# Patient Record
Sex: FEMALE | Race: WHITE | NOT HISPANIC OR LATINO | URBAN - METROPOLITAN AREA
[De-identification: names, ages, dates, MRNs, and addresses within clinical notes are randomized per-mention and may not be internally consistent; named-entity substitution may affect disease eponyms.]

---

## 2017-06-08 ENCOUNTER — INPATIENT (INPATIENT)
Facility: HOSPITAL | Age: 57
LOS: 1 days | Discharge: HOME | End: 2017-06-10
Attending: INTERNAL MEDICINE

## 2017-06-08 DIAGNOSIS — F93.0 SEPARATION ANXIETY DISORDER OF CHILDHOOD: ICD-10-CM

## 2017-06-08 DIAGNOSIS — I48.91 UNSPECIFIED ATRIAL FIBRILLATION: ICD-10-CM

## 2017-06-08 DIAGNOSIS — I10 ESSENTIAL (PRIMARY) HYPERTENSION: ICD-10-CM

## 2017-06-08 DIAGNOSIS — K21.9 GASTRO-ESOPHAGEAL REFLUX DISEASE WITHOUT ESOPHAGITIS: ICD-10-CM

## 2017-06-08 DIAGNOSIS — J45.909 UNSPECIFIED ASTHMA, UNCOMPLICATED: ICD-10-CM

## 2017-06-28 DIAGNOSIS — I48.91 UNSPECIFIED ATRIAL FIBRILLATION: ICD-10-CM

## 2017-06-28 DIAGNOSIS — J45.909 UNSPECIFIED ASTHMA, UNCOMPLICATED: ICD-10-CM

## 2017-06-28 DIAGNOSIS — E66.01 MORBID (SEVERE) OBESITY DUE TO EXCESS CALORIES: ICD-10-CM

## 2017-06-28 DIAGNOSIS — I48.0 PAROXYSMAL ATRIAL FIBRILLATION: ICD-10-CM

## 2017-06-28 DIAGNOSIS — Z79.01 LONG TERM (CURRENT) USE OF ANTICOAGULANTS: ICD-10-CM

## 2017-06-28 DIAGNOSIS — Z71.3 DIETARY COUNSELING AND SURVEILLANCE: ICD-10-CM

## 2017-06-28 DIAGNOSIS — H91.90 UNSPECIFIED HEARING LOSS, UNSPECIFIED EAR: ICD-10-CM

## 2017-06-28 DIAGNOSIS — I10 ESSENTIAL (PRIMARY) HYPERTENSION: ICD-10-CM

## 2017-06-28 DIAGNOSIS — F32.9 MAJOR DEPRESSIVE DISORDER, SINGLE EPISODE, UNSPECIFIED: ICD-10-CM

## 2018-03-29 ENCOUNTER — EMERGENCY (EMERGENCY)
Facility: HOSPITAL | Age: 58
LOS: 0 days | Discharge: HOME | End: 2018-03-30
Attending: EMERGENCY MEDICINE

## 2018-03-29 VITALS
HEART RATE: 83 BPM | RESPIRATION RATE: 17 BRPM | SYSTOLIC BLOOD PRESSURE: 160 MMHG | DIASTOLIC BLOOD PRESSURE: 78 MMHG | HEIGHT: 67 IN | OXYGEN SATURATION: 98 % | TEMPERATURE: 99 F | WEIGHT: 250 LBS

## 2018-03-29 DIAGNOSIS — Z91.041 RADIOGRAPHIC DYE ALLERGY STATUS: ICD-10-CM

## 2018-03-29 DIAGNOSIS — Z79.899 OTHER LONG TERM (CURRENT) DRUG THERAPY: ICD-10-CM

## 2018-03-29 DIAGNOSIS — R10.9 UNSPECIFIED ABDOMINAL PAIN: ICD-10-CM

## 2018-03-29 DIAGNOSIS — N39.0 URINARY TRACT INFECTION, SITE NOT SPECIFIED: ICD-10-CM

## 2018-03-29 DIAGNOSIS — Z91.013 ALLERGY TO SEAFOOD: ICD-10-CM

## 2018-03-29 DIAGNOSIS — Z98.891 HISTORY OF UTERINE SCAR FROM PREVIOUS SURGERY: ICD-10-CM

## 2018-03-29 PROBLEM — Z00.00 ENCOUNTER FOR PREVENTIVE HEALTH EXAMINATION: Status: ACTIVE | Noted: 2018-03-29

## 2018-03-29 LAB
ALBUMIN SERPL ELPH-MCNC: 4.7 G/DL — SIGNIFICANT CHANGE UP (ref 3.5–5.2)
ALP SERPL-CCNC: 82 U/L — SIGNIFICANT CHANGE UP (ref 30–115)
ALT FLD-CCNC: 12 U/L — SIGNIFICANT CHANGE UP (ref 0–41)
ANION GAP SERPL CALC-SCNC: 12 MMOL/L — SIGNIFICANT CHANGE UP (ref 7–14)
APPEARANCE UR: CLEAR — SIGNIFICANT CHANGE UP
AST SERPL-CCNC: 13 U/L — SIGNIFICANT CHANGE UP (ref 0–41)
BACTERIA # UR AUTO: (no result)
BILIRUB SERPL-MCNC: 0.4 MG/DL — SIGNIFICANT CHANGE UP (ref 0.2–1.2)
BILIRUB UR-MCNC: NEGATIVE — SIGNIFICANT CHANGE UP
BUN SERPL-MCNC: 18 MG/DL — SIGNIFICANT CHANGE UP (ref 10–20)
CALCIUM SERPL-MCNC: 10.3 MG/DL — HIGH (ref 8.5–10.1)
CHLORIDE SERPL-SCNC: 101 MMOL/L — SIGNIFICANT CHANGE UP (ref 98–110)
CO2 SERPL-SCNC: 30 MMOL/L — SIGNIFICANT CHANGE UP (ref 17–32)
COD CRY URNS QL: NEGATIVE — SIGNIFICANT CHANGE UP
COLOR SPEC: YELLOW — SIGNIFICANT CHANGE UP
CREAT SERPL-MCNC: 0.8 MG/DL — SIGNIFICANT CHANGE UP (ref 0.7–1.5)
DIFF PNL FLD: (no result)
EPI CELLS # UR: (no result) /HPF
GLUCOSE SERPL-MCNC: 83 MG/DL — SIGNIFICANT CHANGE UP (ref 70–99)
GLUCOSE UR QL: NEGATIVE MG/DL — SIGNIFICANT CHANGE UP
GRAN CASTS # UR COMP ASSIST: NEGATIVE — SIGNIFICANT CHANGE UP
HCT VFR BLD CALC: 40.2 % — SIGNIFICANT CHANGE UP (ref 37–47)
HGB BLD-MCNC: 13.1 G/DL — SIGNIFICANT CHANGE UP (ref 12–16)
HYALINE CASTS # UR AUTO: (no result) /LPF
KETONES UR-MCNC: NEGATIVE — SIGNIFICANT CHANGE UP
LACTATE SERPL-SCNC: 0.9 MMOL/L — SIGNIFICANT CHANGE UP (ref 0.5–2.2)
LEUKOCYTE ESTERASE UR-ACNC: (no result)
LIDOCAIN IGE QN: 25 U/L — SIGNIFICANT CHANGE UP (ref 7–60)
MCHC RBC-ENTMCNC: 26 PG — LOW (ref 27–31)
MCHC RBC-ENTMCNC: 32.6 G/DL — SIGNIFICANT CHANGE UP (ref 32–37)
MCV RBC AUTO: 79.9 FL — LOW (ref 81–99)
NITRITE UR-MCNC: NEGATIVE — SIGNIFICANT CHANGE UP
NRBC # BLD: 0 /100 WBCS — SIGNIFICANT CHANGE UP (ref 0–0)
PH UR: 5.5 — SIGNIFICANT CHANGE UP (ref 5–8)
PLATELET # BLD AUTO: 351 K/UL — SIGNIFICANT CHANGE UP (ref 130–400)
POTASSIUM SERPL-MCNC: 4.2 MMOL/L — SIGNIFICANT CHANGE UP (ref 3.5–5)
POTASSIUM SERPL-SCNC: 4.2 MMOL/L — SIGNIFICANT CHANGE UP (ref 3.5–5)
PROT SERPL-MCNC: 7.5 G/DL — SIGNIFICANT CHANGE UP (ref 6–8)
PROT UR-MCNC: (no result) MG/DL
RBC # BLD: 5.03 M/UL — SIGNIFICANT CHANGE UP (ref 4.2–5.4)
RBC # FLD: 13.3 % — SIGNIFICANT CHANGE UP (ref 11.5–14.5)
RBC CASTS # UR COMP ASSIST: (no result) /HPF
SODIUM SERPL-SCNC: 143 MMOL/L — SIGNIFICANT CHANGE UP (ref 135–146)
SP GR SPEC: >=1.03 (ref 1.01–1.03)
TRI-PHOS CRY UR QL COMP ASSIST: NEGATIVE — SIGNIFICANT CHANGE UP
URATE CRY FLD QL MICRO: NEGATIVE — SIGNIFICANT CHANGE UP
UROBILINOGEN FLD QL: 0.2 MG/DL — SIGNIFICANT CHANGE UP (ref 0.2–0.2)
WBC # BLD: 9.24 K/UL — SIGNIFICANT CHANGE UP (ref 4.8–10.8)
WBC # FLD AUTO: 9.24 K/UL — SIGNIFICANT CHANGE UP (ref 4.8–10.8)
WBC UR QL: SIGNIFICANT CHANGE UP /HPF

## 2018-03-29 RX ORDER — DIATRIZOATE MEGLUMINE 180 MG/ML
20 INJECTION, SOLUTION INTRAVESICAL ONCE
Qty: 0 | Refills: 0 | Status: COMPLETED | OUTPATIENT
Start: 2018-03-29 | End: 2018-03-29

## 2018-03-29 RX ORDER — SODIUM CHLORIDE 9 MG/ML
500 INJECTION INTRAMUSCULAR; INTRAVENOUS; SUBCUTANEOUS ONCE
Qty: 0 | Refills: 0 | Status: COMPLETED | OUTPATIENT
Start: 2018-03-29 | End: 2018-03-29

## 2018-03-29 RX ORDER — SODIUM CHLORIDE 9 MG/ML
1000 INJECTION INTRAMUSCULAR; INTRAVENOUS; SUBCUTANEOUS ONCE
Qty: 0 | Refills: 0 | Status: COMPLETED | OUTPATIENT
Start: 2018-03-29 | End: 2018-03-29

## 2018-03-29 RX ADMIN — SODIUM CHLORIDE 500 MILLILITER(S): 9 INJECTION INTRAMUSCULAR; INTRAVENOUS; SUBCUTANEOUS at 23:30

## 2018-03-29 RX ADMIN — DIATRIZOATE MEGLUMINE 20 MILLILITER(S): 180 INJECTION, SOLUTION INTRAVESICAL at 20:53

## 2018-03-29 RX ADMIN — SODIUM CHLORIDE 1000 MILLILITER(S): 9 INJECTION INTRAMUSCULAR; INTRAVENOUS; SUBCUTANEOUS at 20:54

## 2018-03-29 NOTE — ED PROVIDER NOTE - MEDICAL DECISION MAKING DETAILS
pt reports feeling better, tolerated po in ed, aware of all labs and imaging, will give copy of results as reviewed and understood, aware of u.a. agrees with abx as reports similar to when she had uti in past, will send abx to pharmacy, aware of signs and symptoms to return for, will follow up with pmd as well, abd reexam soft ndnt no rebound tenderness.

## 2018-03-29 NOTE — ED ADULT NURSE NOTE - PMH
Bilateral carpal tunnel syndrome    Closed fracture of foot, unspecified laterality, sequela    History of  section

## 2018-03-29 NOTE — ED PROVIDER NOTE - CARE PLAN
Assessment and plan of treatment:	Plan: Labs, ivf, reports pain controlled at this time. u.a. + culture, imaging with oral contrast as pt cannot have iv contrast due to anaphylaxis, jakob mojicao, reassess. Principal Discharge DX:	UTI (urinary tract infection)  Assessment and plan of treatment:	Plan: Labs, ivf, reports pain controlled at this time. u.a. + culture, imaging with oral contrast as pt cannot have iv contrast due to anaphylaxis, ruq sono, reassess.  Secondary Diagnosis:	Abdominal pain

## 2018-03-29 NOTE — ED PROVIDER NOTE - PROGRESS NOTE DETAILS
pt reports can have oral contrast, states cannot have IV contrast, has had oral contrast in the past. states no IV due to anaphylaxis to IV contrast. pt aware and agrees with current plan, will continue to monitor and reassess.

## 2018-03-29 NOTE — ED PROVIDER NOTE - PLAN OF CARE
Plan: Labs, ivf, reports pain controlled at this time. u.a. + culture, imaging with oral contrast as pt cannot have iv contrast due to anaphylaxis, ruryan mojicao, reassess.

## 2018-03-29 NOTE — ED PROVIDER NOTE - PHYSICAL EXAMINATION
on exam: WDWN appearing female sitting on stretcher in nad, no rash, mmm, regular rate, radial pulses 2/4 b/l, no jvd, ctabl w/ breath sounds present b/l, no wheezing or crackles, good air exchange, good respiratory effort, no accessory muscle use, no tachypnea, no stridor, bs present throughout all 4 quadrants, abd soft, nd, tenderness to palpation to RUQ and lower mid abdomen, (-) murphys (-) rovsing (-) obturator (-) psoas  no rebound tenderness or guarding, no cvat, no pelvic pain to palpation, FROM of ext, no edema, no calf pain/swelling/erythema, AAOx3.

## 2018-03-29 NOTE — ED PROVIDER NOTE - OBJECTIVE STATEMENT
A 58 y/o f w/ pmhx of afib on xarelto, pyelonephritis, diverticulitis, asthma, b/l hearing aides presents for generalized abd pain x 4 days, worsening associated w/ R flank pain and subjective fever. went to urgent care center and was sent to ed for imaging and evaluation. denies chills, n/v, cp, sob, pleuritic chest pain, palpitations, diaphoresis, cough, diarrhea, constipation, melena/brbpr, urinary symptoms, back/ flank pain, vaginal bleeding/discharge/odor, sick contacts, recent travel or rash. had been having normal bm.

## 2018-03-30 VITALS
HEART RATE: 67 BPM | SYSTOLIC BLOOD PRESSURE: 175 MMHG | OXYGEN SATURATION: 99 % | TEMPERATURE: 97 F | DIASTOLIC BLOOD PRESSURE: 91 MMHG | RESPIRATION RATE: 18 BRPM

## 2018-03-30 RX ORDER — SACCHAROMYCES BOULARDII 250 MG
1 POWDER IN PACKET (EA) ORAL
Qty: 7 | Refills: 0 | OUTPATIENT
Start: 2018-03-30 | End: 2018-04-05

## 2018-03-30 RX ORDER — CEFPODOXIME PROXETIL 100 MG
1 TABLET ORAL
Qty: 14 | Refills: 0 | OUTPATIENT
Start: 2018-03-30 | End: 2018-04-05

## 2018-09-02 ENCOUNTER — EMERGENCY (EMERGENCY)
Facility: HOSPITAL | Age: 58
LOS: 0 days | Discharge: HOME | End: 2018-09-02
Attending: EMERGENCY MEDICINE | Admitting: EMERGENCY MEDICINE

## 2018-09-02 VITALS — HEIGHT: 67 IN | WEIGHT: 250 LBS

## 2018-09-02 VITALS — RESPIRATION RATE: 16 BRPM | TEMPERATURE: 96 F | HEART RATE: 68 BPM

## 2018-09-02 DIAGNOSIS — J45.909 UNSPECIFIED ASTHMA, UNCOMPLICATED: ICD-10-CM

## 2018-09-02 DIAGNOSIS — Z98.890 OTHER SPECIFIED POSTPROCEDURAL STATES: ICD-10-CM

## 2018-09-02 DIAGNOSIS — Z79.51 LONG TERM (CURRENT) USE OF INHALED STEROIDS: ICD-10-CM

## 2018-09-02 DIAGNOSIS — Z79.899 OTHER LONG TERM (CURRENT) DRUG THERAPY: ICD-10-CM

## 2018-09-02 DIAGNOSIS — I48.91 UNSPECIFIED ATRIAL FIBRILLATION: ICD-10-CM

## 2018-09-02 DIAGNOSIS — Z87.891 PERSONAL HISTORY OF NICOTINE DEPENDENCE: ICD-10-CM

## 2018-09-02 DIAGNOSIS — Z79.01 LONG TERM (CURRENT) USE OF ANTICOAGULANTS: ICD-10-CM

## 2018-09-02 DIAGNOSIS — R42 DIZZINESS AND GIDDINESS: ICD-10-CM

## 2018-09-02 DIAGNOSIS — R51 HEADACHE: ICD-10-CM

## 2018-09-02 DIAGNOSIS — Z79.2 LONG TERM (CURRENT) USE OF ANTIBIOTICS: ICD-10-CM

## 2018-09-02 DIAGNOSIS — Z91.048 OTHER NONMEDICINAL SUBSTANCE ALLERGY STATUS: ICD-10-CM

## 2018-09-02 DIAGNOSIS — Z91.013 ALLERGY TO SEAFOOD: ICD-10-CM

## 2018-09-02 DIAGNOSIS — I10 ESSENTIAL (PRIMARY) HYPERTENSION: ICD-10-CM

## 2018-09-02 PROBLEM — Z98.891 HISTORY OF UTERINE SCAR FROM PREVIOUS SURGERY: Chronic | Status: ACTIVE | Noted: 2018-03-29

## 2018-09-02 PROBLEM — G56.03 CARPAL TUNNEL SYNDROME, BILATERAL UPPER LIMBS: Chronic | Status: ACTIVE | Noted: 2018-03-29

## 2018-09-02 PROBLEM — S92.909S: Chronic | Status: ACTIVE | Noted: 2018-03-29

## 2018-09-02 LAB
ALBUMIN SERPL ELPH-MCNC: 4.2 G/DL — SIGNIFICANT CHANGE UP (ref 3.5–5.2)
ALP SERPL-CCNC: 87 U/L — SIGNIFICANT CHANGE UP (ref 30–115)
ALT FLD-CCNC: 14 U/L — SIGNIFICANT CHANGE UP (ref 0–41)
ANION GAP SERPL CALC-SCNC: 10 MMOL/L — SIGNIFICANT CHANGE UP (ref 7–14)
AST SERPL-CCNC: 14 U/L — SIGNIFICANT CHANGE UP (ref 0–41)
BASOPHILS # BLD AUTO: 0.06 K/UL — SIGNIFICANT CHANGE UP (ref 0–0.2)
BASOPHILS NFR BLD AUTO: 0.8 % — SIGNIFICANT CHANGE UP (ref 0–1)
BILIRUB SERPL-MCNC: 0.4 MG/DL — SIGNIFICANT CHANGE UP (ref 0.2–1.2)
BUN SERPL-MCNC: 13 MG/DL — SIGNIFICANT CHANGE UP (ref 10–20)
CALCIUM SERPL-MCNC: 9.6 MG/DL — SIGNIFICANT CHANGE UP (ref 8.5–10.1)
CHLORIDE SERPL-SCNC: 103 MMOL/L — SIGNIFICANT CHANGE UP (ref 98–110)
CO2 SERPL-SCNC: 28 MMOL/L — SIGNIFICANT CHANGE UP (ref 17–32)
CREAT SERPL-MCNC: 0.9 MG/DL — SIGNIFICANT CHANGE UP (ref 0.7–1.5)
EOSINOPHIL # BLD AUTO: 0.33 K/UL — SIGNIFICANT CHANGE UP (ref 0–0.7)
EOSINOPHIL NFR BLD AUTO: 4.6 % — SIGNIFICANT CHANGE UP (ref 0–8)
GLUCOSE SERPL-MCNC: 100 MG/DL — HIGH (ref 70–99)
HCT VFR BLD CALC: 38.5 % — SIGNIFICANT CHANGE UP (ref 37–47)
HGB BLD-MCNC: 12.4 G/DL — SIGNIFICANT CHANGE UP (ref 12–16)
IMM GRANULOCYTES NFR BLD AUTO: 0.1 % — SIGNIFICANT CHANGE UP (ref 0.1–0.3)
LACTATE SERPL-SCNC: 1.3 MMOL/L — SIGNIFICANT CHANGE UP (ref 0.5–2.2)
LYMPHOCYTES # BLD AUTO: 1.75 K/UL — SIGNIFICANT CHANGE UP (ref 1.2–3.4)
LYMPHOCYTES # BLD AUTO: 24.4 % — SIGNIFICANT CHANGE UP (ref 20.5–51.1)
MAGNESIUM SERPL-MCNC: 2.1 MG/DL — SIGNIFICANT CHANGE UP (ref 1.8–2.4)
MCHC RBC-ENTMCNC: 25.7 PG — LOW (ref 27–31)
MCHC RBC-ENTMCNC: 32.2 G/DL — SIGNIFICANT CHANGE UP (ref 32–37)
MCV RBC AUTO: 79.9 FL — LOW (ref 81–99)
MONOCYTES # BLD AUTO: 0.53 K/UL — SIGNIFICANT CHANGE UP (ref 0.1–0.6)
MONOCYTES NFR BLD AUTO: 7.4 % — SIGNIFICANT CHANGE UP (ref 1.7–9.3)
NEUTROPHILS # BLD AUTO: 4.5 K/UL — SIGNIFICANT CHANGE UP (ref 1.4–6.5)
NEUTROPHILS NFR BLD AUTO: 62.7 % — SIGNIFICANT CHANGE UP (ref 42.2–75.2)
NRBC # BLD: 0 /100 WBCS — SIGNIFICANT CHANGE UP (ref 0–0)
PLATELET # BLD AUTO: 277 K/UL — SIGNIFICANT CHANGE UP (ref 130–400)
POTASSIUM SERPL-MCNC: 4.1 MMOL/L — SIGNIFICANT CHANGE UP (ref 3.5–5)
POTASSIUM SERPL-SCNC: 4.1 MMOL/L — SIGNIFICANT CHANGE UP (ref 3.5–5)
PROT SERPL-MCNC: 7.3 G/DL — SIGNIFICANT CHANGE UP (ref 6–8)
RBC # BLD: 4.82 M/UL — SIGNIFICANT CHANGE UP (ref 4.2–5.4)
RBC # FLD: 13.3 % — SIGNIFICANT CHANGE UP (ref 11.5–14.5)
SODIUM SERPL-SCNC: 141 MMOL/L — SIGNIFICANT CHANGE UP (ref 135–146)
TROPONIN T SERPL-MCNC: <0.01 NG/ML — SIGNIFICANT CHANGE UP
WBC # BLD: 7.18 K/UL — SIGNIFICANT CHANGE UP (ref 4.8–10.8)
WBC # FLD AUTO: 7.18 K/UL — SIGNIFICANT CHANGE UP (ref 4.8–10.8)

## 2018-09-02 RX ORDER — SODIUM CHLORIDE 9 MG/ML
1000 INJECTION INTRAMUSCULAR; INTRAVENOUS; SUBCUTANEOUS ONCE
Qty: 0 | Refills: 0 | Status: COMPLETED | OUTPATIENT
Start: 2018-09-02 | End: 2018-09-02

## 2018-09-02 RX ORDER — MECLIZINE HCL 12.5 MG
50 TABLET ORAL ONCE
Qty: 0 | Refills: 0 | Status: COMPLETED | OUTPATIENT
Start: 2018-09-02 | End: 2018-09-02

## 2018-09-02 RX ADMIN — Medication 50 MILLIGRAM(S): at 16:12

## 2018-09-02 RX ADMIN — SODIUM CHLORIDE 1000 MILLILITER(S): 9 INJECTION INTRAMUSCULAR; INTRAVENOUS; SUBCUTANEOUS at 16:12

## 2018-09-02 NOTE — ED ADULT TRIAGE NOTE - CHIEF COMPLAINT QUOTE
patient states since Tuesday she has headache, dizziness, went to PMD on Weds had carotid doppler, EKG and Blood work "and everything looked ok" states today the symptoms returned and went to urgent care and was told to come the ER for further evaluation

## 2018-09-02 NOTE — ED PROVIDER NOTE - ATTENDING CONTRIBUTION TO CARE
57y f hx asthma, HTN, paroxysmal afib on xarelto. Presents w dizziness x 1 week, described as near syncopal sensation. No actual syncope. + accompanying mild, frontal HA, improving since earlier. No hx head trauma. No neck pain. No vision or hearing changes. No room spinning sensation. No CP, SOB. No abdominal pain, n/v/d. No fever, chills, or recent illness. Exam: WDWN NAD comfortable appearing and conversing appropriately. NCAT neck FROM no ttp and no meningismus. Skin warm and dry. HEENT WNL, MMM. S1S2 RRR, equal pulses b/l, lungs CTAB, no w/r/r, abdomen soft NTND, no r/g, no CVAT, no suprapubic ttp. No LE edema. A&O, normal strength and sensation, a&Ox3, CN ii-xii intact, no dysmetria, no pronator drift, 5/5 strength UE and LE b/l, normal sensation, no aphasia, no dysarthria, no droop, normal gait. A/P - dizziness - labs, imaging, reassess.

## 2018-09-02 NOTE — ED PROVIDER NOTE - OBJECTIVE STATEMENT
58 y/o F with PMH afib on xarelto, vertigo, optic neuritis, migraines, former tobacco user, asthma, HTN presents with room spinning sensation intermittently x 5 days. presented to PMD who ordered labs and carotid doppler in 1 wk from today. presented to [facility] today who sent her here for further eval . +frontal HA x 1 wk. Denies CP, palpitations, SOB, back pain, abdominal pain, n/v/d, black or bloody stools, fevers, sweats, chills, vision changes, trauma, fall, cough, recent travel, recent illness, sick contacts, leg pain/swelling, urinary symptoms, rash.

## 2018-09-02 NOTE — ED ADULT NURSE NOTE - PMH
Afib    Asthma    Bilateral carpal tunnel syndrome    Closed fracture of foot, unspecified laterality, sequela    Essential hypertension    History of  section

## 2018-09-02 NOTE — ED PROVIDER NOTE - NS ED ROS FT
Review of Systems    Constitutional: (-) fever  Cardiovascular: (-) chest pain, (-) syncope  Respiratory: (-) cough, (-) shortness of breath  Gastrointestinal: (-) vomiting, (-) diarrhea  Musculoskeletal: (-) neck pain, (-) back pain  Integumentary: (-) rash, (-) edema  Neurological: (+) headache

## 2018-09-02 NOTE — ED PROVIDER NOTE - MEDICAL DECISION MAKING DETAILS
patient feeling improved, eager to go home. has close f/u with Dr. Hernandez and is already scheduled for diagnostic testing. Neuro intact. Will dc. Patient agreeable to plan. Strict return precautions given.

## 2018-09-02 NOTE — ED ADULT NURSE NOTE - NSIMPLEMENTINTERV_GEN_ALL_ED
Implemented All Universal Safety Interventions:  [place] to call system. Call bell, personal items and telephone within reach. Instruct patient to call for assistance. Room bathroom lighting operational. Non-slip footwear when patient is off stretcher. Physically safe environment: no spills, clutter or unnecessary equipment. Stretcher in lowest position, wheels locked, appropriate side rails in place.

## 2018-12-13 NOTE — ED ADULT NURSE NOTE - TEMPLATE
Number received from peer to peer.      [phone]  Good from 12/13/18 till 02/10/2019    Pa pool notified.   Abdominal Pain, N/V/D

## 2019-01-06 ENCOUNTER — INPATIENT (INPATIENT)
Facility: HOSPITAL | Age: 59
LOS: 1 days | Discharge: HOME | End: 2019-01-08
Attending: INTERNAL MEDICINE | Admitting: INTERNAL MEDICINE
Payer: COMMERCIAL

## 2019-01-06 VITALS
OXYGEN SATURATION: 99 % | HEIGHT: 67 IN | WEIGHT: 250 LBS | HEART RATE: 84 BPM | TEMPERATURE: 98 F | SYSTOLIC BLOOD PRESSURE: 201 MMHG | RESPIRATION RATE: 20 BRPM | DIASTOLIC BLOOD PRESSURE: 87 MMHG

## 2019-01-06 DIAGNOSIS — I48.91 UNSPECIFIED ATRIAL FIBRILLATION: ICD-10-CM

## 2019-01-06 DIAGNOSIS — H91.90 UNSPECIFIED HEARING LOSS, UNSPECIFIED EAR: ICD-10-CM

## 2019-01-06 DIAGNOSIS — J45.902 UNSPECIFIED ASTHMA WITH STATUS ASTHMATICUS: ICD-10-CM

## 2019-01-06 DIAGNOSIS — Z91.013 ALLERGY TO SEAFOOD: ICD-10-CM

## 2019-01-06 DIAGNOSIS — Z23 ENCOUNTER FOR IMMUNIZATION: ICD-10-CM

## 2019-01-06 DIAGNOSIS — R06.02 SHORTNESS OF BREATH: ICD-10-CM

## 2019-01-06 DIAGNOSIS — G56.03 CARPAL TUNNEL SYNDROME, BILATERAL UPPER LIMBS: ICD-10-CM

## 2019-01-06 DIAGNOSIS — G47.33 OBSTRUCTIVE SLEEP APNEA (ADULT) (PEDIATRIC): ICD-10-CM

## 2019-01-06 DIAGNOSIS — R06.00 DYSPNEA, UNSPECIFIED: ICD-10-CM

## 2019-01-06 DIAGNOSIS — I10 ESSENTIAL (PRIMARY) HYPERTENSION: ICD-10-CM

## 2019-01-06 DIAGNOSIS — B34.9 VIRAL INFECTION, UNSPECIFIED: ICD-10-CM

## 2019-01-06 DIAGNOSIS — Z88.9 ALLERGY STATUS TO UNSPECIFIED DRUGS, MEDICAMENTS AND BIOLOGICAL SUBSTANCES: ICD-10-CM

## 2019-01-06 PROBLEM — J45.909 UNSPECIFIED ASTHMA, UNCOMPLICATED: Chronic | Status: ACTIVE | Noted: 2018-09-02

## 2019-01-06 LAB
ALBUMIN SERPL ELPH-MCNC: 4.3 G/DL — SIGNIFICANT CHANGE UP (ref 3.5–5.2)
ALP SERPL-CCNC: 92 U/L — SIGNIFICANT CHANGE UP (ref 30–115)
ALT FLD-CCNC: 14 U/L — SIGNIFICANT CHANGE UP (ref 0–41)
ANION GAP SERPL CALC-SCNC: 9 MMOL/L — SIGNIFICANT CHANGE UP (ref 7–14)
APTT BLD: 31.9 SEC — SIGNIFICANT CHANGE UP (ref 27–39.2)
AST SERPL-CCNC: 15 U/L — SIGNIFICANT CHANGE UP (ref 0–41)
BASOPHILS # BLD AUTO: 0.07 K/UL — SIGNIFICANT CHANGE UP (ref 0–0.2)
BASOPHILS NFR BLD AUTO: 0.9 % — SIGNIFICANT CHANGE UP (ref 0–1)
BILIRUB SERPL-MCNC: 0.5 MG/DL — SIGNIFICANT CHANGE UP (ref 0.2–1.2)
BUN SERPL-MCNC: 12 MG/DL — SIGNIFICANT CHANGE UP (ref 10–20)
CALCIUM SERPL-MCNC: 10.2 MG/DL — HIGH (ref 8.5–10.1)
CHLORIDE SERPL-SCNC: 103 MMOL/L — SIGNIFICANT CHANGE UP (ref 98–110)
CO2 SERPL-SCNC: 30 MMOL/L — SIGNIFICANT CHANGE UP (ref 17–32)
CREAT SERPL-MCNC: 0.8 MG/DL — SIGNIFICANT CHANGE UP (ref 0.7–1.5)
D DIMER BLD IA.RAPID-MCNC: 88 NG/ML DDU — SIGNIFICANT CHANGE UP (ref 0–230)
EOSINOPHIL # BLD AUTO: 0.42 K/UL — SIGNIFICANT CHANGE UP (ref 0–0.7)
EOSINOPHIL NFR BLD AUTO: 5.3 % — SIGNIFICANT CHANGE UP (ref 0–8)
GLUCOSE SERPL-MCNC: 94 MG/DL — SIGNIFICANT CHANGE UP (ref 70–99)
HCT VFR BLD CALC: 41 % — SIGNIFICANT CHANGE UP (ref 37–47)
HGB BLD-MCNC: 13.2 G/DL — SIGNIFICANT CHANGE UP (ref 12–16)
IMM GRANULOCYTES NFR BLD AUTO: 0.1 % — SIGNIFICANT CHANGE UP (ref 0.1–0.3)
INR BLD: 1.22 RATIO — SIGNIFICANT CHANGE UP (ref 0.65–1.3)
LACTATE SERPL-SCNC: 0.9 MMOL/L — SIGNIFICANT CHANGE UP (ref 0.5–2.2)
LYMPHOCYTES # BLD AUTO: 1.66 K/UL — SIGNIFICANT CHANGE UP (ref 1.2–3.4)
LYMPHOCYTES # BLD AUTO: 21 % — SIGNIFICANT CHANGE UP (ref 20.5–51.1)
MAGNESIUM SERPL-MCNC: 2.2 MG/DL — SIGNIFICANT CHANGE UP (ref 1.8–2.4)
MCHC RBC-ENTMCNC: 25.3 PG — LOW (ref 27–31)
MCHC RBC-ENTMCNC: 32.2 G/DL — SIGNIFICANT CHANGE UP (ref 32–37)
MCV RBC AUTO: 78.5 FL — LOW (ref 81–99)
MONOCYTES # BLD AUTO: 0.53 K/UL — SIGNIFICANT CHANGE UP (ref 0.1–0.6)
MONOCYTES NFR BLD AUTO: 6.7 % — SIGNIFICANT CHANGE UP (ref 1.7–9.3)
NEUTROPHILS # BLD AUTO: 5.22 K/UL — SIGNIFICANT CHANGE UP (ref 1.4–6.5)
NEUTROPHILS NFR BLD AUTO: 66 % — SIGNIFICANT CHANGE UP (ref 42.2–75.2)
NRBC # BLD: 0 /100 WBCS — SIGNIFICANT CHANGE UP (ref 0–0)
NT-PROBNP SERPL-SCNC: 217 PG/ML — SIGNIFICANT CHANGE UP (ref 0–300)
PLATELET # BLD AUTO: 309 K/UL — SIGNIFICANT CHANGE UP (ref 130–400)
POTASSIUM SERPL-MCNC: 4.5 MMOL/L — SIGNIFICANT CHANGE UP (ref 3.5–5)
POTASSIUM SERPL-SCNC: 4.5 MMOL/L — SIGNIFICANT CHANGE UP (ref 3.5–5)
PROT SERPL-MCNC: 7.5 G/DL — SIGNIFICANT CHANGE UP (ref 6–8)
PROTHROM AB SERPL-ACNC: 13.2 SEC — HIGH (ref 9.95–12.87)
RBC # BLD: 5.22 M/UL — SIGNIFICANT CHANGE UP (ref 4.2–5.4)
RBC # FLD: 13.1 % — SIGNIFICANT CHANGE UP (ref 11.5–14.5)
SODIUM SERPL-SCNC: 142 MMOL/L — SIGNIFICANT CHANGE UP (ref 135–146)
TROPONIN T SERPL-MCNC: <0.01 NG/ML — SIGNIFICANT CHANGE UP
WBC # BLD: 7.91 K/UL — SIGNIFICANT CHANGE UP (ref 4.8–10.8)
WBC # FLD AUTO: 7.91 K/UL — SIGNIFICANT CHANGE UP (ref 4.8–10.8)

## 2019-01-06 PROCEDURE — 93970 EXTREMITY STUDY: CPT | Mod: 26

## 2019-01-06 RX ORDER — ALBUTEROL 90 UG/1
1 AEROSOL, METERED ORAL EVERY 4 HOURS
Qty: 0 | Refills: 0 | Status: DISCONTINUED | OUTPATIENT
Start: 2019-01-06 | End: 2019-01-08

## 2019-01-06 RX ORDER — ALBUTEROL 90 UG/1
2.5 AEROSOL, METERED ORAL ONCE
Qty: 0 | Refills: 0 | Status: COMPLETED | OUTPATIENT
Start: 2019-01-06 | End: 2019-01-06

## 2019-01-06 RX ORDER — ESCITALOPRAM OXALATE 10 MG/1
10 TABLET, FILM COATED ORAL AT BEDTIME
Qty: 0 | Refills: 0 | Status: DISCONTINUED | OUTPATIENT
Start: 2019-01-06 | End: 2019-01-08

## 2019-01-06 RX ORDER — IPRATROPIUM/ALBUTEROL SULFATE 18-103MCG
3 AEROSOL WITH ADAPTER (GRAM) INHALATION ONCE
Qty: 0 | Refills: 0 | Status: COMPLETED | OUTPATIENT
Start: 2019-01-06 | End: 2019-01-06

## 2019-01-06 RX ORDER — HYDROCHLOROTHIAZIDE 25 MG
12.5 TABLET ORAL AT BEDTIME
Qty: 0 | Refills: 0 | Status: DISCONTINUED | OUTPATIENT
Start: 2019-01-06 | End: 2019-01-08

## 2019-01-06 RX ORDER — LOSARTAN POTASSIUM 100 MG/1
50 TABLET, FILM COATED ORAL AT BEDTIME
Qty: 0 | Refills: 0 | Status: DISCONTINUED | OUTPATIENT
Start: 2019-01-06 | End: 2019-01-08

## 2019-01-06 RX ORDER — LOSARTAN POTASSIUM 100 MG/1
50 TABLET, FILM COATED ORAL DAILY
Qty: 0 | Refills: 0 | Status: DISCONTINUED | OUTPATIENT
Start: 2019-01-06 | End: 2019-01-06

## 2019-01-06 RX ORDER — ACETAMINOPHEN 500 MG
650 TABLET ORAL EVERY 6 HOURS
Qty: 0 | Refills: 0 | Status: DISCONTINUED | OUTPATIENT
Start: 2019-01-06 | End: 2019-01-08

## 2019-01-06 RX ORDER — ESCITALOPRAM OXALATE 10 MG/1
10 TABLET, FILM COATED ORAL DAILY
Qty: 0 | Refills: 0 | Status: DISCONTINUED | OUTPATIENT
Start: 2019-01-06 | End: 2019-01-06

## 2019-01-06 RX ORDER — TIOTROPIUM BROMIDE 18 UG/1
1 CAPSULE ORAL; RESPIRATORY (INHALATION) DAILY
Qty: 0 | Refills: 0 | Status: DISCONTINUED | OUTPATIENT
Start: 2019-01-06 | End: 2019-01-08

## 2019-01-06 RX ORDER — RIVAROXABAN 15 MG-20MG
20 KIT ORAL EVERY 24 HOURS
Qty: 0 | Refills: 0 | Status: DISCONTINUED | OUTPATIENT
Start: 2019-01-06 | End: 2019-01-08

## 2019-01-06 RX ORDER — DILTIAZEM HCL 120 MG
180 CAPSULE, EXT RELEASE 24 HR ORAL AT BEDTIME
Qty: 0 | Refills: 0 | Status: DISCONTINUED | OUTPATIENT
Start: 2019-01-06 | End: 2019-01-08

## 2019-01-06 RX ORDER — DILTIAZEM HCL 120 MG
180 CAPSULE, EXT RELEASE 24 HR ORAL DAILY
Qty: 0 | Refills: 0 | Status: DISCONTINUED | OUTPATIENT
Start: 2019-01-06 | End: 2019-01-06

## 2019-01-06 RX ORDER — HYDROCHLOROTHIAZIDE 25 MG
12.5 TABLET ORAL DAILY
Qty: 0 | Refills: 0 | Status: DISCONTINUED | OUTPATIENT
Start: 2019-01-06 | End: 2019-01-06

## 2019-01-06 RX ORDER — IPRATROPIUM/ALBUTEROL SULFATE 18-103MCG
3 AEROSOL WITH ADAPTER (GRAM) INHALATION EVERY 6 HOURS
Qty: 0 | Refills: 0 | Status: DISCONTINUED | OUTPATIENT
Start: 2019-01-06 | End: 2019-01-08

## 2019-01-06 RX ADMIN — ALBUTEROL 2.5 MILLIGRAM(S): 90 AEROSOL, METERED ORAL at 16:59

## 2019-01-06 RX ADMIN — Medication 125 MILLIGRAM(S): at 20:40

## 2019-01-06 RX ADMIN — Medication 3 MILLILITER(S): at 16:58

## 2019-01-06 RX ADMIN — ALBUTEROL 2.5 MILLIGRAM(S): 90 AEROSOL, METERED ORAL at 20:39

## 2019-01-06 NOTE — ED PROVIDER NOTE - PROGRESS NOTE DETAILS
Dr Hernandez accepts patient to non ICU tele, Med ELAINE Huffman aware of admission with F/u DHAVALO.

## 2019-01-06 NOTE — ED ADULT NURSE NOTE - NSIMPLEMENTINTERV_GEN_ALL_ED
Implemented All Universal Safety Interventions:  [place] to call system. Call bell, personal items and telephone within reach. Instruct patient to call for assistance. Room bathroom lighting operational. Non-slip footwear when patient is off stretcher. Physically safe environment: no spills, clutter or unnecessary equipment. Stretcher in lowest position, wheels locked, appropriate side rails in place.

## 2019-01-06 NOTE — H&P ADULT - HISTORY OF PRESENT ILLNESS
59 y/o female with 2 weeks exertional dyspnea/decreased ET, no palpitations, no URI symptoms that usually accompany her asthma/bronchitis, has been using albuterol around the clock with no relief, no fever, no CP, is anticoagulated for 2 episodes of afib, no h/o DVT/PE,

## 2019-01-06 NOTE — ED PROVIDER NOTE - OBJECTIVE STATEMENT
SOB, MURCIA, worsening over past 2 weeks. No fever, slight cough, Using nebs at home with slight improvement. no fever, no chest pain. H/o asthma and A fib, no h/o DVT/PE.

## 2019-01-06 NOTE — ED PROVIDER NOTE - MEDICAL DECISION MAKING DETAILS
Pt with above PMH with 2 weeks exertional dyspnea/decreased ET, no palpitations, no URI symptoms that usually accompany her asthma/bronchitis, has been using albuterol around the clock with no relief, no fever, no CP, is anticoagulated for 2 episodes of afib, no h/o DVT/PE, on exam vital signs appreciated, comfortable at rest but dyspneic on exertion, obese, neck supple cor rrr lungs cta with good air entry abd +bs, snt/nd, no c/c/e calves nontender, labs and studies reviewed, pt felt better after nebs, lung exam unchanged, however sob/tightness returned on ambulation, will admit for pulm/cardio eval

## 2019-01-06 NOTE — ED ADULT TRIAGE NOTE - CHIEF COMPLAINT QUOTE
"I have a history of asthma. I have been short of breath for a few weeks. I have been using my nebulizer and inhaler with no relief." Pt also took Prednisone 5 mg today with no relief. "I have a history of asthma. I have been short of breath for a few weeks. I have been using my nebulizer and inhaler with no relief." Pt also took Prednisone 5 mg today with no relief. Peak Flow 150 upon arrival to ER.

## 2019-01-06 NOTE — ED ADULT NURSE NOTE - CHIEF COMPLAINT QUOTE
"I have a history of asthma. I have been short of breath for a few weeks. I have been using my nebulizer and inhaler with no relief." Pt also took Prednisone 5 mg today with no relief.

## 2019-01-06 NOTE — ED ADULT NURSE NOTE - PMH
Afib    Asthma    Bilateral carpal tunnel syndrome    Closed fracture of foot, unspecified laterality, sequela    Essential hypertension    Hearing aid worn    Hearing decreased    History of  section

## 2019-01-06 NOTE — H&P ADULT - ASSESSMENT
Pt with above PMH with 2 weeks exertional dyspnea/decreased ET, no palpitations, no URI symptoms that usually accompany her asthma/bronchitis, has been using albuterol around the clock with no relief, no fever, no CP, is anticoagulated for 2 episodes of afib, no h/o DVT/PE,

## 2019-01-07 LAB
ANION GAP SERPL CALC-SCNC: 11 MMOL/L — SIGNIFICANT CHANGE UP (ref 7–14)
BASOPHILS # BLD AUTO: 0.01 K/UL — SIGNIFICANT CHANGE UP (ref 0–0.2)
BASOPHILS NFR BLD AUTO: 0.1 % — SIGNIFICANT CHANGE UP (ref 0–1)
BUN SERPL-MCNC: 15 MG/DL — SIGNIFICANT CHANGE UP (ref 10–20)
CALCIUM SERPL-MCNC: 10.5 MG/DL — HIGH (ref 8.5–10.1)
CHLORIDE SERPL-SCNC: 105 MMOL/L — SIGNIFICANT CHANGE UP (ref 98–110)
CK MB CFR SERPL CALC: 2 NG/ML — SIGNIFICANT CHANGE UP (ref 0.6–6.3)
CK SERPL-CCNC: 66 U/L — SIGNIFICANT CHANGE UP (ref 0–225)
CO2 SERPL-SCNC: 26 MMOL/L — SIGNIFICANT CHANGE UP (ref 17–32)
CREAT SERPL-MCNC: 0.8 MG/DL — SIGNIFICANT CHANGE UP (ref 0.7–1.5)
EOSINOPHIL # BLD AUTO: 0 K/UL — SIGNIFICANT CHANGE UP (ref 0–0.7)
EOSINOPHIL NFR BLD AUTO: 0 % — SIGNIFICANT CHANGE UP (ref 0–8)
GLUCOSE SERPL-MCNC: 183 MG/DL — HIGH (ref 70–99)
HCT VFR BLD CALC: 40.9 % — SIGNIFICANT CHANGE UP (ref 37–47)
HGB BLD-MCNC: 13.2 G/DL — SIGNIFICANT CHANGE UP (ref 12–16)
IMM GRANULOCYTES NFR BLD AUTO: 0.6 % — HIGH (ref 0.1–0.3)
LYMPHOCYTES # BLD AUTO: 0.71 K/UL — LOW (ref 1.2–3.4)
LYMPHOCYTES # BLD AUTO: 5.6 % — LOW (ref 20.5–51.1)
MAGNESIUM SERPL-MCNC: 2.1 MG/DL — SIGNIFICANT CHANGE UP (ref 1.8–2.4)
MCHC RBC-ENTMCNC: 25.6 PG — LOW (ref 27–31)
MCHC RBC-ENTMCNC: 32.3 G/DL — SIGNIFICANT CHANGE UP (ref 32–37)
MCV RBC AUTO: 79.3 FL — LOW (ref 81–99)
MONOCYTES # BLD AUTO: 0.11 K/UL — SIGNIFICANT CHANGE UP (ref 0.1–0.6)
MONOCYTES NFR BLD AUTO: 0.9 % — LOW (ref 1.7–9.3)
NEUTROPHILS # BLD AUTO: 11.79 K/UL — HIGH (ref 1.4–6.5)
NEUTROPHILS NFR BLD AUTO: 92.8 % — HIGH (ref 42.2–75.2)
NRBC # BLD: 0 /100 WBCS — SIGNIFICANT CHANGE UP (ref 0–0)
PLATELET # BLD AUTO: 318 K/UL — SIGNIFICANT CHANGE UP (ref 130–400)
POTASSIUM SERPL-MCNC: 4.4 MMOL/L — SIGNIFICANT CHANGE UP (ref 3.5–5)
POTASSIUM SERPL-SCNC: 4.4 MMOL/L — SIGNIFICANT CHANGE UP (ref 3.5–5)
RBC # BLD: 5.16 M/UL — SIGNIFICANT CHANGE UP (ref 4.2–5.4)
RBC # FLD: 13.3 % — SIGNIFICANT CHANGE UP (ref 11.5–14.5)
SODIUM SERPL-SCNC: 142 MMOL/L — SIGNIFICANT CHANGE UP (ref 135–146)
TROPONIN T SERPL-MCNC: <0.01 NG/ML — SIGNIFICANT CHANGE UP
WBC # BLD: 12.7 K/UL — HIGH (ref 4.8–10.8)
WBC # FLD AUTO: 12.7 K/UL — HIGH (ref 4.8–10.8)

## 2019-01-07 RX ORDER — INFLUENZA VIRUS VACCINE 15; 15; 15; 15 UG/.5ML; UG/.5ML; UG/.5ML; UG/.5ML
0.5 SUSPENSION INTRAMUSCULAR ONCE
Qty: 0 | Refills: 0 | Status: COMPLETED | OUTPATIENT
Start: 2019-01-07 | End: 2019-01-08

## 2019-01-07 RX ADMIN — Medication 650 MILLIGRAM(S): at 11:46

## 2019-01-07 RX ADMIN — Medication 650 MILLIGRAM(S): at 23:22

## 2019-01-07 RX ADMIN — Medication 12.5 MILLIGRAM(S): at 21:36

## 2019-01-07 RX ADMIN — Medication 3 MILLILITER(S): at 21:01

## 2019-01-07 RX ADMIN — Medication 180 MILLIGRAM(S): at 21:36

## 2019-01-07 RX ADMIN — Medication 3 MILLILITER(S): at 06:13

## 2019-01-07 RX ADMIN — Medication 40 MILLIGRAM(S): at 14:43

## 2019-01-07 RX ADMIN — RIVAROXABAN 20 MILLIGRAM(S): KIT at 17:52

## 2019-01-07 RX ADMIN — ESCITALOPRAM OXALATE 10 MILLIGRAM(S): 10 TABLET, FILM COATED ORAL at 21:36

## 2019-01-07 RX ADMIN — Medication 650 MILLIGRAM(S): at 15:39

## 2019-01-07 RX ADMIN — LOSARTAN POTASSIUM 50 MILLIGRAM(S): 100 TABLET, FILM COATED ORAL at 21:36

## 2019-01-07 RX ADMIN — Medication 40 MILLIGRAM(S): at 06:05

## 2019-01-07 NOTE — PROGRESS NOTE ADULT - SUBJECTIVE AND OBJECTIVE BOX
57 y/o female with 2 weeks exertional dyspnea/decreased ET, no palpitations, no URI symptoms that usually accompany her asthma/bronchitis, has been using albuterol around the clock with no relief, no fever, no CP, is anticoagulated for 2 episodes of afib, no h/o DVT/PE,      interval : stabilization of condition    PAST MEDICAL & SURGICAL HISTORY:  Hearing aid worn  Hearing decreased  Asthma  Essential hypertension  Afib  Closed fracture of foot, unspecified laterality, sequela  History of  section  Bilateral carpal tunnel syndrome  No significant past surgical history      MEDICATIONS  (STANDING):  ALBUTerol    90 MICROgram(s) HFA Inhaler 1 Puff(s) Inhalation every 4 hours  ALBUTerol/ipratropium for Nebulization 3 milliLiter(s) Nebulizer every 6 hours  diltiazem    milliGRAM(s) Oral at bedtime  escitalopram 10 milliGRAM(s) Oral at bedtime  hydrochlorothiazide 12.5 milliGRAM(s) Oral at bedtime  influenza   Vaccine 0.5 milliLiter(s) IntraMuscular once  losartan 50 milliGRAM(s) Oral at bedtime  rivaroxaban 20 milliGRAM(s) Oral every 24 hours  tiotropium 18 MICROgram(s) Capsule 1 Capsule(s) Inhalation daily      MEDICATIONS  (PRN):  acetaminophen   Tablet .. 650 milliGRAM(s) Oral every 6 hours PRN Temp greater or equal to 38C (100.4F), Mild Pain (1 - 3)    Vital Signs Last 24 Hrs  T(C): 37.1 (2019 22:00), Max: 37.1 (2019 13:56)  T(F): 98.7 (2019 22:00), Max: 98.7 (2019 13:56)  HR: 82 (2019 22:00) (82 - 97)  BP: 122/58 (2019 22:00) (122/58 - 153/70)  BP(mean): --  RR: 18 (2019 22:00) (18 - 18)  SpO2: 98% (2019 06:00) (97% - 98%)    PHYSICAL     HEENT: PERRLA EOMI + corrective lenses  CARD: S1 S2  LUNGS: CTA - Wheezes  ABD: soft + bs  ext: - c / c / e  neurologic : alert awake o x 3 non focal                           13.2   12.70 )-----------( 318      ( 2019 08:37 )             40.9   -07    142  |  105  |  15  ----------------------------<  183<H>  4.4   |  26  |  0.8    Ca    10.5<H>      2019 08:37  Mg     2.1     -    TPro  7.5  /  Alb  4.3  /  TBili  0.5  /  DBili  x   /  AST  15  /  ALT  14  /  AlkPhos  92  -      CARDIAC MARKERS ( 2019 12:07 )  x     / <0.01 ng/mL / 66 U/L / x     / 2.0 ng/mL  CARDIAC MARKERS ( 2019 16:45 )  x     / <0.01 ng/mL / x     / x     / x                 PROCEDURE DATE:  2019            INTERPRETATION:  Clinical History / Reason for exam: The patient is a 58   year old male  with leg swelling. A venous duplex examination was   performed to evaluate the patient for deep venous thrombosis of the lower   extremities.    The bilateral common femoral, greater saphenous, superficial femoral,   popliteal and lesser saphenous veins were visualized with no evidence of   deep venous thrombosis    Allveins were fully compressible.  There was presence of spontaneous   flow, augmentation with distal compression and phasicity.    The anterior tibial veins were  patent  the posterior tibial veins were   patent  and peroneal veins were patent        Impression:    No evidence of deep venous thrombosis in the bilateral lower extremities.    ICD-10: M79.89

## 2019-01-07 NOTE — PROGRESS NOTE ADULT - ASSESSMENT
· Assessment		  Pt with above PMH with 2 weeks exertional dyspnea/decreased ET, no palpitations, no URI symptoms that usually accompany her asthma/bronchitis, has been using albuterol around the clock with no relief, no fever, no CP, is anticoagulated for 2 episodes of afib, no h/o DVT/PE,     Problem/Plan - 1:  ·  Problem: Shortness of breath.  Plan: Cont nebs.     Problem/Plan - 2:  ·  Problem: Essential hypertension.  Plan: VS  Cont meds.     Problem/Plan - 3:  ·  Problem: Afib.  Plan: Cont meds  ekg in am.

## 2019-01-08 ENCOUNTER — TRANSCRIPTION ENCOUNTER (OUTPATIENT)
Age: 59
End: 2019-01-08

## 2019-01-08 VITALS — OXYGEN SATURATION: 97 %

## 2019-01-08 RX ORDER — BUDESONIDE AND FORMOTEROL FUMARATE DIHYDRATE 160; 4.5 UG/1; UG/1
2 AEROSOL RESPIRATORY (INHALATION)
Qty: 9 | Refills: 0
Start: 2019-01-08 | End: 2019-02-06

## 2019-01-08 RX ORDER — ALBUTEROL 90 UG/1
2 AEROSOL, METERED ORAL
Qty: 9 | Refills: 5 | OUTPATIENT
Start: 2019-01-08 | End: 2019-07-06

## 2019-01-08 RX ORDER — TIOTROPIUM BROMIDE 18 UG/1
1 CAPSULE ORAL; RESPIRATORY (INHALATION)
Qty: 30 | Refills: 4
Start: 2019-01-08 | End: 2019-06-06

## 2019-01-08 RX ADMIN — Medication 3 MILLILITER(S): at 14:02

## 2019-01-08 RX ADMIN — Medication 3 MILLILITER(S): at 08:08

## 2019-01-08 RX ADMIN — Medication 3 MILLILITER(S): at 02:40

## 2019-01-08 RX ADMIN — INFLUENZA VIRUS VACCINE 0.5 MILLILITER(S): 15; 15; 15; 15 SUSPENSION INTRAMUSCULAR at 15:12

## 2019-01-08 NOTE — DISCHARGE NOTE ADULT - NS AS ACTIVITY OBS
Return to Work/School allowed/Walking-Indoors allowed/Stairs allowed/Walking-Outdoors allowed/No Heavy lifting/straining

## 2019-01-08 NOTE — DISCHARGE NOTE ADULT - MEDICATION SUMMARY - MEDICATIONS TO TAKE
I will START or STAY ON the medications listed below when I get home from the hospital:    Deltasone 20 mg oral tablet  -- 2 tab(s) by mouth once a day   -- It is very important that you take or use this exactly as directed.  Do not skip doses or discontinue unless directed by your doctor.  Obtain medical advice before taking any non-prescription drugs as some may affect the action of this medication.  Take with food or milk.    -- Indication: For Asthma    Cardizem  -- Cardizem 180 XT once a day  -- Indication: For Atrial fibrillation    Xarelto 20 mg oral tablet  -- 1 tab(s) by mouth once a day (in the evening)  -- Indication: For Afib    escitalopram 10 mg oral tablet  -- 1 tab(s) by mouth once a day  -- Indication: For mood    losartan-hydrochlorothiazide 50mg-12.5mg oral tablet  -- 1 tab(s) by mouth once a day  -- Indication: For Htn    albuterol  -- Indication: For Asthma    Ventolin  -- Indication: For Asthma    Symbicort 160 mcg-4.5 mcg/inh inhalation aerosol  -- 2 puff(s) inhaled 2 times a day   -- Check with your doctor before becoming pregnant.  For inhalation only.  Rinse mouth thoroughly after use.    -- Indication: For Asthma    Spiriva 18 mcg inhalation capsule  -- 1 cap(s) inhaled once a day   -- Check with your doctor before becoming pregnant.  For inhalation only.  It is very important that you take or use this exactly as directed.  Do not skip doses or discontinue unless directed by your doctor.  Obtain medical advice before taking any non-prescription drugs as some may affect the action of this medication.    -- Indication: For Asthma    Ventolin HFA 90 mcg/inh inhalation aerosol  -- 2 puff(s) inhaled every 6 hours   -- For inhalation only.  It is very important that you take or use this exactly as directed.  Do not skip doses or discontinue unless directed by your doctor.  Obtain medical advice before taking any non-prescription drugs as some may affect the action of this medication.  Shake well before use.    -- Indication: For Asthma

## 2019-01-08 NOTE — DISCHARGE NOTE ADULT - CARE PROVIDER_API CALL
Jerman Hernandez), Internal Medicine  [address] [facility] [address]  [address]  [place] [address]  Phone: [phone]  Fax: [phone]    Russell Ramos (), Critical Care Medicine; Pulmonary Disease; Sleep Medicine  [address] [place] [address]  Phone: [phone]  Fax: [phone]

## 2019-01-08 NOTE — DISCHARGE NOTE ADULT - CARE PROVIDERS DIRECT ADDRESSES
[email or web address]

## 2019-01-08 NOTE — CONSULT NOTE ADULT - ASSESSMENT
Patient with sob lees. No chest pain. No ankle swelling PND or orthopnea. No overt CHF by exam. She has probable SILAS, She needs a echo. She needs when stable a outpatient dobutamine SE. Patient aware

## 2019-01-08 NOTE — PROGRESS NOTE ADULT - SUBJECTIVE AND OBJECTIVE BOX
57 y/o female with 2 weeks exertional dyspnea/decreased ET, no palpitations, no URI symptoms that usually accompany her asthma/bronchitis, has been using albuterol around the clock with no relief, no fever, no CP, is anticoagulated for 2 episodes of afib, no h/o DVT/PE,      interval : stabilization of condition    PAST MEDICAL & SURGICAL HISTORY:  Hearing aid worn  Hearing decreased  Asthma  Essential hypertension  Afib  Closed fracture of foot, unspecified laterality, sequela  History of  section  Bilateral carpal tunnel syndrome  No significant past surgical history      MEDICATIONS  (STANDING):  ALBUTerol    90 MICROgram(s) HFA Inhaler 1 Puff(s) Inhalation every 4 hours  ALBUTerol/ipratropium for Nebulization 3 milliLiter(s) Nebulizer every 6 hours  diltiazem    milliGRAM(s) Oral at bedtime  escitalopram 10 milliGRAM(s) Oral at bedtime  hydrochlorothiazide 12.5 milliGRAM(s) Oral at bedtime  influenza   Vaccine 0.5 milliLiter(s) IntraMuscular once  losartan 50 milliGRAM(s) Oral at bedtime  rivaroxaban 20 milliGRAM(s) Oral every 24 hours  tiotropium 18 MICROgram(s) Capsule 1 Capsule(s) Inhalation daily      MEDICATIONS  (PRN):  acetaminophen   Tablet .. 650 milliGRAM(s) Oral every 6 hours PRN Temp greater or equal to 38C (100.4F), Mild Pain (1 - 3)    Vital Signs Last 24 Hrs  T(C): 36.2 (2019 06:13), Max: 37.1 (2019 22:00)  T(F): 97.2 (2019 06:13), Max: 98.7 (2019 22:00)  HR: 80 (2019 06:13) (80 - 97)  BP: 130/62 (2019 06:13) (122/58 - 131/76)  BP(mean): --  RR: 16 (2019 06:13) (16 - 18)  SpO2: 96% (2019 08:37) (96% - 96%)    PHYSICAL     HEENT: PERRLA EOMI + corrective lenses  CARD: S1 S2  LUNGS: CTA - Wheezes  ABD: soft + bs  ext: - c / c / e  neurologic : alert awake o x 3 non focal                           13.2   12.70 )-----------( 318      ( 2019 08:37 )             40.9   -    142  |  105  |  15  ----------------------------<  183<H>  4.4   |  26  |  0.8    Ca    10.5<H>      2019 08:37  Mg     2.1     -    TPro  7.5  /  Alb  4.3  /  TBili  0.5  /  DBili  x   /  AST  15  /  ALT  14  /  AlkPhos  92  -      CARDIAC MARKERS ( 2019 12:07 )  x     / <0.01 ng/mL / 66 U/L / x     / 2.0 ng/mL  CARDIAC MARKERS ( 2019 16:45 )  x     / <0.01 ng/mL / x     / x     / x                 PROCEDURE DATE:  2019            INTERPRETATION:  Clinical History / Reason for exam: The patient is a 58   year old male  with leg swelling. A venous duplex examination was   performed to evaluate the patient for deep venous thrombosis of the lower   extremities.    The bilateral common femoral, greater saphenous, superficial femoral,   popliteal and lesser saphenous veins were visualized with no evidence of   deep venous thrombosis    Allveins were fully compressible.  There was presence of spontaneous   flow, augmentation with distal compression and phasicity.    The anterior tibial veins were  patent  the posterior tibial veins were   patent  and peroneal veins were patent        Impression:    No evidence of deep venous thrombosis in the bilateral lower extremities.

## 2019-01-08 NOTE — CONSULT NOTE ADULT - ASSESSMENT
Status asthmaticus  likely viral in orogin    PLAN:  OK to D/C home  Check O2 sat on ambulation prior    as OP needs:  Prednisone taper  ICS/LABA such as Symbicort 160 2 puffs BID  Prn albuterol MDI    f/u office 2 weeks

## 2019-01-08 NOTE — CONSULT NOTE ADULT - SUBJECTIVE AND OBJECTIVE BOX
CARDIOLOGY CONSULT NOTE     CHIEF COMPLAINT/REASON FOR CONSULT:    HPI:  59 y/o female with 2 weeks exertional dyspnea/decreased ET, no palpitations, no URI symptoms that usually accompany her asthma/bronchitis, has been using albuterol around the clock with no relief, no fever, no CP, is anticoagulated for 2 episodes of afib, no h/o DVT/PE, (2019 21:44)      PAST MEDICAL & SURGICAL HISTORY:  Hearing aid worn  Hearing decreased  Asthma  Essential hypertension  Afib  Closed fracture of foot, unspecified laterality, sequela  History of  section  Bilateral carpal tunnel syndrome  No significant past surgical history      Cardiac Risks:   [ ]HTN, [ ] DM, [ ] Smoking, [x ] FH,  [ ] Lipids        MEDICATIONS:  MEDICATIONS  (STANDING):  ALBUTerol    90 MICROgram(s) HFA Inhaler 1 Puff(s) Inhalation every 4 hours  ALBUTerol/ipratropium for Nebulization 3 milliLiter(s) Nebulizer every 6 hours  diltiazem    milliGRAM(s) Oral at bedtime  escitalopram 10 milliGRAM(s) Oral at bedtime  hydrochlorothiazide 12.5 milliGRAM(s) Oral at bedtime  influenza   Vaccine 0.5 milliLiter(s) IntraMuscular once  losartan 50 milliGRAM(s) Oral at bedtime  rivaroxaban 20 milliGRAM(s) Oral every 24 hours  tiotropium 18 MICROgram(s) Capsule 1 Capsule(s) Inhalation daily      FAMILY HISTORY:  No pertinent family history in first degree relatives      SOCIAL HISTORY:      [ ] Marital status  Divourced  Allergies    iodine (Unknown)  shellfish (Unknown)        	    REVIEW OF SYSTEMS:  CONSTITUTIONAL: No fever, weight loss, or fatigue  EYES: No eye pain, visual disturbances, or discharge  ENMT:  No difficulty hearing, tinnitus, vertigo; No sinus or throat pain  NECK: No pain or stiffness  RESPIRATORY: No cough, wheezing, chills or hemoptysis; No Shortness of Breath  CARDIOVASCULAR: No chest pain, palpitations, passing out, dizziness, or leg swelling  GASTROINTESTINAL: No abdominal or epigastric pain. No nausea, vomiting, or hematemesis; No diarrhea or constipation. No melena or hematochezia.  GENITOURINARY: No dysuria, frequency, hematuria, or incontinence  NEUROLOGICAL: No headaches, memory loss, loss of strength, numbness, or tremors  SKIN: No itching, burning, rashes, or lesions   	    [ ] All others negative	  [ ] Unable to obtain    PHYSICAL EXAM:  T(C): 36.2 (19 @ 06:13), Max: 37.1 (19 @ 13:56)  HR: 80 (19 @ 06:13) (80 - 97)  BP: 130/62 (19 @ 06:13) (122/58 - 153/70)  RR: 16 (19 @ 06:13) (16 - 18)  SpO2: 96% (19 @ 08:37) (96% - 96%)  Wt(kg): --  I&O's Summary      Appearance: Normal	  Psychiatry: A & O x 3, Mood & affect appropriate  HEENT:   Normal oral mucosa, PERRL, EOMI	  Lymphatic: No lymphadenopathy  Cardiovascular: Normal S1 S2,RRR, No JVD, No murmurs  Respiratory: Lungs clear to auscultation	decreased bs  Gastrointestinal:  Soft, Non-tender, + BS	  Skin: No rashes, No ecchymoses, No cyanosis	  Neurologic: Non-focal  Extremities: Normal range of motion, No clubbing, cyanosis or edema  Vascular: Peripheral pulses palpable 2+ bilaterally      ECG:  	< from: 12 Lead ECG (19 @ 18:38) >  Diagnosis Line Normal sinus rhythm  Nonspecific ST abnormality  Abnormal ECG    Confirmed by KURTIS MALAGON MD ([id number]) on 2019 10:46:14 AM    < end of copied text >      	  LABS:	 	    CARDIAC MARKERS:                                    13.2   12.70 )-----------( 318      ( 2019 08:37 )             40.9         142  |  105  |  15  ----------------------------<  183<H>  4.4   |  26  |  0.8    Ca    10.5<H>      2019 08:37  Mg     2.1         TPro  7.5  /  Alb  4.3  /  TBili  0.5  /  DBili  x   /  AST  15  /  ALT  14  /  AlkPhos  92      PT/INR - ( 2019 16:45 )   PT: 13.20 sec;   INR: 1.22 ratio         PTT - ( 2019 16:45 )  PTT:31.9 sec CARDIOLOGY CONSULT NOTE     CHIEF COMPLAINT/REASON FOR CONSULT:    HPI:  57 y/o female with 2 weeks exertional dyspnea/decreased ET, no palpitations, no URI symptoms that usually accompany her asthma/bronchitis, has been using albuterol around the clock with no relief, no fever, no CP, is anticoagulated for 2 episodes of afib, no h/o DVT/PE, (2019 21:44)      PAST MEDICAL & SURGICAL HISTORY:  Hearing aid worn  Hearing decreased  Asthma  Essential hypertension  Afib  Closed fracture of foot, unspecified laterality, sequela  History of  section  Bilateral carpal tunnel syndrome  No significant past surgical history      Cardiac Risks:   [ ]HTN, [ ] DM, [ ] Smoking, [x ] FH,  [ ] Lipids        MEDICATIONS:  MEDICATIONS  (STANDING):  ALBUTerol    90 MICROgram(s) HFA Inhaler 1 Puff(s) Inhalation every 4 hours  ALBUTerol/ipratropium for Nebulization 3 milliLiter(s) Nebulizer every 6 hours  diltiazem    milliGRAM(s) Oral at bedtime  escitalopram 10 milliGRAM(s) Oral at bedtime  hydrochlorothiazide 12.5 milliGRAM(s) Oral at bedtime  influenza   Vaccine 0.5 milliLiter(s) IntraMuscular once  losartan 50 milliGRAM(s) Oral at bedtime  rivaroxaban 20 milliGRAM(s) Oral every 24 hours  tiotropium 18 MICROgram(s) Capsule 1 Capsule(s) Inhalation daily      FAMILY HISTORY:  No pertinent family history in first degree relatives      SOCIAL HISTORY:      [ ] Marital status  Divourced  Allergies    iodine (Unknown)  shellfish (Unknown)        	    REVIEW OF SYSTEMS:  CONSTITUTIONAL: No fever, weight loss, or fatigue  EYES: No eye pain, visual disturbances, or discharge  ENMT:  No difficulty hearing, tinnitus, vertigo; No sinus or throat pain  NECK: No pain or stiffness  RESPIRATORY: No cough, wheezing, chills or hemoptysis; SOB See above  CARDIOVASCULAR: No chest pain, palpitations, passing out, dizziness, or leg swelling  GASTROINTESTINAL: No abdominal or epigastric pain. No nausea, vomiting, or hematemesis; No diarrhea or constipation. No melena or hematochezia.  GENITOURINARY: No dysuria, frequency, hematuria, or incontinence  NEUROLOGICAL: No headaches, memory loss, loss of strength, numbness, or tremors  SKIN: No itching, burning, rashes, or lesions   	    [ ] All others negative	  [ ] Unable to obtain    PHYSICAL EXAM:  T(C): 36.2 (19 @ 06:13), Max: 37.1 (19 @ 13:56)  HR: 80 (19 @ 06:13) (80 - 97)  BP: 130/62 (19 @ 06:13) (122/58 - 153/70)  RR: 16 (19 @ 06:13) (16 - 18)  SpO2: 96% (19 @ 08:37) (96% - 96%)  Wt(kg): --  I&O's Summary      Appearance: Normal	  Psychiatry: A & O x 3, Mood & affect appropriate  HEENT:   Normal oral mucosa, PERRL, EOMI	  Lymphatic: No lymphadenopathy  Cardiovascular: Normal S1 S2,RRR, No JVD, No murmurs  Respiratory: Lungs clear to auscultation	decreased bs  Gastrointestinal:  Soft, Non-tender, + BS	  Skin: No rashes, No ecchymoses, No cyanosis	  Neurologic: Non-focal  Extremities: Normal range of motion, No clubbing, cyanosis or edema  Vascular: Peripheral pulses palpable 2+ bilaterally      ECG:  	< from: 12 Lead ECG (19 @ 18:38) >  Diagnosis Line Normal sinus rhythm  Nonspecific ST abnormality  Abnormal ECG    Confirmed by KURTIS MALAGON MD ([id number]) on 2019 10:46:14 AM    < end of copied text >      	  LABS:	 	    CARDIAC MARKERS:                                    13.2   12.70 )-----------( 318      ( 2019 08:37 )             40.9         142  |  105  |  15  ----------------------------<  183<H>  4.4   |  26  |  0.8    Ca    10.5<H>      2019 08:37  Mg     2.1         TPro  7.5  /  Alb  4.3  /  TBili  0.5  /  DBili  x   /  AST  15  /  ALT  14  /  AlkPhos  92      PT/INR - ( 2019 16:45 )   PT: 13.20 sec;   INR: 1.22 ratio         PTT - ( 2019 16:45 )  PTT:31.9 sec

## 2019-01-08 NOTE — DISCHARGE NOTE ADULT - PATIENT PORTAL LINK FT
You can access the [email or web address] Patient Portal, offered by [facility], by registering with the following website: [email or web address] [facility]

## 2019-01-08 NOTE — PROGRESS NOTE ADULT - ASSESSMENT
Pt with above PMH with 2 weeks exertional dyspnea/decreased ET, no palpitations, no URI symptoms that usually accompany her asthma/bronchitis, has been using albuterol around the clock with no relief, no fever, no CP, is anticoagulated for 2 episodes of afib, no h/o DVT/PE,     Problem/Plan - 1:  ·  Problem: Shortness of breath.  Plan    . Prednisone taper  ICS/LABA such as Symbicort 160 2 puffs BID  Prn albuterol MDI      Problem/Plan - 2:  ·  Problem: Essential hypertension.  Plan: VS  Cont meds.     Problem/Plan - 3:  ·  Problem: Afib.  Plan: Cont meds  ekg in am.

## 2019-01-08 NOTE — CONSULT NOTE ADULT - SUBJECTIVE AND OBJECTIVE BOX
FLAVIO HUDSON        Patient is a 58y old  Female who presents with a chief complaint of SOB/MURCIA (08 Jan 2019 11:04)      HPI:  59 y/o female with 2 weeks exertional dyspnea/decreased ET, no palpitations, no URI symptoms that usually accompany her asthma/bronchitis, has been using albuterol around the clock with no relief, no fever, no CP, is anticoagulated for 2 episodes of afib, no h/o DVT/PE, (06 Jan 2019 21:44)      Allergies    iodine (Unknown)  shellfish (Unknown)    Intolerances        Daily     Daily     I&O's Summary      FAMILY HISTORY:  No pertinent family history in first degree relatives      SOCIAL:    Marital Status:  ( x  )    (   ) Single    (   )    (  )   Occupation:   Lives with: (  ) alone  (  ) children   ( x ) spouse   (  ) parents  (  ) other  Recent Travel:     Substance Use (street drugs): ( x ) never used  (  ) other:  Tobacco Usage:  ( x  ) never smoked   (   ) former smoker   (   ) current smoker  (     ) pack year  Alcohol Usage:        HEALTH ISSUES - PROBLEM Dx:  Afib: Afib  Essential hypertension: Essential hypertension  Shortness of breath: Shortness of breath          Vital Signs Last 24 Hrs  T(C): 36.2 (08 Jan 2019 06:13), Max: 37.1 (07 Jan 2019 13:56)  T(F): 97.2 (08 Jan 2019 06:13), Max: 98.7 (07 Jan 2019 13:56)  HR: 80 (08 Jan 2019 06:13) (80 - 97)  BP: 130/62 (08 Jan 2019 06:13) (122/58 - 153/70)  BP(mean): --  RR: 16 (08 Jan 2019 06:13) (16 - 18)  SpO2: 96% (08 Jan 2019 08:37) (96% - 96%)      PT/INR - ( 06 Jan 2019 16:45 )   PT: 13.20 sec;   INR: 1.22 ratio         PTT - ( 06 Jan 2019 16:45 )  PTT:31.9 sec                          13.2   12.70 )-----------( 318      ( 07 Jan 2019 08:37 )             40.9       01-07    142  |  105  |  15  ----------------------------<  183<H>  4.4   |  26  |  0.8    Ca    10.5<H>      07 Jan 2019 08:37  Mg     2.1     01-07    TPro  7.5  /  Alb  4.3  /  TBili  0.5  /  DBili  x   /  AST  15  /  ALT  14  /  AlkPhos  92  01-06      CARDIAC MARKERS ( 07 Jan 2019 12:07 )  x     / <0.01 ng/mL / 66 U/L / x     / 2.0 ng/mL  CARDIAC MARKERS ( 06 Jan 2019 16:45 )  x     / <0.01 ng/mL / x     / x     / x            LIVER FUNCTIONS - ( 06 Jan 2019 16:45 )  Alb: 4.3 g/dL / Pro: 7.5 g/dL / ALK PHOS: 92 U/L / ALT: 14 U/L / AST: 15 U/L / GGT: x             Radiology: Radiology personally reviewed.    MEDICATIONS  (STANDING):  ALBUTerol    90 MICROgram(s) HFA Inhaler 1 Puff(s) Inhalation every 4 hours  ALBUTerol/ipratropium for Nebulization 3 milliLiter(s) Nebulizer every 6 hours  diltiazem    milliGRAM(s) Oral at bedtime  escitalopram 10 milliGRAM(s) Oral at bedtime  hydrochlorothiazide 12.5 milliGRAM(s) Oral at bedtime  influenza   Vaccine 0.5 milliLiter(s) IntraMuscular once  losartan 50 milliGRAM(s) Oral at bedtime  rivaroxaban 20 milliGRAM(s) Oral every 24 hours  tiotropium 18 MICROgram(s) Capsule 1 Capsule(s) Inhalation daily    MEDICATIONS  (PRN):  acetaminophen   Tablet .. 650 milliGRAM(s) Oral every 6 hours PRN Temp greater or equal to 38C (100.4F), Mild Pain (1 - 3)      Prescriptions:        REVIEW OF SYSTEMS:    Review of Systems:  · CONSTITUTIONAL: no fever and no chills.  · EYES: no discharge, no irritation, no pain, no redness, and no visual changes.  · ENMT: Ears: no ear pain and no hearing problems.Nose: no nasal congestion and no nasal drainage.Mouth/Throat: no dysphagia, no hoarseness and no throat pain.Neck: no lumps, no pain, no stiffness and no swollen glands.  · CARDIOVASCULAR: no chest pain  · RESPIRATORY: - - -   · Respiratory [+]: +SOB +wheezing  · GASTROINTESTINAL: no abdominal pain, no bloating, no constipation, no diarrhea, no nausea and no vomiting.  · GENITOURINARY: no dysuria, no frequency, and no hematuria.  · MUSCULOSKELETAL: no back pain, no gout, no musculoskeletal pain, no neck pain, and no weakness.  · SKIN: no abrasions, no jaundice, no lesions, no pruritis, and no rashes.  · NEURO: no loss of consciousness, no gait abnormality, no headache, no sensory deficits, and no weakness.  · PSYCHIATRIC: no known mental health issues.    PHYSICAL EXAM:   · CONSTITUTIONAL: Well appearing, well nourished, NAD  · ENMT: Airway patent, Nasal mucosa clear. Mouth with normal mucosa. Throat has no vesicles, no oropharyngeal exudates and uvula is midline.  · EYES: Clear bilaterally, pupils equal, round and reactive to light.  · CARDIAC: Normal rate, regular rhythm.  Heart sounds S1, S2.  No murmurs, rubs or gallops.  · RESPIRATORY: b/l wheezing  · GASTROINTESTINAL: Abdomen soft, non-tender, no guarding.  · MUSCULOSKELETAL: Spine appears normal, range of motion is not limited, no muscle or joint tenderness  · NEUROLOGICAL: Alert and oriented, no focal deficits, no motor or sensory deficits.  · SKIN: Skin normal color for race, warm, dry and intact. No evidence of rash.  · PSYCHIATRIC: Alert and oriented to person, place, time/situation. normal mood and affect. no apparent risk to self or others.  · HEME LYMPH: No adenopathy or splenomegaly. No cervical or inguinal lymphadenopathy.

## 2019-01-08 NOTE — DISCHARGE NOTE ADULT - HOSPITAL COURSE
· Subjective and Objective: 	  57 y/o female with 2 weeks exertional dyspnea/decreased ET, no palpitations, no URI symptoms that usually accompany her asthma/bronchitis, has been using albuterol around the clock with no relief, no fever, no CP, is anticoagulated for 2 episodes of afib, no h/o DVT/PE,      interval : stabilization of condition  Pt with above PMH with 2 weeks exertional dyspnea/decreased ET, no palpitations, no URI symptoms that usually accompany her asthma/bronchitis, has been using albuterol around the clock with no relief, no fever, no CP, is anticoagulated for 2 episodes of afib, no h/o DVT/PE,     Problem/Plan - 1:  ·  Problem: Shortness of breath.  Plan    . Prednisone taper  ICS/LABA such as Symbicort 160 2 puffs BID  Prn albuterol MDI

## 2019-02-17 ENCOUNTER — INPATIENT (INPATIENT)
Facility: HOSPITAL | Age: 59
LOS: 10 days | Discharge: HOME HEALTH | End: 2019-02-28
Attending: THORACIC SURGERY (CARDIOTHORACIC VASCULAR SURGERY) | Admitting: THORACIC SURGERY (CARDIOTHORACIC VASCULAR SURGERY)
Payer: COMMERCIAL

## 2019-02-17 VITALS
OXYGEN SATURATION: 97 % | TEMPERATURE: 98 F | HEART RATE: 105 BPM | SYSTOLIC BLOOD PRESSURE: 132 MMHG | DIASTOLIC BLOOD PRESSURE: 73 MMHG

## 2019-02-17 DIAGNOSIS — I24.9 ACUTE ISCHEMIC HEART DISEASE, UNSPECIFIED: ICD-10-CM

## 2019-02-17 DIAGNOSIS — R00.2 PALPITATIONS: ICD-10-CM

## 2019-02-17 DIAGNOSIS — I48.0 PAROXYSMAL ATRIAL FIBRILLATION: ICD-10-CM

## 2019-02-17 DIAGNOSIS — E11.9 TYPE 2 DIABETES MELLITUS WITHOUT COMPLICATIONS: ICD-10-CM

## 2019-02-17 DIAGNOSIS — I21.4 NON-ST ELEVATION (NSTEMI) MYOCARDIAL INFARCTION: ICD-10-CM

## 2019-02-17 DIAGNOSIS — E66.01 MORBID (SEVERE) OBESITY DUE TO EXCESS CALORIES: ICD-10-CM

## 2019-02-17 DIAGNOSIS — I27.20 PULMONARY HYPERTENSION, UNSPECIFIED: ICD-10-CM

## 2019-02-17 DIAGNOSIS — Z97.4 PRESENCE OF EXTERNAL HEARING-AID: ICD-10-CM

## 2019-02-17 DIAGNOSIS — J45.50 SEVERE PERSISTENT ASTHMA, UNCOMPLICATED: ICD-10-CM

## 2019-02-17 DIAGNOSIS — R74.8 ABNORMAL LEVELS OF OTHER SERUM ENZYMES: ICD-10-CM

## 2019-02-17 DIAGNOSIS — I25.119 ATHEROSCLEROTIC HEART DISEASE OF NATIVE CORONARY ARTERY WITH UNSPECIFIED ANGINA PECTORIS: ICD-10-CM

## 2019-02-17 DIAGNOSIS — I10 ESSENTIAL (PRIMARY) HYPERTENSION: ICD-10-CM

## 2019-02-17 DIAGNOSIS — Z79.01 LONG TERM (CURRENT) USE OF ANTICOAGULANTS: ICD-10-CM

## 2019-02-17 DIAGNOSIS — R42 DIZZINESS AND GIDDINESS: ICD-10-CM

## 2019-02-17 DIAGNOSIS — G47.33 OBSTRUCTIVE SLEEP APNEA (ADULT) (PEDIATRIC): ICD-10-CM

## 2019-02-17 DIAGNOSIS — E86.0 DEHYDRATION: ICD-10-CM

## 2019-02-17 DIAGNOSIS — I47.1 SUPRAVENTRICULAR TACHYCARDIA: ICD-10-CM

## 2019-02-17 DIAGNOSIS — Z91.041 RADIOGRAPHIC DYE ALLERGY STATUS: ICD-10-CM

## 2019-02-17 DIAGNOSIS — H91.90 UNSPECIFIED HEARING LOSS, UNSPECIFIED EAR: ICD-10-CM

## 2019-02-17 DIAGNOSIS — D62 ACUTE POSTHEMORRHAGIC ANEMIA: ICD-10-CM

## 2019-02-17 DIAGNOSIS — Z88.8 ALLERGY STATUS TO OTHER DRUGS, MEDICAMENTS AND BIOLOGICAL SUBSTANCES STATUS: ICD-10-CM

## 2019-02-17 DIAGNOSIS — Z87.891 PERSONAL HISTORY OF NICOTINE DEPENDENCE: ICD-10-CM

## 2019-02-17 DIAGNOSIS — J44.9 CHRONIC OBSTRUCTIVE PULMONARY DISEASE, UNSPECIFIED: ICD-10-CM

## 2019-02-17 DIAGNOSIS — I48.92 UNSPECIFIED ATRIAL FLUTTER: ICD-10-CM

## 2019-02-17 DIAGNOSIS — Z91.013 ALLERGY TO SEAFOOD: ICD-10-CM

## 2019-02-17 DIAGNOSIS — G56.03 CARPAL TUNNEL SYNDROME, BILATERAL UPPER LIMBS: ICD-10-CM

## 2019-02-17 DIAGNOSIS — Z91.14 PATIENT'S OTHER NONCOMPLIANCE WITH MEDICATION REGIMEN: ICD-10-CM

## 2019-02-17 LAB
ALBUMIN SERPL ELPH-MCNC: 4.2 G/DL — SIGNIFICANT CHANGE UP (ref 3.5–5.2)
ALP SERPL-CCNC: 95 U/L — SIGNIFICANT CHANGE UP (ref 30–115)
ALT FLD-CCNC: 15 U/L — SIGNIFICANT CHANGE UP (ref 0–41)
ANION GAP SERPL CALC-SCNC: 11 MMOL/L — SIGNIFICANT CHANGE UP (ref 7–14)
APTT BLD: 31.7 SEC — SIGNIFICANT CHANGE UP (ref 27–39.2)
AST SERPL-CCNC: 17 U/L — SIGNIFICANT CHANGE UP (ref 0–41)
BASOPHILS # BLD AUTO: 0.05 K/UL — SIGNIFICANT CHANGE UP (ref 0–0.2)
BASOPHILS NFR BLD AUTO: 0.6 % — SIGNIFICANT CHANGE UP (ref 0–1)
BILIRUB SERPL-MCNC: 0.4 MG/DL — SIGNIFICANT CHANGE UP (ref 0.2–1.2)
BUN SERPL-MCNC: 14 MG/DL — SIGNIFICANT CHANGE UP (ref 10–20)
CALCIUM SERPL-MCNC: 9.8 MG/DL — SIGNIFICANT CHANGE UP (ref 8.5–10.1)
CHLORIDE SERPL-SCNC: 105 MMOL/L — SIGNIFICANT CHANGE UP (ref 98–110)
CK MB CFR SERPL CALC: 13.4 NG/ML — HIGH (ref 0.6–6.3)
CK SERPL-CCNC: 148 U/L — SIGNIFICANT CHANGE UP (ref 0–225)
CO2 SERPL-SCNC: 27 MMOL/L — SIGNIFICANT CHANGE UP (ref 17–32)
CREAT SERPL-MCNC: 1 MG/DL — SIGNIFICANT CHANGE UP (ref 0.7–1.5)
EOSINOPHIL # BLD AUTO: 0.27 K/UL — SIGNIFICANT CHANGE UP (ref 0–0.7)
EOSINOPHIL NFR BLD AUTO: 3.2 % — SIGNIFICANT CHANGE UP (ref 0–8)
GLUCOSE SERPL-MCNC: 124 MG/DL — HIGH (ref 70–99)
HCT VFR BLD CALC: 44.5 % — SIGNIFICANT CHANGE UP (ref 37–47)
HGB BLD-MCNC: 14.2 G/DL — SIGNIFICANT CHANGE UP (ref 12–16)
IMM GRANULOCYTES NFR BLD AUTO: 0.4 % — HIGH (ref 0.1–0.3)
INR BLD: 0.98 RATIO — SIGNIFICANT CHANGE UP (ref 0.65–1.3)
LYMPHOCYTES # BLD AUTO: 1.21 K/UL — SIGNIFICANT CHANGE UP (ref 1.2–3.4)
LYMPHOCYTES # BLD AUTO: 14.1 % — LOW (ref 20.5–51.1)
MAGNESIUM SERPL-MCNC: 2.1 MG/DL — SIGNIFICANT CHANGE UP (ref 1.8–2.4)
MCHC RBC-ENTMCNC: 25.7 PG — LOW (ref 27–31)
MCHC RBC-ENTMCNC: 31.9 G/DL — LOW (ref 32–37)
MCV RBC AUTO: 80.5 FL — LOW (ref 81–99)
MONOCYTES # BLD AUTO: 0.64 K/UL — HIGH (ref 0.1–0.6)
MONOCYTES NFR BLD AUTO: 7.5 % — SIGNIFICANT CHANGE UP (ref 1.7–9.3)
NEUTROPHILS # BLD AUTO: 6.36 K/UL — SIGNIFICANT CHANGE UP (ref 1.4–6.5)
NEUTROPHILS NFR BLD AUTO: 74.2 % — SIGNIFICANT CHANGE UP (ref 42.2–75.2)
NRBC # BLD: 0 /100 WBCS — SIGNIFICANT CHANGE UP (ref 0–0)
PLATELET # BLD AUTO: 281 K/UL — SIGNIFICANT CHANGE UP (ref 130–400)
POTASSIUM SERPL-MCNC: 4.1 MMOL/L — SIGNIFICANT CHANGE UP (ref 3.5–5)
POTASSIUM SERPL-SCNC: 4.1 MMOL/L — SIGNIFICANT CHANGE UP (ref 3.5–5)
PROT SERPL-MCNC: 7.1 G/DL — SIGNIFICANT CHANGE UP (ref 6–8)
PROTHROM AB SERPL-ACNC: 11.3 SEC — SIGNIFICANT CHANGE UP (ref 9.95–12.87)
RBC # BLD: 5.53 M/UL — HIGH (ref 4.2–5.4)
RBC # FLD: 13.3 % — SIGNIFICANT CHANGE UP (ref 11.5–14.5)
SODIUM SERPL-SCNC: 143 MMOL/L — SIGNIFICANT CHANGE UP (ref 135–146)
TROPONIN T SERPL-MCNC: 0.05 NG/ML — CRITICAL HIGH
TROPONIN T SERPL-MCNC: 0.27 NG/ML — CRITICAL HIGH
WBC # BLD: 8.56 K/UL — SIGNIFICANT CHANGE UP (ref 4.8–10.8)
WBC # FLD AUTO: 8.56 K/UL — SIGNIFICANT CHANGE UP (ref 4.8–10.8)

## 2019-02-17 PROCEDURE — 93880 EXTRACRANIAL BILAT STUDY: CPT | Mod: 26

## 2019-02-17 RX ORDER — ENOXAPARIN SODIUM 100 MG/ML
100 INJECTION SUBCUTANEOUS ONCE
Qty: 0 | Refills: 0 | Status: DISCONTINUED | OUTPATIENT
Start: 2019-02-17 | End: 2019-02-17

## 2019-02-17 RX ORDER — METOPROLOL TARTRATE 50 MG
5 TABLET ORAL ONCE
Qty: 0 | Refills: 0 | Status: COMPLETED | OUTPATIENT
Start: 2019-02-17 | End: 2019-02-17

## 2019-02-17 RX ORDER — CLOPIDOGREL BISULFATE 75 MG/1
300 TABLET, FILM COATED ORAL ONCE
Qty: 0 | Refills: 0 | Status: COMPLETED | OUTPATIENT
Start: 2019-02-17 | End: 2019-02-17

## 2019-02-17 RX ORDER — AMIODARONE HYDROCHLORIDE 400 MG/1
1 TABLET ORAL
Qty: 900 | Refills: 0 | Status: DISCONTINUED | OUTPATIENT
Start: 2019-02-17 | End: 2019-02-17

## 2019-02-17 RX ORDER — DILTIAZEM HCL 120 MG
180 CAPSULE, EXT RELEASE 24 HR ORAL DAILY
Qty: 0 | Refills: 0 | Status: DISCONTINUED | OUTPATIENT
Start: 2019-02-17 | End: 2019-02-20

## 2019-02-17 RX ORDER — TIOTROPIUM BROMIDE 18 UG/1
1 CAPSULE ORAL; RESPIRATORY (INHALATION) DAILY
Qty: 0 | Refills: 0 | Status: DISCONTINUED | OUTPATIENT
Start: 2019-02-17 | End: 2019-02-18

## 2019-02-17 RX ORDER — VALSARTAN 80 MG/1
1 TABLET ORAL
Qty: 0 | Refills: 0 | COMMUNITY

## 2019-02-17 RX ORDER — ESCITALOPRAM OXALATE 10 MG/1
10 TABLET, FILM COATED ORAL DAILY
Qty: 0 | Refills: 0 | Status: DISCONTINUED | OUTPATIENT
Start: 2019-02-17 | End: 2019-02-22

## 2019-02-17 RX ORDER — DILTIAZEM HCL 120 MG
5 CAPSULE, EXT RELEASE 24 HR ORAL
Qty: 125 | Refills: 0 | Status: DISCONTINUED | OUTPATIENT
Start: 2019-02-17 | End: 2019-02-17

## 2019-02-17 RX ORDER — ESCITALOPRAM OXALATE 10 MG/1
1 TABLET, FILM COATED ORAL
Qty: 0 | Refills: 0 | COMMUNITY

## 2019-02-17 RX ORDER — DIPHENHYDRAMINE HCL 50 MG
50 CAPSULE ORAL ONCE
Qty: 0 | Refills: 0 | Status: COMPLETED | OUTPATIENT
Start: 2019-02-17 | End: 2019-02-17

## 2019-02-17 RX ORDER — DILTIAZEM HCL 120 MG
1 CAPSULE, EXT RELEASE 24 HR ORAL
Qty: 0 | Refills: 0 | COMMUNITY

## 2019-02-17 RX ORDER — ACETAMINOPHEN 500 MG
650 TABLET ORAL EVERY 6 HOURS
Qty: 0 | Refills: 0 | Status: DISCONTINUED | OUTPATIENT
Start: 2019-02-17 | End: 2019-02-22

## 2019-02-17 RX ORDER — ENOXAPARIN SODIUM 100 MG/ML
110 INJECTION SUBCUTANEOUS EVERY 12 HOURS
Qty: 0 | Refills: 0 | Status: DISCONTINUED | OUTPATIENT
Start: 2019-02-17 | End: 2019-02-18

## 2019-02-17 RX ORDER — BUDESONIDE AND FORMOTEROL FUMARATE DIHYDRATE 160; 4.5 UG/1; UG/1
2 AEROSOL RESPIRATORY (INHALATION)
Qty: 0 | Refills: 0 | COMMUNITY

## 2019-02-17 RX ORDER — RIVAROXABAN 15 MG-20MG
1 KIT ORAL
Qty: 0 | Refills: 0 | COMMUNITY

## 2019-02-17 RX ORDER — ALBUTEROL 90 UG/1
2 AEROSOL, METERED ORAL EVERY 6 HOURS
Qty: 0 | Refills: 0 | Status: DISCONTINUED | OUTPATIENT
Start: 2019-02-17 | End: 2019-02-22

## 2019-02-17 RX ORDER — AMIODARONE HYDROCHLORIDE 400 MG/1
150 TABLET ORAL ONCE
Qty: 0 | Refills: 0 | Status: COMPLETED | OUTPATIENT
Start: 2019-02-17 | End: 2019-02-17

## 2019-02-17 RX ORDER — BUDESONIDE AND FORMOTEROL FUMARATE DIHYDRATE 160; 4.5 UG/1; UG/1
2 AEROSOL RESPIRATORY (INHALATION)
Qty: 0 | Refills: 0 | Status: DISCONTINUED | OUTPATIENT
Start: 2019-02-17 | End: 2019-02-22

## 2019-02-17 RX ORDER — SODIUM CHLORIDE 9 MG/ML
1000 INJECTION INTRAMUSCULAR; INTRAVENOUS; SUBCUTANEOUS ONCE
Qty: 0 | Refills: 0 | Status: COMPLETED | OUTPATIENT
Start: 2019-02-17 | End: 2019-02-17

## 2019-02-17 RX ORDER — CLOPIDOGREL BISULFATE 75 MG/1
75 TABLET, FILM COATED ORAL DAILY
Qty: 0 | Refills: 0 | Status: DISCONTINUED | OUTPATIENT
Start: 2019-02-18 | End: 2019-02-19

## 2019-02-17 RX ORDER — DIGOXIN 250 MCG
0.25 TABLET ORAL ONCE
Qty: 0 | Refills: 0 | Status: COMPLETED | OUTPATIENT
Start: 2019-02-17 | End: 2019-02-17

## 2019-02-17 RX ORDER — CHLORHEXIDINE GLUCONATE 213 G/1000ML
1 SOLUTION TOPICAL
Qty: 0 | Refills: 0 | Status: DISCONTINUED | OUTPATIENT
Start: 2019-02-17 | End: 2019-02-22

## 2019-02-17 RX ORDER — PANTOPRAZOLE SODIUM 20 MG/1
40 TABLET, DELAYED RELEASE ORAL
Qty: 0 | Refills: 0 | Status: DISCONTINUED | OUTPATIENT
Start: 2019-02-17 | End: 2019-02-22

## 2019-02-17 RX ORDER — RIVAROXABAN 15 MG-20MG
20 KIT ORAL AT BEDTIME
Qty: 0 | Refills: 0 | Status: DISCONTINUED | OUTPATIENT
Start: 2019-02-17 | End: 2019-02-17

## 2019-02-17 RX ORDER — ALBUTEROL 90 UG/1
0 AEROSOL, METERED ORAL
Qty: 0 | Refills: 0 | COMMUNITY

## 2019-02-17 RX ADMIN — SODIUM CHLORIDE 2000 MILLILITER(S): 9 INJECTION INTRAMUSCULAR; INTRAVENOUS; SUBCUTANEOUS at 15:02

## 2019-02-17 RX ADMIN — Medication 5 MG/HR: at 15:02

## 2019-02-17 RX ADMIN — Medication 5 MG/HR: at 18:00

## 2019-02-17 RX ADMIN — Medication 125 MILLIGRAM(S): at 21:20

## 2019-02-17 RX ADMIN — Medication 650 MILLIGRAM(S): at 22:45

## 2019-02-17 RX ADMIN — ENOXAPARIN SODIUM 110 MILLIGRAM(S): 100 INJECTION SUBCUTANEOUS at 22:37

## 2019-02-17 RX ADMIN — Medication 650 MILLIGRAM(S): at 23:00

## 2019-02-17 RX ADMIN — Medication 180 MILLIGRAM(S): at 22:37

## 2019-02-17 RX ADMIN — Medication 102 MILLIGRAM(S): at 21:10

## 2019-02-17 RX ADMIN — AMIODARONE HYDROCHLORIDE 618 MILLIGRAM(S): 400 TABLET ORAL at 21:02

## 2019-02-17 RX ADMIN — CLOPIDOGREL BISULFATE 300 MILLIGRAM(S): 75 TABLET, FILM COATED ORAL at 21:55

## 2019-02-17 RX ADMIN — Medication 5 MILLIGRAM(S): at 21:30

## 2019-02-17 RX ADMIN — TIOTROPIUM BROMIDE 1 CAPSULE(S): 18 CAPSULE ORAL; RESPIRATORY (INHALATION) at 22:47

## 2019-02-17 RX ADMIN — Medication 0.25 MILLIGRAM(S): at 21:25

## 2019-02-17 RX ADMIN — BUDESONIDE AND FORMOTEROL FUMARATE DIHYDRATE 2 PUFF(S): 160; 4.5 AEROSOL RESPIRATORY (INHALATION) at 22:32

## 2019-02-17 NOTE — ED ADULT NURSE REASSESSMENT NOTE - NS ED NURSE REASSESS COMMENT FT1
pt on cardiac monitor, , Cardizem 10mg pushed with MD echavarria at bedside, additional 10 mg pushed with MD at bedside. Heart rate reduced to 115 bpm.
medication drip adjusted to 7ml/hr as per verbal order by Dr. Mckeon, will monitor.

## 2019-02-17 NOTE — CONSULT NOTE ADULT - SUBJECTIVE AND OBJECTIVE BOX
· HPI: 59yo F with a h/o Afib, asthma, BIBA for rapid HR. PT states that she woke up at 630am today and felt clammy, tightness in her jaw, SOB, tightness across her shoulders and nausea. Pt states that she called 911 after feeling her symptoms worsen. AS per EMS, Pt HR was 280, pt was given 612 12 of Adenosine, and 25 Cardizem at which her HR went down to 142. Pt states that she stopped taking her Xarelto 3 weeks ago on her own as she ran out of it. Pt denies any CP, leg pain. Patient had recent addition of inhalers given to her by pulmonary. patient denies any fever, vomiting or diarrhea.  Denies missing cardizem.   PMD: Dr. Hernandez               Cardio: Dr. Barroso   PAST MEDICAL & SURGICAL HISTORY: Hearing aid worn Hearing decreased Asthma Essential hypertension Afib Closed fracture of foot, unspecified laterality, sequela History of  section Bilateral carpal tunnel syndrome No significant past surgical history   Home Medications: albuterol:  (2019 16:05) Cardizem: Cardizem 120 XT once a day (2019 16:05) escitalopram 10 mg oral tablet: 1 tab(s) orally once a day (2019 16:05) losartan-hydrochlorothiazide 50mg-12.5mg oral tablet: 1 tab(s) orally once a day (2019 16:05) Ventolin:  (2019 16:05) Xarelto 20 mg oral tablet: 1 tab(s) orally once a day (in the evening) (2019 16:05)  Allergies  iodine (Unknown) shellfish (Unknown)  Intolerances  FAMILY HISTORY: No pertinent family history in first degree relatives   Social History Former smoker  Vital Signs Last 24 Hrs T(C): 36.8 (2019 13:31), Max: 36.8 (2019 13:31) T(F): 98.3 (2019 13:31), Max: 98.3 (2019 13:31) HR: 136 (2019 15:33) (101 - 147) BP: 110/55 (2019 14:14) (110/55 - 132/73) BP(mean): -- RR: 18 (2019 14:14) (18 - 18) SpO2: 99% (2019 14:14) (97% - 99%)  Labs:                      14.2  8.56  )-----------( 281      ( 2019 14:20 )            44.5              143  |  105  |  14 ----------------------------<  124<H> 4.1   |  27  |  1.0  Ca    9.8      2019 14:20 Mg     2.1       TPro  7.1  /  Alb  4.2  /  TBili  0.4  /  DBili  x   /  AST  17  /  ALT  15  /  AlkPhos  95    LIVER FUNCTIONS - ( 2019 14:20 ) Alb: 4.2 g/dL / Pro: 7.1 g/dL / ALK PHOS: 95 U/L / ALT: 15 U/L / AST: 17 U/L / GGT: x                PT/INR - ( 2019 14:20 )   PT: 11.30 sec;   INR: 0.98 ratio      PTT - ( 2019 14:20 )  PTT:31.7 sec CARDIAC MARKERS ( 2019 14:20 ) x     / 0.05 ng/mL / x     / x     / x        < from: Xray Chest 1 View-PORTABLE IMMEDIATE (19 @ 15:01) > Impression:    No radiographic evidence of acute cardiopulmonary disease.  < end of copied text >

## 2019-02-17 NOTE — ED PROVIDER NOTE - ATTENDING CONTRIBUTION TO CARE
57 yo F PMHx a fib on Xarelto (ran out a few weeks ago), asthma, HTN, presents via EMS with c/o rapid HR.  Pt states she woke up feeling palpitations, flushed, clammy, felt as if her "jaw was locking".  + nausea, no vomiting.  EMS found pt with .  They gave 6mg Adenosine followed by 2 doses of 12 mg x 2.  Followed by 25 mg Cardizem which slowed pts HR to 120s.  On arrival to ED pt 170s, but reports feeling much better.  On exam pt in NAD AAO x 3, + tachy, irregular, Lungs CTA B/L no wrr, abdsoft nt nd, no edema, no calf tenderness

## 2019-02-17 NOTE — ED PROVIDER NOTE - OBJECTIVE STATEMENT
57yo F with a h/o Afib BIBA for rapid HR. PT states that she woke up at 630am today and felt "fluish", stating she felt clammy, tightness in her haw, SOB, tightness across her shoulder and nausea. Pt states that she called 911 after feeling her symptoms worsen. AS per EMS, Pt HR was 280, pt was given 612 12 of Adenosine, and 25 Cardizem at which her HR went down to 142. Pt states that she stopped taking her Xarelto 3 weeks ago on her own. Pt denies any CP, leg pain. 57yo F with a h/o Afib, asthma, BIBA for rapid HR. PT states that she woke up at 630am today and felt clammy, tightness in her jaw, SOB, tightness across her shoulders and nausea. Pt states that she called 911 after feeling her symptoms worsen. AS per EMS, Pt HR was 280, pt was given 612 12 of Adenosine, and 25 Cardizem at which her HR went down to 142. Pt states that she stopped taking her Xarelto 3 weeks ago on her own. Pt denies any CP, leg pain. patient had recent addition of inhalers given to her by pulmonary. patient denies any fever, vomiting or diarrhea

## 2019-02-17 NOTE — PROVIDER CONTACT NOTE (CHANGE IN STATUS NOTIFICATION) - SITUATION
IV Amiodarone bolus 150mg in 100cc D5W initiated Immediately patient stated she didn't feel well HR noted to go into 200's irregular

## 2019-02-17 NOTE — PROVIDER CONTACT NOTE (CHANGE IN STATUS NOTIFICATION) - ACTION/TREATMENT ORDERED:
Amiodarone D/C'd Benedryl 50 mg IVPB stat Solumedrol 125 mg IVP administered Digoxin 0.25 mg IVP adminstered Lopressor 5 mg IVP administered Loaded with Plavix 300 mg PO received Lovenox 110 mg SQ giv

## 2019-02-17 NOTE — ED ADULT TRIAGE NOTE - CHIEF COMPLAINT QUOTE
Pt brought in by ambulance from home. As per EMT "She was found with a heart rate of 280. She has a history of Afib. We treated as though it were SVT and gave Adenosine 6 mg, 6 mg, then 12 mg. We then gave her Cardizem 25 mg IV. Her rate went down to 140." Pt has not had her Xarelto for one week as she ran out.

## 2019-02-17 NOTE — CONSULT NOTE ADULT - ASSESSMENT
IMPRESSION:  Afib with RVR    PLAN:    HEENT:  Oral care    PULMONARY:  HOB @ 45 degrees. Nebs PRN, spiriva, symbicort    CARDIOVASCULAR: Keep on cardizem drip for now, cardiology consult, echo, CE x 2  Restart xarelto    GI: GI prophylaxis                                          Feeding: Regular diet    RENAL:  F/u  lytes.  Correct as needed.     INFECTIOUS DISEASE:    HEMATOLOGICAL:  DVT prophylaxis.    ENDOCRINE:      MUSCULOSKELETAL:    CODE STATUS: FULL CODE    DISPOSITION: Pt requires continued monitoring ion telemetry

## 2019-02-17 NOTE — ED PROVIDER NOTE - CLINICAL SUMMARY MEDICAL DECISION MAKING FREE TEXT BOX
Pt with h/o a fib, presents today with a fib with RVR.  On cardizem drip with better rate control.  Pt has been out of her Xarelto.  I considered CT scan to look for PE however patient with anaphylaxis to contrast.   Lovenox and admit to monitored setting.

## 2019-02-17 NOTE — PATIENT PROFILE ADULT - ABILITY TO HEAR (WITH HEARING AID OR HEARING APPLIANCE IF NORMALLY USED):
Mildly to Moderately Impaired: difficulty hearing in some environments or speaker may need to increase volume or speak distinctly/[place] bilateral hearing aids

## 2019-02-17 NOTE — ED ADULT NURSE NOTE - NSIMPLEMENTINTERV_GEN_ALL_ED
Implemented All Universal Safety Interventions:  [facility] to call system. Call bell, personal items and telephone within reach. Instruct patient to call for assistance. Room bathroom lighting operational. Non-slip footwear when patient is off stretcher. Physically safe environment: no spills, clutter or unnecessary equipment. Stretcher in lowest position, wheels locked, appropriate side rails in place.

## 2019-02-17 NOTE — PROVIDER CONTACT NOTE (CHANGE IN STATUS NOTIFICATION) - BACKGROUND
Pt was an admission today was in an afib RVR received with IV Cardizem infusing @ 20 mg/h= 20 cc/h HR remained rapid ib 120's -140's Pa Sese ordered AmiodaRONE BOLUS AND DRIP Drip stopped immediately

## 2019-02-18 DIAGNOSIS — R74.8 ABNORMAL LEVELS OF OTHER SERUM ENZYMES: ICD-10-CM

## 2019-02-18 DIAGNOSIS — I10 ESSENTIAL (PRIMARY) HYPERTENSION: ICD-10-CM

## 2019-02-18 DIAGNOSIS — J45.909 UNSPECIFIED ASTHMA, UNCOMPLICATED: ICD-10-CM

## 2019-02-18 DIAGNOSIS — I48.91 UNSPECIFIED ATRIAL FIBRILLATION: ICD-10-CM

## 2019-02-18 LAB
ALBUMIN SERPL ELPH-MCNC: 4.3 G/DL — SIGNIFICANT CHANGE UP (ref 3.5–5.2)
ALP SERPL-CCNC: 93 U/L — SIGNIFICANT CHANGE UP (ref 30–115)
ALT FLD-CCNC: 14 U/L — SIGNIFICANT CHANGE UP (ref 0–41)
ANION GAP SERPL CALC-SCNC: 14 MMOL/L — SIGNIFICANT CHANGE UP (ref 7–14)
APPEARANCE UR: CLEAR — SIGNIFICANT CHANGE UP
AST SERPL-CCNC: 22 U/L — SIGNIFICANT CHANGE UP (ref 0–41)
BASOPHILS # BLD AUTO: 0.03 K/UL — SIGNIFICANT CHANGE UP (ref 0–0.2)
BASOPHILS NFR BLD AUTO: 0.4 % — SIGNIFICANT CHANGE UP (ref 0–1)
BILIRUB SERPL-MCNC: 0.6 MG/DL — SIGNIFICANT CHANGE UP (ref 0.2–1.2)
BILIRUB UR-MCNC: NEGATIVE — SIGNIFICANT CHANGE UP
BUN SERPL-MCNC: 18 MG/DL — SIGNIFICANT CHANGE UP (ref 10–20)
CALCIUM SERPL-MCNC: 9.8 MG/DL — SIGNIFICANT CHANGE UP (ref 8.5–10.1)
CHLORIDE SERPL-SCNC: 105 MMOL/L — SIGNIFICANT CHANGE UP (ref 98–110)
CK MB CFR SERPL CALC: 7.6 NG/ML — HIGH (ref 0.6–6.3)
CK SERPL-CCNC: 113 U/L — SIGNIFICANT CHANGE UP (ref 0–225)
CO2 SERPL-SCNC: 21 MMOL/L — SIGNIFICANT CHANGE UP (ref 17–32)
COLOR SPEC: YELLOW — SIGNIFICANT CHANGE UP
CREAT SERPL-MCNC: 0.9 MG/DL — SIGNIFICANT CHANGE UP (ref 0.7–1.5)
DIFF PNL FLD: NEGATIVE — SIGNIFICANT CHANGE UP
EOSINOPHIL # BLD AUTO: 0.02 K/UL — SIGNIFICANT CHANGE UP (ref 0–0.7)
EOSINOPHIL NFR BLD AUTO: 0.3 % — SIGNIFICANT CHANGE UP (ref 0–8)
EPI CELLS # UR: ABNORMAL /HPF
GLUCOSE SERPL-MCNC: 182 MG/DL — HIGH (ref 70–99)
GLUCOSE UR QL: NEGATIVE MG/DL — SIGNIFICANT CHANGE UP
HCT VFR BLD CALC: 44.2 % — SIGNIFICANT CHANGE UP (ref 37–47)
HCV AB S/CO SERPL IA: 0.17 S/CO — SIGNIFICANT CHANGE UP (ref 0–0.79)
HCV AB SERPL-IMP: SIGNIFICANT CHANGE UP
HGB BLD-MCNC: 14 G/DL — SIGNIFICANT CHANGE UP (ref 12–16)
IMM GRANULOCYTES NFR BLD AUTO: 0.7 % — HIGH (ref 0.1–0.3)
KETONES UR-MCNC: NEGATIVE — SIGNIFICANT CHANGE UP
LEUKOCYTE ESTERASE UR-ACNC: ABNORMAL
LYMPHOCYTES # BLD AUTO: 0.78 K/UL — LOW (ref 1.2–3.4)
LYMPHOCYTES # BLD AUTO: 10.8 % — LOW (ref 20.5–51.1)
MAGNESIUM SERPL-MCNC: 2.2 MG/DL — SIGNIFICANT CHANGE UP (ref 1.8–2.4)
MCHC RBC-ENTMCNC: 25.6 PG — LOW (ref 27–31)
MCHC RBC-ENTMCNC: 31.7 G/DL — LOW (ref 32–37)
MCV RBC AUTO: 80.8 FL — LOW (ref 81–99)
MONOCYTES # BLD AUTO: 0.11 K/UL — SIGNIFICANT CHANGE UP (ref 0.1–0.6)
MONOCYTES NFR BLD AUTO: 1.5 % — LOW (ref 1.7–9.3)
NEUTROPHILS # BLD AUTO: 6.23 K/UL — SIGNIFICANT CHANGE UP (ref 1.4–6.5)
NEUTROPHILS NFR BLD AUTO: 86.3 % — HIGH (ref 42.2–75.2)
NITRITE UR-MCNC: NEGATIVE — SIGNIFICANT CHANGE UP
NRBC # BLD: 0 /100 WBCS — SIGNIFICANT CHANGE UP (ref 0–0)
PH UR: 6 — SIGNIFICANT CHANGE UP (ref 5–8)
PLATELET # BLD AUTO: 293 K/UL — SIGNIFICANT CHANGE UP (ref 130–400)
POTASSIUM SERPL-MCNC: 4.4 MMOL/L — SIGNIFICANT CHANGE UP (ref 3.5–5)
POTASSIUM SERPL-SCNC: 4.4 MMOL/L — SIGNIFICANT CHANGE UP (ref 3.5–5)
PROT SERPL-MCNC: 7.2 G/DL — SIGNIFICANT CHANGE UP (ref 6–8)
PROT UR-MCNC: 100 MG/DL
RBC # BLD: 5.47 M/UL — HIGH (ref 4.2–5.4)
RBC # FLD: 13.4 % — SIGNIFICANT CHANGE UP (ref 11.5–14.5)
RBC CASTS # UR COMP ASSIST: NEGATIVE — SIGNIFICANT CHANGE UP
SODIUM SERPL-SCNC: 140 MMOL/L — SIGNIFICANT CHANGE UP (ref 135–146)
SP GR SPEC: 1.02 — SIGNIFICANT CHANGE UP (ref 1.01–1.03)
TROPONIN T SERPL-MCNC: 0.12 NG/ML — CRITICAL HIGH
TSH SERPL-MCNC: 1.7 UIU/ML — SIGNIFICANT CHANGE UP (ref 0.27–4.2)
UROBILINOGEN FLD QL: 0.2 MG/DL — SIGNIFICANT CHANGE UP (ref 0.2–0.2)
WBC # BLD: 7.22 K/UL — SIGNIFICANT CHANGE UP (ref 4.8–10.8)
WBC # FLD AUTO: 7.22 K/UL — SIGNIFICANT CHANGE UP (ref 4.8–10.8)
WBC UR QL: ABNORMAL /HPF

## 2019-02-18 RX ORDER — ALPRAZOLAM 0.25 MG
0.5 TABLET ORAL AT BEDTIME
Qty: 0 | Refills: 0 | Status: DISCONTINUED | OUTPATIENT
Start: 2019-02-18 | End: 2019-02-18

## 2019-02-18 RX ORDER — ENOXAPARIN SODIUM 100 MG/ML
100 INJECTION SUBCUTANEOUS EVERY 12 HOURS
Qty: 0 | Refills: 0 | Status: DISCONTINUED | OUTPATIENT
Start: 2019-02-18 | End: 2019-02-19

## 2019-02-18 RX ORDER — DIPHENHYDRAMINE HCL 50 MG
50 CAPSULE ORAL ONCE
Qty: 0 | Refills: 0 | Status: DISCONTINUED | OUTPATIENT
Start: 2019-02-19 | End: 2019-02-22

## 2019-02-18 RX ORDER — TIOTROPIUM BROMIDE 18 UG/1
1 CAPSULE ORAL; RESPIRATORY (INHALATION) AT BEDTIME
Qty: 0 | Refills: 0 | Status: DISCONTINUED | OUTPATIENT
Start: 2019-02-18 | End: 2019-02-22

## 2019-02-18 RX ADMIN — ENOXAPARIN SODIUM 100 MILLIGRAM(S): 100 INJECTION SUBCUTANEOUS at 11:08

## 2019-02-18 RX ADMIN — CHLORHEXIDINE GLUCONATE 1 APPLICATION(S): 213 SOLUTION TOPICAL at 07:56

## 2019-02-18 RX ADMIN — BUDESONIDE AND FORMOTEROL FUMARATE DIHYDRATE 2 PUFF(S): 160; 4.5 AEROSOL RESPIRATORY (INHALATION) at 07:24

## 2019-02-18 RX ADMIN — Medication 40 MILLIGRAM(S): at 11:08

## 2019-02-18 RX ADMIN — ENOXAPARIN SODIUM 100 MILLIGRAM(S): 100 INJECTION SUBCUTANEOUS at 21:27

## 2019-02-18 RX ADMIN — CLOPIDOGREL BISULFATE 75 MILLIGRAM(S): 75 TABLET, FILM COATED ORAL at 12:19

## 2019-02-18 RX ADMIN — ESCITALOPRAM OXALATE 10 MILLIGRAM(S): 10 TABLET, FILM COATED ORAL at 13:33

## 2019-02-18 RX ADMIN — PANTOPRAZOLE SODIUM 40 MILLIGRAM(S): 20 TABLET, DELAYED RELEASE ORAL at 07:19

## 2019-02-18 RX ADMIN — TIOTROPIUM BROMIDE 1 CAPSULE(S): 18 CAPSULE ORAL; RESPIRATORY (INHALATION) at 21:26

## 2019-02-18 RX ADMIN — Medication 40 MILLIGRAM(S): at 21:28

## 2019-02-18 RX ADMIN — BUDESONIDE AND FORMOTEROL FUMARATE DIHYDRATE 2 PUFF(S): 160; 4.5 AEROSOL RESPIRATORY (INHALATION) at 20:19

## 2019-02-18 RX ADMIN — Medication 180 MILLIGRAM(S): at 21:28

## 2019-02-18 RX ADMIN — Medication 0.5 MILLIGRAM(S): at 21:29

## 2019-02-18 NOTE — PROGRESS NOTE ADULT - SUBJECTIVE AND OBJECTIVE BOX
57 yo female with PMH of Afib on Xarelto (ran out a few weeks ago), asthma, HTN, presents via EMS with c/o rapid HR.  Pt states she woke up feeling palpitations, flushed, clammy, felt as if her "jaw was locking".  + nausea, no vomiting.  EMS found pt with .  They gave 6mg Adenosine followed by 2 doses of 12 mg x 2.  Followed by 25 mg Cardizem which slowed pts HR to 120s    PAST MEDICAL & SURGICAL HISTORY:  Hearing aid worn  Hearing decreased  Asthma  Essential hypertension  Afib  Closed fracture of foot, unspecified laterality, sequela  History of  section  Bilateral carpal tunnel syndrome  No significant past surgical history    MEDICATIONS  (STANDING):  buDESOnide  80 MICROgram(s)/formoterol 4.5 MICROgram(s) Inhaler 2 Puff(s) Inhalation two times a day  chlorhexidine 4% Liquid 1 Application(s) Topical <User Schedule>  clopidogrel Tablet 75 milliGRAM(s) Oral daily  diltiazem    milliGRAM(s) Oral daily  enoxaparin Injectable 100 milliGRAM(s) SubCutaneous every 12 hours  escitalopram 10 milliGRAM(s) Oral daily  pantoprazole    Tablet 40 milliGRAM(s) Oral before breakfast  predniSONE   Tablet 40 milliGRAM(s) Oral every 12 hours  tiotropium 18 MICROgram(s) Capsule 1 Capsule(s) Inhalation daily    MEDICATIONS  (PRN):  acetaminophen   Tablet .. 650 milliGRAM(s) Oral every 6 hours PRN Temp greater or equal to 38C (100.4F), Mild Pain (1 - 3)  ALBUTerol    90 MICROgram(s) HFA Inhaler 2 Puff(s) Inhalation every 6 hours PRN Shortness of Breath and/or Wheezing    ICU Vital Signs Last 24 Hrs  T(C): 36.4 (2019 07:14), Max: 38.1 (2019 20:30)  T(F): 97.5 (2019 07:14), Max: 100.6 (2019 20:31)  HR: 65 (2019 07:18) (60 - 207)  BP: 137/71 (2019 07:18) (105/63 - 211/92)  BP(mean): 95 (2019 07:18) (79 - 145)  ABP: --  ABP(mean): --  RR: 18 (2019 07:18) (18 - 26)  SpO2: 98% (2019 07:18) (82% - 100%)    PHYSICAL EXAM :     GENERAL : IN bed with family at bedside NAD   HEENT : difficulty hearing   LUNGS : bilateral air entry   CARD: S1 s2 irregular   ABD: soft + bs   ext: 2 +   neuro - alert awake oriented motor intact                           14.0   7.22  )-----------( 293      ( 2019 05:30 )             44.2   02-18    140  |  105  |  18  ----------------------------<  182<H>  4.4   |  21  |  0.9    Ca    9.8      2019 05:30  Mg     2.2     18    TPro  7.2  /  Alb  4.3  /  TBili  0.6  /  DBili  x   /  AST  22  /  ALT  14  /  AlkPhos  93  02-18    CARDIAC MARKERS ( 2019 05:30 )  x     / 0.12 ng/mL / 113 U/L / x     / 7.6 ng/mL  CARDIAC MARKERS ( 2019 19:22 )  x     / 0.27 ng/mL / 148 U/L / x     / 13.4 ng/mL  CARDIAC MARKERS ( 2019 14:20 )  x     / 0.05 ng/mL / x     / x     / x

## 2019-02-18 NOTE — H&P ADULT - NSHPPHYSICALEXAM_GEN_ALL_CORE
T(C): 36.4 (02-18-19 @ 07:14), Max: 38.1 (02-17-19 @ 20:30)  HR: 61 (02-18-19 @ 05:01) (60 - 207)  BP: 125/60 (02-18-19 @ 05:01) (105/63 - 211/92)  RR: 26 (02-18-19 @ 07:14) (18 - 26)  SpO2: 99% (02-18-19 @ 05:01) (82% - 100%)    PHYSICAL EXAM:  GENERAL: NAD, well-developed  NECK: Supple, No JVD  CHEST/LUNG: Clear to auscultation bilaterally; No wheeze  HEART: Regular rate and rhythm; No murmurs, rubs, or gallops  ABDOMEN: Soft, Nontender, Nondistended; Bowel sounds present  EXTREMITIES:  2+ Peripheral Pulses, No clubbing, cyanosis, or edema

## 2019-02-18 NOTE — PROGRESS NOTE ADULT - ASSESSMENT
58 year old female with Afib, HTN, Asthma present with palpations and shortness of breath was  in hospital 1 month ago ;     spoke with cardiology

## 2019-02-18 NOTE — H&P ADULT - HISTORY OF PRESENT ILLNESS
59 yo female with PMH of Afib on Xarelto (ran out a few weeks ago), asthma, HTN, presents via EMS with c/o rapid HR.  Pt states she woke up feeling palpitations, flushed, clammy, felt as if her "jaw was locking".  + nausea, no vomiting.  EMS found pt with .  They gave 6mg Adenosine followed by 2 doses of 12 mg x 2.  Followed by 25 mg Cardizem which slowed pts HR to 120s

## 2019-02-18 NOTE — CONSULT NOTE ADULT - SUBJECTIVE AND OBJECTIVE BOX
Patient is a 58y old  Female who presents with a chief complaint of Palpitations (2019 07:28)      HPI:  59 yo female with PMH of Afib on Xarelto (ran out a few weeks ago), asthma - well comtrolled, HTN, presents via EMS with c/o rapid HR. Pt states she woke up feeling palpitations, flushed, clammy, felt as if her "jaw was locking".  + nausea, no vomiting.  EMS found pt with .  They gave 6mg Adenosine followed by 2 doses of 12 mg x 2.  Followed by 25 mg Cardizem which slowed pts HR to 120s (2019 07:28)    Pt was seen by Dr. Ramos for asthma recently and was started on Symbicort and Spiriva to [place]. Since [place] she has used albuterol 4 times in 1 month (contrary to once a day prior to previous admission). Currently she feels well and feels her asthma is under control.     PAST MEDICAL & SURGICAL HISTORY:  Hearing aid worn  Hearing decreased  Asthma  Essential hypertension  Afib  Closed fracture of foot, unspecified laterality, sequela  History of  section  Bilateral carpal tunnel syndrome  No significant past surgical history      SOCIAL HX:   Smoking       -ve x 3                   ETOH                            Other    FAMILY HISTORY:  No pertinent family history in first degree relatives  :  No known cardiovacular family hisotry     ROS:  See HPI     Allergies    amiodarone (Flushing)  iodine (Unknown)  shellfish (Unknown)    Intolerances          PHYSICAL EXAM    ICU Vital Signs Last 24 Hrs  T(C): 36.4 (2019 07:14), Max: 38.1 (2019 20:30)  T(F): 97.5 (2019 07:14), Max: 100.6 (2019 20:31)  HR: 65 (2019 07:18) (60 - 207)  BP: 137/71 (2019 07:18) (105/63 - 211/92)  BP(mean): 95 (2019 07:18) (79 - 145)  ABP: --  ABP(mean): --  RR: 18 (2019 07:18) (18 - 26)  SpO2: 98% (2019 07:18) (82% - 100%)      General: In NAD   HEENT:  FREDRICK              Lymphatic system: No cervical LN   Lungs: Bilateral BS  Cardiovascular: Regular  Gastrointestinal: Soft, Positive BS  Musculoskeletal: No clubbing.  Moves all extremities.  Full range of motion   Skin: Warm.  Intact  Neurological: No motor or sensory deficit       19 @ 07:01  -  19 @ 07:00  --------------------------------------------------------  IN:    diltiazem Infusion: 30 mL  Total IN: 30 mL    OUT:    Voided: 475 mL  Total OUT: 475 mL    Total NET: -445 mL          LABS:                          14.0   7.22  )-----------( 293      ( 2019 05:30 )             44.2                                                   140  |  105  |  18  ----------------------------<  182<H>  4.4   |  21  |  0.9    Ca    9.8      2019 05:30  Mg     2.2         TPro  7.2  /  Alb  4.3  /  TBili  0.6  /  DBili  x   /  AST  22  /  ALT  14  /  AlkPhos  93        PT/INR - ( 2019 14:20 )   PT: 11.30 sec;   INR: 0.98 ratio         PTT - ( 2019 14:20 )  PTT:31.7 sec                                           CARDIAC MARKERS ( 2019 05:30 )  x     / 0.12 ng/mL / 113 U/L / x     / 7.6 ng/mL  CARDIAC MARKERS ( 2019 19:22 )  x     / 0.27 ng/mL / 148 U/L / x     / 13.4 ng/mL  CARDIAC MARKERS ( 2019 14:20 )  x     / 0.05 ng/mL / x     / x     / x                                                LIVER FUNCTIONS - ( 2019 05:30 )  Alb: 4.3 g/dL / Pro: 7.2 g/dL / ALK PHOS: 93 U/L / ALT: 14 U/L / AST: 22 U/L / GGT: x                                                                                                                                       X-Rays           No infiltrate                                                                           ECHO    MEDICATIONS  (STANDING):  buDESOnide  80 MICROgram(s)/formoterol 4.5 MICROgram(s) Inhaler 2 Puff(s) Inhalation two times a day  chlorhexidine 4% Liquid 1 Application(s) Topical <User Schedule>  clopidogrel Tablet 75 milliGRAM(s) Oral daily  diltiazem    milliGRAM(s) Oral daily  enoxaparin Injectable 110 milliGRAM(s) SubCutaneous every 12 hours  escitalopram 10 milliGRAM(s) Oral daily  pantoprazole    Tablet 40 milliGRAM(s) Oral before breakfast  tiotropium 18 MICROgram(s) Capsule 1 Capsule(s) Inhalation daily    MEDICATIONS  (PRN):  acetaminophen   Tablet .. 650 milliGRAM(s) Oral every 6 hours PRN Temp greater or equal to 38C (100.4F), Mild Pain (1 - 3)  ALBUTerol    90 MICROgram(s) HFA Inhaler 2 Puff(s) Inhalation every 6 hours PRN Shortness of Breath and/or Wheezing Patient is a 58y old  Female who presents with a chief complaint of Palpitations (2019 07:28)      HPI:  59 yo female with PMH of Afib on Xarelto (ran out a few weeks ago), asthma - well comtrolled, HTN, presents via EMS with c/o rapid HR. Pt states she woke up feeling palpitations, flushed, clammy, felt as if her "jaw was locking".  + nausea, no vomiting.  EMS found pt with .  They gave 6mg Adenosine followed by 2 doses of 12 mg x 2.  Followed by 25 mg Cardizem which slowed pts HR to 120s (2019 07:28)    Pt was seen by Dr. Ramos for asthma recently and was started on Symbicort and Spiriva to [place]. Since FL she has used albuterol 4 times in 1 month (contrary to once a day prior to previous admission). Currently she feels well and feels her asthma is under control.     PAST MEDICAL & SURGICAL HISTORY:  Hearing aid worn  Hearing decreased  Asthma  Essential hypertension  Afib  Closed fracture of foot, unspecified laterality, sequela  History of  section  Bilateral carpal tunnel syndrome  No significant past surgical history      SOCIAL HX:   Smoking       -ve x 3                   ETOH                            Other    FAMILY HISTORY:  No pertinent family history in first degree relatives  :  No known cardiovacular family hisotry     ROS:  See HPI     Allergies    amiodarone (Flushing)  iodine (Unknown)  shellfish (Unknown)    Intolerances          PHYSICAL EXAM    ICU Vital Signs Last 24 Hrs  T(C): 36.4 (2019 07:14), Max: 38.1 (2019 20:30)  T(F): 97.5 (2019 07:14), Max: 100.6 (2019 20:31)  HR: 65 (2019 07:18) (60 - 207)  BP: 137/71 (2019 07:18) (105/63 - 211/92)  BP(mean): 95 (2019 07:18) (79 - 145)    RR: 18 (2019 07:18) (18 - 26)  SpO2: 98% (2019 07:18) (82% - 100%)      General: In NAD   HEENT:  FREDRICK              Lymphatic system: No cervical LN   Lungs: Bilateral BS  Cardiovascular: Regular  Gastrointestinal: Soft, Positive BS  Musculoskeletal: No clubbing.  Moves all extremities.  Full range of motion   Skin: Warm.  Intact  Neurological: No motor or sensory deficit       19 @ 07:01  -  19 @ 07:00  --------------------------------------------------------  IN:    diltiazem Infusion: 30 mL  Total IN: 30 mL    OUT:    Voided: 475 mL  Total OUT: 475 mL    Total NET: -445 mL          LABS:                          14.0   7.22  )-----------( 293      ( 2019 05:30 )             44.2                                                   140  |  105  |  18  ----------------------------<  182<H>  4.4   |  21  |  0.9    Ca    9.8      2019 05:30  Mg     2.2         TPro  7.2  /  Alb  4.3  /  TBili  0.6  /  DBili  x   /  AST  22  /  ALT  14  /  AlkPhos  93  18      PT/INR - ( 2019 14:20 )   PT: 11.30 sec;   INR: 0.98 ratio         PTT - ( 2019 14:20 )  PTT:31.7 sec                                           CARDIAC MARKERS ( 2019 05:30 )  x     / 0.12 ng/mL / 113 U/L / x     / 7.6 ng/mL  CARDIAC MARKERS ( 2019 19:22 )  x     / 0.27 ng/mL / 148 U/L / x     / 13.4 ng/mL  CARDIAC MARKERS ( 2019 14:20 )  x     / 0.05 ng/mL / x     / x     / x                                                LIVER FUNCTIONS - ( 2019 05:30 )  Alb: 4.3 g/dL / Pro: 7.2 g/dL / ALK PHOS: 93 U/L / ALT: 14 U/L / AST: 22 U/L / GGT: x                                                                                                                                       X-Rays           No infiltrate                                                                           ECHO    MEDICATIONS  (STANDING):  buDESOnide  80 MICROgram(s)/formoterol 4.5 MICROgram(s) Inhaler 2 Puff(s) Inhalation two times a day  chlorhexidine 4% Liquid 1 Application(s) Topical <User Schedule>  clopidogrel Tablet 75 milliGRAM(s) Oral daily  diltiazem    milliGRAM(s) Oral daily  enoxaparin Injectable 110 milliGRAM(s) SubCutaneous every 12 hours  escitalopram 10 milliGRAM(s) Oral daily  pantoprazole    Tablet 40 milliGRAM(s) Oral before breakfast  tiotropium 18 MICROgram(s) Capsule 1 Capsule(s) Inhalation daily    MEDICATIONS  (PRN):  acetaminophen   Tablet .. 650 milliGRAM(s) Oral every 6 hours PRN Temp greater or equal to 38C (100.4F), Mild Pain (1 - 3)  ALBUTerol    90 MICROgram(s) HFA Inhaler 2 Puff(s) Inhalation every 6 hours PRN Shortness of Breath and/or Wheezing

## 2019-02-18 NOTE — H&P ADULT - NSHPLABSRESULTS_GEN_ALL_CORE
14.0   7.22  )-----------( 293      ( 18 Feb 2019 05:30 )             44.2       02-17    143  |  105  |  14  ----------------------------<  124<H>  4.1   |  27  |  1.0    Ca    9.8      17 Feb 2019 14:20  Mg     2.1     02-17    TPro  7.1  /  Alb  4.2  /  TBili  0.4  /  DBili  x   /  AST  17  /  ALT  15  /  AlkPhos  95  02-17                  PT/INR - ( 17 Feb 2019 14:20 )   PT: 11.30 sec;   INR: 0.98 ratio         PTT - ( 17 Feb 2019 14:20 )  PTT:31.7 sec    Lactate Trend      CARDIAC MARKERS ( 17 Feb 2019 19:22 )  x     / 0.27 ng/mL / 148 U/L / x     / 13.4 ng/mL  CARDIAC MARKERS ( 17 Feb 2019 14:20 )  x     / 0.05 ng/mL / x     / x     / x            CAPILLARY BLOOD GLUCOSE

## 2019-02-18 NOTE — CONSULT NOTE ADULT - ASSESSMENT
IMPRESSION:  Afib with RVR  Asthma - severe persistant - improving     PLAN:    Nebs PRN,   cont spiriva, symbicort.   OP pulm follow up.   Daily PEFR   On cardizem CD,   cardiology  on board, echo, AC per cards  DVT px   Downgrade to tele

## 2019-02-18 NOTE — H&P ADULT - ASSESSMENT
59 yo lady with PMH of Afib and asthma , presented for palpitations and chest tightness , found to have Afib with RVR , had also positive trop       1)Afib with RVR      S/P Cardizem drip and IV digoxin      Converted to sinus rythm    -->Keep on PO Cardizem   -->Anticoagulation with Lovenox     2)Troponinemia     ECG showing ST dep in inferior and lateral leads      -->Cardio eval   -->Consider Cardiac Cath 59 yo lady with PMH of Afib and asthma , presented for palpitations and chest tightness , found to have Afib with RVR , had also positive trop       1)Afib with RVR      S/P Cardizem drip and IV digoxin      Converted to sinus rythm    -->Keep on PO Cardizem   -->Anticoagulation with Lovenox     2)Troponinemia     ECG showing ST dep in inferolateral leads    Loaded with Plavix       -->Cardio eval   -->Trend trop   -->Consider Cardiac Cath     3)Asthma   Keep on Spiriva and Symbicort

## 2019-02-19 LAB
ALBUMIN SERPL ELPH-MCNC: 4 G/DL — SIGNIFICANT CHANGE UP (ref 3.5–5.2)
ALP SERPL-CCNC: 75 U/L — SIGNIFICANT CHANGE UP (ref 30–115)
ALT FLD-CCNC: 11 U/L — SIGNIFICANT CHANGE UP (ref 0–41)
ANION GAP SERPL CALC-SCNC: 11 MMOL/L — SIGNIFICANT CHANGE UP (ref 7–14)
ANION GAP SERPL CALC-SCNC: 13 MMOL/L — SIGNIFICANT CHANGE UP (ref 7–14)
APPEARANCE UR: CLEAR — SIGNIFICANT CHANGE UP
APTT BLD: 26.8 SEC — LOW (ref 27–39.2)
AST SERPL-CCNC: 10 U/L — SIGNIFICANT CHANGE UP (ref 0–41)
BACTERIA # UR AUTO: ABNORMAL /HPF
BASOPHILS # BLD AUTO: 0.01 K/UL — SIGNIFICANT CHANGE UP (ref 0–0.2)
BASOPHILS NFR BLD AUTO: 0.1 % — SIGNIFICANT CHANGE UP (ref 0–1)
BILIRUB SERPL-MCNC: 0.4 MG/DL — SIGNIFICANT CHANGE UP (ref 0.2–1.2)
BILIRUB UR-MCNC: NEGATIVE — SIGNIFICANT CHANGE UP
BLD GP AB SCN SERPL QL: SIGNIFICANT CHANGE UP
BUN SERPL-MCNC: 22 MG/DL — HIGH (ref 10–20)
BUN SERPL-MCNC: 23 MG/DL — HIGH (ref 10–20)
CALCIUM SERPL-MCNC: 9.9 MG/DL — SIGNIFICANT CHANGE UP (ref 8.5–10.1)
CALCIUM SERPL-MCNC: 9.9 MG/DL — SIGNIFICANT CHANGE UP (ref 8.5–10.1)
CHLORIDE SERPL-SCNC: 104 MMOL/L — SIGNIFICANT CHANGE UP (ref 98–110)
CHLORIDE SERPL-SCNC: 105 MMOL/L — SIGNIFICANT CHANGE UP (ref 98–110)
CHOLEST SERPL-MCNC: 223 MG/DL — HIGH (ref 100–200)
CO2 SERPL-SCNC: 22 MMOL/L — SIGNIFICANT CHANGE UP (ref 17–32)
CO2 SERPL-SCNC: 26 MMOL/L — SIGNIFICANT CHANGE UP (ref 17–32)
COLOR SPEC: YELLOW — SIGNIFICANT CHANGE UP
CREAT SERPL-MCNC: 0.9 MG/DL — SIGNIFICANT CHANGE UP (ref 0.7–1.5)
CREAT SERPL-MCNC: 0.9 MG/DL — SIGNIFICANT CHANGE UP (ref 0.7–1.5)
DIFF PNL FLD: ABNORMAL
EOSINOPHIL # BLD AUTO: 0 K/UL — SIGNIFICANT CHANGE UP (ref 0–0.7)
EOSINOPHIL NFR BLD AUTO: 0 % — SIGNIFICANT CHANGE UP (ref 0–8)
EPI CELLS # UR: ABNORMAL /HPF
GLUCOSE SERPL-MCNC: 167 MG/DL — HIGH (ref 70–99)
GLUCOSE SERPL-MCNC: 185 MG/DL — HIGH (ref 70–99)
GLUCOSE UR QL: NEGATIVE — SIGNIFICANT CHANGE UP
HCT VFR BLD CALC: 38.9 % — SIGNIFICANT CHANGE UP (ref 37–47)
HCT VFR BLD CALC: 41.3 % — SIGNIFICANT CHANGE UP (ref 37–47)
HDLC SERPL-MCNC: 57 MG/DL — SIGNIFICANT CHANGE UP
HGB BLD-MCNC: 12.7 G/DL — SIGNIFICANT CHANGE UP (ref 12–16)
HGB BLD-MCNC: 13 G/DL — SIGNIFICANT CHANGE UP (ref 12–16)
IMM GRANULOCYTES NFR BLD AUTO: 0.6 % — HIGH (ref 0.1–0.3)
INR BLD: 1 RATIO — SIGNIFICANT CHANGE UP (ref 0.65–1.3)
KETONES UR-MCNC: NEGATIVE — SIGNIFICANT CHANGE UP
LEUKOCYTE ESTERASE UR-ACNC: NEGATIVE — SIGNIFICANT CHANGE UP
LIPID PNL WITH DIRECT LDL SERPL: 156 MG/DL — HIGH (ref 4–129)
LYMPHOCYTES # BLD AUTO: 0.8 K/UL — LOW (ref 1.2–3.4)
LYMPHOCYTES # BLD AUTO: 5.5 % — LOW (ref 20.5–51.1)
MAGNESIUM SERPL-MCNC: 2.2 MG/DL — SIGNIFICANT CHANGE UP (ref 1.8–2.4)
MCHC RBC-ENTMCNC: 25.8 PG — LOW (ref 27–31)
MCHC RBC-ENTMCNC: 26 PG — LOW (ref 27–31)
MCHC RBC-ENTMCNC: 31.5 G/DL — LOW (ref 32–37)
MCHC RBC-ENTMCNC: 32.6 G/DL — SIGNIFICANT CHANGE UP (ref 32–37)
MCV RBC AUTO: 79.6 FL — LOW (ref 81–99)
MCV RBC AUTO: 82.1 FL — SIGNIFICANT CHANGE UP (ref 81–99)
MONOCYTES # BLD AUTO: 0.33 K/UL — SIGNIFICANT CHANGE UP (ref 0.1–0.6)
MONOCYTES NFR BLD AUTO: 2.3 % — SIGNIFICANT CHANGE UP (ref 1.7–9.3)
MRSA PCR RESULT.: NEGATIVE — SIGNIFICANT CHANGE UP
NEUTROPHILS # BLD AUTO: 13.3 K/UL — HIGH (ref 1.4–6.5)
NEUTROPHILS NFR BLD AUTO: 91.5 % — HIGH (ref 42.2–75.2)
NITRITE UR-MCNC: NEGATIVE — SIGNIFICANT CHANGE UP
NRBC # BLD: 0 /100 WBCS — SIGNIFICANT CHANGE UP (ref 0–0)
NRBC # BLD: 0 /100 WBCS — SIGNIFICANT CHANGE UP (ref 0–0)
NT-PROBNP SERPL-SCNC: 1085 PG/ML — HIGH (ref 0–300)
PH UR: 5.5 — SIGNIFICANT CHANGE UP (ref 5–8)
PLATELET # BLD AUTO: 293 K/UL — SIGNIFICANT CHANGE UP (ref 130–400)
PLATELET # BLD AUTO: 315 K/UL — SIGNIFICANT CHANGE UP (ref 130–400)
POTASSIUM SERPL-MCNC: 4.4 MMOL/L — SIGNIFICANT CHANGE UP (ref 3.5–5)
POTASSIUM SERPL-MCNC: 5.1 MMOL/L — HIGH (ref 3.5–5)
POTASSIUM SERPL-SCNC: 4.4 MMOL/L — SIGNIFICANT CHANGE UP (ref 3.5–5)
POTASSIUM SERPL-SCNC: 5.1 MMOL/L — HIGH (ref 3.5–5)
PROT SERPL-MCNC: 6.8 G/DL — SIGNIFICANT CHANGE UP (ref 6–8)
PROT UR-MCNC: NEGATIVE — SIGNIFICANT CHANGE UP
PROTHROM AB SERPL-ACNC: 11.5 SEC — SIGNIFICANT CHANGE UP (ref 9.95–12.87)
RBC # BLD: 4.89 M/UL — SIGNIFICANT CHANGE UP (ref 4.2–5.4)
RBC # BLD: 5.03 M/UL — SIGNIFICANT CHANGE UP (ref 4.2–5.4)
RBC # FLD: 13.9 % — SIGNIFICANT CHANGE UP (ref 11.5–14.5)
RBC # FLD: 14 % — SIGNIFICANT CHANGE UP (ref 11.5–14.5)
RBC CASTS # UR COMP ASSIST: SIGNIFICANT CHANGE UP /HPF
SODIUM SERPL-SCNC: 139 MMOL/L — SIGNIFICANT CHANGE UP (ref 135–146)
SODIUM SERPL-SCNC: 142 MMOL/L — SIGNIFICANT CHANGE UP (ref 135–146)
SP GR SPEC: >=1.03 — SIGNIFICANT CHANGE UP (ref 1.01–1.03)
TOTAL CHOLESTEROL/HDL RATIO MEASUREMENT: 3.9 RATIO — LOW (ref 4–5.5)
TRIGL SERPL-MCNC: 103 MG/DL — SIGNIFICANT CHANGE UP (ref 10–149)
TROPONIN T SERPL-MCNC: 0.07 NG/ML — CRITICAL HIGH
TYPE + AB SCN PNL BLD: SIGNIFICANT CHANGE UP
UROBILINOGEN FLD QL: 0.2 — SIGNIFICANT CHANGE UP (ref 0.2–0.2)
WBC # BLD: 14.52 K/UL — HIGH (ref 4.8–10.8)
WBC # BLD: 17.28 K/UL — HIGH (ref 4.8–10.8)
WBC # FLD AUTO: 14.52 K/UL — HIGH (ref 4.8–10.8)
WBC # FLD AUTO: 17.28 K/UL — HIGH (ref 4.8–10.8)
WBC UR QL: SIGNIFICANT CHANGE UP /HPF

## 2019-02-19 RX ORDER — SODIUM CHLORIDE 9 MG/ML
3 INJECTION INTRAMUSCULAR; INTRAVENOUS; SUBCUTANEOUS EVERY 8 HOURS
Qty: 0 | Refills: 0 | Status: DISCONTINUED | OUTPATIENT
Start: 2019-02-19 | End: 2019-02-22

## 2019-02-19 RX ORDER — SODIUM CHLORIDE 9 MG/ML
1000 INJECTION INTRAMUSCULAR; INTRAVENOUS; SUBCUTANEOUS
Qty: 0 | Refills: 0 | Status: DISCONTINUED | OUTPATIENT
Start: 2019-02-19 | End: 2019-02-25

## 2019-02-19 RX ORDER — METOPROLOL TARTRATE 50 MG
25 TABLET ORAL
Qty: 0 | Refills: 0 | Status: DISCONTINUED | OUTPATIENT
Start: 2019-02-19 | End: 2019-02-20

## 2019-02-19 RX ORDER — ALPRAZOLAM 0.25 MG
0.5 TABLET ORAL ONCE
Qty: 0 | Refills: 0 | Status: DISCONTINUED | OUTPATIENT
Start: 2019-02-19 | End: 2019-02-19

## 2019-02-19 RX ORDER — DOCUSATE SODIUM 100 MG
100 CAPSULE ORAL THREE TIMES A DAY
Qty: 0 | Refills: 0 | Status: DISCONTINUED | OUTPATIENT
Start: 2019-02-19 | End: 2019-02-22

## 2019-02-19 RX ORDER — ASPIRIN/CALCIUM CARB/MAGNESIUM 324 MG
81 TABLET ORAL DAILY
Qty: 0 | Refills: 0 | Status: DISCONTINUED | OUTPATIENT
Start: 2019-02-19 | End: 2019-02-22

## 2019-02-19 RX ORDER — ATORVASTATIN CALCIUM 80 MG/1
40 TABLET, FILM COATED ORAL AT BEDTIME
Qty: 0 | Refills: 0 | Status: DISCONTINUED | OUTPATIENT
Start: 2019-02-19 | End: 2019-02-22

## 2019-02-19 RX ADMIN — Medication 100 MILLIGRAM(S): at 21:25

## 2019-02-19 RX ADMIN — BUDESONIDE AND FORMOTEROL FUMARATE DIHYDRATE 2 PUFF(S): 160; 4.5 AEROSOL RESPIRATORY (INHALATION) at 21:34

## 2019-02-19 RX ADMIN — Medication 25 MILLIGRAM(S): at 18:48

## 2019-02-19 RX ADMIN — Medication 40 MILLIGRAM(S): at 10:20

## 2019-02-19 RX ADMIN — Medication 0.5 MILLIGRAM(S): at 10:29

## 2019-02-19 RX ADMIN — CLOPIDOGREL BISULFATE 75 MILLIGRAM(S): 75 TABLET, FILM COATED ORAL at 10:20

## 2019-02-19 RX ADMIN — BUDESONIDE AND FORMOTEROL FUMARATE DIHYDRATE 2 PUFF(S): 160; 4.5 AEROSOL RESPIRATORY (INHALATION) at 07:12

## 2019-02-19 RX ADMIN — ATORVASTATIN CALCIUM 40 MILLIGRAM(S): 80 TABLET, FILM COATED ORAL at 21:26

## 2019-02-19 RX ADMIN — PANTOPRAZOLE SODIUM 40 MILLIGRAM(S): 20 TABLET, DELAYED RELEASE ORAL at 07:09

## 2019-02-19 RX ADMIN — TIOTROPIUM BROMIDE 1 CAPSULE(S): 18 CAPSULE ORAL; RESPIRATORY (INHALATION) at 21:26

## 2019-02-19 RX ADMIN — CHLORHEXIDINE GLUCONATE 1 APPLICATION(S): 213 SOLUTION TOPICAL at 07:09

## 2019-02-19 RX ADMIN — ESCITALOPRAM OXALATE 10 MILLIGRAM(S): 10 TABLET, FILM COATED ORAL at 10:20

## 2019-02-19 RX ADMIN — SODIUM CHLORIDE 3 MILLILITER(S): 9 INJECTION INTRAMUSCULAR; INTRAVENOUS; SUBCUTANEOUS at 21:17

## 2019-02-19 NOTE — PROGRESS NOTE ADULT - SUBJECTIVE AND OBJECTIVE BOX
Patient aware of risk and benefits of planned procedure up to and including death and agrees with planned procedure.02-19-19 @ 20:32        POST OPERATIVE PROCEDURAL DOCUMENTATION  PRE-OP DIAGNOSIS:  cad    PROCEDURE:   LEFT HEART CATHERIZATION    Physician:KATHE Truong MD  Assistant:  MD    ANESTHESIA TYPE:  [ X] Sedation  [ X] Local/Regional  [   ]General Anesthesia    ESTIMATED BLOOD LOSS:      less than 10 mL    CONDITION  [X ] Good  [   ] Fair  [   ] Serious  [   ] Critical      SPECIMENS REMOVED (IF APPLICABLE):   None          IMPLANTS (IF APPLICABLE)      FINDINGS:  LEFT HEART CATHERIZATION                                    LVEF%:  LVEDP:    Left main:  mild  LAD:  sever ostial                  Diag:      Left Circumflex: mild  OM:      Right Cornary Artery:  mild  RPDA:    RPL:        RIGHT HEART CATHERIZATION  PA:  PCW:  CO/CI:    PERCUTANEOUS CORONARY INTERVENTIONS:      COMPLICATIONS:  None      POST-OP DIAGNOSIS  cad        PLAN OF CARE  cts consult

## 2019-02-19 NOTE — CONSULT NOTE ADULT - SUBJECTIVE AND OBJECTIVE BOX
Surgeon: /Jackson    Consult requesting by: Dr. Truong  Cardio: Dr. Barroso  Pulm: Dr. Metcalf  PMD: Dr. burch      HISTORY OF PRESENT ILLNESS:  59 yo female with PMH of Afib on Xarelto (ran out a few weeks ago), asthma, HTN, presents via EMS with c/o rapid HR.  Pt states she woke up feeling palpitations, flushed, clammy, felt as if her "jaw was locking".  + nausea, no vomiting.  EMS found pt with .  They gave 6mg Adenosine followed by 2 doses of 12 mg x 2.  Followed by 25 mg Cardizem which slowed pts HR to 120s (2019 07:28)  On 2019 the patient was transferred to [facility] for cardiac catheterization that revealed severe single vessel CAP with ostial LAD stenosis.      NYHA functional class    [ x] Class I (no limitation) [ ] Class II (slight limitation) [ ] Class III (marked limitation) [ ] Class IV (symptoms at rest)    PAST MEDICAL & SURGICAL HISTORY:  Hearing aid worn  Hearing decreased  Asthma  Essential hypertension  Afib  Closed fracture of foot, unspecified laterality, sequela  History of  section  Bilateral carpal tunnel syndrome  No significant past surgical history      MEDICATIONS  (STANDING):  aspirin enteric coated 81 milliGRAM(s) Oral daily  atorvastatin 40 milliGRAM(s) Oral at bedtime  buDESOnide  80 MICROgram(s)/formoterol 4.5 MICROgram(s) Inhaler 2 Puff(s) Inhalation two times a day  chlorhexidine 4% Liquid 1 Application(s) Topical <User Schedule>  diltiazem    milliGRAM(s) Oral daily  diphenhydrAMINE   Injectable 50 milliGRAM(s) IV Push once  docusate sodium 100 milliGRAM(s) Oral three times a day  enoxaparin Injectable 100 milliGRAM(s) SubCutaneous every 12 hours  escitalopram 10 milliGRAM(s) Oral daily  metoprolol tartrate 25 milliGRAM(s) Oral two times a day  pantoprazole    Tablet 40 milliGRAM(s) Oral before breakfast  sodium chloride 0.9% lock flush 3 milliLiter(s) IV Push every 8 hours  sodium chloride 0.9%. 1000 milliLiter(s) (75 mL/Hr) IV Continuous <Continuous>  tiotropium 18 MICROgram(s) Capsule 1 Capsule(s) Inhalation at bedtime    MEDICATIONS  (PRN):  acetaminophen   Tablet .. 650 milliGRAM(s) Oral every 6 hours PRN Temp greater or equal to 38C (100.4F), Mild Pain (1 - 3)  ALBUTerol    90 MICROgram(s) HFA Inhaler 2 Puff(s) Inhalation every 6 hours PRN Shortness of Breath and/or Wheezing    Antiplatelet therapy:        Plavix  x 3 doses                 Last dose/amt: 300mg (); 75mg (;)    Allergies    amiodarone (Flushing)  iodine (Unknown)  shellfish (Unknown)      SOCIAL HISTORY:  Smoker: [x ] Yes  [ ] No        PACK YEARS:  15 years x 1/2 pack/week                       WHEN QUIT? 10 years ago  ETOH use: [ ] Yes  [x ] No              FREQUENCY / QUANTITY:  Ilicit Drug use:  [ ] Yes  [ x] No  Occupation: Works in [employer] home   Lives with: 2 daughters  Assisted device use: None  5 meter walk test: 1____sec, 2____sec, 3___sec - Unable to complete - Femoral cath  FAMILY HISTORY: Father - CABG age 70; CAD diagnosed in his 60s    Review of Systems  CONSTITUTIONAL:  Fevers[ ] chills[ ] sweats[ ] fatigue[ ] weight loss[ ] weight gain [ ]            NEGATIVE [X ]   NEURO:  parathesias[ ] seizures [ ]  syncope [ ]  confusion [ ]                                                       NEGATIVE[ X]   EYES: glasses[ ]  blurry vision[ ]  discharge[ ] pain[ ] glaucoma [ ]                                                 NEGATIVE[X ]   ENMT:  difficulty hearing [ ]  vertigo[ ]  dysphagia[ ] epistaxis[ ] recent dental work [ ]           NEGATIVE[ X]   CV:  chest pain[ x] palpitations[x ] MURCIA [x ] diaphoresis [ ]                                                                  NEGATIVE[ ]   RESPIRATORY:  wheezing[ ] SOB[ ] cough [ ] sputum[ ] hemoptysis[ ]                                          NEGATIVE[ ]   GI:  nausea[ ]  vomiting [ ]  diarrhea[ ] constipation [ ] melena [ ]                                             NEGATIVE[ X]   : hematuria[ ]  dysuria[ ] urgency[ ] incontinence[ ]                                                                   NEGATIVE[ X]   MUSCULOSKELETAL:  arthritis[ ]  joint swelling [ ] muscle weakness [ ] Hx vein stripping [ ]   NEGATIVE[X ]   SKIN/BREAST:  rash[ ] itching [ ]  hair loss[ ] masses[ ]                                                                    NEGATIVE[ X]   PSYCH:  dementia [ ] depression [ ] anxiety[ ]                                                                                     NEGATIVE[X ]   HEME/LYMPH:  bruises easily[ ] enlarged lymph nodes[ ] tender lymph nodes[ ]                     NEGATIVE[ X]   ENDOCRINE:  cold intolerance[ ] heat intolerance[ ] polydipsia[ ]                                                NEGATIVE[ X]     PHYSICAL EXAM  Vital Signs Last 24 Hrs  T(C): 36.2 (2019 07:01), Max: 36.3 (2019 23:12)  T(F): 97.2 (2019 07:01), Max: 97.3 (2019 23:12)  HR: 73 (2019 07:06) (73 - 85)  BP: 137/65 (2019 07:06) (137/65 - 161/69)  BP(mean): 94 (2019 07:06) (94 - 99)  RR: 33 (2019 07:06) (20 - 33)  SpO2: 99% (2019 07:06) (99% - 99%)    CONSTITUTIONAL:  WNL[ x]   Neuro: WNL[ x] Normal exam oriented to person/place & time with no focal motor or sensory  deficits. Other                     Eyes: WNL[ x]   Normal exam of conjunctiva & lids, pupils equally reactive. Other     ENT: WNL[x ]    Normal exam of nasal/oral mucosa with absence of cyanosis. Other  Neck: WNL[x ]  Normal exam of jugular veins, trachea & thyroid. Other  Chest: WNL[ x] Normal lung exam with good air movement absence of wheezes, rales or Ronchi                                                                             CV:  Auscultation: normal [x ] S3[ ] S4[ ] Irregular [ ] Rub[ ] Clicks[ ]    Murmurs none:[x ]systolic [ ]  diastolic [ ] holosystolic [ ]  Carotids: No Bruits[x ] Other                 Abdominal Aorta: normal [ ] nonpalpable[ ]Other                                                                                      GI: WNL[ x] Normal exam of abdomen, liver & spleen with no noted masses or tenderness. Other                                                                                                        Extremities: WNL[x ] Normal no evidence of cyanosis or deformity Edema: none[ ]trace[ ]1+[ ]2+[ ]3+[ ]4+[ ]  Lower Extremity Pulses: Right[x ] Left[x ]Varicosities[ ]  SKIN :WNL[x ] Normal exam to inspection & palpation. Other:                                                          LABS:                        13.0   17.28 )-----------( 315      ( 2019 05:31 )             41.3     02-19    142  |  105  |  22<H>  ----------------------------<  167<H>  5.1<H>   |  26  |  0.9    Ca    9.9      2019 05:31  Mg     2.2         TPro  7.2  /  Alb  4.3  /  TBili  0.6  /  DBili  x   /  AST  22  /  ALT  14  /  AlkPhos  93        Urinalysis Basic - ( 2019 08:28 )    Color: Yellow / Appearance: Clear / S.020 / pH: x  Gluc: x / Ketone: Negative  / Bili: Negative / Urobili: 0.2 mg/dL   Blood: x / Protein: 100 mg/dL / Nitrite: Negative   Leuk Esterase: Trace / RBC: Negative / WBC 6-10 /HPF   Sq Epi: x / Non Sq Epi: Moderate /HPF / Bacteria: x      CARDIAC MARKERS ( 2019 05:31 )  x     / 0.07 ng/mL / x     / x     / x      CARDIAC MARKERS ( 2019 05:30 )  x     / 0.12 ng/mL / 113 U/L / x     / 7.6 ng/mL  CARDIAC MARKERS ( 2019 19:22 )  x     / 0.27 ng/mL / 148 U/L / x     / 13.4 ng/mL      Cardiac Cath: 1vCAD - pLAD    TTE / VASU:   Pending    Recommendation: (Procedures/Evaluations)  CT HEAD Non-Contrast:[  ]  CT Chest without contrast [ x]  Echocadiography :[x ]  Carotid Duplex :[x ]  CECI/PVR: [ ]  PFT : Simple PFT [ x]  Full [ ]  Renal Consult [ ]  Pulmonary Consult: [x ] Dr. Metcalf  Vascular Consult [ ]    Dental Consult [ ]   Hem-Onc Consult [ ]   GI Consult [ ]   Other Consultations :    STS Score:   Procedure: Isolated CAB  Risk of Mortality: 1.363%  Renal Failure: 1.071%  Permanent Stroke: 0.693%  Prolonged Ventilation: 6.707%  DSW Infection: 0.355%  Reoperation: 1.730%  Morbidity or Mortality: 10.557%  Short Length of Stay: 41.042%  Long Length of Stay: 4.025%      Impression:    CAD [ x]  Valvular  disease [ ]   Aortic Disease [ ]   CARINA: Yes[ ] No [ ]   CKD Stage I [ ] , Stage II [ ] , Stage III [ ], Stage IV [ ]   Anemia: Yes [ ], No [ ]  Diabetes :Yes [ ], No [ ]  Acute MI: Yes [ ], No [ ]   Heart Failure: Yes [ ] , No [ ] HFpEF [ ], HFrEF [ ]        Assessment/ Plan:  58F here with significant single vessel CAD.  1. Admit to CTU and pre-op for possible CABG  2. CBC, CMP, Thyroid function, BNP, HA1c, Verify Now, PT/PTT, UA, MRSA nasal Cx  3. CXR, TTE, CT chest non-contrast, Carotid duplex b/l, Simple PFT's

## 2019-02-19 NOTE — PROGRESS NOTE ADULT - ASSESSMENT
1)Afib with RVR      S/P Cardizem drip and IV digoxin      Converted to sinus rythm    -->Keep on PO Cardizem   -->Anticoagulation with Lovenox     2)Troponinemia     ECG showing ST dep in inferolateral leads    Loaded with Plavix       -->Cardio following  -->Cardiac Cath today     3)Asthma   Keep on Spiriva and Symbicort    Outpt Pulm eval

## 2019-02-19 NOTE — PROGRESS NOTE ADULT - SUBJECTIVE AND OBJECTIVE BOX
57 yo female with PMH of Afib on Xarelto (ran out a few weeks ago), asthma, HTN, presents via EMS with c/o rapid HR.  Pt states she woke up feeling palpitations, flushed, clammy, felt as if her "jaw was locking".  + nausea, no vomiting.  EMS found pt with .  They gave 6mg Adenosine followed by 2 doses of 12 mg x 2.  Followed by 25 mg Cardizem which slowed pts HR to 120s    PAST MEDICAL & SURGICAL HISTORY:  Hearing aid worn  Hearing decreased  Asthma  Essential hypertension  Afib  Closed fracture of foot, unspecified laterality, sequela  History of  section  Bilateral carpal tunnel syndrome  No significant past surgical history    MEDICATIONS  (STANDING):  buDESOnide  80 MICROgram(s)/formoterol 4.5 MICROgram(s) Inhaler 2 Puff(s) Inhalation two times a day  chlorhexidine 4% Liquid 1 Application(s) Topical <User Schedule>  clopidogrel Tablet 75 milliGRAM(s) Oral daily  diltiazem    milliGRAM(s) Oral daily  enoxaparin Injectable 100 milliGRAM(s) SubCutaneous every 12 hours  escitalopram 10 milliGRAM(s) Oral daily  pantoprazole    Tablet 40 milliGRAM(s) Oral before breakfast  predniSONE   Tablet 40 milliGRAM(s) Oral every 12 hours  tiotropium 18 MICROgram(s) Capsule 1 Capsule(s) Inhalation daily    MEDICATIONS  (PRN):  acetaminophen   Tablet .. 650 milliGRAM(s) Oral every 6 hours PRN Temp greater or equal to 38C (100.4F), Mild Pain (1 - 3)  ALBUTerol    90 MICROgram(s) HFA Inhaler 2 Puff(s) Inhalation every 6 hours PRN Shortness of Breath and/or Wheezing    ICU Vital Signs Last 24 Hrs  T(C): 36.4 (2019 07:14), Max: 38.1 (2019 20:30)  T(F): 97.5 (2019 07:14), Max: 100.6 (2019 20:31)  HR: 65 (2019 07:18) (60 - 207)  BP: 137/71 (2019 07:18) (105/63 - 211/92)  BP(mean): 95 (2019 07:18) (79 - 145)  ABP: --  ABP(mean): --  RR: 18 (2019 07:18) (18 - 26)  SpO2: 98% (2019 07:18) (82% - 100%)    PHYSICAL EXAM :     GENERAL : IN bed with family at bedside NAD   HEENT : difficulty hearing   LUNGS : bilateral air entry   CARD: S1 s2 irregular   ABD: soft + bs   ext: 2 +   neuro - alert awake oriented motor intact

## 2019-02-19 NOTE — PROGRESS NOTE ADULT - SUBJECTIVE AND OBJECTIVE BOX
FLAVIO HUDSON    Patient is a 58y old  Female who presents with a chief complaint of Palpitations (2019 07:36)      Interval History/Overnight events:    Remains in sinus rhythm     T(C): 36.2 (19 @ 07:01), Max: 37.2 (19 @ 15:01)  HR: 73 (19 @ 07:06)  BP: 137/65 (19 @ 07:06)  RR: 33 (19 @ 07:06)  SpO2: 99% (19 @ 07:06)    PHYSICAL EXAM:    GENERAL: Comfortable , not in distress   HEENT: Anicteric sclera, EOMI  CHEST/LUNG: Clear to auscultation bilaterally  HEART: Regular rate and rhythm; No murmurs, rubs, or gallops  ABDOMEN: Soft, Nontender, Nondistended; Bowel sounds present  EXTREMITIES: No clubbing, cyanosis, or edema      LABS:                          14.0   7.22  )-----------( 293      ( 2019 05:30 )             44.2         142  |  105  |  22<H>  ----------------------------<  167<H>  5.1<H>   |  26  |  0.9    Ca    9.9      2019 05:31  Mg     2.2         TPro  7.2  /  Alb  4.3  /  TBili  0.6  /  DBili  x   /  AST  22  /  ALT  14  /  AlkPhos  93  18    PT/INR - ( 2019 14:20 )   PT: 11.30 sec;   INR: 0.98 ratio         PTT - ( 2019 14:20 )  PTT:31.7 sec  CARDIAC MARKERS ( 2019 05:31 )  x     / 0.07 ng/mL / x     / x     / x      CARDIAC MARKERS ( 2019 05:30 )  x     / 0.12 ng/mL / 113 U/L / x     / 7.6 ng/mL  CARDIAC MARKERS ( 2019 19:22 )  x     / 0.27 ng/mL / 148 U/L / x     / 13.4 ng/mL  CARDIAC MARKERS ( 2019 14:20 )  x     / 0.05 ng/mL / x     / x     / x            Urinalysis Basic - ( 2019 08:28 )    Color: Yellow / Appearance: Clear / S.020 / pH: x  Gluc: x / Ketone: Negative  / Bili: Negative / Urobili: 0.2 mg/dL   Blood: x / Protein: 100 mg/dL / Nitrite: Negative   Leuk Esterase: Trace / RBC: Negative / WBC 6-10 /HPF   Sq Epi: x / Non Sq Epi: Moderate /HPF / Bacteria: x      LIVER FUNCTIONS - ( 2019 05:30 )  Alb: 4.3 g/dL / Pro: 7.2 g/dL / ALK PHOS: 93 U/L / ALT: 14 U/L / AST: 22 U/L / GGT: x             MEDICATIONS:    MEDICATIONS  (STANDING):  buDESOnide  80 MICROgram(s)/formoterol 4.5 MICROgram(s) Inhaler 2 Puff(s) Inhalation two times a day  chlorhexidine 4% Liquid 1 Application(s) Topical <User Schedule>  clopidogrel Tablet 75 milliGRAM(s) Oral daily  diltiazem    milliGRAM(s) Oral daily  diphenhydrAMINE   Injectable 50 milliGRAM(s) IV Push once  enoxaparin Injectable 100 milliGRAM(s) SubCutaneous every 12 hours  escitalopram 10 milliGRAM(s) Oral daily  pantoprazole    Tablet 40 milliGRAM(s) Oral before breakfast  predniSONE   Tablet 40 milliGRAM(s) Oral every 12 hours  tiotropium 18 MICROgram(s) Capsule 1 Capsule(s) Inhalation at bedtime      MEDICATIONS  (PRN):  acetaminophen   Tablet .. 650 milliGRAM(s) Oral every 6 hours PRN Temp greater or equal to 38C (100.4F), Mild Pain (1 - 3)  ALBUTerol    90 MICROgram(s) HFA Inhaler 2 Puff(s) Inhalation every 6 hours PRN Shortness of Breath and/or Wheezing

## 2019-02-19 NOTE — PROGRESS NOTE ADULT - SUBJECTIVE AND OBJECTIVE BOX
Patient is a 58y old  Female who presents with a chief complaint of Palpitations (18 Feb 2019 10:40)      T(F): 97.2 (02-19-19 @ 07:01), Max: 98.9 (02-18-19 @ 15:01)  HR: 73 (02-19-19 @ 07:06)  BP: 137/65 (02-19-19 @ 07:06)  RR: 33 (02-19-19 @ 07:06)  SpO2: 99% (02-19-19 @ 07:06) (99% - 99%)    PHYSICAL EXAM:  GENERAL: NAD, well-groomed, well-developed  HEAD:  Atraumatic, Normocephalic  EYES: EOMI, PERRLA, conjunctiva and sclera clear  ENMT: No tonsillar erythema, exudates, or enlargement; Moist mucous membranes, Good dentition, No lesions  NECK: Supple, No JVD, Normal thyroid  NERVOUS SYSTEM:  Alert & Oriented X3,  Motor Strength 5/5 B/L upper and lower extremities  CHEST/LUNG: Clear to percussion bilaterally; No rales, rhonchi, wheezing, or rubs  HEART: Regular rate and rhythm; No murmurs, rubs, or gallops  ABDOMEN: Soft, Nontender, Nondistended; Bowel sounds present  EXTREMITIES:   No clubbing, cyanosis, or edema  LYMPH: No lymphadenopathy noted  SKIN: No rashes or lesions    labs  02-19    142  |  105  |  22<H>  ----------------------------<  167<H>  5.1<H>   |  26  |  0.9    Ca    9.9      19 Feb 2019 05:31  Mg     2.2     02-19    TPro  7.2  /  Alb  4.3  /  TBili  0.6  /  DBili  x   /  AST  22  /  ALT  14  /  AlkPhos  93  02-18                          14.0   7.22  )-----------( 293      ( 18 Feb 2019 05:30 )             44.2       PT/INR - ( 17 Feb 2019 14:20 )   PT: 11.30 sec;   INR: 0.98 ratio         PTT - ( 17 Feb 2019 14:20 )  PTT:31.7 sec    Troponin T, Serum: 0.07 ng/mL <HH> (02-19-19 @ 05:31)      acetaminophen   Tablet .. 650 milliGRAM(s) Oral every 6 hours PRN  ALBUTerol    90 MICROgram(s) HFA Inhaler 2 Puff(s) Inhalation every 6 hours PRN  buDESOnide  80 MICROgram(s)/formoterol 4.5 MICROgram(s) Inhaler 2 Puff(s) Inhalation two times a day  chlorhexidine 4% Liquid 1 Application(s) Topical <User Schedule>  clopidogrel Tablet 75 milliGRAM(s) Oral daily  diltiazem    milliGRAM(s) Oral daily  diphenhydrAMINE   Injectable 50 milliGRAM(s) IV Push once  enoxaparin Injectable 100 milliGRAM(s) SubCutaneous every 12 hours  escitalopram 10 milliGRAM(s) Oral daily  pantoprazole    Tablet 40 milliGRAM(s) Oral before breakfast  predniSONE   Tablet 40 milliGRAM(s) Oral every 12 hours  tiotropium 18 MICROgram(s) Capsule 1 Capsule(s) Inhalation at bedtime

## 2019-02-20 LAB
ABO RH CONFIRMATION: SIGNIFICANT CHANGE UP
ALBUMIN SERPL ELPH-MCNC: 4.1 G/DL — SIGNIFICANT CHANGE UP (ref 3.5–5.2)
ALP SERPL-CCNC: 76 U/L — SIGNIFICANT CHANGE UP (ref 30–115)
ALT FLD-CCNC: 11 U/L — SIGNIFICANT CHANGE UP (ref 0–41)
ANION GAP SERPL CALC-SCNC: 15 MMOL/L — HIGH (ref 7–14)
AST SERPL-CCNC: 9 U/L — SIGNIFICANT CHANGE UP (ref 0–41)
BASOPHILS # BLD AUTO: 0.02 K/UL — SIGNIFICANT CHANGE UP (ref 0–0.2)
BASOPHILS NFR BLD AUTO: 0.1 % — SIGNIFICANT CHANGE UP (ref 0–1)
BILIRUB SERPL-MCNC: 0.3 MG/DL — SIGNIFICANT CHANGE UP (ref 0.2–1.2)
BUN SERPL-MCNC: 25 MG/DL — HIGH (ref 10–20)
CALCIUM SERPL-MCNC: 9.9 MG/DL — SIGNIFICANT CHANGE UP (ref 8.5–10.1)
CHLORIDE SERPL-SCNC: 101 MMOL/L — SIGNIFICANT CHANGE UP (ref 98–110)
CO2 SERPL-SCNC: 26 MMOL/L — SIGNIFICANT CHANGE UP (ref 17–32)
CREAT SERPL-MCNC: 0.9 MG/DL — SIGNIFICANT CHANGE UP (ref 0.7–1.5)
EOSINOPHIL # BLD AUTO: 0.01 K/UL — SIGNIFICANT CHANGE UP (ref 0–0.7)
EOSINOPHIL NFR BLD AUTO: 0.1 % — SIGNIFICANT CHANGE UP (ref 0–8)
ESTIMATED AVERAGE GLUCOSE: 120 MG/DL — HIGH (ref 68–114)
GLUCOSE SERPL-MCNC: 125 MG/DL — HIGH (ref 70–99)
HBA1C BLD-MCNC: 5.8 % — HIGH (ref 4–5.6)
HCT VFR BLD CALC: 41.4 % — SIGNIFICANT CHANGE UP (ref 37–47)
HGB BLD-MCNC: 13.1 G/DL — SIGNIFICANT CHANGE UP (ref 12–16)
IMM GRANULOCYTES NFR BLD AUTO: 0.6 % — HIGH (ref 0.1–0.3)
LYMPHOCYTES # BLD AUTO: 1.7 K/UL — SIGNIFICANT CHANGE UP (ref 1.2–3.4)
LYMPHOCYTES # BLD AUTO: 12 % — LOW (ref 20.5–51.1)
MCHC RBC-ENTMCNC: 25.6 PG — LOW (ref 27–31)
MCHC RBC-ENTMCNC: 31.6 G/DL — LOW (ref 32–37)
MCV RBC AUTO: 81 FL — SIGNIFICANT CHANGE UP (ref 81–99)
MONOCYTES # BLD AUTO: 0.92 K/UL — HIGH (ref 0.1–0.6)
MONOCYTES NFR BLD AUTO: 6.5 % — SIGNIFICANT CHANGE UP (ref 1.7–9.3)
NEUTROPHILS # BLD AUTO: 11.44 K/UL — HIGH (ref 1.4–6.5)
NEUTROPHILS NFR BLD AUTO: 80.7 % — HIGH (ref 42.2–75.2)
NRBC # BLD: 0 /100 WBCS — SIGNIFICANT CHANGE UP (ref 0–0)
PLATELET # BLD AUTO: 306 K/UL — SIGNIFICANT CHANGE UP (ref 130–400)
POTASSIUM SERPL-MCNC: 4.5 MMOL/L — SIGNIFICANT CHANGE UP (ref 3.5–5)
POTASSIUM SERPL-SCNC: 4.5 MMOL/L — SIGNIFICANT CHANGE UP (ref 3.5–5)
PROT SERPL-MCNC: 6.9 G/DL — SIGNIFICANT CHANGE UP (ref 6–8)
RBC # BLD: 5.11 M/UL — SIGNIFICANT CHANGE UP (ref 4.2–5.4)
RBC # FLD: 14.1 % — SIGNIFICANT CHANGE UP (ref 11.5–14.5)
SODIUM SERPL-SCNC: 142 MMOL/L — SIGNIFICANT CHANGE UP (ref 135–146)
TSH SERPL-MCNC: 0.42 UIU/ML — SIGNIFICANT CHANGE UP (ref 0.27–4.2)
WBC # BLD: 14.18 K/UL — HIGH (ref 4.8–10.8)
WBC # FLD AUTO: 14.18 K/UL — HIGH (ref 4.8–10.8)

## 2019-02-20 RX ORDER — METOPROLOL TARTRATE 50 MG
25 TABLET ORAL ONCE
Qty: 0 | Refills: 0 | Status: COMPLETED | OUTPATIENT
Start: 2019-02-20 | End: 2019-02-20

## 2019-02-20 RX ORDER — ENOXAPARIN SODIUM 100 MG/ML
40 INJECTION SUBCUTANEOUS ONCE
Qty: 0 | Refills: 0 | Status: COMPLETED | OUTPATIENT
Start: 2019-02-20 | End: 2019-02-20

## 2019-02-20 RX ORDER — LANOLIN ALCOHOL/MO/W.PET/CERES
5 CREAM (GRAM) TOPICAL ONCE
Qty: 0 | Refills: 0 | Status: COMPLETED | OUTPATIENT
Start: 2019-02-20 | End: 2019-02-20

## 2019-02-20 RX ORDER — DILTIAZEM HCL 120 MG
180 CAPSULE, EXT RELEASE 24 HR ORAL DAILY
Qty: 0 | Refills: 0 | Status: DISCONTINUED | OUTPATIENT
Start: 2019-02-20 | End: 2019-02-21

## 2019-02-20 RX ORDER — CHLORHEXIDINE GLUCONATE 213 G/1000ML
5 SOLUTION TOPICAL ONCE
Qty: 0 | Refills: 0 | Status: DISCONTINUED | OUTPATIENT
Start: 2019-02-20 | End: 2019-02-20

## 2019-02-20 RX ORDER — METOPROLOL TARTRATE 50 MG
12.5 TABLET ORAL
Qty: 0 | Refills: 0 | Status: COMPLETED | OUTPATIENT
Start: 2019-02-20 | End: 2019-02-21

## 2019-02-20 RX ORDER — CHLORHEXIDINE GLUCONATE 213 G/1000ML
1 SOLUTION TOPICAL ONCE
Qty: 0 | Refills: 0 | Status: DISCONTINUED | OUTPATIENT
Start: 2019-02-20 | End: 2019-02-20

## 2019-02-20 RX ADMIN — Medication 81 MILLIGRAM(S): at 11:22

## 2019-02-20 RX ADMIN — CHLORHEXIDINE GLUCONATE 1 APPLICATION(S): 213 SOLUTION TOPICAL at 06:06

## 2019-02-20 RX ADMIN — ATORVASTATIN CALCIUM 40 MILLIGRAM(S): 80 TABLET, FILM COATED ORAL at 21:25

## 2019-02-20 RX ADMIN — Medication 25 MILLIGRAM(S): at 17:31

## 2019-02-20 RX ADMIN — Medication 5 MILLIGRAM(S): at 21:24

## 2019-02-20 RX ADMIN — Medication 100 MILLIGRAM(S): at 14:07

## 2019-02-20 RX ADMIN — Medication 12.5 MILLIGRAM(S): at 19:38

## 2019-02-20 RX ADMIN — Medication 100 MILLIGRAM(S): at 06:06

## 2019-02-20 RX ADMIN — ENOXAPARIN SODIUM 40 MILLIGRAM(S): 100 INJECTION SUBCUTANEOUS at 17:31

## 2019-02-20 RX ADMIN — SODIUM CHLORIDE 3 MILLILITER(S): 9 INJECTION INTRAMUSCULAR; INTRAVENOUS; SUBCUTANEOUS at 06:10

## 2019-02-20 RX ADMIN — BUDESONIDE AND FORMOTEROL FUMARATE DIHYDRATE 2 PUFF(S): 160; 4.5 AEROSOL RESPIRATORY (INHALATION) at 19:54

## 2019-02-20 RX ADMIN — SODIUM CHLORIDE 3 MILLILITER(S): 9 INJECTION INTRAMUSCULAR; INTRAVENOUS; SUBCUTANEOUS at 22:00

## 2019-02-20 RX ADMIN — ESCITALOPRAM OXALATE 10 MILLIGRAM(S): 10 TABLET, FILM COATED ORAL at 11:22

## 2019-02-20 RX ADMIN — TIOTROPIUM BROMIDE 1 CAPSULE(S): 18 CAPSULE ORAL; RESPIRATORY (INHALATION) at 21:18

## 2019-02-20 RX ADMIN — PANTOPRAZOLE SODIUM 40 MILLIGRAM(S): 20 TABLET, DELAYED RELEASE ORAL at 06:06

## 2019-02-20 RX ADMIN — SODIUM CHLORIDE 3 MILLILITER(S): 9 INJECTION INTRAMUSCULAR; INTRAVENOUS; SUBCUTANEOUS at 11:24

## 2019-02-20 RX ADMIN — BUDESONIDE AND FORMOTEROL FUMARATE DIHYDRATE 2 PUFF(S): 160; 4.5 AEROSOL RESPIRATORY (INHALATION) at 09:00

## 2019-02-20 RX ADMIN — Medication 180 MILLIGRAM(S): at 06:55

## 2019-02-20 RX ADMIN — Medication 25 MILLIGRAM(S): at 06:06

## 2019-02-20 NOTE — PROGRESS NOTE ADULT - SUBJECTIVE AND OBJECTIVE BOX
SUBJ:No chest pain or shortness of breath      MEDICATIONS  (STANDING):  aspirin enteric coated 81 milliGRAM(s) Oral daily  atorvastatin 40 milliGRAM(s) Oral at bedtime  buDESOnide  80 MICROgram(s)/formoterol 4.5 MICROgram(s) Inhaler 2 Puff(s) Inhalation two times a day  chlorhexidine 4% Liquid 1 Application(s) Topical <User Schedule>  diltiazem    milliGRAM(s) Oral daily  diphenhydrAMINE   Injectable 50 milliGRAM(s) IV Push once  docusate sodium 100 milliGRAM(s) Oral three times a day  escitalopram 10 milliGRAM(s) Oral daily  metoprolol tartrate 25 milliGRAM(s) Oral once  pantoprazole    Tablet 40 milliGRAM(s) Oral before breakfast  sodium chloride 0.9% lock flush 3 milliLiter(s) IV Push every 8 hours  sodium chloride 0.9%. 1000 milliLiter(s) (75 mL/Hr) IV Continuous <Continuous>  tiotropium 18 MICROgram(s) Capsule 1 Capsule(s) Inhalation at bedtime    MEDICATIONS  (PRN):  acetaminophen   Tablet .. 650 milliGRAM(s) Oral every 6 hours PRN Temp greater or equal to 38C (100.4F), Mild Pain (1 - 3)  ALBUTerol    90 MICROgram(s) HFA Inhaler 2 Puff(s) Inhalation every 6 hours PRN Shortness of Breath and/or Wheezing            Vital Signs Last 24 Hrs  T(C): 36.5 (20 Feb 2019 06:00), Max: 36.6 (19 Feb 2019 17:30)  T(F): 97.7 (20 Feb 2019 06:00), Max: 97.8 (19 Feb 2019 17:30)  HR: 62 (20 Feb 2019 08:00) (58 - 74)  BP: 133/69 (20 Feb 2019 08:00) (98/58 - 147/72)  BP(mean): 95 (20 Feb 2019 08:00) (74 - 121)  RR: 33 (20 Feb 2019 08:00) (17 - 33)  SpO2: 100% (20 Feb 2019 08:00) (96% - 100%)      ECG:NML    TTE:    LABS:                        13.1   14.18 )-----------( 306      ( 20 Feb 2019 03:50 )             41.4     02-20    142  |  101  |  25<H>  ----------------------------<  125<H>  4.5   |  26  |  0.9    Ca    9.9      20 Feb 2019 03:50  Mg     2.2     02-19    TPro  6.9  /  Alb  4.1  /  TBili  0.3  /  DBili  x   /  AST  9   /  ALT  11  /  AlkPhos  76  02-20    CARDIAC MARKERS ( 19 Feb 2019 05:31 )  x     / 0.07 ng/mL / x     / x     / x          PT/INR - ( 19 Feb 2019 17:59 )   PT: 11.50 sec;   INR: 1.00 ratio         PTT - ( 19 Feb 2019 17:59 )  PTT:26.8 sec    I&O's Summary    19 Feb 2019 07:01  -  20 Feb 2019 07:00  --------------------------------------------------------  IN: 300 mL / OUT: 300 mL / NET: 0 mL      BNPSerum Pro-Brain Natriuretic Peptide: 1085 pg/mL (02-19 @ 17:59)

## 2019-02-20 NOTE — PROGRESS NOTE ADULT - SUBJECTIVE AND OBJECTIVE BOX
CTU Attending Progress Daily Note     20 Feb 2019 07:40  POD#               Procedure:      Patient seen as post-op critical care follow-up    HPI:  59 yo female with PMH of Afib on Xarelto (ran out a few weeks ago), asthma, HTN, presents via EMS with c/o rapid HR.  Pt states she woke up feeling palpitations, flushed, clammy, felt as if her "jaw was locking".  + nausea, no vomiting.  EMS found pt with .  They gave 6mg Adenosine followed by 2 doses of 12 mg x 2.  Followed by 25 mg Cardizem which slowed pts HR to 120s (18 Feb 2019 07:28)      Interval event for past 24 hr:  FLAVIO HUDSON  58y had no event.     Current Complains:  FLAVIO HUDSON has no new complaints    REVIEW OF SYSTEMS:  CONSTITUTIONAL:  [-] weakness, [-] fevers, [-] chills  EYES/ENT: [-] visual changes, [-] vertigo, [-] throat pain   NECK: [-] pain, [-] stiffness  RESPIRATORY: [-] cough, [-] wheezing, [-] hemoptysis, [-] shortness of breath  CARDIOVASCULAR: [-] chest pain, [-] palpitations, [-] orthopnea  GASTROINTESTINAL:    [-]abdominal pain, [-] nausea, [-] vomiting, [-] hematemesis, [-] diarrhea, [-] constipation, [-] melena, [-] hematochezia.  GENITOURINARY: [-] dysuria, [-] frequency, [-] hematuria  NEUROLOGICAL: [-] numbness, [-] weakness  SKIN: [-] itching, [-] burning, [-] rashes, [-] lesions   All other review of systems is negative unless indicated above.    [  ] Unable to assess ROS because :    OBJECTIVE:  ICU Vital Signs Last 24 Hrs  T(C): 36.5 (20 Feb 2019 06:00), Max: 36.6 (19 Feb 2019 17:30)  T(F): 97.7 (20 Feb 2019 06:00), Max: 97.8 (19 Feb 2019 17:30)  HR: 66 (20 Feb 2019 06:00) (58 - 74)  BP: 143/65 (20 Feb 2019 06:00) (98/58 - 147/72)  BP(mean): 87 (20 Feb 2019 06:00) (74 - 121)  ABP: --  ABP(mean): --  RR: 17 (20 Feb 2019 06:00) (17 - 32)  SpO2: 100% (20 Feb 2019 06:00) (96% - 100%)      I&O's Summary    19 Feb 2019 07:01  -  20 Feb 2019 07:00  --------------------------------------------------------  IN: 300 mL / OUT: 300 mL / NET: 0 mL      Adult Advanced Hemodynamics Last 24 Hrs  CVP(mm Hg): --  CVP(cm H2O): --  CO: --  CI: --  PA: --  PA(mean): --  PCWP: --  SVR: --  SVRI: --  PVR: --  PVRI: --      PHYSICAL EXAM:  General: WN/WD NAD    HEENT:     [+] NCAT  [+] EOMI  [-] Conjuctival edema   [-] Icterus   [-] Thrush   [-] ETT  [-] NGT/OGT    Neck:         [+] FROM   [-] JVD     [-] Nodes     [-] Masses    [+] Mid-line trachea    [-] Tracheostomy    Chest:         [-] Sternal click   [-] Sternal drainage   [+] Pacing wires   [+] Chest tubes   [-] SubQ emphysema    Lungs:          [+] CTA   [-] Rhonchi   [-] Rales    [-] Wheezing    [-] Decreased BS    [-] Dullness R L    Cardiac:       [+] S1 [+] S2    [+] RRR   [-] Irregular   [-] S3   [-] S4    [-] Murmurs    [-] Rub    Abdomen:    [+] BS    [+] Soft    [+] Non-tender     [-] Distended    [-] Organomegaly  [-] PEG    Extremities:   [-] Cyanosis U/L   [-] Clubbing  U/L  [-] LE/UE Edema   [+] Capillary refill    [+] Pulses     Neuro:        [+] Awake   [+]  Alert   [-] Confused   [-] Lethargic   [-] Sedated   [-] Generalized Weakness    Skin:        [-] Rashes    [-] Erythema   [+] Normal incisions   [+] IV sites intact   [-] Sacral decubitus    Tubes:  LINES:    CAPILLARY BLOOD GLUCOSE        CAPILLARY BLOOD GLUCOSE          HOSPITAL MEDICATIONS:  MEDICATIONS  (STANDING):  aspirin enteric coated 81 milliGRAM(s) Oral daily  atorvastatin 40 milliGRAM(s) Oral at bedtime  buDESOnide  80 MICROgram(s)/formoterol 4.5 MICROgram(s) Inhaler 2 Puff(s) Inhalation two times a day  chlorhexidine 4% Liquid 1 Application(s) Topical <User Schedule>  diltiazem    milliGRAM(s) Oral daily  diphenhydrAMINE   Injectable 50 milliGRAM(s) IV Push once  docusate sodium 100 milliGRAM(s) Oral three times a day  escitalopram 10 milliGRAM(s) Oral daily  metoprolol tartrate 25 milliGRAM(s) Oral two times a day  pantoprazole    Tablet 40 milliGRAM(s) Oral before breakfast  sodium chloride 0.9% lock flush 3 milliLiter(s) IV Push every 8 hours  sodium chloride 0.9%. 1000 milliLiter(s) (75 mL/Hr) IV Continuous <Continuous>  tiotropium 18 MICROgram(s) Capsule 1 Capsule(s) Inhalation at bedtime    MEDICATIONS  (PRN):  acetaminophen   Tablet .. 650 milliGRAM(s) Oral every 6 hours PRN Temp greater or equal to 38C (100.4F), Mild Pain (1 - 3)  ALBUTerol    90 MICROgram(s) HFA Inhaler 2 Puff(s) Inhalation every 6 hours PRN Shortness of Breath and/or Wheezing      LABS:                          13.1   14.18 )-----------( 306      ( 20 Feb 2019 03:50 )             41.4     02-20    142  |  101  |  25<H>  ----------------------------<  125<H>  4.5   |  26  |  0.9    Ca    9.9      20 Feb 2019 03:50  Mg     2.2     02-19    TPro  6.9  /  Alb  4.1  /  TBili  0.3  /  DBili  x   /  AST  9   /  ALT  11  /  AlkPhos  76  02-20    PT/INR - ( 19 Feb 2019 17:59 )   PT: 11.50 sec;   INR: 1.00 ratio         PTT - ( 19 Feb 2019 17:59 )  PTT:26.8 sec  Urinalysis Basic - ( 19 Feb 2019 18:00 )    Color: Yellow / Appearance: Clear / SG: >=1.030 / pH: x  Gluc: x / Ketone: Negative  / Bili: Negative / Urobili: 0.2   Blood: x / Protein: Negative / Nitrite: Negative   Leuk Esterase: Negative / RBC: 1-2 /HPF / WBC 1-2 /HPF   Sq Epi: x / Non Sq Epi: Occasional /HPF / Bacteria: Few /HPF          RADIOLOGY:  Reviewed and interpreted by me  CXR from 02-20-19 shows [+] mild congestion, [-] pneumothorax, [-] R/L effusion, [-] cardiomegaly,   NGT in place, S-G Catheter in place, R/L TLC in place, R/L Chest Tubes in place    ECG:  Reviewed and interpreted by me:   QTC:    Assessment:    PLAN:  Neuro: Pain control  Pulm: Encourage coughing, deep breathing and use of incentive spirometry. Wean off supplemental oxygen as able. Daily CXR.   Cardio: Monitor telemetry/alarms. Continue cardiac meds  GI: Tolerating diet. Continue stool softeners. Continue GI prophylaxis  Renal: monitor urine output, supplement electrolytes as needed  Vasc: Heparin SC/SCDs for DVT prophylaxis  Heme: Monitor H/H.   ID: Off antibiotics. Stable.  Endocrine: Monitor finger stick blood sugar and control hyperglycemia with insulin  Physical Therapy: OOB/ambulate  Tubes: Monitor Chest tube output      Discussed with Cardiothoracic Team at AM rounds. CTU Attending Progress Daily Note     2019 07:40    Patient seen as pre-op critical care follow-up    HPI:  59 yo female with PMH of Afib on Xarelto (ran out a few weeks ago), asthma, HTN, presents via EMS with c/o rapid HR.  Pt states she woke up feeling palpitations, flushed, clammy, felt as if her "jaw was locking".  + nausea, no vomiting.  EMS found pt with .  They gave 6mg Adenosine followed by 2 doses of 12 mg x 2.  Followed by 25 mg Cardizem which slowed pts HR to 120s (2019 07:28)      Interval event for past 24 hr:  FLAVIO HUDSON  58y had no event.     Current Complains:  FLAVIO HUDSON has no new complaints    REVIEW OF SYSTEMS:  CONSTITUTIONAL:  [-] weakness, [-] fevers, [-] chills  EYES/ENT: [-] visual changes, [-] vertigo, [-] throat pain   NECK: [-] pain, [-] stiffness  RESPIRATORY: [-] cough, [-] wheezing, [-] hemoptysis, [-] shortness of breath  CARDIOVASCULAR: [-] chest pain, [-] palpitations, [-] orthopnea  GASTROINTESTINAL:    [-]abdominal pain, [-] nausea, [-] vomiting, [-] hematemesis, [-] diarrhea, [-] constipation, [-] melena, [-] hematochezia.  GENITOURINARY: [-] dysuria, [-] frequency, [-] hematuria  NEUROLOGICAL: [-] numbness, [-] weakness  SKIN: [-] itching, [-] burning, [-] rashes, [-] lesions   All other review of systems is negative unless indicated above.    [  ] Unable to assess ROS because :    OBJECTIVE:  ICU Vital Signs Last 24 Hrs  T(C): 36.5 (2019 06:00), Max: 36.6 (2019 17:30)  T(F): 97.7 (2019 06:00), Max: 97.8 (2019 17:30)  HR: 66 (2019 06:00) (58 - 74)  BP: 143/65 (2019 06:00) (98/58 - 147/72)  BP(mean): 87 (2019 06:00) (74 - 121)  ABP: --  ABP(mean): --  RR: 17 (2019 06:00) (17 - 32)  SpO2: 100% (2019 06:00) (96% - 100%)      I&O's Summary    2019 07:01  -  2019 07:00  --------------------------------------------------------  IN: 300 mL / OUT: 300 mL / NET: 0 mL      PHYSICAL EXAM:  General: WN/WD NAD    HEENT:     [+] NCAT  [+] EOMI  [-] Conjuctival edema   [-] Icterus   [-] Thrush   [-] ETT  [-] NGT/OGT    Neck:         [+] FROM   [-] JVD     [-] Nodes     [-] Masses    [+] Mid-line trachea    [-] Tracheostomy    Lungs:          [+] CTA   [-] Rhonchi   [-] Rales    [-] Wheezing    [-] Decreased BS    [-] Dullness R L    Cardiac:       [+] S1 [+] S2    [+] RRR   [-] Irregular   [-] S3   [-] S4    [-] Murmurs    [-] Rub    Abdomen:    [+] BS    [+] Soft    [+] Non-tender     [-] Distended    [-] Organomegaly  [-] PEG    Extremities:   [-] Cyanosis U/L   [-] Clubbing  U/L  [-] LE/UE Edema   [+] Capillary refill    [+] Pulses     Neuro:        [+] Awake   [+]  Alert   [-] Confused   [-] Lethargic   [-] Sedated   [-] Generalized Weakness    Skin:        [-] Rashes    [-] Erythema    [+] IV sites intact   [-] Sacral decubitus      HOSPITAL MEDICATIONS:  MEDICATIONS  (STANDING):  aspirin enteric coated 81 milliGRAM(s) Oral daily  atorvastatin 40 milliGRAM(s) Oral at bedtime  buDESOnide  80 MICROgram(s)/formoterol 4.5 MICROgram(s) Inhaler 2 Puff(s) Inhalation two times a day  chlorhexidine 4% Liquid 1 Application(s) Topical <User Schedule>  diltiazem    milliGRAM(s) Oral daily  diphenhydrAMINE   Injectable 50 milliGRAM(s) IV Push once  docusate sodium 100 milliGRAM(s) Oral three times a day  escitalopram 10 milliGRAM(s) Oral daily  metoprolol tartrate 25 milliGRAM(s) Oral two times a day  pantoprazole    Tablet 40 milliGRAM(s) Oral before breakfast  sodium chloride 0.9% lock flush 3 milliLiter(s) IV Push every 8 hours  sodium chloride 0.9%. 1000 milliLiter(s) (75 mL/Hr) IV Continuous <Continuous>  tiotropium 18 MICROgram(s) Capsule 1 Capsule(s) Inhalation at bedtime    MEDICATIONS  (PRN):  acetaminophen   Tablet .. 650 milliGRAM(s) Oral every 6 hours PRN Temp greater or equal to 38C (100.4F), Mild Pain (1 - 3)  ALBUTerol    90 MICROgram(s) HFA Inhaler 2 Puff(s) Inhalation every 6 hours PRN Shortness of Breath and/or Wheezing      LABS:                          13.1   14.18 )-----------( 306      ( 2019 03:50 )             41.4     02-20    142  |  101  |  25<H>  ----------------------------<  125<H>  4.5   |  26  |  0.9    Ca    9.9      2019 03:50  Mg     2.2     02-19    TPro  6.9  /  Alb  4.1  /  TBili  0.3  /  DBili  x   /  AST  9   /  ALT  11  /  AlkPhos  76  02-20    PT/INR - ( 2019 17:59 )   PT: 11.50 sec;   INR: 1.00 ratio         PTT - ( 2019 17:59 )  PTT:26.8 sec  Urinalysis Basic - ( 2019 18:00 )    BNP 1085      Color: Yellow / Appearance: Clear / SG: >=1.030 / pH: x  Gluc: x / Ketone: Negative  / Bili: Negative / Urobili: 0.2   Blood: x / Protein: Negative / Nitrite: Negative   Leuk Esterase: Negative / RBC: 1-2 /HPF / WBC 1-2 /HPF   Sq Epi: x / Non Sq Epi: Occasional /HPF / Bacteria: Few /HPF            < from: Transthoracic Echocardiogram (19 @ 16:35) >  Summary:   1. Normal global left ventricular systolic function.   2. Trace tricuspid regurgitation.   3.PSAP at least 50.    < end of copied text >      RADIOLOGY:    Reviewed and interpreted by me  CXR from 19 shows [+] mild congestion, [-] pneumothorax, [-] R/L effusion, [-] cardiomegaly,       < from: Xray Chest 1 View- PORTABLE-Routine (19 @ 06:25) >  INTERPRETATION:  Clinical History / Reason for exam: Shortness of breath    Comparison : Chest radiograph 2019.    Technique/Positionin frontal views.    Findings:    Support devices: Telemetry leads.    Cardiac/mediastinum/hilum: Unremarkable.    Lung parenchyma/Pleura: Left basilar focal atelectasis.    Skeleton/soft tissues: Stable.    Impression:      Bibasilar focal atelectasis    < end of copied text >        ECG:    < from: 12 Lead ECG (19 @ 06:55) >    Ventricular Rate 66 BPM    Atrial Rate 66 BPM    P-R Interval 142 ms    QRS Duration 94 ms    Q-T Interval 432 ms    QTC Calculation(Bezet) 452 ms    P Axis 81 degrees    R Axis 62 degrees    T Axis 4 degrees    Diagnosis Line Normal sinus rhythm  Normal ECG    Confirmed by KURTIS MALAGON MD ([id number]) on 2019 12:44:28 PM    < end of copied text >      Assessment:  CAD preop CABG    PAST MEDICAL & SURGICAL HISTORY:  Hearing aid worn  Hearing decreased  Asthma  Essential hypertension  Afib  Closed fracture of foot, unspecified laterality, sequela  History of  section  Bilateral carpal tunnel syndrome  No significant past surgical history      PLAN:    Pulm: Encourage coughing, deep breathing and use of incentive spirometry.   Cardio: Monitor telemetry/alarms. Continue cardiac meds, for OR PER CTS  GI: Tolerating diet. Continue stool softeners. Continue GI prophylaxis  Renal: monitor urine output, supplement electrolytes as needed  Vasc: Heparin SC/SCDs for DVT prophylaxis  Heme: Monitor H/H.   ID: Off antibiotics. Stable.  Endocrine: Monitor finger stick blood sugar and control hyperglycemia with insulin  Physical Therapy: OOB/ambulate        Discussed with Cardiothoracic Team at AM rounds.

## 2019-02-20 NOTE — PROGRESS NOTE ADULT - ASSESSMENT
IMPRESSION:  HO severe persistent asthma- Better controlled on triple therapy  High risk for SILAS     PLAN:  FU PFT results   c/w LABA/ICS/LAMA   PRETTY PRN  Patient might benefit from NIV prior to and post op; Trial of CPAP 10 at bedtime  Patient appears to be optimized from asthma perspective on triple therapy   Intermediate risk for perioperative pulmonary complications   DVT px IMPRESSION:  HO severe persistent asthma- Better controlled on triple therapy  High risk for SILAS     PLAN:  FU PFT results   c/w LABA/ICS/LAMA   PRETTY PRN  Patient might benefit from NIV prior to and post op; Trial of BiPAP 14/7 at bedtime  Patient appears to be optimized from asthma perspective on triple therapy   Intermediate risk for perioperative pulmonary complications   DVT px IMPRESSION:  HO severe persistent asthma- Better controlled on triple therapy  High risk for SILAS     PLAN:  FU PFT results   c/w LABA/ICS/LAMA   PRETTY PRN  Patient might benefit from NIV prior to and post op; Trial of BiPAP 14/8 at bedtime  Patient appears to be optimized from asthma perspective on triple therapy   Intermediate risk for perioperative pulmonary complications   DVT px   monitor end tidal CO2 perioperatively and use bipap machine perioperatively

## 2019-02-20 NOTE — PROGRESS NOTE ADULT - SUBJECTIVE AND OBJECTIVE BOX
Patient is a 58y old  Female who presents with a chief complaint of Palpitations (20 Feb 2019 11:32)      SUBJECTIVE:  Patient feels well. Denies SOB.  Pulmonary FU requested for perioperative CTS pulmonary risk stratification.   Patient notes compliance with symbicort/spiriva at home for the past few months and denies the need for PRETTY > 2 times per month.     REVIEW OF SYSTEMS:  See HPI    PHYSICAL EXAM  Vital Signs Last 24 Hrs  T(C): 36.4 (20 Feb 2019 08:00), Max: 36.6 (19 Feb 2019 17:30)  T(F): 97.6 (20 Feb 2019 08:00), Max: 97.8 (19 Feb 2019 17:30)  HR: 60 (20 Feb 2019 11:08) (58 - 74)  BP: 114/62 (20 Feb 2019 11:08) (98/58 - 147/72)  BP(mean): 89 (20 Feb 2019 11:08) (74 - 121)  RR: 20 (20 Feb 2019 11:08) (17 - 33)  SpO2: 100% (20 Feb 2019 11:08) (96% - 100%)  I&O's Summary    19 Feb 2019 07:01  -  20 Feb 2019 07:00  --------------------------------------------------------  IN: 300 mL / OUT: 300 mL / NET: 0 mL    20 Feb 2019 07:01  -  20 Feb 2019 13:45  --------------------------------------------------------  IN: 120 mL / OUT: 0 mL / NET: 120 mL        General: Comfortable in bed  HEENT:  On RA  Lymph node: No palpable LN             Lungs: CTA  Cardiovascular: RRR, S1S2  Abdomen: BS+ve; soft non tender  Extremities: No LE edema  Skin:  No evident Rash  Neurological:  AAOx3; No focal deficit      LABS:                          13.1   14.18 )-----------( 306      ( 20 Feb 2019 03:50 )             41.4                                                 02-20    142  |  101  |  25<H>  ----------------------------<  125<H>  4.5   |  26  |  0.9    Ca    9.9      20 Feb 2019 03:50  Mg     2.2     02-19    TPro  6.9  /  Alb  4.1  /  TBili  0.3  /  DBili  x   /  AST  9   /  ALT  11  /  AlkPhos  76  02-20  PT/INR - ( 19 Feb 2019 17:59 )   PT: 11.50 sec;   INR: 1.00 ratio      PTT - ( 19 Feb 2019 17:59 )  PTT:26.8 sec                                           Urinalysis Basic - ( 19 Feb 2019 18:00 )  Color: Yellow / Appearance: Clear / SG: >=1.030 / pH: x  Gluc: x / Ketone: Negative  / Bili: Negative / Urobili: 0.2   Blood: x / Protein: Negative / Nitrite: Negative   Leuk Esterase: Negative / RBC: 1-2 /HPF / WBC 1-2 /HPF   Sq Epi: x / Non Sq Epi: Occasional /HPF / Bacteria: Few /HPF      CARDIAC MARKERS ( 19 Feb 2019 05:31 )  x     / 0.07 ng/mL / x     / x     / x         LIVER FUNCTIONS - ( 20 Feb 2019 03:50 )  Alb: 4.1 g/dL / Pro: 6.9 g/dL / ALK PHOS: 76 U/L / ALT: 11 U/L / AST: 9 U/L / GGT: x                                              Serum Pro-Brain Natriuretic Peptide: 1085 pg/mL (02-19-19 @ 17:59)      MEDICATIONS  (STANDING):  aspirin enteric coated 81 milliGRAM(s) Oral daily  atorvastatin 40 milliGRAM(s) Oral at bedtime  buDESOnide  80 MICROgram(s)/formoterol 4.5 MICROgram(s) Inhaler 2 Puff(s) Inhalation two times a day  chlorhexidine 0.12% Liquid 5 milliLiter(s) Swish and Spit once  chlorhexidine 4% Liquid 1 Application(s) Topical once  chlorhexidine 4% Liquid 1 Application(s) Topical <User Schedule>  diltiazem    milliGRAM(s) Oral daily  diphenhydrAMINE   Injectable 50 milliGRAM(s) IV Push once  docusate sodium 100 milliGRAM(s) Oral three times a day  escitalopram 10 milliGRAM(s) Oral daily  metoprolol tartrate 25 milliGRAM(s) Oral once  pantoprazole    Tablet 40 milliGRAM(s) Oral before breakfast  sodium chloride 0.9% lock flush 3 milliLiter(s) IV Push every 8 hours  sodium chloride 0.9%. 1000 milliLiter(s) (75 mL/Hr) IV Continuous <Continuous>  tiotropium 18 MICROgram(s) Capsule 1 Capsule(s) Inhalation at bedtime    MEDICATIONS  (PRN):  acetaminophen   Tablet .. 650 milliGRAM(s) Oral every 6 hours PRN Temp greater or equal to 38C (100.4F), Mild Pain (1 - 3)  ALBUTerol    90 MICROgram(s) HFA Inhaler 2 Puff(s) Inhalation every 6 hours PRN Shortness of Breath and/or Wheezing      Radiology:  < from: Xray Chest 1 View AP/PA (02.19.19 @ 18:42) >  Impression:    No radiographic evidence of cardiopulmonary disease    < end of copied text >

## 2019-02-20 NOTE — PROGRESS NOTE ADULT - SUBJECTIVE AND OBJECTIVE BOX
Cardiac Surgery Pre-op Note:   CC: Patient is a 58y old  Female who presents with a chief complaint of Palpitations (2019 09:33)      Referring Physician:        Alexi                                                                                     Surgeon: imjack  Procedure: cabg/ablation    Allergies    amiodarone (Flushing)  iodine (Unknown)  shellfish (Unknown)    Intolerances      HPI:  59 yo female with PMH of Afib on Xarelto (ran out a few weeks ago), asthma, HTN, presents via EMS with c/o rapid HR.  Pt states she woke up feeling palpitations, flushed, clammy, felt as if her "jaw was locking".  + nausea, no vomiting.  EMS found pt with .  They gave 6mg Adenosine followed by 2 doses of 12 mg x 2.  Followed by 25 mg Cardizem which slowed pts HR to 120s (2019 07:28)      PAST MEDICAL & SURGICAL HISTORY:  Hearing aid worn  Hearing decreased  Asthma  Essential hypertension  Afib  Closed fracture of foot, unspecified laterality, sequela  History of  section  Bilateral carpal tunnel syndrome  No significant past surgical history      MEDICATIONS  (STANDING):  aspirin enteric coated 81 milliGRAM(s) Oral daily  atorvastatin 40 milliGRAM(s) Oral at bedtime  buDESOnide  80 MICROgram(s)/formoterol 4.5 MICROgram(s) Inhaler 2 Puff(s) Inhalation two times a day  chlorhexidine 4% Liquid 1 Application(s) Topical <User Schedule>  diltiazem    milliGRAM(s) Oral daily  diphenhydrAMINE   Injectable 50 milliGRAM(s) IV Push once  docusate sodium 100 milliGRAM(s) Oral three times a day  escitalopram 10 milliGRAM(s) Oral daily  metoprolol tartrate 25 milliGRAM(s) Oral once  pantoprazole    Tablet 40 milliGRAM(s) Oral before breakfast  sodium chloride 0.9% lock flush 3 milliLiter(s) IV Push every 8 hours  sodium chloride 0.9%. 1000 milliLiter(s) (75 mL/Hr) IV Continuous <Continuous>  tiotropium 18 MICROgram(s) Capsule 1 Capsule(s) Inhalation at bedtime    MEDICATIONS  (PRN):  acetaminophen   Tablet .. 650 milliGRAM(s) Oral every 6 hours PRN Temp greater or equal to 38C (100.4F), Mild Pain (1 - 3)  ALBUTerol    90 MICROgram(s) HFA Inhaler 2 Puff(s) Inhalation every 6 hours PRN Shortness of Breath and/or Wheezing      Labs:                        13.1   14.18 )-----------( 306      ( 2019 03:50 )             41.4         142  |  101  |  25<H>  ----------------------------<  125<H>  4.5   |  26  |  0.9    Ca    9.9      2019 03:50  Mg     2.2         TPro  6.9  /  Alb  4.1  /  TBili  0.3  /  DBili  x   /  AST  9   /  ALT  11  /  AlkPhos  76      PT/INR - ( 2019 17:59 )   PT: 11.50 sec;   INR: 1.00 ratio         PTT - ( 2019 17:59 )  PTT:26.8 sec    Blood Type:   HGB A1C:   Prealbumin:   Pro-BNP: Serum Pro-Brain Natriuretic Peptide: 1085 pg/mL ( @ 17:59)    Thyroid Panel:  @ 17:59/0.42  --/--/--   @ 05:30/1.70  --/--/--    MRSA: MRSA PCR Result.: Negative ( @ 15:41)   / MSSA:   Urinalysis Basic - ( 2019 18:00 )    Color: Yellow / Appearance: Clear / SG: >=1.030 / pH: x  Gluc: x / Ketone: Negative  / Bili: Negative / Urobili: 0.2   Blood: x / Protein: Negative / Nitrite: Negative   Leuk Esterase: Negative / RBC: 1-2 /HPF / WBC 1-2 /HPF   Sq Epi: x / Non Sq Epi: Occasional /HPF / Bacteria: Few /HPF        CXR:   Impression:    No radiographic evidence of cardiopulmonary diseas    EKG:  Ventricular Rate 66 BPM    Atrial Rate 66 BPM    P-R Interval 142 ms    QRS Duration 94 ms    Q-T Interval 432 ms    QTC Calculation(Bezet) 452 ms    P Axis 81 degrees    R Axis 62 degrees    T Axis 4 degrees    Diagnosis Line Normal sinus rhythm  Normal ECG    Confirmed by KURTIS MALAGON MD ([id number]) on 2019 12:44:28 PM    Carotid Duplex:    EXAM:  CAROTID DUPLEX BILATERAL            PROCEDURE DATE:  2019      INTERPRETATION:  Clinical History / Reason for exam: The patient is   58-year-old female preop for CABG and carotid bruit.  The study was   performed to evaluate the patient for carotid artery stenosis.    Duplex evaluation of the carotid arteries was performed bilaterally.  In the right carotid system, the internal carotid artery is noted to be   patent with a peak systolic velocity of 75.5 cm/sec over end diastolic   velocity of 27.3 cm/sec.    In the left carotid system, the internal carotid artery is noted to be   patent with a peak systolic velocity of 60.9 cm/sec over end diastolic   velocity of 15.6 cm/sec.    The right vertebral arterial flow was antegrade.  The left vertebral arterial flow was antegrade.    Impression:    20-39% stenosis of the right internal carotid artery.  20-39% stenosis of the left internal carotid artery.    PFT's: pending    Echocardiogram:   EXAM:  2-D ECHO (TTE) COMPLETE        PROCEDURE DATE:  2019      INTERPRETATION:  REPORT:    TRANSTHORACIC ECHOCARDIOGRAM REPORT         Patient Name:   FLAVIO HUDSON Accession #: [id number]  Medical Rec #:  [id number]         Height:      67.0 in 170.2 cm  YOB: 1960        Weight:      258.0 lb 117.03 kg  Patient Age:    58 years         BSA:         2.25 m²  Patient Gender: F                BP:          154/75 mmHg       Date of Exam:      2019 4:35:45 PM  Sonographer:       Divya Yen  Reading Physician: Senthil Aiken M.D.    Procedure:     2D Echo/Doppler/Color Doppler Complete.  Indications:   R07.9 - Chest Pain, unspecified  Diagnosis:     Chest pain, unspecified - R07.9  Study Details: Technically fair study. Study quality was adversely   affected due                 to body habitus, post-operative state and no parasternal   windows.         Summary:   1. Normal global left ventricular systolic function.   2. Trace tricuspid regurgitation.   3.PSAP at least 50.    PHYSICIAN INTERPRETATION:  Left Ventricle: The left ventricular internal cavity size is normal. Left   ventricular wall thickness is normal. Global LV systolic function was   normal.  Right Ventricle: The right ventricular size is mildly enlarged.  Left Atrium: Normal left atrial size.  Right Atrium: Normal right atrial size.  Pericardium: There is no evidence of pericardial effusion.  Mitral Valve: No evidence of mitral valve regurgitation is seen. The   mitral valve is normal in structure.  Tricuspid Valve: Trivial tricuspid regurgitation is visualized. The   tricuspid valve is normal. PSAP at least 50.  Aortic Valve: The aortic valve was not well visualized. No evidence of   aortic valve regurgitation is seen. No evidence of aortic stenosis.  Aorta: The aortic root is normal in size and structure.       2D AND M-MODE MEASUREMENTS (normal ranges within parentheses):  Left                 Normal    Aorta/Left           Normal  Ventricle:                     Atrium:  IVSd (2D): 0.88 cm   (0.7-1.1) AoV Cusp      1.90   (1.5-2.6)  LVPWd      0.94 cm   (0.7-1.1) Separation:   cm  (2D):                          Left Atrium   3.59   (1.9-4.0)  LVIDd      4.98 cm   (3.4-5.7) (Mmode):      cm  (2D):   LA Volume     17.8  LVIDs      3.85 cm             Index         ml/m²  (2D):                          Right  LV FS      22.7 %    (>25%)    Ventricle:  (2D):                          RVd (2D):      2.05 cm  IVSd       0.99 cm   (0.7-1.1)  (Mmode):  LVPWd      0.99 cm   (0.7-1.1)  (Mmode):  LVIDd      5.01 cm   (3.4-5.7)  (Mmode):  LVIDs      3.69 cm  (Mmode):  LV FS      26.4 %    (>25%)  (Mmode):  Relative   0.38      (<0.42)  Wall  Thickness  Rel. Wall  0.40      (<0.42)  Thickness  Mm  LV Mass    80.6 g/m²  Index:  Mmode    SPECTRAL DOPPLER ANALYSIS:  LV DIASTOLIC FUNCTION:  MV Peak E: 0.98 m/s Decel Time: 232 msec  MV Peak A: 0.96 m/s  E/A Ratio: 1.03    Aortic Valve:  AoV VMax:    1.80 m/s  AoV Area, Vmax: 2.60 cm² Vmax Indx: 1.15 cm²/m²  AoV Pk Grad: 12.9 mmHg    LVOT Vmax: 1.26 m/s  LVOT VTI:  0.23 m  LVOT Diam: 2.17 cm    Mitral Valve:  MV P1/2 Time: 67.29 msec  MV Area, PHT: 3.27 cm²    Tricuspid Valve and PA/RV Systolic Pressure: TR Max Velocity: 3.41 m/s RA   Pressure:  RVSP/PASP: 33.6 mmHg    Pulmonic Valve:  PV Max Velocity: 0.99 m/s PV Max PG: 3.9 mmHg PV Mean PG:       [id number] Senthil Aiken M.D., Electronically signed on 2019 at 6:10:18   PM     Cardiac catheterization:   severe ostial LAD disease    Verify Now- 232    Urinalysis (19 @ 18:00)    pH Urine: 5.5    Glucose Qualitative, Urine: Negative    Blood, Urine: Trace    Color: Yellow    Urine Appearance: Clear    Bilirubin: Negative    Ketone - Urine: Negative    Specific Gravity: >=1.030    Protein, Urine: Negative    Urobilinogen: 0.2    Nitrite: Negative    Leukocyte Esterase Concentration: Negative    Nasal cx:  MRSA/MSSA PCR (19 @ 15:41)    MRSA PCR Result.: Negative: Notes  FLAVIO HUDSON  By: Real-Time PCR (Polymerase Reaction Method)    Gen: WN/WD NAD  Neuro: A&Ox3, nonfocal  Pulm: CTA B/L  CV: RRR, S1S2 +systolic murmur  Abd: Soft, NT, ND +BS  Ext: No edema, + peripheral pulses    Cardiac Surgery Risk Factors  CVA and/or carotid/cerebrovascular disease. Yes  No  Explain if Yes  Aortoiliac disease Yes  No  Explain if Yes  Previous MI Yes  No  Explain if Yes  Previos Cardiac Surgery Yes  No  Explain if Yes  Hemodynamics-Unstable or Shock Yes  No  Explain if Yes  Diabetis Yes  No  Explain if Yes  Hepatic Failure Yes  No  Explain if Yes  Renal failure and/or dialysis Yes  No  Explain if Yes  Heart failure-type-present or past Yes  No  Explain if Yes  COPD Yes  No  Explain if Yes  Immune System Deficiency Yes  No  Explain if Yes  Malignant Ventricular Arrhythmia Yes  No  Explain if Yes    STS Score:     Pt has AICD/PPM [ ] Yes  [x ] No             Brand Name:  Pre-op Beta Blocker ordered within 24 hrs of surgery (CABG ONLY)?  [x ] Yes  [ ] No  If not, Why?  Type & Cross  [ ] Yes  [ ] No  NPO after Midnight [x ] Yes  [ ] No  Pre-op ABX ordered, to be taped on chart:  [ x] Yes  [ ] No     Hibiclens/Peridex ordered [x ] Yes  [ ] No  Intraop on Hold: PRBCs, CXR, VASU [x ]   Consent obtained  [ ] Yes  [ ] No Cardiac Surgery Pre-op Note:   CC: Patient is a 58y old  Female who presents with a chief complaint of Palpitations (2019 09:33)      Referring Physician:        Alexi                                                                                     Surgeon: imjack  Procedure: cabg/ablation    Allergies    amiodarone (Flushing)  iodine (Unknown)  shellfish (Unknown)    Intolerances      HPI:  57 yo female with PMH of Afib on Xarelto (ran out a few weeks ago), asthma, HTN, presents via EMS with c/o rapid HR.  Pt states she woke up feeling palpitations, flushed, clammy, felt as if her "jaw was locking".  + nausea, no vomiting.  EMS found pt with .  They gave 6mg Adenosine followed by 2 doses of 12 mg x 2.  Followed by 25 mg Cardizem which slowed pts HR to 120s (2019 07:28)      PAST MEDICAL & SURGICAL HISTORY:  Hearing aid worn  Hearing decreased  Asthma  Essential hypertension  Afib  Closed fracture of foot, unspecified laterality, sequela  History of  section  Bilateral carpal tunnel syndrome  No significant past surgical history      MEDICATIONS  (STANDING):  aspirin enteric coated 81 milliGRAM(s) Oral daily  atorvastatin 40 milliGRAM(s) Oral at bedtime  buDESOnide  80 MICROgram(s)/formoterol 4.5 MICROgram(s) Inhaler 2 Puff(s) Inhalation two times a day  chlorhexidine 4% Liquid 1 Application(s) Topical <User Schedule>  diltiazem    milliGRAM(s) Oral daily  diphenhydrAMINE   Injectable 50 milliGRAM(s) IV Push once  docusate sodium 100 milliGRAM(s) Oral three times a day  escitalopram 10 milliGRAM(s) Oral daily  metoprolol tartrate 25 milliGRAM(s) Oral once  pantoprazole    Tablet 40 milliGRAM(s) Oral before breakfast  sodium chloride 0.9% lock flush 3 milliLiter(s) IV Push every 8 hours  sodium chloride 0.9%. 1000 milliLiter(s) (75 mL/Hr) IV Continuous <Continuous>  tiotropium 18 MICROgram(s) Capsule 1 Capsule(s) Inhalation at bedtime    MEDICATIONS  (PRN):  acetaminophen   Tablet .. 650 milliGRAM(s) Oral every 6 hours PRN Temp greater or equal to 38C (100.4F), Mild Pain (1 - 3)  ALBUTerol    90 MICROgram(s) HFA Inhaler 2 Puff(s) Inhalation every 6 hours PRN Shortness of Breath and/or Wheezing      Labs:                        13.1   14.18 )-----------( 306      ( 2019 03:50 )             41.4         142  |  101  |  25<H>  ----------------------------<  125<H>  4.5   |  26  |  0.9    Ca    9.9      2019 03:50  Mg     2.2         TPro  6.9  /  Alb  4.1  /  TBili  0.3  /  DBili  x   /  AST  9   /  ALT  11  /  AlkPhos  76      PT/INR - ( 2019 17:59 )   PT: 11.50 sec;   INR: 1.00 ratio         PTT - ( 2019 17:59 )  PTT:26.8 sec    Blood Type:   HGB A1C:   Prealbumin:   Pro-BNP: Serum Pro-Brain Natriuretic Peptide: 1085 pg/mL ( @ 17:59)    Thyroid Panel:  @ 17:59/0.42  --/--/--   @ 05:30/1.70  --/--/--    MRSA: MRSA PCR Result.: Negative ( @ 15:41)   / MSSA:   Urinalysis Basic - ( 2019 18:00 )    Color: Yellow / Appearance: Clear / SG: >=1.030 / pH: x  Gluc: x / Ketone: Negative  / Bili: Negative / Urobili: 0.2   Blood: x / Protein: Negative / Nitrite: Negative   Leuk Esterase: Negative / RBC: 1-2 /HPF / WBC 1-2 /HPF   Sq Epi: x / Non Sq Epi: Occasional /HPF / Bacteria: Few /HPF        CXR:   Impression:    No radiographic evidence of cardiopulmonary diseas    EKG:  Ventricular Rate 66 BPM    Atrial Rate 66 BPM    P-R Interval 142 ms    QRS Duration 94 ms    Q-T Interval 432 ms    QTC Calculation(Bezet) 452 ms    P Axis 81 degrees    R Axis 62 degrees    T Axis 4 degrees    Diagnosis Line Normal sinus rhythm  Normal ECG    Confirmed by KURTIS MALAGON MD ([id number]) on 2019 12:44:28 PM    Carotid Duplex:    EXAM:  CAROTID DUPLEX BILATERAL            PROCEDURE DATE:  2019      INTERPRETATION:  Clinical History / Reason for exam: The patient is   58-year-old female preop for CABG and carotid bruit.  The study was   performed to evaluate the patient for carotid artery stenosis.    Duplex evaluation of the carotid arteries was performed bilaterally.  In the right carotid system, the internal carotid artery is noted to be   patent with a peak systolic velocity of 75.5 cm/sec over end diastolic   velocity of 27.3 cm/sec.    In the left carotid system, the internal carotid artery is noted to be   patent with a peak systolic velocity of 60.9 cm/sec over end diastolic   velocity of 15.6 cm/sec.    The right vertebral arterial flow was antegrade.  The left vertebral arterial flow was antegrade.    Impression:    20-39% stenosis of the right internal carotid artery.  20-39% stenosis of the left internal carotid artery.    PFT's: pending    Echocardiogram:   EXAM:  2-D ECHO (TTE) COMPLETE        PROCEDURE DATE:  2019      INTERPRETATION:  REPORT:    TRANSTHORACIC ECHOCARDIOGRAM REPORT         Patient Name:   FLAVIO HUDSON Accession #: [id number]  Medical Rec #:  [id number]         Height:      67.0 in 170.2 cm  YOB: 1960        Weight:      258.0 lb 117.03 kg  Patient Age:    58 years         BSA:         2.25 m²  Patient Gender: F                BP:          154/75 mmHg       Date of Exam:      2019 4:35:45 PM  Sonographer:       Divya Yen  Reading Physician: Senthil Aiken M.D.    Procedure:     2D Echo/Doppler/Color Doppler Complete.  Indications:   R07.9 - Chest Pain, unspecified  Diagnosis:     Chest pain, unspecified - R07.9  Study Details: Technically fair study. Study quality was adversely   affected due                 to body habitus, post-operative state and no parasternal   windows.         Summary:   1. Normal global left ventricular systolic function.   2. Trace tricuspid regurgitation.   3.PSAP at least 50.    PHYSICIAN INTERPRETATION:  Left Ventricle: The left ventricular internal cavity size is normal. Left   ventricular wall thickness is normal. Global LV systolic function was   normal.  Right Ventricle: The right ventricular size is mildly enlarged.  Left Atrium: Normal left atrial size.  Right Atrium: Normal right atrial size.  Pericardium: There is no evidence of pericardial effusion.  Mitral Valve: No evidence of mitral valve regurgitation is seen. The   mitral valve is normal in structure.  Tricuspid Valve: Trivial tricuspid regurgitation is visualized. The   tricuspid valve is normal. PSAP at least 50.  Aortic Valve: The aortic valve was not well visualized. No evidence of   aortic valve regurgitation is seen. No evidence of aortic stenosis.  Aorta: The aortic root is normal in size and structure.       2D AND M-MODE MEASUREMENTS (normal ranges within parentheses):  Left                 Normal    Aorta/Left           Normal  Ventricle:                     Atrium:  IVSd (2D): 0.88 cm   (0.7-1.1) AoV Cusp      1.90   (1.5-2.6)  LVPWd      0.94 cm   (0.7-1.1) Separation:   cm  (2D):                          Left Atrium   3.59   (1.9-4.0)  LVIDd      4.98 cm   (3.4-5.7) (Mmode):      cm  (2D):   LA Volume     17.8  LVIDs      3.85 cm             Index         ml/m²  (2D):                          Right  LV FS      22.7 %    (>25%)    Ventricle:  (2D):                          RVd (2D):      2.05 cm  IVSd       0.99 cm   (0.7-1.1)  (Mmode):  LVPWd      0.99 cm   (0.7-1.1)  (Mmode):  LVIDd      5.01 cm   (3.4-5.7)  (Mmode):  LVIDs      3.69 cm  (Mmode):  LV FS      26.4 %    (>25%)  (Mmode):  Relative   0.38      (<0.42)  Wall  Thickness  Rel. Wall  0.40      (<0.42)  Thickness  Mm  LV Mass    80.6 g/m²  Index:  Mmode    SPECTRAL DOPPLER ANALYSIS:  LV DIASTOLIC FUNCTION:  MV Peak E: 0.98 m/s Decel Time: 232 msec  MV Peak A: 0.96 m/s  E/A Ratio: 1.03    Aortic Valve:  AoV VMax:    1.80 m/s  AoV Area, Vmax: 2.60 cm² Vmax Indx: 1.15 cm²/m²  AoV Pk Grad: 12.9 mmHg    LVOT Vmax: 1.26 m/s  LVOT VTI:  0.23 m  LVOT Diam: 2.17 cm    Mitral Valve:  MV P1/2 Time: 67.29 msec  MV Area, PHT: 3.27 cm²    Tricuspid Valve and PA/RV Systolic Pressure: TR Max Velocity: 3.41 m/s RA   Pressure:  RVSP/PASP: 33.6 mmHg    Pulmonic Valve:  PV Max Velocity: 0.99 m/s PV Max PG: 3.9 mmHg PV Mean PG:       [id number] Senthil Aiken M.D., Electronically signed on 2019 at 6:10:18   PM     Cardiac catheterization:   severe ostial LAD disease    Verify Now- 232    Urinalysis (19 @ 18:00)    pH Urine: 5.5    Glucose Qualitative, Urine: Negative    Blood, Urine: Trace    Color: Yellow    Urine Appearance: Clear    Bilirubin: Negative    Ketone - Urine: Negative    Specific Gravity: >=1.030    Protein, Urine: Negative    Urobilinogen: 0.2    Nitrite: Negative    Leukocyte Esterase Concentration: Negative    Nasal cx:  MRSA/MSSA PCR (19 @ 15:41)    MRSA PCR Result.: Negative: Notes  FLAVIO HUDSON  By: Real-Time PCR (Polymerase Reaction Method)    Gen: WN/WD NAD  Neuro: A&Ox3, nonfocal  Pulm: CTA B/L  CV: RRR, S1S2 +systolic murmur  Abd: Soft, NT, ND +BS  Ext: No edema, + peripheral pulses    Cardiac Surgery Risk Factors  CVA and/or carotid/cerebrovascular disease. Yes  No  Explain if Yes  Aortoiliac disease Yes  No  Explain if Yes  Previous MI Yes  No  Explain if Yes  Previos Cardiac Surgery Yes  No  Explain if Yes  Hemodynamics-Unstable or Shock Yes  No  Explain if Yes  Diabetis Yes  No  Explain if Yes  Hepatic Failure Yes  No  Explain if Yes  Renal failure and/or dialysis Yes  No  Explain if Yes  Heart failure-type-present or past Yes  No  Explain if Yes  COPD Yes  No  Explain if Yes-- pt was on resp tx's pre-op  Immune System Deficiency Yes  No  Explain if Yes  Malignant Ventricular Arrhythmia Yes  No  Explain if Yes    STS Score:   Risk of Mortality:  4.425%  Renal Failure:  3.874%  Permanent Stroke:  1.242%  Prolonged Ventilation:  18.555%  DSW Infection:  0.734%  Reoperation:  4.125%  Morbidity or Mortality:  26.268%  Short Length of Stay:  18.377%  Long Length of Stay:  13.243%    Pt has AICD/PPM [ ] Yes  [x ] No             Brand Name:  Pre-op Beta Blocker ordered within 24 hrs of surgery (CABG ONLY)?  [x ] Yes  [ ] No  If not, Why?  Type & Cross  [ x] Yes  [ ] No  NPO after Midnight [x ] Yes  [ ] No  Pre-op ABX ordered, to be taped on chart:  [ x] Yes  [ ] No     Hibiclens/Peridex ordered [x ] Yes  [ ] No  Intraop on Hold: PRBCs, CXR, VASU [x ]   Consent obtained  [ ] Yes  [ ] No Cardiac Surgery Pre-op Note:   CC: Patient is a 58y old  Female who presents with a chief complaint of Palpitations (2019 09:33)      Referring Physician:        Alexi                                                                                     Surgeon: imjack  Procedure: cabg/ablation    Allergies    amiodarone (Flushing)  iodine (Unknown)  shellfish (Unknown)    Intolerances      HPI:  57 yo female with PMH of Afib on Xarelto (ran out a few weeks ago), asthma, HTN, presents via EMS with c/o rapid HR.  Pt states she woke up feeling palpitations, flushed, clammy, felt as if her "jaw was locking".  + nausea, no vomiting.  EMS found pt with .  They gave 6mg Adenosine followed by 2 doses of 12 mg x 2.  Followed by 25 mg Cardizem which slowed pts HR to 120s (2019 07:28)      PAST MEDICAL & SURGICAL HISTORY:  Hearing aid worn  Hearing decreased  Asthma  Essential hypertension  Afib  Closed fracture of foot, unspecified laterality, sequela  History of  section  Bilateral carpal tunnel syndrome  No significant past surgical history      MEDICATIONS  (STANDING):  aspirin enteric coated 81 milliGRAM(s) Oral daily  atorvastatin 40 milliGRAM(s) Oral at bedtime  buDESOnide  80 MICROgram(s)/formoterol 4.5 MICROgram(s) Inhaler 2 Puff(s) Inhalation two times a day  chlorhexidine 4% Liquid 1 Application(s) Topical <User Schedule>  diltiazem    milliGRAM(s) Oral daily  diphenhydrAMINE   Injectable 50 milliGRAM(s) IV Push once  docusate sodium 100 milliGRAM(s) Oral three times a day  escitalopram 10 milliGRAM(s) Oral daily  metoprolol tartrate 25 milliGRAM(s) Oral once  pantoprazole    Tablet 40 milliGRAM(s) Oral before breakfast  sodium chloride 0.9% lock flush 3 milliLiter(s) IV Push every 8 hours  sodium chloride 0.9%. 1000 milliLiter(s) (75 mL/Hr) IV Continuous <Continuous>  tiotropium 18 MICROgram(s) Capsule 1 Capsule(s) Inhalation at bedtime    MEDICATIONS  (PRN):  acetaminophen   Tablet .. 650 milliGRAM(s) Oral every 6 hours PRN Temp greater or equal to 38C (100.4F), Mild Pain (1 - 3)  ALBUTerol    90 MICROgram(s) HFA Inhaler 2 Puff(s) Inhalation every 6 hours PRN Shortness of Breath and/or Wheezing      Labs:                        13.1   14.18 )-----------( 306      ( 2019 03:50 )             41.4         142  |  101  |  25<H>  ----------------------------<  125<H>  4.5   |  26  |  0.9    Ca    9.9      2019 03:50  Mg     2.2         TPro  6.9  /  Alb  4.1  /  TBili  0.3  /  DBili  x   /  AST  9   /  ALT  11  /  AlkPhos  76      PT/INR - ( 2019 17:59 )   PT: 11.50 sec;   INR: 1.00 ratio         PTT - ( 2019 17:59 )  PTT:26.8 sec    Blood Type: ABO Rh Confirmation: A POS (19 @ 08:53)  HGB A1C: pending  Pro-BNP: Serum Pro-Brain Natriuretic Peptide: 1085 pg/mL ( @ 17:59)    Thyroid Panel:  @ 17:59/0.42  --/--/--   @ 05:30/1.70  --/--/--    MRSA: MRSA PCR Result.: Negative ( @ 15:41)   / MSSA:   Urinalysis Basic - ( 2019 18:00 )    Color: Yellow / Appearance: Clear / SG: >=1.030 / pH: x  Gluc: x / Ketone: Negative  / Bili: Negative / Urobili: 0.2   Blood: x / Protein: Negative / Nitrite: Negative   Leuk Esterase: Negative / RBC: 1-2 /HPF / WBC 1-2 /HPF   Sq Epi: x / Non Sq Epi: Occasional /HPF / Bacteria: Few /HPF    Ambulation:  5 Meter walk test: 6.6, 6.7, 5.7 Avg- 6.33    CXR:   Impression:    No radiographic evidence of cardiopulmonary diseas    EKG:  Ventricular Rate 66 BPM    Atrial Rate 66 BPM    P-R Interval 142 ms    QRS Duration 94 ms    Q-T Interval 432 ms    QTC Calculation(Bezet) 452 ms    P Axis 81 degrees    R Axis 62 degrees    T Axis 4 degrees    Diagnosis Line Normal sinus rhythm  Normal ECG    Confirmed by KURTIS MALAGON MD ([id number]) on 2019 12:44:28 PM    Carotid Duplex:    EXAM:  CAROTID DUPLEX BILATERAL            PROCEDURE DATE:  2019      INTERPRETATION:  Clinical History / Reason for exam: The patient is   58-year-old female preop for CABG and carotid bruit.  The study was   performed to evaluate the patient for carotid artery stenosis.    Duplex evaluation of the carotid arteries was performed bilaterally.  In the right carotid system, the internal carotid artery is noted to be   patent with a peak systolic velocity of 75.5 cm/sec over end diastolic   velocity of 27.3 cm/sec.    In the left carotid system, the internal carotid artery is noted to be   patent with a peak systolic velocity of 60.9 cm/sec over end diastolic   velocity of 15.6 cm/sec.    The right vertebral arterial flow was antegrade.  The left vertebral arterial flow was antegrade.    Impression:    20-39% stenosis of the right internal carotid artery.  20-39% stenosis of the left internal carotid artery.    PFT's: pending    Echocardiogram:   EXAM:  2-D ECHO (TTE) COMPLETE        PROCEDURE DATE:  2019      INTERPRETATION:  REPORT:    TRANSTHORACIC ECHOCARDIOGRAM REPORT         Patient Name:   FLAVIO HUDSON Accession #: [id number]  Medical Rec #:  [id number]         Height:      67.0 in 170.2 cm  YOB: 1960        Weight:      258.0 lb 117.03 kg  Patient Age:    58 years         BSA:         2.25 m²  Patient Gender: F                BP:          154/75 mmHg       Date of Exam:      2019 4:35:45 PM  Sonographer:       Divya Yen  Reading Physician: Senthil Aiken M.D.    Procedure:     2D Echo/Doppler/Color Doppler Complete.  Indications:   R07.9 - Chest Pain, unspecified  Diagnosis:     Chest pain, unspecified - R07.9  Study Details: Technically fair study. Study quality was adversely   affected due                 to body habitus, post-operative state and no parasternal   windows.         Summary:   1. Normal global left ventricular systolic function.   2. Trace tricuspid regurgitation.   3.PSAP at least 50.    PHYSICIAN INTERPRETATION:  Left Ventricle: The left ventricular internal cavity size is normal. Left   ventricular wall thickness is normal. Global LV systolic function was   normal.  Right Ventricle: The right ventricular size is mildly enlarged.  Left Atrium: Normal left atrial size.  Right Atrium: Normal right atrial size.  Pericardium: There is no evidence of pericardial effusion.  Mitral Valve: No evidence of mitral valve regurgitation is seen. The   mitral valve is normal in structure.  Tricuspid Valve: Trivial tricuspid regurgitation is visualized. The   tricuspid valve is normal. PSAP at least 50.  Aortic Valve: The aortic valve was not well visualized. No evidence of   aortic valve regurgitation is seen. No evidence of aortic stenosis.  Aorta: The aortic root is normal in size and structure.       2D AND M-MODE MEASUREMENTS (normal ranges within parentheses):  Left                 Normal    Aorta/Left           Normal  Ventricle:                     Atrium:  IVSd (2D): 0.88 cm   (0.7-1.1) AoV Cusp      1.90   (1.5-2.6)  LVPWd      0.94 cm   (0.7-1.1) Separation:   cm  (2D):                          Left Atrium   3.59   (1.9-4.0)  LVIDd      4.98 cm   (3.4-5.7) (Mmode):      cm  (2D):   LA Volume     17.8  LVIDs      3.85 cm             Index         ml/m²  (2D):                          Right  LV FS      22.7 %    (>25%)    Ventricle:  (2D):                          RVd (2D):      2.05 cm  IVSd       0.99 cm   (0.7-1.1)  (Mmode):  LVPWd      0.99 cm   (0.7-1.1)  (Mmode):  LVIDd      5.01 cm   (3.4-5.7)  (Mmode):  LVIDs      3.69 cm  (Mmode):  LV FS      26.4 %    (>25%)  (Mmode):  Relative   0.38      (<0.42)  Wall  Thickness  Rel. Wall  0.40      (<0.42)  Thickness  Mm  LV Mass    80.6 g/m²  Index:  Mmode    SPECTRAL DOPPLER ANALYSIS:  LV DIASTOLIC FUNCTION:  MV Peak E: 0.98 m/s Decel Time: 232 msec  MV Peak A: 0.96 m/s  E/A Ratio: 1.03    Aortic Valve:  AoV VMax:    1.80 m/s  AoV Area, Vmax: 2.60 cm² Vmax Indx: 1.15 cm²/m²  AoV Pk Grad: 12.9 mmHg    LVOT Vmax: 1.26 m/s  LVOT VTI:  0.23 m  LVOT Diam: 2.17 cm    Mitral Valve:  MV P1/2 Time: 67.29 msec  MV Area, PHT: 3.27 cm²    Tricuspid Valve and PA/RV Systolic Pressure: TR Max Velocity: 3.41 m/s RA   Pressure:  RVSP/PASP: 33.6 mmHg    Pulmonic Valve:  PV Max Velocity: 0.99 m/s PV Max PG: 3.9 mmHg PV Mean PG:       [id number] Senthil Aiken M.D., Electronically signed on 2019 at 6:10:18   PM     Cardiac catheterization:   severe ostial LAD disease    Verify Now- 232    Urinalysis (19 @ 18:00)    pH Urine: 5.5    Glucose Qualitative, Urine: Negative    Blood, Urine: Trace    Color: Yellow    Urine Appearance: Clear    Bilirubin: Negative    Ketone - Urine: Negative    Specific Gravity: >=1.030    Protein, Urine: Negative    Urobilinogen: 0.2    Nitrite: Negative    Leukocyte Esterase Concentration: Negative    Nasal cx:  MRSA/MSSA PCR (19 @ 15:41)    MRSA PCR Result.: Negative: FLAVIO Cornell  By: Real-Time PCR (Polymerase Reaction Method)     Left Arm 128/63, right arm 114/70  Gen: WN/WD NAD  Neuro: A&Ox3, nonfocal  Pulm: CTA B/L  CV: RRR, S1S2 +systolic murmur  Abd: Soft, NT, ND +BS  Ext: No edema, + peripheral pulses    Cardiac Surgery Risk Factors  CVA and/or carotid/cerebrovascular disease. Yes  No  Explain if Yes  Aortoiliac disease Yes  No  Explain if Yes  Previous MI Yes  No  Explain if Yes  Previos Cardiac Surgery Yes  No  Explain if Yes  Hemodynamics-Unstable or Shock Yes  No  Explain if Yes  Diabetis Yes  No  Explain if Yes  Hepatic Failure Yes  No  Explain if Yes  Renal failure and/or dialysis Yes  No  Explain if Yes  Heart failure-type-present or past Yes  No  Explain if Yes  COPD Yes  No  Explain if Yes-- pt was on resp tx's pre-op  Immune System Deficiency Yes  No  Explain if Yes  Malignant Ventricular Arrhythmia Yes  No  Explain if Yes    STS Score:   Risk of Mortality:  4.425%  Renal Failure:  3.874%  Permanent Stroke:  1.242%  Prolonged Ventilation:  18.555%  DSW Infection:  0.734%  Reoperation:  4.125%  Morbidity or Mortality:  26.268%  Short Length of Stay:  18.377%  Long Length of Stay:  13.243%    Pt has AICD/PPM [ ] Yes  [x ] No             Brand Name:  Pre-op Beta Blocker ordered within 24 hrs of surgery (CABG ONLY)?  [x ] Yes  [ ] No  If not, Why?  Type & Cross  [ x] Yes  [ ] No  NPO after Midnight [x ] Yes  [ ] No  Pre-op ABX ordered, to be taped on chart:  [ x] Yes  [ ] No     Hibiclens/Peridex ordered [x ] Yes  [ ] No  Intraop on Hold: PRBCs, CXR, VASU [x ]   Consent obtained  [ ] Yes  [ ] No Cardiac Surgery Pre-op Note:   CC: Patient is a 58y old  Female who presents with a chief complaint of Palpitations (2019 09:33)      Referring Physician:        Alexi                                                                                     Surgeon: imjack  Procedure: cabg/ablation    Allergies    amiodarone (Flushing)  iodine (Unknown)  shellfish (Unknown)    Intolerances      HPI:  57 yo female with PMH of Afib on Xarelto (ran out a few weeks ago), asthma, HTN, presents via EMS with c/o rapid HR.  Pt states she woke up feeling palpitations, flushed, clammy, felt as if her "jaw was locking".  + nausea, no vomiting.  EMS found pt with .  They gave 6mg Adenosine followed by 2 doses of 12 mg x 2.  Followed by 25 mg Cardizem which slowed pts HR to 120s (2019 07:28)      PAST MEDICAL & SURGICAL HISTORY:  Hearing aid worn  Hearing decreased  Asthma  Essential hypertension  Afib  Closed fracture of foot, unspecified laterality, sequela  History of  section  Bilateral carpal tunnel syndrome  No significant past surgical history      MEDICATIONS  (STANDING):  aspirin enteric coated 81 milliGRAM(s) Oral daily  atorvastatin 40 milliGRAM(s) Oral at bedtime  buDESOnide  80 MICROgram(s)/formoterol 4.5 MICROgram(s) Inhaler 2 Puff(s) Inhalation two times a day  chlorhexidine 4% Liquid 1 Application(s) Topical <User Schedule>  diltiazem    milliGRAM(s) Oral daily  diphenhydrAMINE   Injectable 50 milliGRAM(s) IV Push once  docusate sodium 100 milliGRAM(s) Oral three times a day  escitalopram 10 milliGRAM(s) Oral daily  metoprolol tartrate 25 milliGRAM(s) Oral once  pantoprazole    Tablet 40 milliGRAM(s) Oral before breakfast  sodium chloride 0.9% lock flush 3 milliLiter(s) IV Push every 8 hours  sodium chloride 0.9%. 1000 milliLiter(s) (75 mL/Hr) IV Continuous <Continuous>  tiotropium 18 MICROgram(s) Capsule 1 Capsule(s) Inhalation at bedtime    MEDICATIONS  (PRN):  acetaminophen   Tablet .. 650 milliGRAM(s) Oral every 6 hours PRN Temp greater or equal to 38C (100.4F), Mild Pain (1 - 3)  ALBUTerol    90 MICROgram(s) HFA Inhaler 2 Puff(s) Inhalation every 6 hours PRN Shortness of Breath and/or Wheezing      Labs:                        13.1   14.18 )-----------( 306      ( 2019 03:50 )             41.4         142  |  101  |  25<H>  ----------------------------<  125<H>  4.5   |  26  |  0.9    Ca    9.9      2019 03:50  Mg     2.2         TPro  6.9  /  Alb  4.1  /  TBili  0.3  /  DBili  x   /  AST  9   /  ALT  11  /  AlkPhos  76      PT/INR - ( 2019 17:59 )   PT: 11.50 sec;   INR: 1.00 ratio         PTT - ( 2019 17:59 )  PTT:26.8 sec    Blood Type: ABO Rh Confirmation: A POS (19 @ 08:53)  HGB A1C: pending  Pro-BNP: Serum Pro-Brain Natriuretic Peptide: 1085 pg/mL ( @ 17:59)  Troponin T, Serum: 0.27: Critical value: ng/mL (19 @ 19:22)      Thyroid Panel:  @ 17:59/0.42  --/--/--   @ 05:30/1.70  --/--/--    MRSA: MRSA PCR Result.: Negative ( @ 15:41)   / MSSA:   Urinalysis Basic - ( 2019 18:00 )    Color: Yellow / Appearance: Clear / SG: >=1.030 / pH: x  Gluc: x / Ketone: Negative  / Bili: Negative / Urobili: 0.2   Blood: x / Protein: Negative / Nitrite: Negative   Leuk Esterase: Negative / RBC: 1-2 /HPF / WBC 1-2 /HPF   Sq Epi: x / Non Sq Epi: Occasional /HPF / Bacteria: Few /HPF    Ambulation:  5 Meter walk test: 6.6, 6.7, 5.7 Avg- 6.33    CXR:   Impression:    No radiographic evidence of cardiopulmonary diseas    EKG:  Ventricular Rate 66 BPM    Atrial Rate 66 BPM    P-R Interval 142 ms    QRS Duration 94 ms    Q-T Interval 432 ms    QTC Calculation(Bezet) 452 ms    P Axis 81 degrees    R Axis 62 degrees    T Axis 4 degrees    Diagnosis Line Normal sinus rhythm  Normal ECG    Confirmed by KURTIS MALAGON MD ([id number]) on 2019 12:44:28 PM    Carotid Duplex:    EXAM:  CAROTID DUPLEX BILATERAL            PROCEDURE DATE:  2019      INTERPRETATION:  Clinical History / Reason for exam: The patient is   58-year-old female preop for CABG and carotid bruit.  The study was   performed to evaluate the patient for carotid artery stenosis.    Duplex evaluation of the carotid arteries was performed bilaterally.  In the right carotid system, the internal carotid artery is noted to be   patent with a peak systolic velocity of 75.5 cm/sec over end diastolic   velocity of 27.3 cm/sec.    In the left carotid system, the internal carotid artery is noted to be   patent with a peak systolic velocity of 60.9 cm/sec over end diastolic   velocity of 15.6 cm/sec.    The right vertebral arterial flow was antegrade.  The left vertebral arterial flow was antegrade.    Impression:    20-39% stenosis of the right internal carotid artery.  20-39% stenosis of the left internal carotid artery.    PFT's: pending    Echocardiogram:   EXAM:  2-D ECHO (TTE) COMPLETE        PROCEDURE DATE:  2019      INTERPRETATION:  REPORT:    TRANSTHORACIC ECHOCARDIOGRAM REPORT         Patient Name:   FLAVIO HUDSON Accession #: [id number]  Medical Rec #:  [id number]         Height:      67.0 in 170.2 cm  YOB: 1960        Weight:      258.0 lb 117.03 kg  Patient Age:    58 years         BSA:         2.25 m²  Patient Gender: F                BP:          154/75 mmHg       Date of Exam:      2019 4:35:45 PM  Sonographer:       Divya Yen  Reading Physician: Senthil Aiken M.D.    Procedure:     2D Echo/Doppler/Color Doppler Complete.  Indications:   R07.9 - Chest Pain, unspecified  Diagnosis:     Chest pain, unspecified - R07.9  Study Details: Technically fair study. Study quality was adversely   affected due                 to body habitus, post-operative state and no parasternal   windows.         Summary:   1. Normal global left ventricular systolic function.   2. Trace tricuspid regurgitation.   3.PSAP at least 50.    PHYSICIAN INTERPRETATION:  Left Ventricle: The left ventricular internal cavity size is normal. Left   ventricular wall thickness is normal. Global LV systolic function was   normal.  Right Ventricle: The right ventricular size is mildly enlarged.  Left Atrium: Normal left atrial size.  Right Atrium: Normal right atrial size.  Pericardium: There is no evidence of pericardial effusion.  Mitral Valve: No evidence of mitral valve regurgitation is seen. The   mitral valve is normal in structure.  Tricuspid Valve: Trivial tricuspid regurgitation is visualized. The   tricuspid valve is normal. PSAP at least 50.  Aortic Valve: The aortic valve was not well visualized. No evidence of   aortic valve regurgitation is seen. No evidence of aortic stenosis.  Aorta: The aortic root is normal in size and structure.       2D AND M-MODE MEASUREMENTS (normal ranges within parentheses):  Left                 Normal    Aorta/Left           Normal  Ventricle:                     Atrium:  IVSd (2D): 0.88 cm   (0.7-1.1) AoV Cusp      1.90   (1.5-2.6)  LVPWd      0.94 cm   (0.7-1.1) Separation:   cm  (2D):                          Left Atrium   3.59   (1.9-4.0)  LVIDd      4.98 cm   (3.4-5.7) (Mmode):      cm  (2D):   LA Volume     17.8  LVIDs      3.85 cm             Index         ml/m²  (2D):                          Right  LV FS      22.7 %    (>25%)    Ventricle:  (2D):                          RVd (2D):      2.05 cm  IVSd       0.99 cm   (0.7-1.1)  (Mmode):  LVPWd      0.99 cm   (0.7-1.1)  (Mmode):  LVIDd      5.01 cm   (3.4-5.7)  (Mmode):  LVIDs      3.69 cm  (Mmode):  LV FS      26.4 %    (>25%)  (Mmode):  Relative   0.38      (<0.42)  Wall  Thickness  Rel. Wall  0.40      (<0.42)  Thickness  Mm  LV Mass    80.6 g/m²  Index:  Mmode    SPECTRAL DOPPLER ANALYSIS:  LV DIASTOLIC FUNCTION:  MV Peak E: 0.98 m/s Decel Time: 232 msec  MV Peak A: 0.96 m/s  E/A Ratio: 1.03    Aortic Valve:  AoV VMax:    1.80 m/s  AoV Area, Vmax: 2.60 cm² Vmax Indx: 1.15 cm²/m²  AoV Pk Grad: 12.9 mmHg    LVOT Vmax: 1.26 m/s  LVOT VTI:  0.23 m  LVOT Diam: 2.17 cm    Mitral Valve:  MV P1/2 Time: 67.29 msec  MV Area, PHT: 3.27 cm²    Tricuspid Valve and PA/RV Systolic Pressure: TR Max Velocity: 3.41 m/s RA   Pressure:  RVSP/PASP: 33.6 mmHg    Pulmonic Valve:  PV Max Velocity: 0.99 m/s PV Max PG: 3.9 mmHg PV Mean PG:       [id number] Senthil Aiken M.D., Electronically signed on 2019 at 6:10:18   PM     Cardiac catheterization:   severe ostial LAD disease    Verify Now- 232    Urinalysis (19 @ 18:00)    pH Urine: 5.5    Glucose Qualitative, Urine: Negative    Blood, Urine: Trace    Color: Yellow    Urine Appearance: Clear    Bilirubin: Negative    Ketone - Urine: Negative    Specific Gravity: >=1.030    Protein, Urine: Negative    Urobilinogen: 0.2    Nitrite: Negative    Leukocyte Esterase Concentration: Negative    Nasal cx:  MRSA/MSSA PCR (19 @ 15:41)    MRSA PCR Result.: Negative: Notes  FLAVIO HUDSON  By: Real-Time PCR (Polymerase Reaction Method)     Left Arm 128/63, right arm 114/70  Gen: WN/WD NAD  Neuro: A&Ox3, nonfocal  Pulm: CTA B/L  CV: RRR, S1S2 +systolic murmur  Abd: Soft, NT, ND +BS  Ext: No edema, + peripheral pulses    Cardiac Surgery Risk Factors  CVA and/or carotid/cerebrovascular disease. Yes  No  Explain if Yes  Aortoiliac disease Yes  No  Explain if Yes  Previous MI Yes  No  Explain if Yes trop 0.27 - pt r/i for nstemi on   Previos Cardiac Surgery Yes  No  Explain if Yes  Hemodynamics-Unstable or Shock Yes  No  Explain if Yes  Diabetis Yes  No  Explain if Yes  Hepatic Failure Yes  No  Explain if Yes  Renal failure and/or dialysis Yes  No  Explain if Yes  Heart failure-type-present or past Yes  No  Explain if Yes  COPD Yes  No  Explain if Yes-- pt was on resp tx's pre-op  Immune System Deficiency Yes  No  Explain if Yes  Malignant Ventricular Arrhythmia Yes  No  Explain if Yes    STS Score:   Risk of Mortality:  4.425%  Renal Failure:  3.874%  Permanent Stroke:  1.242%  Prolonged Ventilation:  18.555%  DSW Infection:  0.734%  Reoperation:  4.125%  Morbidity or Mortality:  26.268%  Short Length of Stay:  18.377%  Long Length of Stay:  13.243%    Pt has AICD/PPM [ ] Yes  [x ] No             Brand Name:  Pre-op Beta Blocker ordered within 24 hrs of surgery (CABG ONLY)?  [x ] Yes  [ ] No  If not, Why?  Type & Cross  [ x] Yes  [ ] No  NPO after Midnight [x ] Yes  [ ] No  Pre-op ABX ordered, to be taped on chart:  [ x] Yes  [ ] No     Hibiclens/Peridex ordered [x ] Yes  [ ] No  Intraop on Hold: PRBCs, CXR, VASU [x ]   Consent obtained  [ ] Yes  [ ] No Cardiac Surgery Pre-op Note:   CC: Patient is a 58y old  Female who presents with a chief complaint of Palpitations (2019 09:33)      Referring Physician:        Alexi                                                                                     Surgeon: imjack  Procedure: cabg/ablation    Allergies    amiodarone (Flushing)  iodine (Unknown)  shellfish (Unknown)    Intolerances      HPI:  57 yo female with PMH of Afib on Xarelto (ran out a few weeks ago), asthma, HTN, presents via EMS with c/o rapid HR.  Pt states she woke up feeling palpitations, flushed, clammy, felt as if her "jaw was locking".  + nausea, no vomiting.  EMS found pt with .  They gave 6mg Adenosine followed by 2 doses of 12 mg x 2.  Followed by 25 mg Cardizem which slowed pts HR to 120s (2019 07:28)      PAST MEDICAL & SURGICAL HISTORY:  Hearing aid worn  Hearing decreased  Asthma  Essential hypertension  Afib  Closed fracture of foot, unspecified laterality, sequela  History of  section  Bilateral carpal tunnel syndrome  No significant past surgical history      MEDICATIONS  (STANDING):  aspirin enteric coated 81 milliGRAM(s) Oral daily  atorvastatin 40 milliGRAM(s) Oral at bedtime  buDESOnide  80 MICROgram(s)/formoterol 4.5 MICROgram(s) Inhaler 2 Puff(s) Inhalation two times a day  chlorhexidine 4% Liquid 1 Application(s) Topical <User Schedule>  diltiazem    milliGRAM(s) Oral daily  diphenhydrAMINE   Injectable 50 milliGRAM(s) IV Push once  docusate sodium 100 milliGRAM(s) Oral three times a day  escitalopram 10 milliGRAM(s) Oral daily  metoprolol tartrate 25 milliGRAM(s) Oral once  pantoprazole    Tablet 40 milliGRAM(s) Oral before breakfast  sodium chloride 0.9% lock flush 3 milliLiter(s) IV Push every 8 hours  sodium chloride 0.9%. 1000 milliLiter(s) (75 mL/Hr) IV Continuous <Continuous>  tiotropium 18 MICROgram(s) Capsule 1 Capsule(s) Inhalation at bedtime    MEDICATIONS  (PRN):  acetaminophen   Tablet .. 650 milliGRAM(s) Oral every 6 hours PRN Temp greater or equal to 38C (100.4F), Mild Pain (1 - 3)  ALBUTerol    90 MICROgram(s) HFA Inhaler 2 Puff(s) Inhalation every 6 hours PRN Shortness of Breath and/or Wheezing      Labs:                        13.1   14.18 )-----------( 306      ( 2019 03:50 )             41.4         142  |  101  |  25<H>  ----------------------------<  125<H>  4.5   |  26  |  0.9    Ca    9.9      2019 03:50  Mg     2.2         TPro  6.9  /  Alb  4.1  /  TBili  0.3  /  DBili  x   /  AST  9   /  ALT  11  /  AlkPhos  76      PT/INR - ( 2019 17:59 )   PT: 11.50 sec;   INR: 1.00 ratio         PTT - ( 2019 17:59 )  PTT:26.8 sec    Blood Type: ABO Rh Confirmation: A POS (19 @ 08:53)  HGB A1C: pending  Pro-BNP: Serum Pro-Brain Natriuretic Peptide: 1085 pg/mL ( @ 17:59)  Troponin T, Serum: 0.27: Critical value: ng/mL (19 @ 19:22)      Thyroid Panel:  @ 17:59/0.42  --/--/--   @ 05:30/1.70  --/--/--    MRSA: MRSA PCR Result.: Negative ( @ 15:41)   / MSSA:   Urinalysis Basic - ( 2019 18:00 )    Color: Yellow / Appearance: Clear / SG: >=1.030 / pH: x  Gluc: x / Ketone: Negative  / Bili: Negative / Urobili: 0.2   Blood: x / Protein: Negative / Nitrite: Negative   Leuk Esterase: Negative / RBC: 1-2 /HPF / WBC 1-2 /HPF   Sq Epi: x / Non Sq Epi: Occasional /HPF / Bacteria: Few /HPF    Ambulation:  5 Meter walk test: 6.6, 6.7, 5.7 Avg- 6.33    CXR:   Impression:    No radiographic evidence of cardiopulmonary diseas    EKG:  Ventricular Rate 66 BPM    Atrial Rate 66 BPM    P-R Interval 142 ms    QRS Duration 94 ms    Q-T Interval 432 ms    QTC Calculation(Bezet) 452 ms    P Axis 81 degrees    R Axis 62 degrees    T Axis 4 degrees    Diagnosis Line Normal sinus rhythm  Normal ECG    Confirmed by KURTIS MALAGON MD ([id number]) on 2019 12:44:28 PM    Carotid Duplex:    EXAM:  CAROTID DUPLEX BILATERAL            PROCEDURE DATE:  2019      INTERPRETATION:  Clinical History / Reason for exam: The patient is   58-year-old female preop for CABG and carotid bruit.  The study was   performed to evaluate the patient for carotid artery stenosis.    Duplex evaluation of the carotid arteries was performed bilaterally.  In the right carotid system, the internal carotid artery is noted to be   patent with a peak systolic velocity of 75.5 cm/sec over end diastolic   velocity of 27.3 cm/sec.    In the left carotid system, the internal carotid artery is noted to be   patent with a peak systolic velocity of 60.9 cm/sec over end diastolic   velocity of 15.6 cm/sec.    The right vertebral arterial flow was antegrade.  The left vertebral arterial flow was antegrade.    Impression:    20-39% stenosis of the right internal carotid artery.  20-39% stenosis of the left internal carotid artery.    PFT's: FEV1 1.22, 38 % predicted    Echocardiogram:   EXAM:  2-D ECHO (TTE) COMPLETE        PROCEDURE DATE:  2019      INTERPRETATION:  REPORT:    TRANSTHORACIC ECHOCARDIOGRAM REPORT         Patient Name:   FLAVIO HUDSON Accession #: [id number]  Medical Rec #:  [id number]         Height:      67.0 in 170.2 cm  YOB: 1960        Weight:      258.0 lb 117.03 kg  Patient Age:    58 years         BSA:         2.25 m²  Patient Gender: F                BP:          154/75 mmHg       Date of Exam:      2019 4:35:45 PM  Sonographer:       Divya Yen  Reading Physician: Senthil Aiken M.D.    Procedure:     2D Echo/Doppler/Color Doppler Complete.  Indications:   R07.9 - Chest Pain, unspecified  Diagnosis:     Chest pain, unspecified - R07.9  Study Details: Technically fair study. Study quality was adversely   affected due                 to body habitus, post-operative state and no parasternal   windows.         Summary:   1. Normal global left ventricular systolic function.   2. Trace tricuspid regurgitation.   3.PSAP at least 50.    PHYSICIAN INTERPRETATION:  Left Ventricle: The left ventricular internal cavity size is normal. Left   ventricular wall thickness is normal. Global LV systolic function was   normal.  Right Ventricle: The right ventricular size is mildly enlarged.  Left Atrium: Normal left atrial size.  Right Atrium: Normal right atrial size.  Pericardium: There is no evidence of pericardial effusion.  Mitral Valve: No evidence of mitral valve regurgitation is seen. The   mitral valve is normal in structure.  Tricuspid Valve: Trivial tricuspid regurgitation is visualized. The   tricuspid valve is normal. PSAP at least 50.  Aortic Valve: The aortic valve was not well visualized. No evidence of   aortic valve regurgitation is seen. No evidence of aortic stenosis.  Aorta: The aortic root is normal in size and structure.       2D AND M-MODE MEASUREMENTS (normal ranges within parentheses):  Left                 Normal    Aorta/Left           Normal  Ventricle:                     Atrium:  IVSd (2D): 0.88 cm   (0.7-1.1) AoV Cusp      1.90   (1.5-2.6)  LVPWd      0.94 cm   (0.7-1.1) Separation:   cm  (2D):                          Left Atrium   3.59   (1.9-4.0)  LVIDd      4.98 cm   (3.4-5.7) (Mmode):      cm  (2D):   LA Volume     17.8  LVIDs      3.85 cm             Index         ml/m²  (2D):                          Right  LV FS      22.7 %    (>25%)    Ventricle:  (2D):                          RVd (2D):      2.05 cm  IVSd       0.99 cm   (0.7-1.1)  (Mmode):  LVPWd      0.99 cm   (0.7-1.1)  (Mmode):  LVIDd      5.01 cm   (3.4-5.7)  (Mmode):  LVIDs      3.69 cm  (Mmode):  LV FS      26.4 %    (>25%)  (Mmode):  Relative   0.38      (<0.42)  Wall  Thickness  Rel. Wall  0.40      (<0.42)  Thickness  Mm  LV Mass    80.6 g/m²  Index:  Mmode    SPECTRAL DOPPLER ANALYSIS:  LV DIASTOLIC FUNCTION:  MV Peak E: 0.98 m/s Decel Time: 232 msec  MV Peak A: 0.96 m/s  E/A Ratio: 1.03    Aortic Valve:  AoV VMax:    1.80 m/s  AoV Area, Vmax: 2.60 cm² Vmax Indx: 1.15 cm²/m²  AoV Pk Grad: 12.9 mmHg    LVOT Vmax: 1.26 m/s  LVOT VTI:  0.23 m  LVOT Diam: 2.17 cm    Mitral Valve:  MV P1/2 Time: 67.29 msec  MV Area, PHT: 3.27 cm²    Tricuspid Valve and PA/RV Systolic Pressure: TR Max Velocity: 3.41 m/s RA   Pressure:  RVSP/PASP: 33.6 mmHg    Pulmonic Valve:  PV Max Velocity: 0.99 m/s PV Max PG: 3.9 mmHg PV Mean PG:       [id number] Senthil Aiken M.D., Electronically signed on 2019 at 6:10:18   PM     Cardiac catheterization:   severe ostial LAD disease    Verify Now- 232    Urinalysis (19 @ 18:00)    pH Urine: 5.5    Glucose Qualitative, Urine: Negative    Blood, Urine: Trace    Color: Yellow    Urine Appearance: Clear    Bilirubin: Negative    Ketone - Urine: Negative    Specific Gravity: >=1.030    Protein, Urine: Negative    Urobilinogen: 0.2    Nitrite: Negative    Leukocyte Esterase Concentration: Negative    Nasal cx:  MRSA/MSSA PCR (19 @ 15:41)    MRSA PCR Result.: Negative: Notes  FLAVIO HUDSON  By: Real-Time PCR (Polymerase Reaction Method)     Left Arm 128/63, right arm 114/70  Gen: WN/WD NAD  Neuro: A&Ox3, nonfocal  Pulm: CTA B/L  CV: RRR, S1S2 +systolic murmur  Abd: Soft, NT, ND +BS  Ext: No edema, + peripheral pulses    Cardiac Surgery Risk Factors  CVA and/or carotid/cerebrovascular disease. Yes  No  Explain if Yes  Aortoiliac disease Yes  No  Explain if Yes  Previous MI Yes  No  Explain if Yes trop 0.27 - pt r/i for nstemi on   Previos Cardiac Surgery Yes  No  Explain if Yes  Hemodynamics-Unstable or Shock Yes  No  Explain if Yes  Diabetis Yes  No  Explain if Yes  Hepatic Failure Yes  No  Explain if Yes  Renal failure and/or dialysis Yes  No  Explain if Yes  Heart failure-type-present or past Yes  No  Explain if Yes  COPD Yes  No  Explain if Yes-- pt was on resp tx's pre-op and has FEV 1 42 % predicted  Immune System Deficiency Yes  No  Explain if Yes  Malignant Ventricular Arrhythmia Yes  No  Explain if Yes    STS Score:   Risk of Mortality:  4.842%  Renal Failure:  3.406%  Permanent Stroke:  1.121%  Prolonged Ventilation:  16.630%  DSW Infection:  0.609%  Reoperation:  2.612%  Morbidity or Mortality:  24.143%  Short Length of Stay:  24.220%  Long Length of Stay:  11.259%    Pt has AICD/PPM [ ] Yes  [x ] No             Brand Name:  Pre-op Beta Blocker ordered within 24 hrs of surgery (CABG ONLY)?  [x ] Yes  [ ] No  If not, Why?  Type & Cross  [ x] Yes  [ ] No  NPO after Midnight [x ] Yes  [ ] No  Pre-op ABX ordered, to be taped on chart:  [ x] Yes  [ ] No     Hibiclens/Peridex ordered [x ] Yes  [ ] No  Intraop on Hold: PRBCs, CXR, VASU [x ]   Consent obtained  [ ] Yes  [ ] No Cardiac Surgery Pre-op Note:   CC: Patient is a 58y old  Female who presents with a chief complaint of Palpitations (2019 09:33)      Referring Physician:        Alexi                                                                                     Surgeon: imjack  Procedure: cabg/ablation    Allergies    amiodarone (Flushing)  iodine (Unknown)  shellfish (Unknown)    Intolerances      HPI:  57 yo female with PMH of Afib on Xarelto (ran out a few weeks ago), asthma, HTN, presents via EMS with c/o rapid HR.  Pt states she woke up feeling palpitations, flushed, clammy, felt as if her "jaw was locking".  + nausea, no vomiting.  EMS found pt with .  They gave 6mg Adenosine followed by 2 doses of 12 mg x 2.  Followed by 25 mg Cardizem which slowed pts HR to 120s (2019 07:28)      PAST MEDICAL & SURGICAL HISTORY:  Hearing aid worn  Hearing decreased  Asthma  Essential hypertension  Afib  Closed fracture of foot, unspecified laterality, sequela  History of  section  Bilateral carpal tunnel syndrome  No significant past surgical history      MEDICATIONS  (STANDING):  aspirin enteric coated 81 milliGRAM(s) Oral daily  atorvastatin 40 milliGRAM(s) Oral at bedtime  buDESOnide  80 MICROgram(s)/formoterol 4.5 MICROgram(s) Inhaler 2 Puff(s) Inhalation two times a day  chlorhexidine 4% Liquid 1 Application(s) Topical <User Schedule>  diltiazem    milliGRAM(s) Oral daily  diphenhydrAMINE   Injectable 50 milliGRAM(s) IV Push once  docusate sodium 100 milliGRAM(s) Oral three times a day  escitalopram 10 milliGRAM(s) Oral daily  metoprolol tartrate 25 milliGRAM(s) Oral once  pantoprazole    Tablet 40 milliGRAM(s) Oral before breakfast  sodium chloride 0.9% lock flush 3 milliLiter(s) IV Push every 8 hours  sodium chloride 0.9%. 1000 milliLiter(s) (75 mL/Hr) IV Continuous <Continuous>  tiotropium 18 MICROgram(s) Capsule 1 Capsule(s) Inhalation at bedtime    MEDICATIONS  (PRN):  acetaminophen   Tablet .. 650 milliGRAM(s) Oral every 6 hours PRN Temp greater or equal to 38C (100.4F), Mild Pain (1 - 3)  ALBUTerol    90 MICROgram(s) HFA Inhaler 2 Puff(s) Inhalation every 6 hours PRN Shortness of Breath and/or Wheezing      Labs:                        13.1   14.18 )-----------( 306      ( 2019 03:50 )             41.4         142  |  101  |  25<H>  ----------------------------<  125<H>  4.5   |  26  |  0.9    Ca    9.9      2019 03:50  Mg     2.2         TPro  6.9  /  Alb  4.1  /  TBili  0.3  /  DBili  x   /  AST  9   /  ALT  11  /  AlkPhos  76      PT/INR - ( 2019 17:59 )   PT: 11.50 sec;   INR: 1.00 ratio         PTT - ( 2019 17:59 )  PTT:26.8 sec    Blood Type: ABO Rh Confirmation: A POS (19 @ 08:53)  HGB A1C: pending  Pro-BNP: Serum Pro-Brain Natriuretic Peptide: 1085 pg/mL ( @ 17:59)  Troponin T, Serum: 0.27: Critical value: ng/mL (19 @ 19:22)      Thyroid Panel:  @ 17:59/0.42  --/--/--   @ 05:30/1.70  --/--/--        MRSA: MRSA PCR Result.: Negative ( @ 15:41)   / MSSA:   Urinalysis Basic - ( 2019 18:00 )    Color: Yellow / Appearance: Clear / SG: >=1.030 / pH: x  Gluc: x / Ketone: Negative  / Bili: Negative / Urobili: 0.2   Blood: x / Protein: Negative / Nitrite: Negative   Leuk Esterase: Negative / RBC: 1-2 /HPF / WBC 1-2 /HPF   Sq Epi: x / Non Sq Epi: Occasional /HPF / Bacteria: Few /HPF    Ambulation:  5 Meter walk test: 6.6, 6.7, 5.7 Avg- 6.33      EXAM:  CT CHEST            PROCEDURE DATE:  2019      INTERPRETATION:  Reason for Exam:  Preop, CABG.    CT of the chest was performed from the thoracic inlet to the level of the   adrenal glands without contrast injection. Coronal and sagittal images   have been submitted.    Comparison: CCTA dated 2016    Findings:     Tubes/Lines: None.    Lungs, Pleura, and Airways:Central tracheobronchial tree is patent.   Minimal centrilobular emphysematous change emphysematous changes.   Lingula, right lower lobe linear atelectasis. No focal consolidation,   effusion or pneumothorax. Left upper lobe calcified granuloma.    No suspicious pulmonary nodules or masses.    Mediastinum/Lymph Nodes:No significant axillary, hilar or mediastinal   adenopathy.    Heart/Great Vessels: The heart is normal in size. No pericardial   effusion. Vessels are normal in caliber. Moderate, stable calcifications   LAD.    Abdomen: Visualized portion of the upper abdomen is unremarkable.    Bones and soft tissues: No acute osseous abnormalities. Degenerative   changes of the spine.    IMPRESSION:      Moderate, stable LAD calcification.     otherwise no CT evidence of acute thoracic pathology.    CXR:   Impression:    No radiographic evidence of cardiopulmonary diseas    EKG:  Ventricular Rate 66 BPM    Atrial Rate 66 BPM    P-R Interval 142 ms    QRS Duration 94 ms    Q-T Interval 432 ms    QTC Calculation(Bezet) 452 ms    P Axis 81 degrees    R Axis 62 degrees    T Axis 4 degrees    Diagnosis Line Normal sinus rhythm  Normal ECG    Confirmed by KURTIS MALAGON MD ([id number]) on 2019 12:44:28 PM    Carotid Duplex:    EXAM:  CAROTID DUPLEX BILATERAL            PROCEDURE DATE:  2019      INTERPRETATION:  Clinical History / Reason for exam: The patient is   58-year-old female preop for CABG and carotid bruit.  The study was   performed to evaluate the patient for carotid artery stenosis.    Duplex evaluation of the carotid arteries was performed bilaterally.  In the right carotid system, the internal carotid artery is noted to be   patent with a peak systolic velocity of 75.5 cm/sec over end diastolic   velocity of 27.3 cm/sec.    In the left carotid system, the internal carotid artery is noted to be   patent with a peak systolic velocity of 60.9 cm/sec over end diastolic   velocity of 15.6 cm/sec.    The right vertebral arterial flow was antegrade.  The left vertebral arterial flow was antegrade.    Impression:    20-39% stenosis of the right internal carotid artery.  20-39% stenosis of the left internal carotid artery.    PFT's: FEV1 1.22, 38 % predicted    Echocardiogram:   EXAM:  2-D ECHO (TTE) COMPLETE        PROCEDURE DATE:  2019      INTERPRETATION:  REPORT:    TRANSTHORACIC ECHOCARDIOGRAM REPORT         Patient Name:   FLAVIO HUDSON Accession #: [id number]  Medical Rec #:  [id number]         Height:      67.0 in 170.2 cm  YOB: 1960        Weight:      258.0 lb 117.03 kg  Patient Age:    58 years         BSA:         2.25 m²  Patient Gender: F                BP:          154/75 mmHg       Date of Exam:      2019 4:35:45 PM  Sonographer:       Divya Yen  Reading Physician: Senthil Aiken M.D.    Procedure:     2D Echo/Doppler/Color Doppler Complete.  Indications:   R07.9 - Chest Pain, unspecified  Diagnosis:     Chest pain, unspecified - R07.9  Study Details: Technically fair study. Study quality was adversely   affected due                 to body habitus, post-operative state and no parasternal   windows.         Summary:   1. Normal global left ventricular systolic function.   2. Trace tricuspid regurgitation.   3.PSAP at least 50.    PHYSICIAN INTERPRETATION:  Left Ventricle: The left ventricular internal cavity size is normal. Left   ventricular wall thickness is normal. Global LV systolic function was   normal.  Right Ventricle: The right ventricular size is mildly enlarged.  Left Atrium: Normal left atrial size.  Right Atrium: Normal right atrial size.  Pericardium: There is no evidence of pericardial effusion.  Mitral Valve: No evidence of mitral valve regurgitation is seen. The   mitral valve is normal in structure.  Tricuspid Valve: Trivial tricuspid regurgitation is visualized. The   tricuspid valve is normal. PSAP at least 50.  Aortic Valve: The aortic valve was not well visualized. No evidence of   aortic valve regurgitation is seen. No evidence of aortic stenosis.  Aorta: The aortic root is normal in size and structure.       2D AND M-MODE MEASUREMENTS (normal ranges within parentheses):  Left                 Normal    Aorta/Left           Normal  Ventricle:                     Atrium:  IVSd (2D): 0.88 cm   (0.7-1.1) AoV Cusp      1.90   (1.5-2.6)  LVPWd      0.94 cm   (0.7-1.1) Separation:   cm  (2D):                          Left Atrium   3.59   (1.9-4.0)  LVIDd      4.98 cm   (3.4-5.7) (Mmode):      cm  (2D):   LA Volume     17.8  LVIDs      3.85 cm             Index         ml/m²  (2D):                          Right  LV FS      22.7 %    (>25%)    Ventricle:  (2D):                          RVd (2D):      2.05 cm  IVSd       0.99 cm   (0.7-1.1)  (Mmode):  LVPWd      0.99 cm   (0.7-1.1)  (Mmode):  LVIDd      5.01 cm   (3.4-5.7)  (Mmode):  LVIDs      3.69 cm  (Mmode):  LV FS      26.4 %    (>25%)  (Mmode):  Relative   0.38      (<0.42)  Wall  Thickness  Rel. Wall  0.40      (<0.42)  Thickness  Mm  LV Mass    80.6 g/m²  Index:  Mmode    SPECTRAL DOPPLER ANALYSIS:  LV DIASTOLIC FUNCTION:  MV Peak E: 0.98 m/s Decel Time: 232 msec  MV Peak A: 0.96 m/s  E/A Ratio: 1.03    Aortic Valve:  AoV VMax:    1.80 m/s  AoV Area, Vmax: 2.60 cm² Vmax Indx: 1.15 cm²/m²  AoV Pk Grad: 12.9 mmHg    LVOT Vmax: 1.26 m/s  LVOT VTI:  0.23 m  LVOT Diam: 2.17 cm    Mitral Valve:  MV P1/2 Time: 67.29 msec  MV Area, PHT: 3.27 cm²    Tricuspid Valve and PA/RV Systolic Pressure: TR Max Velocity: 3.41 m/s RA   Pressure:  RVSP/PASP: 33.6 mmHg    Pulmonic Valve:  PV Max Velocity: 0.99 m/s PV Max PG: 3.9 mmHg PV Mean PG:       [id number] Senthil Aiken M.D., Electronically signed on 2019 at 6:10:18   PM     Cardiac catheterization:   severe ostial LAD disease    Verify Now- 232    Urinalysis (19 @ 18:00)    pH Urine: 5.5    Glucose Qualitative, Urine: Negative    Blood, Urine: Trace    Color: Yellow    Urine Appearance: Clear    Bilirubin: Negative    Ketone - Urine: Negative    Specific Gravity: >=1.030    Protein, Urine: Negative    Urobilinogen: 0.2    Nitrite: Negative    Leukocyte Esterase Concentration: Negative    Nasal cx:  MRSA/MSSA PCR (19 @ 15:41)    MRSA PCR Result.: Negative: Notes  FLAVIO HUDSON  By: Real-Time PCR (Polymerase Reaction Method)     Left Arm 128/63, right arm 114/70  Gen: WN/WD NAD  Neuro: A&Ox3, nonfocal  Pulm: CTA B/L  CV: RRR, S1S2 +systolic murmur  Abd: Soft, NT, ND +BS  Ext: No edema, + peripheral pulses    Cardiac Surgery Risk Factors  CVA and/or carotid/cerebrovascular disease. Yes  No  Explain if Yes  Aortoiliac disease Yes  No  Explain if Yes  Previous MI Yes  No  Explain if Yes trop 0.27 - pt r/i for nstemi on   Previos Cardiac Surgery Yes  No  Explain if Yes  Hemodynamics-Unstable or Shock Yes  No  Explain if Yes  Diabetis Yes  No  Explain if Yes  Hepatic Failure Yes  No  Explain if Yes  Renal failure and/or dialysis Yes  No  Explain if Yes  Heart failure-type-present or past Yes  No  Explain if Yes  COPD Yes  No  Explain if Yes-- pt was on resp tx's pre-op and has FEV 1 42 % predicted  Immune System Deficiency Yes  No  Explain if Yes  Malignant Ventricular Arrhythmia Yes  No  Explain if Yes    STS Score:   Risk of Mortality:  4.842%  Renal Failure:  3.406%  Permanent Stroke:  1.121%  Prolonged Ventilation:  16.630%  DSW Infection:  0.609%  Reoperation:  2.612%  Morbidity or Mortality:  24.143%  Short Length of Stay:  24.220%  Long Length of Stay:  11.259%    Pt has AICD/PPM [ ] Yes  [x ] No             Brand Name:  Pre-op Beta Blocker ordered within 24 hrs of surgery (CABG ONLY)?  [x ] Yes  [ ] No  If not, Why?  Type & Cross  [ x] Yes  [ ] No  NPO after Midnight [x ] Yes  [ ] No  Pre-op ABX ordered, to be taped on chart:  [ x] Yes  [ ] No     Hibiclens/Peridex ordered [x ] Yes  [ ] No  Intraop on Hold: PRBCs, CXR, VASU [x ]   Consent obtained  [ ] Yes  [ ] No

## 2019-02-21 LAB — GLUCOSE BLDC GLUCOMTR-MCNC: 95 MG/DL — SIGNIFICANT CHANGE UP (ref 70–99)

## 2019-02-21 RX ORDER — DIPHENHYDRAMINE HCL 50 MG
25 CAPSULE ORAL EVERY 6 HOURS
Qty: 0 | Refills: 0 | Status: DISCONTINUED | OUTPATIENT
Start: 2019-02-21 | End: 2019-02-22

## 2019-02-21 RX ORDER — HYDROCORTISONE 1 %
1 OINTMENT (GRAM) TOPICAL
Qty: 0 | Refills: 0 | Status: DISCONTINUED | OUTPATIENT
Start: 2019-02-21 | End: 2019-02-22

## 2019-02-21 RX ORDER — ALPRAZOLAM 0.25 MG
0.25 TABLET ORAL AT BEDTIME
Qty: 0 | Refills: 0 | Status: DISCONTINUED | OUTPATIENT
Start: 2019-02-21 | End: 2019-02-22

## 2019-02-21 RX ORDER — CHLORHEXIDINE GLUCONATE 213 G/1000ML
5 SOLUTION TOPICAL ONCE
Qty: 0 | Refills: 0 | Status: DISCONTINUED | OUTPATIENT
Start: 2019-02-21 | End: 2019-02-22

## 2019-02-21 RX ADMIN — ESCITALOPRAM OXALATE 10 MILLIGRAM(S): 10 TABLET, FILM COATED ORAL at 12:56

## 2019-02-21 RX ADMIN — Medication 1 APPLICATION(S): at 13:48

## 2019-02-21 RX ADMIN — Medication 12.5 MILLIGRAM(S): at 06:30

## 2019-02-21 RX ADMIN — Medication 0.25 MILLIGRAM(S): at 18:01

## 2019-02-21 RX ADMIN — PANTOPRAZOLE SODIUM 40 MILLIGRAM(S): 20 TABLET, DELAYED RELEASE ORAL at 06:25

## 2019-02-21 RX ADMIN — SODIUM CHLORIDE 3 MILLILITER(S): 9 INJECTION INTRAMUSCULAR; INTRAVENOUS; SUBCUTANEOUS at 06:35

## 2019-02-21 RX ADMIN — TIOTROPIUM BROMIDE 1 CAPSULE(S): 18 CAPSULE ORAL; RESPIRATORY (INHALATION) at 21:37

## 2019-02-21 RX ADMIN — BUDESONIDE AND FORMOTEROL FUMARATE DIHYDRATE 2 PUFF(S): 160; 4.5 AEROSOL RESPIRATORY (INHALATION) at 21:37

## 2019-02-21 RX ADMIN — Medication 25 MILLIGRAM(S): at 13:48

## 2019-02-21 RX ADMIN — Medication 81 MILLIGRAM(S): at 12:56

## 2019-02-21 RX ADMIN — SODIUM CHLORIDE 3 MILLILITER(S): 9 INJECTION INTRAMUSCULAR; INTRAVENOUS; SUBCUTANEOUS at 13:01

## 2019-02-21 RX ADMIN — BUDESONIDE AND FORMOTEROL FUMARATE DIHYDRATE 2 PUFF(S): 160; 4.5 AEROSOL RESPIRATORY (INHALATION) at 08:37

## 2019-02-21 RX ADMIN — CHLORHEXIDINE GLUCONATE 1 APPLICATION(S): 213 SOLUTION TOPICAL at 06:31

## 2019-02-21 RX ADMIN — Medication 12.5 MILLIGRAM(S): at 17:50

## 2019-02-21 RX ADMIN — SODIUM CHLORIDE 3 MILLILITER(S): 9 INJECTION INTRAMUSCULAR; INTRAVENOUS; SUBCUTANEOUS at 21:38

## 2019-02-21 RX ADMIN — ATORVASTATIN CALCIUM 40 MILLIGRAM(S): 80 TABLET, FILM COATED ORAL at 21:37

## 2019-02-21 NOTE — PRE-ANESTHESIA EVALUATION ADULT - NSANTHOSAYNRD_GEN_A_CORE
No. SILAS screening performed.  STOP BANG Legend: 0-2 = LOW Risk; 3-4 = INTERMEDIATE Risk; 5-8 = HIGH Risk

## 2019-02-21 NOTE — PROVIDER CONTACT NOTE (OTHER) - RECOMMENDATIONS
ELAINE Garcia notified. Both ELAINE Garcia & ELAINE Rollins to bedside. As per both PA's, hydrocortisone & benadryl to be ordered.

## 2019-02-21 NOTE — PROGRESS NOTE ADULT - SUBJECTIVE AND OBJECTIVE BOX
OPERATIVE PROCEDURE(s):     pre-op for cabg/ablation              SURGEON(s):     SUBJECTIVE ASSESSMENT:  Pt is currently without complaints other than stating she has a rash on her left anterior chest at the site of a prior R2 pad.    Vital Signs Last 24 Hrs  T(C): 37 (21 Feb 2019 12:00), Max: 37 (21 Feb 2019 12:00)  T(F): 98.6 (21 Feb 2019 12:00), Max: 98.6 (21 Feb 2019 12:00)  HR: 68 (21 Feb 2019 12:00) (54 - 70)  BP: 145/74 (21 Feb 2019 12:00) (112/58 - 156/79)  BP(mean): 111 (21 Feb 2019 12:00) (76 - 117)  RR: 21 (21 Feb 2019 12:00) (18 - 40)  SpO2: 100% (21 Feb 2019 12:00) (98% - 100%)  02-20-19 @ 07:01  -  02-21-19 @ 07:00  --------------------------------------------------------  IN: 480 mL / OUT: 400 mL / NET: 80 mL    02-21-19 @ 07:01  -  02-21-19 @ 13:33  --------------------------------------------------------  IN: 400 mL / OUT: 450 mL / NET: -50 mL        Physical Exam  General: alert and oriented x 3   Chest: cta bl  CVS: s1s2  Abd: pos bs, soft nt   GI/ :   Ext: no sig edema, 2 + pulses    LABS:                        13.1   14.18 )-----------( 306      ( 20 Feb 2019 03:50 )             41.4     COUMADIN:   [ ] YES [ x] NO    PT/INR - ( 19 Feb 2019 17:59 )   PT: 11.50 sec;   INR: 1.00 ratio         PTT - ( 19 Feb 2019 17:59 )  PTT:26.8 sec  02-20    142  |  101  |  25<H>  ----------------------------<  125<H>  4.5   |  26  |  0.9    Ca    9.9      20 Feb 2019 03:50    TPro  6.9  /  Alb  4.1  /  TBili  0.3  /  DBili  x   /  AST  9   /  ALT  11  /  AlkPhos  76  02-20    Urinalysis Basic - ( 19 Feb 2019 18:00 )    Color: Yellow / Appearance: Clear / SG: >=1.030 / pH: x  Gluc: x / Ketone: Negative  / Bili: Negative / Urobili: 0.2   Blood: x / Protein: Negative / Nitrite: Negative   Leuk Esterase: Negative / RBC: 1-2 /HPF / WBC 1-2 /HPF   Sq Epi: x / Non Sq Epi: Occasional /HPF / Bacteria: Few /HPF        MEDICATIONS  (STANDING):  aspirin enteric coated 81 milliGRAM(s) Oral daily  atorvastatin 40 milliGRAM(s) Oral at bedtime  buDESOnide  80 MICROgram(s)/formoterol 4.5 MICROgram(s) Inhaler 2 Puff(s) Inhalation two times a day  chlorhexidine 4% Liquid 1 Application(s) Topical <User Schedule>  diphenhydrAMINE   Injectable 50 milliGRAM(s) IV Push once  docusate sodium 100 milliGRAM(s) Oral three times a day  escitalopram 10 milliGRAM(s) Oral daily  hydrocortisone 0.5% Cream 1 Application(s) Topical two times a day  metoprolol tartrate 12.5 milliGRAM(s) Oral two times a day  pantoprazole    Tablet 40 milliGRAM(s) Oral before breakfast  sodium chloride 0.9% lock flush 3 milliLiter(s) IV Push every 8 hours  sodium chloride 0.9%. 1000 milliLiter(s) (75 mL/Hr) IV Continuous <Continuous>  tiotropium 18 MICROgram(s) Capsule 1 Capsule(s) Inhalation at bedtime    MEDICATIONS  (PRN):  acetaminophen   Tablet .. 650 milliGRAM(s) Oral every 6 hours PRN Temp greater or equal to 38C (100.4F), Mild Pain (1 - 3)  ALBUTerol    90 MICROgram(s) HFA Inhaler 2 Puff(s) Inhalation every 6 hours PRN Shortness of Breath and/or Wheezing  ALPRAZolam 0.25 milliGRAM(s) Oral at bedtime PRN anxiety  diphenhydrAMINE 25 milliGRAM(s) Oral every 6 hours PRN Rash and/or Itching      Allergies    amiodarone (Flushing)  iodine (Unknown)  shellfish (Unknown)    Intolerances        Ambulation/Activity Status:  ambulates well without assistance      RADIOLOGY & ADDITIONAL TESTS  no new tests    Assessment/Plan:  Patient is a 59 yo female pre-op for cabg/ablation  cont present tx as per ct surgeon  cad- cont asa, lop, and statin  rash- cont topical hydrocortisone cream and benadryl prn  npo after midnight  consent obtained  Social Service Disposition:

## 2019-02-21 NOTE — PRE-ANESTHESIA EVALUATION ADULT - NSRADCARDRESULTSFT_GEN_ALL_CORE
Summary of ECHo:   1. Normal global left ventricular systolic function.   2. Trace tricuspid regurgitation.   3. PSAP at least 50.

## 2019-02-21 NOTE — CONSULT NOTE ADULT - ASSESSMENT
59 yo female with PMH of Afib on Xarelto (ran out a few weeks ago), asthma, HTN, presented via EMS with c/o rapid HR.     Impression  Narrow complex tachycardia on presentation: likely Aflutter  1 V CAD   H/o afib/HTN/asthma      Plan:  currently in SR  asymptomatic  cont metoprolol  Scheduled for CABG tomorrow 59 yo female with PMH of Afib on Xarelto (ran out a few weeks ago), asthma, HTN, presented via EMS with c/o rapid HR.     Impression  Narrow complex tachycardia on presentation: likely Aflutter 1 ; 1 conduction  vs SVT HR at 280/min pt is on multiple B2 agonists ; including ventolin, Albuterol,  Spiriva AND  Asthma slime .uncertain if pt used excess of these drugs   1 V CAD pending  revascularization  H/o   A. Fib/  HTN  /Asthma      Plan:  currently in SR  asymptomatic  cont metoprolol  Will follow after Surgery

## 2019-02-21 NOTE — CONSULT NOTE ADULT - SUBJECTIVE AND OBJECTIVE BOX
Patient is a 58y old  Female who presents with a chief complaint of Palpitations (2019 13:32)    HPI:  Initial HPI: 57 yo female with PMH of Afib on Xarelto (ran out a few weeks ago), asthma, HTN, presents via EMS with c/o rapid HR.  Pt states she woke up feeling palpitations, flushed, clammy, felt as if her "jaw was locking".  + nausea, no vomiting.  EMS found pt with .  They gave 6mg Adenosine followed by 2 doses of 12 mg x 2.  Followed by 25 mg Cardizem which slowed pts HR to 120s (2019 07:28)    Cardiology fellow addendum:   Pt was in afib with RVR at [facility]. Pt was given IV cardizem. Pt was started on Amiodarone after which she had allergic reaction and it was stropped. Pt was transferred to [facility] and underwent cardiac cath which showed 75 % ostial LAD lesion. Pt is currently in CTU and scheduled for CABG tomorrow. pt is currently asymptomatic at rest and in SR on tele.    Pt was very tachycardiac when she initially presented to [facility] (HR~300 per EMS strip) and EP is consulted by cardiology for evaluation of initial tachycardia.     ROS:  All other systems reviewed and are negative    PAST MEDICAL & SURGICAL HISTORY  Hearing aid worn  Hearing decreased  Asthma  Essential hypertension  Afib  Closed fracture of foot, unspecified laterality, sequela  History of  section  Bilateral carpal tunnel syndrome  No significant past surgical history      FAMILY HISTORY:  FAMILY HISTORY:  No pertinent family history in first degree relatives      SOCIAL HISTORY:  []smoker  []Alcohol  []Drug    ALLERGIES:  amiodarone (Flushing)  iodine (Unknown)  shellfish (Unknown)      MEDICATIONS:  MEDICATIONS  (STANDING):  aspirin enteric coated 81 milliGRAM(s) Oral daily  atorvastatin 40 milliGRAM(s) Oral at bedtime  buDESOnide  80 MICROgram(s)/formoterol 4.5 MICROgram(s) Inhaler 2 Puff(s) Inhalation two times a day  chlorhexidine 4% Liquid 1 Application(s) Topical <User Schedule>  diphenhydrAMINE   Injectable 50 milliGRAM(s) IV Push once  docusate sodium 100 milliGRAM(s) Oral three times a day  escitalopram 10 milliGRAM(s) Oral daily  hydrocortisone 0.5% Cream 1 Application(s) Topical two times a day  metoprolol tartrate 12.5 milliGRAM(s) Oral two times a day  pantoprazole    Tablet 40 milliGRAM(s) Oral before breakfast  sodium chloride 0.9% lock flush 3 milliLiter(s) IV Push every 8 hours  sodium chloride 0.9%. 1000 milliLiter(s) (75 mL/Hr) IV Continuous <Continuous>  tiotropium 18 MICROgram(s) Capsule 1 Capsule(s) Inhalation at bedtime    MEDICATIONS  (PRN):  acetaminophen   Tablet .. 650 milliGRAM(s) Oral every 6 hours PRN Temp greater or equal to 38C (100.4F), Mild Pain (1 - 3)  ALBUTerol    90 MICROgram(s) HFA Inhaler 2 Puff(s) Inhalation every 6 hours PRN Shortness of Breath and/or Wheezing  ALPRAZolam 0.25 milliGRAM(s) Oral at bedtime PRN anxiety  diphenhydrAMINE 25 milliGRAM(s) Oral every 6 hours PRN Rash and/or Itching      HOME MEDICATIONS:  Home Medications:  albuterol:  (2019 21:37)  Cardizem: Cardizem 180 XT once a day (2019 21:37)  Lexapro 10 mg oral tablet: 1 tab(s) orally once a day (2019 21:37)  losartan-hydrochlorothiazide 50mg-12.5mg oral tablet: 1 tab(s) orally once a day (2019 21:37)  Xarelto 20 mg oral tablet: 1 tab(s) orally once a day (in the evening) (2019 21:37)      VITALS:   T(F): 98.6 ( @ 12:00), Max: 98.9 ( @ 15:01)  HR: 66 ( @ 14:00) (54 - 85)  BP: 141/80 ( @ 14:00) (98/58 - 161/69)  BP(mean): 114 ( @ 14:00) (74 - 121)  RR: 20 ( @ 14:00) (15 - 48)  SpO2: 100% ( @ 14:00) (96% - 100%)    I&O's Summary    2019 07:01  -  2019 07:00  --------------------------------------------------------  IN: 480 mL / OUT: 400 mL / NET: 80 mL    2019 07:01  -  2019 14:36  --------------------------------------------------------  IN: 600 mL / OUT: 650 mL / NET: -50 mL        PHYSICAL EXAM:  GEN: Alert and oriented X 3, No acute distress  HEENT: no pallor  NECK: Supple, no JVD  LUNGS: Clear to auscultation bilaterally  CARDIOVASCULAR: S1/S2 regular, no murmurs or rubs  ABD: Soft, BS+  EXT: No Lower extremity edema/cyanosis  NEURO: Non focal  SKIN: Intact    LABS:                        13.1   14.18 )-----------( 306      ( 2019 03:50 )             41.4     02    142  |  101  |  25<H>  ----------------------------<  125<H>  4.5   |  26  |  0.9    Ca    9.9      2019 03:50    TPro  6.9  /  Alb  4.1  /  TBili  0.3  /  DBili  x   /  AST  9   /  ALT  11  /  AlkPhos  76      PT/INR - ( 2019 17:59 )   PT: 11.50 sec;   INR: 1.00 ratio         PTT - ( 2019 17:59 )  PTT:26.8 sec          Troponin trend:    Serum Pro-Brain Natriuretic Peptide: 1085 pg/mL (19 @ 17:59)     Chol 223<H> <H> HDL 57 Trig 103  Hemoglobin A1C Hemoglobin A1C, Whole Blood: 5.8 % (19 @ 17:59)    Thyroid      RADIOLOGY:  -CXR:  < from: Xray Chest 1 View AP/PA (19 @ 18:42) >  Impression:    No radiographic evidence of cardiopulmonary disease.    < end of copied text >    -CT:  < from: CT Chest No Cont (19 @ 07:56) >  IMPRESSION:      Moderate, stable LAD calcification.     otherwise no CT evidence of acute thoracic pathology.    < end of copied text >    -TTE:  < from: Transthoracic Echocardiogram (19 @ 16:35) >  Summary:   1. Normal global left ventricular systolic function.   2. Trace tricuspid regurgitation.   3.PSAP at least 50.    < end of copied text >  T:  -CATHETERIZATION:  < from: Cardiac Cath Lab - Adult (19 @ 13:38) >    CORONARY CIRCULATION: The coronary circulation is left dominant. Left main:    Mild stenosis. Ostial LAD: There was a tubular 75 % stenosis at a site    with no prior intervention. Mid LAD: Angiography showed no evidence of    disease. Distal LAD: Angiography showed no evidence of disease. 1st    diagonal: Normal. Circumflex: The vessel was large sized. Angiography    showed minor luminal irregularities with no flow limiting lesions. RCA:    The vessel was small sized. non dominant    < end of copied text >      ECG:  < from: 12 Lead ECG (19 @ 06:55) >    Diagnosis Line Normal sinus rhythm  Normal ECG    < end of copied text >    TELEMETRY EVENTS:  none

## 2019-02-22 LAB
ALBUMIN SERPL ELPH-MCNC: 3.8 G/DL — SIGNIFICANT CHANGE UP (ref 3.5–5.2)
ALP SERPL-CCNC: 50 U/L — SIGNIFICANT CHANGE UP (ref 30–115)
ALT FLD-CCNC: 16 U/L — SIGNIFICANT CHANGE UP (ref 0–41)
ANION GAP SERPL CALC-SCNC: 16 MMOL/L — HIGH (ref 7–14)
APTT BLD: 27.4 SEC — SIGNIFICANT CHANGE UP (ref 27–39.2)
AST SERPL-CCNC: 53 U/L — HIGH (ref 0–41)
BASE EXCESS BLDA CALC-SCNC: -0.6 MMOL/L — SIGNIFICANT CHANGE UP (ref -2–2)
BASE EXCESS BLDA CALC-SCNC: 0.4 MMOL/L — SIGNIFICANT CHANGE UP (ref -2–2)
BASOPHILS # BLD AUTO: 0.04 K/UL — SIGNIFICANT CHANGE UP (ref 0–0.2)
BASOPHILS NFR BLD AUTO: 0.3 % — SIGNIFICANT CHANGE UP (ref 0–1)
BILIRUB SERPL-MCNC: 0.6 MG/DL — SIGNIFICANT CHANGE UP (ref 0.2–1.2)
BUN SERPL-MCNC: 15 MG/DL — SIGNIFICANT CHANGE UP (ref 10–20)
CALCIUM SERPL-MCNC: 8.5 MG/DL — SIGNIFICANT CHANGE UP (ref 8.5–10.1)
CHLORIDE SERPL-SCNC: 103 MMOL/L — SIGNIFICANT CHANGE UP (ref 98–110)
CO2 SERPL-SCNC: 23 MMOL/L — SIGNIFICANT CHANGE UP (ref 17–32)
CREAT SERPL-MCNC: 1 MG/DL — SIGNIFICANT CHANGE UP (ref 0.7–1.5)
EOSINOPHIL # BLD AUTO: 0.24 K/UL — SIGNIFICANT CHANGE UP (ref 0–0.7)
EOSINOPHIL NFR BLD AUTO: 1.8 % — SIGNIFICANT CHANGE UP (ref 0–8)
GAS PNL BLDA: SIGNIFICANT CHANGE UP
GLUCOSE BLDC GLUCOMTR-MCNC: 100 MG/DL — HIGH (ref 70–99)
GLUCOSE BLDC GLUCOMTR-MCNC: 110 MG/DL — HIGH (ref 70–99)
GLUCOSE BLDC GLUCOMTR-MCNC: 114 MG/DL — HIGH (ref 70–99)
GLUCOSE BLDC GLUCOMTR-MCNC: 115 MG/DL — HIGH (ref 70–99)
GLUCOSE BLDC GLUCOMTR-MCNC: 117 MG/DL — HIGH (ref 70–99)
GLUCOSE BLDC GLUCOMTR-MCNC: 146 MG/DL — HIGH (ref 70–99)
GLUCOSE BLDC GLUCOMTR-MCNC: 164 MG/DL — HIGH (ref 70–99)
GLUCOSE BLDC GLUCOMTR-MCNC: 79 MG/DL — SIGNIFICANT CHANGE UP (ref 70–99)
GLUCOSE SERPL-MCNC: 158 MG/DL — HIGH (ref 70–99)
HCO3 BLDA-SCNC: 25 MMOL/L — SIGNIFICANT CHANGE UP (ref 23–27)
HCO3 BLDA-SCNC: 26 MMOL/L — SIGNIFICANT CHANGE UP (ref 23–27)
HCT VFR BLD CALC: 27.2 % — LOW (ref 37–47)
HCT VFR BLD CALC: 34 % — LOW (ref 37–47)
HGB BLD-MCNC: 11.2 G/DL — LOW (ref 12–16)
HGB BLD-MCNC: 9.2 G/DL — LOW (ref 12–16)
IMM GRANULOCYTES NFR BLD AUTO: 0.7 % — HIGH (ref 0.1–0.3)
INR BLD: 1.23 RATIO — SIGNIFICANT CHANGE UP (ref 0.65–1.3)
LYMPHOCYTES # BLD AUTO: 1.05 K/UL — LOW (ref 1.2–3.4)
LYMPHOCYTES # BLD AUTO: 7.8 % — LOW (ref 20.5–51.1)
MCHC RBC-ENTMCNC: 25.8 PG — LOW (ref 27–31)
MCHC RBC-ENTMCNC: 26.3 PG — LOW (ref 27–31)
MCHC RBC-ENTMCNC: 32.9 G/DL — SIGNIFICANT CHANGE UP (ref 32–37)
MCHC RBC-ENTMCNC: 33.8 G/DL — SIGNIFICANT CHANGE UP (ref 32–37)
MCV RBC AUTO: 77.7 FL — LOW (ref 81–99)
MCV RBC AUTO: 78.3 FL — LOW (ref 81–99)
MONOCYTES # BLD AUTO: 0.87 K/UL — HIGH (ref 0.1–0.6)
MONOCYTES NFR BLD AUTO: 6.4 % — SIGNIFICANT CHANGE UP (ref 1.7–9.3)
NEUTROPHILS # BLD AUTO: 11.22 K/UL — HIGH (ref 1.4–6.5)
NEUTROPHILS NFR BLD AUTO: 83 % — HIGH (ref 42.2–75.2)
NRBC # BLD: 0 /100 WBCS — SIGNIFICANT CHANGE UP (ref 0–0)
NRBC # BLD: 0 /100 WBCS — SIGNIFICANT CHANGE UP (ref 0–0)
PCO2 BLDA: 45 MMHG — HIGH (ref 38–42)
PCO2 BLDA: 47 MMHG — HIGH (ref 38–42)
PH BLDA: 7.34 — LOW (ref 7.38–7.42)
PH BLDA: 7.37 — LOW (ref 7.38–7.42)
PLATELET # BLD AUTO: 175 K/UL — SIGNIFICANT CHANGE UP (ref 130–400)
PLATELET # BLD AUTO: 269 K/UL — SIGNIFICANT CHANGE UP (ref 130–400)
PO2 BLDA: 159 MMHG — HIGH (ref 78–95)
PO2 BLDA: 167 MMHG — HIGH (ref 78–95)
POTASSIUM SERPL-MCNC: 4.7 MMOL/L — SIGNIFICANT CHANGE UP (ref 3.5–5)
POTASSIUM SERPL-SCNC: 4.7 MMOL/L — SIGNIFICANT CHANGE UP (ref 3.5–5)
PROT SERPL-MCNC: 5.4 G/DL — LOW (ref 6–8)
PROTHROM AB SERPL-ACNC: 14.1 SEC — HIGH (ref 9.95–12.87)
RBC # BLD: 3.5 M/UL — LOW (ref 4.2–5.4)
RBC # BLD: 4.34 M/UL — SIGNIFICANT CHANGE UP (ref 4.2–5.4)
RBC # FLD: 13.2 % — SIGNIFICANT CHANGE UP (ref 11.5–14.5)
RBC # FLD: 13.4 % — SIGNIFICANT CHANGE UP (ref 11.5–14.5)
SAO2 % BLDA: 100 % — HIGH (ref 94–98)
SAO2 % BLDA: 99 % — HIGH (ref 94–98)
SODIUM SERPL-SCNC: 142 MMOL/L — SIGNIFICANT CHANGE UP (ref 135–146)
WBC # BLD: 13.51 K/UL — HIGH (ref 4.8–10.8)
WBC # BLD: 14.64 K/UL — HIGH (ref 4.8–10.8)
WBC # FLD AUTO: 13.51 K/UL — HIGH (ref 4.8–10.8)
WBC # FLD AUTO: 14.64 K/UL — HIGH (ref 4.8–10.8)

## 2019-02-22 RX ORDER — CEFAZOLIN SODIUM 1 G
1000 VIAL (EA) INJECTION EVERY 8 HOURS
Qty: 0 | Refills: 0 | Status: COMPLETED | OUTPATIENT
Start: 2019-02-22 | End: 2019-02-23

## 2019-02-22 RX ORDER — FENTANYL CITRATE 50 UG/ML
25 INJECTION INTRAVENOUS ONCE
Qty: 0 | Refills: 0 | Status: DISCONTINUED | OUTPATIENT
Start: 2019-02-22 | End: 2019-02-22

## 2019-02-22 RX ORDER — OXYCODONE HYDROCHLORIDE 5 MG/1
5 TABLET ORAL EVERY 4 HOURS
Qty: 0 | Refills: 0 | Status: DISCONTINUED | OUTPATIENT
Start: 2019-02-22 | End: 2019-02-28

## 2019-02-22 RX ORDER — KETOROLAC TROMETHAMINE 30 MG/ML
30 SYRINGE (ML) INJECTION ONCE
Qty: 0 | Refills: 0 | Status: DISCONTINUED | OUTPATIENT
Start: 2019-02-22 | End: 2019-02-22

## 2019-02-22 RX ORDER — SODIUM CHLORIDE 9 MG/ML
1000 INJECTION INTRAMUSCULAR; INTRAVENOUS; SUBCUTANEOUS
Qty: 0 | Refills: 0 | Status: DISCONTINUED | OUTPATIENT
Start: 2019-02-22 | End: 2019-02-25

## 2019-02-22 RX ORDER — ONDANSETRON 8 MG/1
4 TABLET, FILM COATED ORAL ONCE
Qty: 0 | Refills: 0 | Status: DISCONTINUED | OUTPATIENT
Start: 2019-02-22 | End: 2019-02-22

## 2019-02-22 RX ORDER — DEXMEDETOMIDINE HYDROCHLORIDE IN 0.9% SODIUM CHLORIDE 4 UG/ML
0.25 INJECTION INTRAVENOUS
Qty: 200 | Refills: 0 | Status: DISCONTINUED | OUTPATIENT
Start: 2019-02-22 | End: 2019-02-23

## 2019-02-22 RX ORDER — MAGNESIUM SULFATE 500 MG/ML
1 VIAL (ML) INJECTION EVERY 12 HOURS
Qty: 0 | Refills: 0 | Status: DISCONTINUED | OUTPATIENT
Start: 2019-02-22 | End: 2019-02-28

## 2019-02-22 RX ORDER — NOREPINEPHRINE BITARTRATE/D5W 8 MG/250ML
0.05 PLASTIC BAG, INJECTION (ML) INTRAVENOUS
Qty: 8 | Refills: 0 | Status: DISCONTINUED | OUTPATIENT
Start: 2019-02-22 | End: 2019-02-23

## 2019-02-22 RX ORDER — CHLORHEXIDINE GLUCONATE 213 G/1000ML
5 SOLUTION TOPICAL
Qty: 0 | Refills: 0 | Status: DISCONTINUED | OUTPATIENT
Start: 2019-02-22 | End: 2019-02-25

## 2019-02-22 RX ORDER — NITROGLYCERIN 6.5 MG
5 CAPSULE, EXTENDED RELEASE ORAL
Qty: 50 | Refills: 0 | Status: DISCONTINUED | OUTPATIENT
Start: 2019-02-22 | End: 2019-02-23

## 2019-02-22 RX ORDER — OXYCODONE HYDROCHLORIDE 5 MG/1
10 TABLET ORAL
Qty: 0 | Refills: 0 | Status: DISCONTINUED | OUTPATIENT
Start: 2019-02-22 | End: 2019-02-28

## 2019-02-22 RX ORDER — PROPOFOL 10 MG/ML
10 INJECTION, EMULSION INTRAVENOUS
Qty: 1000 | Refills: 0 | Status: DISCONTINUED | OUTPATIENT
Start: 2019-02-22 | End: 2019-02-23

## 2019-02-22 RX ORDER — FAMOTIDINE 10 MG/ML
20 INJECTION INTRAVENOUS EVERY 12 HOURS
Qty: 0 | Refills: 0 | Status: DISCONTINUED | OUTPATIENT
Start: 2019-02-22 | End: 2019-02-23

## 2019-02-22 RX ORDER — ONDANSETRON 8 MG/1
4 TABLET, FILM COATED ORAL EVERY 8 HOURS
Qty: 0 | Refills: 0 | Status: DISCONTINUED | OUTPATIENT
Start: 2019-02-22 | End: 2019-02-28

## 2019-02-22 RX ORDER — METOCLOPRAMIDE HCL 10 MG
5 TABLET ORAL EVERY 8 HOURS
Qty: 0 | Refills: 0 | Status: DISCONTINUED | OUTPATIENT
Start: 2019-02-22 | End: 2019-02-23

## 2019-02-22 RX ORDER — CHLORHEXIDINE GLUCONATE 213 G/1000ML
1 SOLUTION TOPICAL
Qty: 0 | Refills: 0 | Status: DISCONTINUED | OUTPATIENT
Start: 2019-02-22 | End: 2019-02-28

## 2019-02-22 RX ORDER — ALBUMIN HUMAN 25 %
1500 VIAL (ML) INTRAVENOUS ONCE
Qty: 0 | Refills: 0 | Status: COMPLETED | OUTPATIENT
Start: 2019-02-22 | End: 2019-02-22

## 2019-02-22 RX ORDER — MEPERIDINE HYDROCHLORIDE 50 MG/ML
25 INJECTION INTRAMUSCULAR; INTRAVENOUS; SUBCUTANEOUS ONCE
Qty: 0 | Refills: 0 | Status: DISCONTINUED | OUTPATIENT
Start: 2019-02-22 | End: 2019-02-25

## 2019-02-22 RX ORDER — ALBUTEROL 90 UG/1
2 AEROSOL, METERED ORAL EVERY 6 HOURS
Qty: 0 | Refills: 0 | Status: DISCONTINUED | OUTPATIENT
Start: 2019-02-22 | End: 2019-02-23

## 2019-02-22 RX ORDER — ASPIRIN/CALCIUM CARB/MAGNESIUM 324 MG
300 TABLET ORAL ONCE
Qty: 0 | Refills: 0 | Status: COMPLETED | OUTPATIENT
Start: 2019-02-22 | End: 2019-02-22

## 2019-02-22 RX ORDER — NICARDIPINE HYDROCHLORIDE 30 MG/1
5 CAPSULE, EXTENDED RELEASE ORAL
Qty: 40 | Refills: 0 | Status: DISCONTINUED | OUTPATIENT
Start: 2019-02-22 | End: 2019-02-23

## 2019-02-22 RX ORDER — INSULIN HUMAN 100 [IU]/ML
1 INJECTION, SOLUTION SUBCUTANEOUS
Qty: 100 | Refills: 0 | Status: DISCONTINUED | OUTPATIENT
Start: 2019-02-22 | End: 2019-02-23

## 2019-02-22 RX ORDER — DOCUSATE SODIUM 100 MG
100 CAPSULE ORAL THREE TIMES A DAY
Qty: 0 | Refills: 0 | Status: DISCONTINUED | OUTPATIENT
Start: 2019-02-22 | End: 2019-02-28

## 2019-02-22 RX ADMIN — NICARDIPINE HYDROCHLORIDE 25 MG/HR: 30 CAPSULE, EXTENDED RELEASE ORAL at 13:56

## 2019-02-22 RX ADMIN — Medication 30 MILLIGRAM(S): at 15:00

## 2019-02-22 RX ADMIN — CHLORHEXIDINE GLUCONATE 5 MILLILITER(S): 213 SOLUTION TOPICAL at 18:08

## 2019-02-22 RX ADMIN — Medication 750 MILLILITER(S): at 14:38

## 2019-02-22 RX ADMIN — INSULIN HUMAN 1 UNIT(S)/HR: 100 INJECTION, SOLUTION SUBCUTANEOUS at 13:56

## 2019-02-22 RX ADMIN — DEXMEDETOMIDINE HYDROCHLORIDE IN 0.9% SODIUM CHLORIDE 8.22 MICROGRAM(S)/KG/HR: 4 INJECTION INTRAVENOUS at 13:55

## 2019-02-22 RX ADMIN — SODIUM CHLORIDE 20 MILLILITER(S): 9 INJECTION INTRAMUSCULAR; INTRAVENOUS; SUBCUTANEOUS at 13:56

## 2019-02-22 RX ADMIN — Medication 100 MILLIGRAM(S): at 23:01

## 2019-02-22 RX ADMIN — Medication 600 MILLIGRAM(S): at 18:27

## 2019-02-22 RX ADMIN — SODIUM CHLORIDE 3 MILLILITER(S): 9 INJECTION INTRAMUSCULAR; INTRAVENOUS; SUBCUTANEOUS at 05:16

## 2019-02-22 RX ADMIN — FAMOTIDINE 20 MILLIGRAM(S): 10 INJECTION INTRAVENOUS at 18:08

## 2019-02-22 RX ADMIN — Medication 100 MILLIGRAM(S): at 15:13

## 2019-02-22 RX ADMIN — Medication 100 GRAM(S): at 18:07

## 2019-02-22 RX ADMIN — OXYCODONE HYDROCHLORIDE 10 MILLIGRAM(S): 5 TABLET ORAL at 22:15

## 2019-02-22 RX ADMIN — Medication 30 MILLIGRAM(S): at 14:50

## 2019-02-22 RX ADMIN — FENTANYL CITRATE 25 MICROGRAM(S): 50 INJECTION INTRAVENOUS at 14:00

## 2019-02-22 RX ADMIN — Medication 1.5 MICROGRAM(S)/MIN: at 13:56

## 2019-02-22 RX ADMIN — Medication 300 MILLIGRAM(S): at 18:27

## 2019-02-22 RX ADMIN — FENTANYL CITRATE 25 MICROGRAM(S): 50 INJECTION INTRAVENOUS at 14:35

## 2019-02-22 RX ADMIN — Medication 1 APPLICATION(S): at 05:14

## 2019-02-22 RX ADMIN — CHLORHEXIDINE GLUCONATE 1 APPLICATION(S): 213 SOLUTION TOPICAL at 05:15

## 2019-02-22 RX ADMIN — OXYCODONE HYDROCHLORIDE 10 MILLIGRAM(S): 5 TABLET ORAL at 20:32

## 2019-02-22 NOTE — PRE-ANESTHESIA EVALUATION ADULT - NSANTHPROCED_GEN_ALL_CORE
Central Venous Catheter/Transesophageal Echocardiogram/Arterial Catheter/Pulmonary Artery Catheter
Arterial Catheter

## 2019-02-22 NOTE — PROGRESS NOTE ADULT - SUBJECTIVE AND OBJECTIVE BOX
FLAVIO HUDSON  58y  Female    Allergy: amiodarone (Flushing)  iodine (Unknown)  shellfish (Unknown)      Patient is a 58y old  Female who presents with a chief complaint of Palpitations (2019 14:36)      INTERVAL HPI/OVERNIGHT EVENTS:  seen and examined pt at bedside. s/p CABG/ MAZE/ ELENITA closure. still intubated.      REVIEW OF SYSTEMS:  unable to assess. pt is intubated.     PAST MEDICAL & SURGICAL HISTORY:  Hearing aid worn  Hearing decreased  Asthma  Essential hypertension  Afib  Closed fracture of foot, unspecified laterality, sequela  History of  section  Bilateral carpal tunnel syndrome  No significant past surgical history      Vital Signs Last 24 Hrs  T(C): 36.6 (2019 15:00), Max: 36.9 (2019 16:00)  T(F): 97.9 (2019 15:00), Max: 98.5 (2019 16:00)  HR: 82 (2019 15:00) (60 - 84)  BP: 85/54 (2019 15:00) (85/54 - 185/80)  BP(mean): 65 (2019 15:00) (65 - 119)  RR: 13 (2019 15:00) (9 - 24)  SpO2: 99% (2019 15:00) (98% - 100%)    I&O's Summary    2019 07:  -  2019 07:00  --------------------------------------------------------  IN: 1040 mL / OUT: 1450 mL / NET: -410 mL    2019 07:  -  2019 15:23  --------------------------------------------------------  IN: 224.6 mL / OUT: 880 mL / NET: -655.4 mL        Home Medications:  albuterol:  (2019 21:37)  Cardizem: Cardizem 180 XT once a day (2019 21:37)  Lexapro 10 mg oral tablet: 1 tab(s) orally once a day (2019 21:37)  losartan-hydrochlorothiazide 50mg-12.5mg oral tablet: 1 tab(s) orally once a day (2019 21:37)  Xarelto 20 mg oral tablet: 1 tab(s) orally once a day (in the evening) (2019 21:37)      PHYSICAL EXAM:  GENERAL: intubated  HEENT: no pallor/icterus  NECK: supple  NERVOUS SYSTEM:  intubated  PULMONARY: b/l air entry   CARDIOVASCULAR: Regular rate and rhythm; No murmurs, rubs, or gallops  GI: Soft, Nondistended; Bowel sounds present  EXTREMITIES:  2+ Peripheral Pulses, No clubbing, cyanosis, or edema      LABS                        11.2   13.51 )-----------( 175      ( 2019 12:56 )             34.0         142  |  103  |  15  ----------------------------<  158<H>  4.7   |  23  |  1.0    Ca    8.5      2019 12:56    TPro  5.4<L>  /  Alb  3.8  /  TBili  0.6  /  DBili  x   /  AST  53<H>  /  ALT  16  /  AlkPhos  50  -22          PT/INR - ( 2019 12:56 )   PT: 14.10 sec;   INR: 1.23 ratio         PTT - ( 2019 12:56 )  PTT:27.4 sec    RADIOLOGY & ADDITIONAL TESTS:      MEDICATIONS  (STANDING):  ALBUTerol    90 MICROgram(s) HFA Inhaler 2 Puff(s) Inhalation every 6 hours  aspirin Suppository 300 milliGRAM(s) Rectal once  ceFAZolin   IVPB 1000 milliGRAM(s) IV Intermittent every 8 hours  chlorhexidine 0.12% Liquid 5 milliLiter(s) Oral Mucosa two times a day  chlorhexidine 4% Liquid 1 Application(s) Topical <User Schedule>  dexmedetomidine Infusion 0.25 MICROgram(s)/kG/Hr (8.225 mL/Hr) IV Continuous <Continuous>  docusate sodium 100 milliGRAM(s) Oral three times a day  famotidine Injectable 20 milliGRAM(s) IV Push every 12 hours  guaiFENesin  milliGRAM(s) Oral every 12 hours  insulin Infusion 1 Unit(s)/Hr (1 mL/Hr) IV Continuous <Continuous>  magnesium sulfate  IVPB 1 Gram(s) IV Intermittent every 12 hours  meperidine     Injectable 25 milliGRAM(s) IV Push once  metoclopramide  IVPB 5 milliGRAM(s) IV Intermittent every 8 hours  niCARdipine Infusion 5 mG/Hr (25 mL/Hr) IV Continuous <Continuous>  nitroglycerin  Infusion 5 MICROgram(s)/Min (1.5 mL/Hr) IV Continuous <Continuous>  norepinephrine Infusion 0.05 MICROgram(s)/kG/Min (12.338 mL/Hr) IV Continuous <Continuous>  propofol Infusion 10 MICROgram(s)/kG/Min (7.896 mL/Hr) IV Continuous <Continuous>  sodium chloride 0.9%. 1000 milliLiter(s) (20 mL/Hr) IV Continuous <Continuous>  sodium chloride 0.9%. 1000 milliLiter(s) (75 mL/Hr) IV Continuous <Continuous>    MEDICATIONS  (PRN):  ondansetron Injectable 4 milliGRAM(s) IV Push every 8 hours PRN Nausea  oxyCODONE    IR 10 milliGRAM(s) Oral four times a day PRN Severe Pain (7 - 10)  oxyCODONE    IR 5 milliGRAM(s) Oral every 4 hours PRN Moderate Pain (4 - 6)        < from: 12 Lead ECG (19 @ 06:55) >    Diagnosis Line Normal sinus rhythm  Normal ECG    < end of copied text >

## 2019-02-22 NOTE — BRIEF OPERATIVE NOTE - PROCEDURE
<<-----Click on this checkbox to enter Procedure Maze procedure for atrial fibrillation  02/22/2019    [facility]  02/22/2019  [place] TO LAD  Active  [email or web address]

## 2019-02-22 NOTE — PROGRESS NOTE ADULT - ASSESSMENT
Impression  Narrow complex tachycardia on presentation: likely Aflutter 1 ; 1 conduction  vs SVT HR at 280/min pt is on multiple B2 agonists ; including ventolin, Albuterol,  Spiriva AND  Asthma slime .uncertain if pt used excess of these drugs   1 V CAD s/p CABG/ MAZE/ ELENITA closure   H/o   A. Fib/  HTN  /Asthma      Plan:  currently in atrial paced rhythm. underlying sinus rhythm  still intubated   start PO cardizem 30 mg PO Q8 hrs for now.  will consider starting Tikosyn with QT monitoring on monday  discussed with CTU staff. Impression  Narrow complex tachycardia on presentation: likely Aflutter 1 ; 1 conduction  vs SVT HR at 280/min pt is on multiple B2 agonists ; including ventolin, Albuterol,  Spiriva AND  Asthma slime .uncertain if pt used excess of these drugs   1 V CAD s/p CABG/ MAZE/ ELENITA closure   H/o   A. Fib/  HTN  /Asthma      Plan:  currently in atrial paced rhythm. underlying sinus rhythm  still intubated   start PO cardizem 30 mg PO Q8 hrs for now.  will consider starting Tikosyn with QT monitoring on Monday through Wednesday  discussed with CTU staff.

## 2019-02-22 NOTE — BRIEF OPERATIVE NOTE - PRE-OP DX
Atrial fibrillation, unspecified type  02/22/2019    Active  Lois Mancini Atrial fibrillation, unspecified type  02/22/2019    Active  Lois Mancini  Coronary artery disease with other form of angina pectoris  02/22/2019    Active  Jovany Reed

## 2019-02-22 NOTE — BRIEF OPERATIVE NOTE - POST-OP DX
Chronic atrial fibrillation  02/22/2019    Active  Jovany Reed  Coronary artery disease with other form of angina pectoris  02/22/2019    Active  Jovany Reed

## 2019-02-22 NOTE — PACU DISCHARGE NOTE - PAIN:
Detail Level: Detailed
Quality 110: Preventive Care And Screening: Influenza Immunization: Influenza Immunization Administered during Influenza season
Controlled with current regime

## 2019-02-23 LAB
ALBUMIN SERPL ELPH-MCNC: 4.4 G/DL — SIGNIFICANT CHANGE UP (ref 3.5–5.2)
ALP SERPL-CCNC: 41 U/L — SIGNIFICANT CHANGE UP (ref 30–115)
ALT FLD-CCNC: 13 U/L — SIGNIFICANT CHANGE UP (ref 0–41)
ANION GAP SERPL CALC-SCNC: 14 MMOL/L — SIGNIFICANT CHANGE UP (ref 7–14)
APTT BLD: 28.2 SEC — SIGNIFICANT CHANGE UP (ref 27–39.2)
AST SERPL-CCNC: 48 U/L — HIGH (ref 0–41)
BASE EXCESS BLDV CALC-SCNC: 1.1 MMOL/L — SIGNIFICANT CHANGE UP (ref -2–2)
BASOPHILS # BLD AUTO: 0.02 K/UL — SIGNIFICANT CHANGE UP (ref 0–0.2)
BASOPHILS NFR BLD AUTO: 0.2 % — SIGNIFICANT CHANGE UP (ref 0–1)
BILIRUB DIRECT SERPL-MCNC: 0.2 MG/DL — SIGNIFICANT CHANGE UP (ref 0–0.2)
BILIRUB INDIRECT FLD-MCNC: 0.5 MG/DL — SIGNIFICANT CHANGE UP (ref 0.2–1.2)
BILIRUB SERPL-MCNC: 0.7 MG/DL — SIGNIFICANT CHANGE UP (ref 0.2–1.2)
BUN SERPL-MCNC: 17 MG/DL — SIGNIFICANT CHANGE UP (ref 10–20)
CA-I SERPL-SCNC: 1.21 MMOL/L — SIGNIFICANT CHANGE UP (ref 1.12–1.3)
CALCIUM SERPL-MCNC: 8.6 MG/DL — SIGNIFICANT CHANGE UP (ref 8.5–10.1)
CHLORIDE SERPL-SCNC: 106 MMOL/L — SIGNIFICANT CHANGE UP (ref 98–110)
CO2 SERPL-SCNC: 23 MMOL/L — SIGNIFICANT CHANGE UP (ref 17–32)
CREAT SERPL-MCNC: 0.9 MG/DL — SIGNIFICANT CHANGE UP (ref 0.7–1.5)
EOSINOPHIL # BLD AUTO: 0.03 K/UL — SIGNIFICANT CHANGE UP (ref 0–0.7)
EOSINOPHIL NFR BLD AUTO: 0.3 % — SIGNIFICANT CHANGE UP (ref 0–8)
GAS PNL BLDA: SIGNIFICANT CHANGE UP
GAS PNL BLDV: 141 MMOL/L — SIGNIFICANT CHANGE UP (ref 136–145)
GAS PNL BLDV: SIGNIFICANT CHANGE UP
GLUCOSE BLDC GLUCOMTR-MCNC: 101 MG/DL — HIGH (ref 70–99)
GLUCOSE BLDC GLUCOMTR-MCNC: 101 MG/DL — HIGH (ref 70–99)
GLUCOSE BLDC GLUCOMTR-MCNC: 102 MG/DL — HIGH (ref 70–99)
GLUCOSE BLDC GLUCOMTR-MCNC: 107 MG/DL — HIGH (ref 70–99)
GLUCOSE BLDC GLUCOMTR-MCNC: 118 MG/DL — HIGH (ref 70–99)
GLUCOSE BLDC GLUCOMTR-MCNC: 168 MG/DL — HIGH (ref 70–99)
GLUCOSE SERPL-MCNC: 91 MG/DL — SIGNIFICANT CHANGE UP (ref 70–99)
HCO3 BLDV-SCNC: 27 MMOL/L — SIGNIFICANT CHANGE UP (ref 22–29)
HCT VFR BLD CALC: 26.4 % — LOW (ref 37–47)
HCT VFR BLDA CALC: 27.3 % — LOW (ref 34–44)
HGB BLD CALC-MCNC: 8.9 G/DL — LOW (ref 14–18)
HGB BLD-MCNC: 8.5 G/DL — LOW (ref 12–16)
IMM GRANULOCYTES NFR BLD AUTO: 0.4 % — HIGH (ref 0.1–0.3)
INR BLD: 1.23 RATIO — SIGNIFICANT CHANGE UP (ref 0.65–1.3)
LACTATE BLDV-MCNC: 0.5 MMOL/L — SIGNIFICANT CHANGE UP (ref 0.5–1.6)
LYMPHOCYTES # BLD AUTO: 0.96 K/UL — LOW (ref 1.2–3.4)
LYMPHOCYTES # BLD AUTO: 8.9 % — LOW (ref 20.5–51.1)
MAGNESIUM SERPL-MCNC: 3 MG/DL — HIGH (ref 1.8–2.4)
MCHC RBC-ENTMCNC: 25.8 PG — LOW (ref 27–31)
MCHC RBC-ENTMCNC: 32.2 G/DL — SIGNIFICANT CHANGE UP (ref 32–37)
MCV RBC AUTO: 80 FL — LOW (ref 81–99)
MONOCYTES # BLD AUTO: 0.73 K/UL — HIGH (ref 0.1–0.6)
MONOCYTES NFR BLD AUTO: 6.8 % — SIGNIFICANT CHANGE UP (ref 1.7–9.3)
NEUTROPHILS # BLD AUTO: 8.96 K/UL — HIGH (ref 1.4–6.5)
NEUTROPHILS NFR BLD AUTO: 83.4 % — HIGH (ref 42.2–75.2)
NRBC # BLD: 0 /100 WBCS — SIGNIFICANT CHANGE UP (ref 0–0)
PCO2 BLDV: 50 MMHG — SIGNIFICANT CHANGE UP (ref 41–51)
PH BLDV: 7.34 — SIGNIFICANT CHANGE UP (ref 7.26–7.43)
PLATELET # BLD AUTO: 164 K/UL — SIGNIFICANT CHANGE UP (ref 130–400)
PO2 BLDV: 35 MMHG — SIGNIFICANT CHANGE UP (ref 20–40)
POTASSIUM BLDV-SCNC: 4.2 MMOL/L — SIGNIFICANT CHANGE UP (ref 3.3–5.6)
POTASSIUM SERPL-MCNC: 4.6 MMOL/L — SIGNIFICANT CHANGE UP (ref 3.5–5)
POTASSIUM SERPL-SCNC: 4.6 MMOL/L — SIGNIFICANT CHANGE UP (ref 3.5–5)
PROT SERPL-MCNC: 5.7 G/DL — LOW (ref 6–8)
PROTHROM AB SERPL-ACNC: 14.1 SEC — HIGH (ref 9.95–12.87)
RBC # BLD: 3.3 M/UL — LOW (ref 4.2–5.4)
RBC # FLD: 13.6 % — SIGNIFICANT CHANGE UP (ref 11.5–14.5)
SAO2 % BLDV: 66 % — SIGNIFICANT CHANGE UP
SODIUM SERPL-SCNC: 143 MMOL/L — SIGNIFICANT CHANGE UP (ref 135–146)
WBC # BLD: 10.74 K/UL — SIGNIFICANT CHANGE UP (ref 4.8–10.8)
WBC # FLD AUTO: 10.74 K/UL — SIGNIFICANT CHANGE UP (ref 4.8–10.8)

## 2019-02-23 RX ORDER — MOMETASONE FUROATE 220 UG/1
1 INHALANT RESPIRATORY (INHALATION) DAILY
Qty: 0 | Refills: 0 | Status: DISCONTINUED | OUTPATIENT
Start: 2019-02-23 | End: 2019-02-28

## 2019-02-23 RX ORDER — BUDESONIDE AND FORMOTEROL FUMARATE DIHYDRATE 160; 4.5 UG/1; UG/1
2 AEROSOL RESPIRATORY (INHALATION)
Qty: 0 | Refills: 0 | Status: DISCONTINUED | OUTPATIENT
Start: 2019-02-23 | End: 2019-02-23

## 2019-02-23 RX ORDER — ACETAMINOPHEN 500 MG
1000 TABLET ORAL ONCE
Qty: 0 | Refills: 0 | Status: COMPLETED | OUTPATIENT
Start: 2019-02-23 | End: 2019-02-23

## 2019-02-23 RX ORDER — ATORVASTATIN CALCIUM 80 MG/1
40 TABLET, FILM COATED ORAL AT BEDTIME
Qty: 0 | Refills: 0 | Status: DISCONTINUED | OUTPATIENT
Start: 2019-02-23 | End: 2019-02-28

## 2019-02-23 RX ORDER — INSULIN LISPRO 100/ML
VIAL (ML) SUBCUTANEOUS
Qty: 0 | Refills: 0 | Status: DISCONTINUED | OUTPATIENT
Start: 2019-02-23 | End: 2019-02-28

## 2019-02-23 RX ORDER — KETOROLAC TROMETHAMINE 30 MG/ML
30 SYRINGE (ML) INJECTION EVERY 6 HOURS
Qty: 0 | Refills: 0 | Status: DISCONTINUED | OUTPATIENT
Start: 2019-02-23 | End: 2019-02-24

## 2019-02-23 RX ORDER — DEXTROSE 50 % IN WATER 50 %
12.5 SYRINGE (ML) INTRAVENOUS ONCE
Qty: 0 | Refills: 0 | Status: DISCONTINUED | OUTPATIENT
Start: 2019-02-23 | End: 2019-02-28

## 2019-02-23 RX ORDER — FAMOTIDINE 10 MG/ML
20 INJECTION INTRAVENOUS
Qty: 0 | Refills: 0 | Status: DISCONTINUED | OUTPATIENT
Start: 2019-02-23 | End: 2019-02-28

## 2019-02-23 RX ORDER — DEXTROSE 50 % IN WATER 50 %
15 SYRINGE (ML) INTRAVENOUS ONCE
Qty: 0 | Refills: 0 | Status: DISCONTINUED | OUTPATIENT
Start: 2019-02-23 | End: 2019-02-28

## 2019-02-23 RX ORDER — TIOTROPIUM BROMIDE 18 UG/1
1 CAPSULE ORAL; RESPIRATORY (INHALATION) DAILY
Qty: 0 | Refills: 0 | Status: DISCONTINUED | OUTPATIENT
Start: 2019-02-23 | End: 2019-02-28

## 2019-02-23 RX ORDER — MOMETASONE FUROATE 220 UG/1
1 INHALANT RESPIRATORY (INHALATION) DAILY
Qty: 0 | Refills: 0 | Status: DISCONTINUED | OUTPATIENT
Start: 2019-02-23 | End: 2019-02-23

## 2019-02-23 RX ORDER — GLUCAGON INJECTION, SOLUTION 0.5 MG/.1ML
1 INJECTION, SOLUTION SUBCUTANEOUS ONCE
Qty: 0 | Refills: 0 | Status: DISCONTINUED | OUTPATIENT
Start: 2019-02-23 | End: 2019-02-28

## 2019-02-23 RX ORDER — ESCITALOPRAM OXALATE 10 MG/1
10 TABLET, FILM COATED ORAL DAILY
Qty: 0 | Refills: 0 | Status: DISCONTINUED | OUTPATIENT
Start: 2019-02-23 | End: 2019-02-28

## 2019-02-23 RX ORDER — SODIUM CHLORIDE 9 MG/ML
1000 INJECTION, SOLUTION INTRAVENOUS
Qty: 0 | Refills: 0 | Status: DISCONTINUED | OUTPATIENT
Start: 2019-02-23 | End: 2019-02-28

## 2019-02-23 RX ORDER — DEXTROSE 50 % IN WATER 50 %
25 SYRINGE (ML) INTRAVENOUS ONCE
Qty: 0 | Refills: 0 | Status: DISCONTINUED | OUTPATIENT
Start: 2019-02-23 | End: 2019-02-28

## 2019-02-23 RX ORDER — ASPIRIN/CALCIUM CARB/MAGNESIUM 324 MG
325 TABLET ORAL DAILY
Qty: 0 | Refills: 0 | Status: DISCONTINUED | OUTPATIENT
Start: 2019-02-23 | End: 2019-02-28

## 2019-02-23 RX ORDER — IPRATROPIUM/ALBUTEROL SULFATE 18-103MCG
3 AEROSOL WITH ADAPTER (GRAM) INHALATION EVERY 6 HOURS
Qty: 0 | Refills: 0 | Status: DISCONTINUED | OUTPATIENT
Start: 2019-02-23 | End: 2019-02-23

## 2019-02-23 RX ADMIN — OXYCODONE HYDROCHLORIDE 10 MILLIGRAM(S): 5 TABLET ORAL at 23:30

## 2019-02-23 RX ADMIN — ATORVASTATIN CALCIUM 40 MILLIGRAM(S): 80 TABLET, FILM COATED ORAL at 21:07

## 2019-02-23 RX ADMIN — Medication 400 MILLIGRAM(S): at 11:54

## 2019-02-23 RX ADMIN — OXYCODONE HYDROCHLORIDE 10 MILLIGRAM(S): 5 TABLET ORAL at 12:12

## 2019-02-23 RX ADMIN — FAMOTIDINE 20 MILLIGRAM(S): 10 INJECTION INTRAVENOUS at 17:11

## 2019-02-23 RX ADMIN — Medication 100 MILLIGRAM(S): at 00:38

## 2019-02-23 RX ADMIN — OXYCODONE HYDROCHLORIDE 10 MILLIGRAM(S): 5 TABLET ORAL at 07:20

## 2019-02-23 RX ADMIN — TIOTROPIUM BROMIDE 1 CAPSULE(S): 18 CAPSULE ORAL; RESPIRATORY (INHALATION) at 16:56

## 2019-02-23 RX ADMIN — ESCITALOPRAM OXALATE 10 MILLIGRAM(S): 10 TABLET, FILM COATED ORAL at 11:35

## 2019-02-23 RX ADMIN — Medication 325 MILLIGRAM(S): at 11:35

## 2019-02-23 RX ADMIN — Medication 600 MILLIGRAM(S): at 17:11

## 2019-02-23 RX ADMIN — Medication 1000 MILLIGRAM(S): at 12:09

## 2019-02-23 RX ADMIN — MOMETASONE FUROATE 1 PUFF(S): 220 INHALANT RESPIRATORY (INHALATION) at 17:05

## 2019-02-23 RX ADMIN — Medication 30 MILLIGRAM(S): at 08:35

## 2019-02-23 RX ADMIN — Medication 100 MILLIGRAM(S): at 06:25

## 2019-02-23 RX ADMIN — Medication 30 MILLIGRAM(S): at 08:21

## 2019-02-23 RX ADMIN — Medication 30 MILLIGRAM(S): at 22:54

## 2019-02-23 RX ADMIN — OXYCODONE HYDROCHLORIDE 10 MILLIGRAM(S): 5 TABLET ORAL at 00:39

## 2019-02-23 RX ADMIN — Medication 100 MILLIGRAM(S): at 14:13

## 2019-02-23 RX ADMIN — OXYCODONE HYDROCHLORIDE 10 MILLIGRAM(S): 5 TABLET ORAL at 04:22

## 2019-02-23 RX ADMIN — CHLORHEXIDINE GLUCONATE 1 APPLICATION(S): 213 SOLUTION TOPICAL at 06:26

## 2019-02-23 RX ADMIN — Medication 600 MILLIGRAM(S): at 06:24

## 2019-02-23 RX ADMIN — Medication 100 GRAM(S): at 17:11

## 2019-02-23 RX ADMIN — Medication 100 MILLIGRAM(S): at 21:07

## 2019-02-23 RX ADMIN — OXYCODONE HYDROCHLORIDE 10 MILLIGRAM(S): 5 TABLET ORAL at 02:09

## 2019-02-23 RX ADMIN — Medication 100 MILLIGRAM(S): at 07:27

## 2019-02-23 RX ADMIN — FAMOTIDINE 20 MILLIGRAM(S): 10 INJECTION INTRAVENOUS at 06:25

## 2019-02-23 RX ADMIN — OXYCODONE HYDROCHLORIDE 10 MILLIGRAM(S): 5 TABLET ORAL at 11:35

## 2019-02-23 NOTE — DIETITIAN INITIAL EVALUATION ADULT. - DIET TYPE
DASH/TLC (sodium and cholesterol restricted diet)/regular/pt reports good appetite prior to yesterday's surgery (per EMR %). Did not eat breakfast this AM

## 2019-02-23 NOTE — DIETITIAN INITIAL EVALUATION ADULT. - ORAL INTAKE PTA
Pt reports good appetite at home; on regular diet, pt reports some "gluten intolerance" however is able to tolerate reglar diet in the hospital. No PO supplements. Food allergy: fish/shellfish./good good/on regular diet, pt reports some "gluten intolerance" however is able to tolerate regular diet while in the hospital. No PO supplements. Food allergy: fish/shellfish.

## 2019-02-23 NOTE — PROGRESS NOTE ADULT - SUBJECTIVE AND OBJECTIVE BOX
CTU Attending Progress Daily Note     2019 08:16  POD#       1        Procedure:  CABG MAZE    Patient seen as post-op critical care follow-up    HPI:  59 yo female with PMH of Afib on Xarelto (ran out a few weeks ago), asthma, HTN, presents via EMS with c/o rapid HR.  Pt states she woke up feeling palpitations, flushed, clammy, felt as if her "jaw was locking".  + nausea, no vomiting.  EMS found pt with .  They gave 6mg Adenosine followed by 2 doses of 12 mg x 2.  Followed by 25 mg Cardizem which slowed pts HR to 120s (2019 07:28)      Interval event for past 24 hr:  FLAVIO HUDSON  58y remains A paced    Current Complains:  FLAVIO HUDSON has new complaints of post-op pain    REVIEW OF SYSTEMS:  CONSTITUTIONAL:  [-] weakness, [-] fevers, [-] chills  EYES/ENT: [-] visual changes, [-] vertigo, [-] throat pain   NECK: [-] pain, [-] stiffness  RESPIRATORY: [-] cough, [-] wheezing, [-] hemoptysis, [-] shortness of breath  CARDIOVASCULAR: [-] chest pain, [-] palpitations, [-] orthopnea  GASTROINTESTINAL:    [-]abdominal pain, [-] nausea, [-] vomiting, [-] hematemesis, [-] diarrhea, [-] constipation, [-] melena, [-] hematochezia.  GENITOURINARY: [-] dysuria, [-] frequency, [-] hematuria  NEUROLOGICAL: [-] numbness, [-] weakness  SKIN: [-] itching, [-] burning, [-] rashes, [-] lesions   All other review of systems is negative unless indicated above.    [  ] Unable to assess ROS because :    OBJECTIVE:  ICU Vital Signs Last 24 Hrs  T(C): 37.4 (2019 08:00), Max: 37.4 (2019 22:00)  T(F): 99.3 (2019 08:00), Max: 99.4 (2019 22:00)  HR: 84 (2019 08:00) (82 - 84)  BP: 97/58 (2019 20:30) (85/54 - 104/63)  BP(mean): 75 (2019 20:30) (65 - 81)  ABP: 134/74 (2019 08:00) (98/56 - 134/74)  ABP(mean): 96 (2019 08:00) (70 - 96)  RR: 30 (2019 08:00) (6 - 35)  SpO2: 99% (2019 08:00) (97% - 100%)      I&O's Summary    2019 07:01  -  2019 07:00  --------------------------------------------------------  IN: 2582.8 mL / OUT: 2136 mL / NET: 446.8 mL    2019 07:01  -  2019 08:16  --------------------------------------------------------  IN: 20 mL / OUT: 108 mL / NET: -88 mL      Adult Advanced Hemodynamics Last 24 Hrs  CVP(mm Hg): 17 (2019 08:00) (10 - 28)  CVP(cm H2O): --  CO: 7.3 (2019 07:30) (4.9 - 8)  CI: 3 (2019 07:30) (2.2 - 3.3)  PA: 63/29 (2019 08:00) (30/14 - 63/29)  PA(mean): 44 (2019 08:00) (23 - 44)  PCWP: --  SVR: 700 (2019 07:30) (529 - 1142)  SVRI: 1704 (2019 07:30) (1283 - 1982)  PVR: --  PVRI: --  Mode: CPAP with PS  FiO2: 40  PEEP: 5  PS: 5  ITime: 1  MAP: 8  PIP: 14      PHYSICAL EXAM:  General: WN/WD NAD    HEENT:     [+] NCAT  [+] EOMI  [-] Conjuctival edema   [-] Icterus   [-] Thrush   [-] ETT  [-] NGT/OGT    Neck:         [+] FROM   [-] JVD     [-] Nodes     [-] Masses    [+] Mid-line trachea    [-] Tracheostomy    Chest:         [-] Sternal click   [-] Sternal drainage   [+] Pacing wires   [+] Chest tubes   [-] SubQ emphysema    Lungs:          [+] CTA   [-] Rhonchi   [-] Rales    [-] Wheezing    [-] Decreased BS    [-] Dullness R L    Cardiac:       [+] S1 [+] S2    [+] RRR   [-] Irregular   [-] S3   [-] S4    [-] Murmurs    [-] Rub    Abdomen:    [+] BS    [+] Soft    [+] Non-tender     [-] Distended    [-] Organomegaly  [-] PEG    Extremities:   [-] Cyanosis U/L   [-] Clubbing  U/L  [-] LE/UE Edema   [+] Capillary refill    [+] Pulses     Neuro:        [+] Awake   [+]  Alert   [-] Confused   [-] Lethargic   [-] Sedated   [-] Generalized Weakness    Skin:        [-] Rashes    [-] Erythema   [+] Normal incisions   [+] IV sites intact   [-] Sacral decubitus    Tubes: chest  LINES: central    CAPILLARY BLOOD GLUCOSE  94 (2019 18:00)      POCT Blood Glucose.: 102 mg/dL (2019 06:36)    CAPILLARY BLOOD GLUCOSE  94 (2019 18:00)  100 (2019 17:00)      POCT Blood Glucose.: 102 mg/dL (2019 06:36)  POCT Blood Glucose.: 101 mg/dL (2019 05:23)  POCT Blood Glucose.: 101 mg/dL (2019 02:39)  POCT Blood Glucose.: 107 mg/dL (2019 01:03)  POCT Blood Glucose.: 114 mg/dL (2019 22:51)  POCT Blood Glucose.: 115 mg/dL (2019 21:00)  POCT Blood Glucose.: 117 mg/dL (2019 19:24)  POCT Blood Glucose.: 100 mg/dL (2019 17:01)  POCT Blood Glucose.: 110 mg/dL (2019 15:59)  POCT Blood Glucose.: 146 mg/dL (2019 15:04)  POCT Blood Glucose.: 164 mg/dL (2019 14:11)      HOSPITAL MEDICATIONS:  MEDICATIONS  (STANDING):  acetaminophen  IVPB .. 1000 milliGRAM(s) IV Intermittent once  chlorhexidine 0.12% Liquid 5 milliLiter(s) Oral Mucosa two times a day  chlorhexidine 4% Liquid 1 Application(s) Topical <User Schedule>  diltiazem    Tablet 30 milliGRAM(s) Oral three times a day  docusate sodium 100 milliGRAM(s) Oral three times a day  guaiFENesin  milliGRAM(s) Oral every 12 hours  magnesium sulfate  IVPB 1 Gram(s) IV Intermittent every 12 hours  meperidine     Injectable 25 milliGRAM(s) IV Push once  metoclopramide  IVPB 5 milliGRAM(s) IV Intermittent every 8 hours  sodium chloride 0.9%. 1000 milliLiter(s) (20 mL/Hr) IV Continuous <Continuous>  sodium chloride 0.9%. 1000 milliLiter(s) (75 mL/Hr) IV Continuous <Continuous>    MEDICATIONS  (PRN):  ondansetron Injectable 4 milliGRAM(s) IV Push every 8 hours PRN Nausea  oxyCODONE    IR 10 milliGRAM(s) Oral four times a day PRN Severe Pain (7 - 10)  oxyCODONE    IR 5 milliGRAM(s) Oral every 4 hours PRN Moderate Pain (4 - 6)      LABS:  ABG - ( 2019 02:47 )  pH, Arterial: 7.35  pH, Blood: x     /  pCO2: 48    /  pO2: 112   / HCO3: 26    / Base Excess: 0.3   /  SaO2: 99                                      8.5    10.74 )-----------( 164      ( 2019 04:09 )             26.4     -    143  |  106  |  17  ----------------------------<  91  4.6   |  23  |  0.9    Ca    8.6      2019 04:09  Mg     3.0     02-23    TPro  5.7<L>  /  Alb  4.4  /  TBili  0.7  /  DBili  0.2  /  AST  48<H>  /  ALT  13  /  AlkPhos  41      PT/INR - ( 2019 04:09 )   PT: 14.10 sec;   INR: 1.23 ratio         PTT - ( 2019 04:09 )  PTT:28.2 sec        RADIOLOGY:  Reviewed and interpreted by me  CXR from 19 shows [+] mild congestion, [-] pneumothorax, [-] R/L effusion, [-] cardiomegaly,   S-G Catheter in place, R/L TLC in place, R/L Chest Tubes in place    ECG:  Reviewed and interpreted by me:   QTC:    Assessment:  CAD SP CABG  Afib SP Maze procedure  Acute blood loss anemia    PAST MEDICAL & SURGICAL HISTORY:  Hearing aid worn  Hearing decreased  Asthma  Essential hypertension  Afib  Closed fracture of foot, unspecified laterality, sequela  History of  section  Bilateral carpal tunnel syndrome  No significant past surgical history        PLAN:  Neuro: Pain control  Pulm: Encourage coughing, deep breathing and use of incentive spirometry. Wean off supplemental oxygen as able. Daily CXR.   Cardio: Monitor telemetry/alarms. Continue cardiac meds  GI: Tolerating diet. Continue stool softeners. Continue GI prophylaxis  Renal: monitor urine output, supplement electrolytes as needed  Vasc: Heparin SC/SCDs for DVT prophylaxis  Heme: Monitor H/H.   ID: Off antibiotics. Stable.  Endocrine: Monitor finger stick blood sugar and control hyperglycemia with insulin  Physical Therapy: OOB/ambulate  Tubes: Monitor Chest tube output      Discussed with Cardiothoracic Team at AM rounds.

## 2019-02-23 NOTE — DIETITIAN INITIAL EVALUATION ADULT. - PHYSICAL APPEARANCE
obese/Alert, oriented, [place]. Family at bedside. BMI - 40.4 (used lowest wt this admit 117.5kg and 170.18cm/5'7"). IBW- 61.4kg.  GI -no complains, last BM  yesterday. Skin- surgical incision.  No chewing/swallowing difficulties reported

## 2019-02-23 NOTE — PROGRESS NOTE ADULT - SUBJECTIVE AND OBJECTIVE BOX
OPERATIVE PROCEDURE(s):    CABGx1/MAZE             POD #  1                     58yFemale  SURGEON(s): YANCY Reed  SUBJECTIVE ASSESSMENT: pt seen and examined.   Vital Signs Last 24 Hrs  T(F): 99.3 (23 Feb 2019 06:00), Max: 99.4 (22 Feb 2019 22:00)  HR: 82 (23 Feb 2019 07:00) (82 - 84)  BP: 97/58 (22 Feb 2019 20:30) (85/54 - 104/63)  BP(mean): 75 (22 Feb 2019 20:30) (65 - 81)  ABP: 122/60 (23 Feb 2019 07:00) (98/56 - 130/78)  ABP(mean): 80 (23 Feb 2019 07:00)  RR: 16 (23 Feb 2019 07:00) (6 - 35)  SpO2: 99% (23 Feb 2019 07:00) (97% - 100%)  CVP(mm Hg): 14 (23 Feb 2019 07:00)  CO: 5.7 (23 Feb 2019 06:30)  CI: 2.4 (23 Feb 2019 06:30)  PA: 40/22 (23 Feb 2019 07:00)  SVR: 784 (23 Feb 2019 06:30)  Mode: CPAP with PS  FiO2: 40  PEEP: 5  PS: 5  MAP: 8    I&O's Detail    22 Feb 2019 07:01  -  23 Feb 2019 07:00  --------------------------------------------------------  IN:    Albumin 5%  - 500 mL: 1500 mL    dexmedetomidine Infusion: 55.8 mL    insulin Infusion: 24 mL    IV PiggyBack: 250 mL    niCARdipine Infusion: 65 mL    nitroglycerin  Infusion: 28 mL    Oral Fluid: 260 mL    sodium chloride 0.9%.: 20 mL    sodium chloride 0.9%.: 380 mL  Total IN: 2582.8 mL    OUT:    Chest Tube: 120 mL    Chest Tube: 470 mL    Indwelling Catheter - Urethral: 1546 mL  Total OUT: 2136 mL        Net:   I&O's Detail    21 Feb 2019 07:01  -  22 Feb 2019 07:00  --------------------------------------------------------  Total NET: -410 mL      22 Feb 2019 07:01  -  23 Feb 2019 07:00  --------------------------------------------------------  Total NET: 446.8 mL        CAPILLARY BLOOD GLUCOSE  94 (22 Feb 2019 18:00)  100 (22 Feb 2019 17:00)      POCT Blood Glucose.: 102 mg/dL (23 Feb 2019 06:36)  POCT Blood Glucose.: 101 mg/dL (23 Feb 2019 05:23)  POCT Blood Glucose.: 101 mg/dL (23 Feb 2019 02:39)  POCT Blood Glucose.: 107 mg/dL (23 Feb 2019 01:03)  POCT Blood Glucose.: 114 mg/dL (22 Feb 2019 22:51)  POCT Blood Glucose.: 115 mg/dL (22 Feb 2019 21:00)  POCT Blood Glucose.: 117 mg/dL (22 Feb 2019 19:24)  POCT Blood Glucose.: 100 mg/dL (22 Feb 2019 17:01)  POCT Blood Glucose.: 110 mg/dL (22 Feb 2019 15:59)  POCT Blood Glucose.: 146 mg/dL (22 Feb 2019 15:04)  POCT Blood Glucose.: 164 mg/dL (22 Feb 2019 14:11)    Physical Exam:  General: NAD; A&Ox3/Patient is intubated and sedated  Cardiac: S1/S2, RRR, no murmur, no rubs  Lungs: unlabored respirations, CTA b/l, no wheeze, no rales, no crackles  Abdomen: Soft/NT/ND; positive bowel sounds x 4  Sternum: Intact, no click, incision healing well with no drainage  Incisions: Incisions clean/dry/intact  Extremities: No edema b/l lower extremities; good capillary refill; no cyanosis; palpable 1+ pedal pulses b/l    Central Venous Catheter: Yes[]  No[] , If Yes indication:           Day #  Cochran Catheter: Yes  [] , No  [] , If yes indication:                      Day #  NGT: Yes [] No [] ,    If Yes Placement:                                     Day #  EPICARDIAL WIRES:  [] YES [] NO                                              Day #  BOWEL MOVEMENT:  [] YES [] NO, If No, Timing since last BM:      Day #  CHEST TUBE(Left/Right):  [] YES [] NO, If yes -  AIR LEAKS:  [] YES [] NO        LABS:                        8.5<L>  10.74 )-----------( 164      ( 23 Feb 2019 04:09 )             26.4<L>                        11.2<L>  13.51<H> )-----------( 175      ( 22 Feb 2019 12:56 )             34.0<L>    02-23    143  |  106  |  17  ----------------------------<  91  4.6   |  23  |  0.9  02-22    142  |  103  |  15  ----------------------------<  158<H>  4.7   |  23  |  1.0    Ca    8.6      23 Feb 2019 04:09  Mg     3.0     02-23    TPro  5.7<L> [6.0 - 8.0]  /  Alb  4.4 [3.5 - 5.2]  /  TBili  0.7 [0.2 - 1.2]  /  DBili  0.2 [0.0 - 0.2]  /  AST  48<H> [0 - 41]  /  ALT  13 [0 - 41]  /  AlkPhos  41 [30 - 115]  02-23    PT/INR - ( 23 Feb 2019 04:09 )   PT: ;   INR: 1.23 ratio         PT/INR - ( 22 Feb 2019 12:56 )   PT: ;   INR: 1.23 ratio         PTT - ( 23 Feb 2019 04:09 )  PTT:28.2 sec, PTT - ( 22 Feb 2019 12:56 )  PTT:27.4 sec    ABG - ( 23 Feb 2019 02:47 )  pH: 7.35  /  pCO2: 48    /  pO2: 112   / HCO3: 26    / Base Excess: 0.3   /  SaO2: 99    /  LA: 0.5              RADIOLOGY & ADDITIONAL TESTS:  CXR: < from: Xray Chest 1 View-PORTABLE IMMEDIATE (02.22.19 @ 13:17) >  Impression:      Interval median sternotomy with multiple support devices as above.    Left retrocardiac atelectasis. No pneumothorax.    < end of copied text >    EKG:  MEDICATIONS  (STANDING):  acetaminophen  IVPB .. 1000 milliGRAM(s) IV Intermittent once  ALBUTerol    90 MICROgram(s) HFA Inhaler 2 Puff(s) Inhalation every 6 hours  chlorhexidine 0.12% Liquid 5 milliLiter(s) Oral Mucosa two times a day  chlorhexidine 4% Liquid 1 Application(s) Topical <User Schedule>  dexmedetomidine Infusion 0.25 MICROgram(s)/kG/Hr (8.225 mL/Hr) IV Continuous <Continuous>  diltiazem    Tablet 30 milliGRAM(s) Oral three times a day  docusate sodium 100 milliGRAM(s) Oral three times a day  famotidine Injectable 20 milliGRAM(s) IV Push every 12 hours  guaiFENesin  milliGRAM(s) Oral every 12 hours  insulin Infusion 1 Unit(s)/Hr (1 mL/Hr) IV Continuous <Continuous>  magnesium sulfate  IVPB 1 Gram(s) IV Intermittent every 12 hours  meperidine     Injectable 25 milliGRAM(s) IV Push once  metoclopramide  IVPB 5 milliGRAM(s) IV Intermittent every 8 hours  niCARdipine Infusion 5 mG/Hr (25 mL/Hr) IV Continuous <Continuous>  nitroglycerin  Infusion 5 MICROgram(s)/Min (1.5 mL/Hr) IV Continuous <Continuous>  norepinephrine Infusion 0.05 MICROgram(s)/kG/Min (12.338 mL/Hr) IV Continuous <Continuous>  propofol Infusion 10 MICROgram(s)/kG/Min (7.896 mL/Hr) IV Continuous <Continuous>  sodium chloride 0.9%. 1000 milliLiter(s) (20 mL/Hr) IV Continuous <Continuous>  sodium chloride 0.9%. 1000 milliLiter(s) (75 mL/Hr) IV Continuous <Continuous>    MEDICATIONS  (PRN):  ondansetron Injectable 4 milliGRAM(s) IV Push every 8 hours PRN Nausea  oxyCODONE    IR 10 milliGRAM(s) Oral four times a day PRN Severe Pain (7 - 10)  oxyCODONE    IR 5 milliGRAM(s) Oral every 4 hours PRN Moderate Pain (4 - 6)    HEPARIN:  [] YES [] NO  Dose: XX UNITS/HR UNITS Q8H  LOVENOX:[] YES [] NO  Dose: XX mg Q24H  COUMADIN: []  YES [] NO  Dose: XX mg  Q24H  SCD's: YES b/l  GI Prophylaxis: Protonix [], Pepcid [], None [], (Contra-indication:.....)    Post-Op Beta-Blockers: Yes [], No[], If No, then contraindication:  Post-Op Aspirin: Yes [],  No [], If No, then contraindication:  Post-Op Statin: Yes [], No[], If No, then contraindication:  Allergies    amiodarone (Flushing)  iodine (Unknown)  shellfish (Unknown)    Intolerances      Ambulation/Activity Status:    Assessment/Plan:  58y Female status-post .....  - Case and plan discussed with CTU Intensivist and CT Surgeon - Dr. Reed/Jackson/Leonidas   - Continue CTU supportive care    - Continue DVT/GI prophylaxis  - Incentive Spirometry 10 times an hour  - Continue to advance physical activity as tolerated and continue PT/OT as directed  1. CAD: Continue ASA, statin, BB  2. HTN:   3. A. Fib:   4. COPD/Hypoxia:   5. DM/Glucose Control:     Social Service Disposition: OPERATIVE PROCEDURE(s):    CABGx1/MAZE             POD #  1                     58yFemale  SURGEON(s): YANCY Reed  SUBJECTIVE ASSESSMENT: pt seen and examined. pt is having mild sternal pain, otherwise doing well  Vital Signs Last 24 Hrs  T(F): 99.3 (23 Feb 2019 06:00), Max: 99.4 (22 Feb 2019 22:00)  HR: 82 (23 Feb 2019 07:00) (82 - 84)  BP: 97/58 (22 Feb 2019 20:30) (85/54 - 104/63)  BP(mean): 75 (22 Feb 2019 20:30) (65 - 81)  ABP: 122/60 (23 Feb 2019 07:00) (98/56 - 130/78)  ABP(mean): 80 (23 Feb 2019 07:00)  RR: 16 (23 Feb 2019 07:00) (6 - 35)  SpO2: 99% (23 Feb 2019 07:00) (97% - 100%)  CVP(mm Hg): 14 (23 Feb 2019 07:00)  CO: 5.7 (23 Feb 2019 06:30)  CI: 2.4 (23 Feb 2019 06:30)  PA: 40/22 (23 Feb 2019 07:00)  SVR: 784 (23 Feb 2019 06:30)  Mode: CPAP with PS  FiO2: 40  PEEP: 5  PS: 5  MAP: 8    I&O's Detail    22 Feb 2019 07:01  -  23 Feb 2019 07:00  --------------------------------------------------------  IN:    Albumin 5%  - 500 mL: 1500 mL    dexmedetomidine Infusion: 55.8 mL    insulin Infusion: 24 mL    IV PiggyBack: 250 mL    niCARdipine Infusion: 65 mL    nitroglycerin  Infusion: 28 mL    Oral Fluid: 260 mL    sodium chloride 0.9%.: 20 mL    sodium chloride 0.9%.: 380 mL  Total IN: 2582.8 mL    OUT:    Chest Tube: 120 mL    Chest Tube: 470 mL    Indwelling Catheter - Urethral: 1546 mL  Total OUT: 2136 mL        Net:   I&O's Detail    21 Feb 2019 07:01  -  22 Feb 2019 07:00  --------------------------------------------------------  Total NET: -410 mL      22 Feb 2019 07:01  -  23 Feb 2019 07:00  --------------------------------------------------------  Total NET: 446.8 mL        CAPILLARY BLOOD GLUCOSE  94 (22 Feb 2019 18:00)  100 (22 Feb 2019 17:00)      POCT Blood Glucose.: 102 mg/dL (23 Feb 2019 06:36)  POCT Blood Glucose.: 101 mg/dL (23 Feb 2019 05:23)  POCT Blood Glucose.: 101 mg/dL (23 Feb 2019 02:39)  POCT Blood Glucose.: 107 mg/dL (23 Feb 2019 01:03)  POCT Blood Glucose.: 114 mg/dL (22 Feb 2019 22:51)  POCT Blood Glucose.: 115 mg/dL (22 Feb 2019 21:00)  POCT Blood Glucose.: 117 mg/dL (22 Feb 2019 19:24)  POCT Blood Glucose.: 100 mg/dL (22 Feb 2019 17:01)  POCT Blood Glucose.: 110 mg/dL (22 Feb 2019 15:59)  POCT Blood Glucose.: 146 mg/dL (22 Feb 2019 15:04)  POCT Blood Glucose.: 164 mg/dL (22 Feb 2019 14:11)    Physical Exam:  General: NAD; A&Ox3  Cardiac: S1/S2, RRR, no murmur, no rubs  Lungs: decreased bs at bases   Abdomen: Soft/NT/ND; positive bowel sounds x 4  Sternum: Intact, no click, incision healing well with no drainage  Incisions: Incisions clean/dry/intact  Extremities: trace edema b/l lower extremities; good capillary refill; no cyanosis; palpable 1+ pedal pulses b/l    Central Venous Catheter: Yes[x]  No[] , If Yes indication:     access      Day #1  Cochran Catheter: Yes  [x] , No  [] , If yes indication:       strict i/o               Day #1  NGT: Yes [] No [x] ,    If Yes Placement:                                     Day #  EPICARDIAL WIRES:  [x] YES [] NO                                              Day #1  BOWEL MOVEMENT:  [] YES [x] NO, If No, Timing since last BM:      Day #  CHEST TUBE(Left/Right):  [x] YES [] NO, If yes -  AIR LEAKS:  [] YES [] NO        LABS:                        8.5<L>  10.74 )-----------( 164      ( 23 Feb 2019 04:09 )             26.4<L>                        11.2<L>  13.51<H> )-----------( 175      ( 22 Feb 2019 12:56 )             34.0<L>    02-23    143  |  106  |  17  ----------------------------<  91  4.6   |  23  |  0.9  02-22    142  |  103  |  15  ----------------------------<  158<H>  4.7   |  23  |  1.0    Ca    8.6      23 Feb 2019 04:09  Mg     3.0     02-23    TPro  5.7<L> [6.0 - 8.0]  /  Alb  4.4 [3.5 - 5.2]  /  TBili  0.7 [0.2 - 1.2]  /  DBili  0.2 [0.0 - 0.2]  /  AST  48<H> [0 - 41]  /  ALT  13 [0 - 41]  /  AlkPhos  41 [30 - 115]  02-23    PT/INR - ( 23 Feb 2019 04:09 )   PT: ;   INR: 1.23 ratio         PT/INR - ( 22 Feb 2019 12:56 )   PT: ;   INR: 1.23 ratio         PTT - ( 23 Feb 2019 04:09 )  PTT:28.2 sec, PTT - ( 22 Feb 2019 12:56 )  PTT:27.4 sec    ABG - ( 23 Feb 2019 02:47 )  pH: 7.35  /  pCO2: 48    /  pO2: 112   / HCO3: 26    / Base Excess: 0.3   /  SaO2: 99    /  LA: 0.5              RADIOLOGY & ADDITIONAL TESTS:  CXR: < from: Xray Chest 1 View-PORTABLE IMMEDIATE (02.22.19 @ 13:17) >  Impression:      Interval median sternotomy with multiple support devices as above.    Left retrocardiac atelectasis. No pneumothorax.    < end of copied text >    EKG:  MEDICATIONS  (STANDING):  acetaminophen  IVPB .. 1000 milliGRAM(s) IV Intermittent once  ALBUTerol    90 MICROgram(s) HFA Inhaler 2 Puff(s) Inhalation every 6 hours  chlorhexidine 0.12% Liquid 5 milliLiter(s) Oral Mucosa two times a day  chlorhexidine 4% Liquid 1 Application(s) Topical <User Schedule>  dexmedetomidine Infusion 0.25 MICROgram(s)/kG/Hr (8.225 mL/Hr) IV Continuous <Continuous>  diltiazem    Tablet 30 milliGRAM(s) Oral three times a day  docusate sodium 100 milliGRAM(s) Oral three times a day  famotidine Injectable 20 milliGRAM(s) IV Push every 12 hours  guaiFENesin  milliGRAM(s) Oral every 12 hours  insulin Infusion 1 Unit(s)/Hr (1 mL/Hr) IV Continuous <Continuous>  magnesium sulfate  IVPB 1 Gram(s) IV Intermittent every 12 hours  meperidine     Injectable 25 milliGRAM(s) IV Push once  metoclopramide  IVPB 5 milliGRAM(s) IV Intermittent every 8 hours  niCARdipine Infusion 5 mG/Hr (25 mL/Hr) IV Continuous <Continuous>  nitroglycerin  Infusion 5 MICROgram(s)/Min (1.5 mL/Hr) IV Continuous <Continuous>  norepinephrine Infusion 0.05 MICROgram(s)/kG/Min (12.338 mL/Hr) IV Continuous <Continuous>  propofol Infusion 10 MICROgram(s)/kG/Min (7.896 mL/Hr) IV Continuous <Continuous>  sodium chloride 0.9%. 1000 milliLiter(s) (20 mL/Hr) IV Continuous <Continuous>  sodium chloride 0.9%. 1000 milliLiter(s) (75 mL/Hr) IV Continuous <Continuous>    MEDICATIONS  (PRN):  ondansetron Injectable 4 milliGRAM(s) IV Push every 8 hours PRN Nausea  oxyCODONE    IR 10 milliGRAM(s) Oral four times a day PRN Severe Pain (7 - 10)  oxyCODONE    IR 5 milliGRAM(s) Oral every 4 hours PRN Moderate Pain (4 - 6)    HEPARIN:  [] YES [x] NO  Dose: XX UNITS/HR UNITS Q8H  LOVENOX:[] YES [x] NO  Dose: XX mg Q24H  COUMADIN: []  YES [x] NO  Dose: XX mg  Q24H  SCD's: YES b/l  GI Prophylaxis: Protonix [], Pepcid [x], None [], (Contra-indication:.....)    Post-Op Beta-Blockers: Yes [], No[x], If No, then contraindication: a paced, phil  Post-Op Aspirin: Yes [x],  No [], If No, then contraindication:  Post-Op Statin: Yes [x], No[], If No, then contraindication:  Allergies    amiodarone (Flushing)  iodine (Unknown)  shellfish (Unknown)    Intolerances      Ambulation/Activity Status: ambulate as tolerated    Assessment/Plan:  58y Female status-post CABGx1/MAZE   POD#1  - Case and plan discussed with CTU Intensivist and CT Surgeon - Dr. Reed/Jackson/Leonidas   - Continue CTU supportive care    - Continue DVT/GI prophylaxis  - Incentive Spirometry 10 times an hour  - Continue to advance physical activity as tolerated and continue PT/OT as directed  1. CAD: Continue ASA, statin, no BB pt is currently being a paced. pain control  2. A. Fib: on cardizem as per EP  3. COPD/Hypoxia: cont nebs, mucinex  4. DM/Glucose Control: insulin sliding scale     Social Service Disposition:  home most likely

## 2019-02-23 NOTE — DIETITIAN INITIAL EVALUATION ADULT. - ENERGY NEEDS
Estimated energy needs: ~1700kcal/day obtained from 1790kcal/d (MSJ x 1.0) compared to 1830kcal/d (MSJ x 1.3 minus 500kcal to promote gradual wt loss @ ~1lb/week) vs. 1540kcal/d (25kcal/kg IBW)  Estimated protein needs: 74-92gm/d (1.2-1.5gm/kg IBW)  Estimated fluid need: 1ml/kcal or as per CTU team

## 2019-02-24 LAB
ALBUMIN SERPL ELPH-MCNC: 4.1 G/DL — SIGNIFICANT CHANGE UP (ref 3.5–5.2)
ALP SERPL-CCNC: 42 U/L — SIGNIFICANT CHANGE UP (ref 30–115)
ALT FLD-CCNC: 12 U/L — SIGNIFICANT CHANGE UP (ref 0–41)
ANION GAP SERPL CALC-SCNC: 9 MMOL/L — SIGNIFICANT CHANGE UP (ref 7–14)
APTT BLD: 26.7 SEC — LOW (ref 27–39.2)
AST SERPL-CCNC: 28 U/L — SIGNIFICANT CHANGE UP (ref 0–41)
BILIRUB DIRECT SERPL-MCNC: 0.2 MG/DL — SIGNIFICANT CHANGE UP (ref 0–0.2)
BILIRUB INDIRECT FLD-MCNC: 0.5 MG/DL — SIGNIFICANT CHANGE UP (ref 0.2–1.2)
BILIRUB SERPL-MCNC: 0.7 MG/DL — SIGNIFICANT CHANGE UP (ref 0.2–1.2)
BUN SERPL-MCNC: 20 MG/DL — SIGNIFICANT CHANGE UP (ref 10–20)
CALCIUM SERPL-MCNC: 9.1 MG/DL — SIGNIFICANT CHANGE UP (ref 8.5–10.1)
CHLORIDE SERPL-SCNC: 106 MMOL/L — SIGNIFICANT CHANGE UP (ref 98–110)
CO2 SERPL-SCNC: 25 MMOL/L — SIGNIFICANT CHANGE UP (ref 17–32)
CREAT SERPL-MCNC: 0.9 MG/DL — SIGNIFICANT CHANGE UP (ref 0.7–1.5)
GAS PNL BLDA: SIGNIFICANT CHANGE UP
GLUCOSE BLDC GLUCOMTR-MCNC: 121 MG/DL — HIGH (ref 70–99)
GLUCOSE BLDC GLUCOMTR-MCNC: 127 MG/DL — HIGH (ref 70–99)
GLUCOSE BLDC GLUCOMTR-MCNC: 136 MG/DL — HIGH (ref 70–99)
GLUCOSE SERPL-MCNC: 119 MG/DL — HIGH (ref 70–99)
HCT VFR BLD CALC: 27.3 % — LOW (ref 37–47)
HGB BLD-MCNC: 8.6 G/DL — LOW (ref 12–16)
INR BLD: 1.15 RATIO — SIGNIFICANT CHANGE UP (ref 0.65–1.3)
MAGNESIUM SERPL-MCNC: 2.8 MG/DL — HIGH (ref 1.8–2.4)
MCHC RBC-ENTMCNC: 25.8 PG — LOW (ref 27–31)
MCHC RBC-ENTMCNC: 31.5 G/DL — LOW (ref 32–37)
MCV RBC AUTO: 82 FL — SIGNIFICANT CHANGE UP (ref 81–99)
NRBC # BLD: 0 /100 WBCS — SIGNIFICANT CHANGE UP (ref 0–0)
PLATELET # BLD AUTO: 156 K/UL — SIGNIFICANT CHANGE UP (ref 130–400)
POTASSIUM SERPL-MCNC: 4.3 MMOL/L — SIGNIFICANT CHANGE UP (ref 3.5–5)
POTASSIUM SERPL-SCNC: 4.3 MMOL/L — SIGNIFICANT CHANGE UP (ref 3.5–5)
PROT SERPL-MCNC: 5.7 G/DL — LOW (ref 6–8)
PROTHROM AB SERPL-ACNC: 13.2 SEC — HIGH (ref 9.95–12.87)
RBC # BLD: 3.33 M/UL — LOW (ref 4.2–5.4)
RBC # FLD: 14 % — SIGNIFICANT CHANGE UP (ref 11.5–14.5)
SODIUM SERPL-SCNC: 140 MMOL/L — SIGNIFICANT CHANGE UP (ref 135–146)
WBC # BLD: 12.23 K/UL — HIGH (ref 4.8–10.8)
WBC # FLD AUTO: 12.23 K/UL — HIGH (ref 4.8–10.8)

## 2019-02-24 RX ORDER — HEPARIN SODIUM 5000 [USP'U]/ML
5000 INJECTION INTRAVENOUS; SUBCUTANEOUS EVERY 8 HOURS
Qty: 0 | Refills: 0 | Status: DISCONTINUED | OUTPATIENT
Start: 2019-02-24 | End: 2019-02-28

## 2019-02-24 RX ORDER — METOPROLOL TARTRATE 50 MG
12.5 TABLET ORAL ONCE
Qty: 0 | Refills: 0 | Status: COMPLETED | OUTPATIENT
Start: 2019-02-24 | End: 2019-02-24

## 2019-02-24 RX ORDER — METOPROLOL TARTRATE 50 MG
12.5 TABLET ORAL
Qty: 0 | Refills: 0 | Status: DISCONTINUED | OUTPATIENT
Start: 2019-02-24 | End: 2019-02-25

## 2019-02-24 RX ORDER — ALBUMIN HUMAN 25 %
500 VIAL (ML) INTRAVENOUS ONCE
Qty: 0 | Refills: 0 | Status: COMPLETED | OUTPATIENT
Start: 2019-02-24 | End: 2019-02-24

## 2019-02-24 RX ORDER — IBUPROFEN 200 MG
400 TABLET ORAL EVERY 6 HOURS
Qty: 0 | Refills: 0 | Status: DISCONTINUED | OUTPATIENT
Start: 2019-02-24 | End: 2019-02-28

## 2019-02-24 RX ADMIN — TIOTROPIUM BROMIDE 1 CAPSULE(S): 18 CAPSULE ORAL; RESPIRATORY (INHALATION) at 12:38

## 2019-02-24 RX ADMIN — HEPARIN SODIUM 5000 UNIT(S): 5000 INJECTION INTRAVENOUS; SUBCUTANEOUS at 13:33

## 2019-02-24 RX ADMIN — Medication 30 MILLIGRAM(S): at 07:17

## 2019-02-24 RX ADMIN — ATORVASTATIN CALCIUM 40 MILLIGRAM(S): 80 TABLET, FILM COATED ORAL at 21:54

## 2019-02-24 RX ADMIN — OXYCODONE HYDROCHLORIDE 10 MILLIGRAM(S): 5 TABLET ORAL at 22:54

## 2019-02-24 RX ADMIN — Medication 30 MILLIGRAM(S): at 00:33

## 2019-02-24 RX ADMIN — Medication 400 MILLIGRAM(S): at 16:08

## 2019-02-24 RX ADMIN — Medication 30 MILLIGRAM(S): at 05:55

## 2019-02-24 RX ADMIN — Medication 100 MILLIGRAM(S): at 05:04

## 2019-02-24 RX ADMIN — HEPARIN SODIUM 5000 UNIT(S): 5000 INJECTION INTRAVENOUS; SUBCUTANEOUS at 21:54

## 2019-02-24 RX ADMIN — Medication 250 MILLILITER(S): at 13:31

## 2019-02-24 RX ADMIN — OXYCODONE HYDROCHLORIDE 10 MILLIGRAM(S): 5 TABLET ORAL at 00:33

## 2019-02-24 RX ADMIN — Medication 100 GRAM(S): at 05:04

## 2019-02-24 RX ADMIN — Medication 325 MILLIGRAM(S): at 12:39

## 2019-02-24 RX ADMIN — Medication 600 MILLIGRAM(S): at 17:02

## 2019-02-24 RX ADMIN — Medication 100 GRAM(S): at 17:02

## 2019-02-24 RX ADMIN — Medication 100 MILLIGRAM(S): at 13:33

## 2019-02-24 RX ADMIN — Medication 12.5 MILLIGRAM(S): at 13:45

## 2019-02-24 RX ADMIN — FAMOTIDINE 20 MILLIGRAM(S): 10 INJECTION INTRAVENOUS at 17:02

## 2019-02-24 RX ADMIN — Medication 100 MILLIGRAM(S): at 21:54

## 2019-02-24 RX ADMIN — OXYCODONE HYDROCHLORIDE 10 MILLIGRAM(S): 5 TABLET ORAL at 16:20

## 2019-02-24 RX ADMIN — FAMOTIDINE 20 MILLIGRAM(S): 10 INJECTION INTRAVENOUS at 05:04

## 2019-02-24 RX ADMIN — ESCITALOPRAM OXALATE 10 MILLIGRAM(S): 10 TABLET, FILM COATED ORAL at 12:39

## 2019-02-24 RX ADMIN — Medication 12.5 MILLIGRAM(S): at 19:16

## 2019-02-24 RX ADMIN — Medication 600 MILLIGRAM(S): at 05:04

## 2019-02-24 RX ADMIN — MOMETASONE FUROATE 1 PUFF(S): 220 INHALANT RESPIRATORY (INHALATION) at 12:37

## 2019-02-24 NOTE — PROGRESS NOTE ADULT - SUBJECTIVE AND OBJECTIVE BOX
FLAVIO HUDSON  MRN#: [id number]  Subjective:  Patient was seen and evalauted on AM rounds     OBJECTIVE:  ICU Vital Signs Last 24 Hrs  T(C): 37.7 (2019 12:30), Max: 37.9 (2019 11:30)  T(F): 99.9 (2019 12:30), Max: 100.2 (2019 11:30)  HR: 86 (2019 13:00) (74 - 92)  BP: 113/52 (2019 09:43) (106/67 - 113/52)  BP(mean): 73 (2019 09:30) (73 - 76)  ABP: 110/52 (2019 13:00) (98/42 - 158/72)  ABP(mean): 72 (2019 13:00) (60 - 102)  RR: 24 (2019 12:00) (13 - 33)  SpO2: 98% (2019 13:00) (93% - 100%)       @ 07: @ 07:00  --------------------------------------------------------  IN: 990 mL / OUT: 873 mL / NET: 117 mL     @ 07:01   @ 13:33  --------------------------------------------------------  IN: 0 mL / OUT: 142 mL / NET: -142 mL      CAPILLARY BLOOD GLUCOSE  168 (2019 16:08)      POCT Blood Glucose.: 121 mg/dL (2019 11:11)      PHYSICAL EXAM:Daily     Daily Weight in k (2019 06:00)  General: WN/WD NAD    HEENT:     + NCAT  + EOMI  - Conjuctival edema   - Icterus   - Thrush   - ETT  - NGT/OGT    Neck:         + FROM    - JVD     - Nodes     - Masses    + Mid-line trachea   - Tracheostomy    Chest:         - Sternal click  - Sternal drainage  + Pacing wires  + Chest tubes  - SubQ emphysema    Lungs:          + CTA   - Rhonchi    - Rales    - Wheezing     - Decreased BS   - Dullness R L    Cardiac:       + S1 + S2    + RRR   - Irregular   - S3  - S4    - Murmurs   - Rub   - Hamman’s sign     Abdomen:    + BS     + Soft    + Non-tender     - Distended    - Organomegaly  - PEG    Extremities:   - Cyanosis U/L   - Clubbing  U/L  + LE Edema   + Capillary refill    + Pulses     Neuro:        + Awake   +  Alert   - Confused   - Lethargic   - Sedated   - Generalized Weakness    Skin:        - Rashes    - Erythema   + Normal incisions   + IV sites intact  - Sacral decubitus    HOSPITAL MEDICATIONS:  MEDICATIONS  (STANDING):  albumin human  5% IVPB 500 milliLiter(s) IV Intermittent once  aspirin enteric coated 325 milliGRAM(s) Oral daily  atorvastatin 40 milliGRAM(s) Oral at bedtime  chlorhexidine 0.12% Liquid 5 milliLiter(s) Oral Mucosa two times a day  chlorhexidine 4% Liquid 1 Application(s) Topical <User Schedule>  dextrose 5%. 1000 milliLiter(s) (50 mL/Hr) IV Continuous <Continuous>  dextrose 50% Injectable 12.5 Gram(s) IV Push once  dextrose 50% Injectable 25 Gram(s) IV Push once  dextrose 50% Injectable 25 Gram(s) IV Push once  diltiazem    Tablet 30 milliGRAM(s) Oral three times a day  docusate sodium 100 milliGRAM(s) Oral three times a day  escitalopram 10 milliGRAM(s) Oral daily  famotidine    Tablet 20 milliGRAM(s) Oral two times a day  guaiFENesin  milliGRAM(s) Oral every 12 hours  heparin  Injectable 5000 Unit(s) SubCutaneous every 8 hours  insulin lispro (HumaLOG) corrective regimen sliding scale   SubCutaneous three times a day before meals  magnesium sulfate  IVPB 1 Gram(s) IV Intermittent every 12 hours  meperidine     Injectable 25 milliGRAM(s) IV Push once  metoprolol tartrate 12.5 milliGRAM(s) Oral two times a day  mometasone 220 MICROgram(s) Inhaler 1 Puff(s) Inhalation daily  sodium chloride 0.9%. 1000 milliLiter(s) (20 mL/Hr) IV Continuous <Continuous>  sodium chloride 0.9%. 1000 milliLiter(s) (75 mL/Hr) IV Continuous <Continuous>  tiotropium 18 MICROgram(s) Capsule 1 Capsule(s) Inhalation daily    MEDICATIONS  (PRN):  dextrose 40% Gel 15 Gram(s) Oral once PRN Blood Glucose LESS THAN 70 milliGRAM(s)/deciliter  glucagon  Injectable 1 milliGRAM(s) IntraMuscular once PRN Glucose LESS THAN 70 milligrams/deciliter  ibuprofen  Tablet. 400 milliGRAM(s) Oral every 6 hours PRN Temp greater or equal to 38C (100.4F), Moderate Pain (4 - 6)  ondansetron Injectable 4 milliGRAM(s) IV Push every 8 hours PRN Nausea  oxyCODONE    IR 5 milliGRAM(s) Oral every 4 hours PRN Moderate Pain (4 - 6)  oxyCODONE    IR 10 milliGRAM(s) Oral four times a day PRN Severe Pain (7 - 10)      LABS:                        8.6    12.23 )-----------( 156      ( 2019 03:00 )             27.3        140  |  106  |  20  ----------------------------<  119<H>  4.3   |  25  |  0.9    Ca    9.1      2019 03:00  Mg     2.8         TPro  5.7<L>  /  Alb  4.1  /  TBili  0.7  /  DBili  0.2  /  AST  28  /  ALT  12  /  AlkPhos  42      PT/INR - ( 2019 03:50 )   PT: 13.20 sec;   INR: 1.15 ratio         PTT - ( 2019 03:50 )  PTT:26.7 sec LIVER FUNCTIONS - ( 2019 03:00 )  Alb: 4.1 g/dL / Pro: 5.7 g/dL / ALK PHOS: 42 U/L / ALT: 12 U/L / AST: 28 U/L / GGT: x               RADIOLOGY:  X Reviewed and interpreted by me: clear bilat    CARDIOPULMONARY DYSFUNCTION  - Respiratory status required supplemental oxygen & the following of continuous pulse oximetry for support & to prevent decompensation  - Continued early mobilization as tolerated  - Addressed analgesic regimen to optimize function    PREVENTION-PROPHYLAXIS  - ASA continued for graft occlusion-thromboembolism prophylaxis  - Lipitor was also started for long term graft patency  - Heparin continued for VTE prophylaxis in addition to Venodyne boots  - Pepcid maintained for GI bleeding prophylaxis  - Lopressor initiated for atrial fibrillation prophylaxis  - Metabolic stability & infection prophylaxis required review and adjustment of regular Insulin sliding scale and gylcemic regimen while following serial glucose levels to help achieve & maintain euglycemia  - Reviewed & addressed surgical site infection prophylaxis regimen

## 2019-02-24 NOTE — PROGRESS NOTE ADULT - ASSESSMENT
Assessment/Plan:  CAD/A-Fib-s/p CABG x 1, MAZE-POD #2  1-BP control-start beta-blockers  2-serum glucose control-insulin sliding scale correction regimen  3-A-fib-continue cardizem  4-BTNH-[id number] tx, pul toilet, chest PT, incentive spirometry  5-fluid overload-diuresis  6-acute blood loss anemia-stable, continue to monitor Hb/Hct daily

## 2019-02-24 NOTE — PROGRESS NOTE ADULT - SUBJECTIVE AND OBJECTIVE BOX
OPERATIVE PROCEDURE(s):                POD #                       58yFemale  SURGEON(s): YANCY Reed  SUBJECTIVE ASSESSMENT:   Vital Signs Last 24 Hrs  T(F): 100 (24 Feb 2019 04:00), Max: 100 (23 Feb 2019 16:00)  HR: 82 (24 Feb 2019 06:00) (74 - 89)  BP: 116/59 (23 Feb 2019 07:30) (116/59 - 116/59)  BP(mean): --  ABP: 130/66 (24 Feb 2019 06:00) (98/52 - 158/72)  ABP(mean): 90 (24 Feb 2019 06:00)  RR: 18 (24 Feb 2019 06:02) (11 - 30)  SpO2: 98% (24 Feb 2019 06:02) (96% - 100%)  CVP(mm Hg): 16 (23 Feb 2019 09:30)  CVP(cm H2O): --  CO: 7.3 (23 Feb 2019 07:30)  CI: 3 (23 Feb 2019 07:30)  PA: 46/25 (23 Feb 2019 09:30)  SVR: 700 (23 Feb 2019 07:30)    I&O's Detail    23 Feb 2019 07:01  -  24 Feb 2019 07:00  --------------------------------------------------------  IN:    IV PiggyBack: 200 mL    Oral Fluid: 630 mL    sodium chloride 0.9%.: 160 mL  Total IN: 990 mL    OUT:    Chest Tube: 190 mL    Chest Tube: 16 mL    Indwelling Catheter - Urethral: 639 mL  Total OUT: 845 mL        Net:   I&O's Detail    22 Feb 2019 07:01  -  23 Feb 2019 07:00  --------------------------------------------------------  Total NET: 446.8 mL      23 Feb 2019 07:01  -  24 Feb 2019 07:00  --------------------------------------------------------  Total NET: 145 mL        CAPILLARY BLOOD GLUCOSE  168 (23 Feb 2019 16:08)  118 (23 Feb 2019 11:00)      POCT Blood Glucose.: 127 mg/dL (24 Feb 2019 06:08)  POCT Blood Glucose.: 168 mg/dL (23 Feb 2019 16:08)  POCT Blood Glucose.: 118 mg/dL (23 Feb 2019 11:02)    Physical Exam:  General: NAD; A&Ox3/Patient is intubated and sedated  Cardiac: S1/S2, RRR, no murmur, no rubs  Lungs: unlabored respirations, CTA b/l, no wheeze, no rales, no crackles  Abdomen: Soft/NT/ND; positive bowel sounds x 4  Sternum: Intact, no click, incision healing well with no drainage  Incisions: Incisions clean/dry/intact  Extremities: No edema b/l lower extremities; good capillary refill; no cyanosis; palpable 1+ pedal pulses b/l    Central Venous Catheter: Yes[]  No[] , If Yes indication:           Day #  Cochran Catheter: Yes  [] , No  [] , If yes indication:                      Day #  NGT: Yes [] No [] ,    If Yes Placement:                                     Day #  EPICARDIAL WIRES:  [] YES [] NO                                              Day #  BOWEL MOVEMENT:  [] YES [] NO, If No, Timing since last BM:      Day #  CHEST TUBE(Left/Right):  [] YES [] NO, If yes -  AIR LEAKS:  [] YES [] NO        LABS:                        8.6<L>  12.23<H> )-----------( 156      ( 24 Feb 2019 03:00 )             27.3<L>                        8.5<L>  10.74 )-----------( 164      ( 23 Feb 2019 04:09 )             26.4<L>    02-24    140  |  106  |  20  ----------------------------<  119<H>  4.3   |  25  |  0.9  02-23    143  |  106  |  17  ----------------------------<  91  4.6   |  23  |  0.9    Ca    9.1      24 Feb 2019 03:00  Mg     2.8     02-24    TPro  5.7<L> [6.0 - 8.0]  /  Alb  4.1 [3.5 - 5.2]  /  TBili  0.7 [0.2 - 1.2]  /  DBili  0.2 [0.0 - 0.2]  /  AST  28 [0 - 41]  /  ALT  12 [0 - 41]  /  AlkPhos  42 [30 - 115]  02-24    PT/INR - ( 24 Feb 2019 03:50 )   PT: ;   INR: 1.15 ratio         PT/INR - ( 23 Feb 2019 04:09 )   PT: ;   INR: 1.23 ratio         PTT - ( 24 Feb 2019 03:50 )  PTT:26.7 sec, PTT - ( 23 Feb 2019 04:09 )  PTT:28.2 sec    ABG - ( 24 Feb 2019 06:00 )  pH: 7.44  /  pCO2: 38    /  pO2: 107   / HCO3: 26    / Base Excess: 1.6   /  SaO2: 99    /  LA: 1.0              RADIOLOGY & ADDITIONAL TESTS:  CXR:  EKG:  MEDICATIONS  (STANDING):  aspirin enteric coated 325 milliGRAM(s) Oral daily  atorvastatin 40 milliGRAM(s) Oral at bedtime  chlorhexidine 0.12% Liquid 5 milliLiter(s) Oral Mucosa two times a day  chlorhexidine 4% Liquid 1 Application(s) Topical <User Schedule>  dextrose 5%. 1000 milliLiter(s) (50 mL/Hr) IV Continuous <Continuous>  dextrose 50% Injectable 12.5 Gram(s) IV Push once  dextrose 50% Injectable 25 Gram(s) IV Push once  dextrose 50% Injectable 25 Gram(s) IV Push once  diltiazem    Tablet 30 milliGRAM(s) Oral three times a day  docusate sodium 100 milliGRAM(s) Oral three times a day  escitalopram 10 milliGRAM(s) Oral daily  famotidine    Tablet 20 milliGRAM(s) Oral two times a day  guaiFENesin  milliGRAM(s) Oral every 12 hours  insulin lispro (HumaLOG) corrective regimen sliding scale   SubCutaneous three times a day before meals  magnesium sulfate  IVPB 1 Gram(s) IV Intermittent every 12 hours  meperidine     Injectable 25 milliGRAM(s) IV Push once  mometasone 220 MICROgram(s) Inhaler 1 Puff(s) Inhalation daily  sodium chloride 0.9%. 1000 milliLiter(s) (20 mL/Hr) IV Continuous <Continuous>  sodium chloride 0.9%. 1000 milliLiter(s) (75 mL/Hr) IV Continuous <Continuous>  tiotropium 18 MICROgram(s) Capsule 1 Capsule(s) Inhalation daily    MEDICATIONS  (PRN):  dextrose 40% Gel 15 Gram(s) Oral once PRN Blood Glucose LESS THAN 70 milliGRAM(s)/deciliter  glucagon  Injectable 1 milliGRAM(s) IntraMuscular once PRN Glucose LESS THAN 70 milligrams/deciliter  ketorolac   Injectable 30 milliGRAM(s) IV Push every 6 hours PRN Moderate Pain (4 - 6)  ondansetron Injectable 4 milliGRAM(s) IV Push every 8 hours PRN Nausea  oxyCODONE    IR 5 milliGRAM(s) Oral every 4 hours PRN Moderate Pain (4 - 6)  oxyCODONE    IR 10 milliGRAM(s) Oral four times a day PRN Severe Pain (7 - 10)    HEPARIN:  [] YES [] NO  Dose: XX UNITS/HR UNITS Q8H  LOVENOX:[] YES [] NO  Dose: XX mg Q24H  COUMADIN: []  YES [] NO  Dose: XX mg  Q24H  SCD's: YES b/l  GI Prophylaxis: Protonix [], Pepcid [], None [], (Contra-indication:.....)    Post-Op Beta-Blockers: Yes [], No[], If No, then contraindication:  Post-Op Aspirin: Yes [],  No [], If No, then contraindication:  Post-Op Statin: Yes [], No[], If No, then contraindication:  Allergies    amiodarone (Flushing)  iodine (Unknown)  shellfish (Unknown)    Intolerances      Ambulation/Activity Status:    Assessment/Plan:  58y Female status-post .....  - Case and plan discussed with CTU Intensivist and CT Surgeon - Dr. Reed/Jackson/Leonidas   - Continue CTU supportive care    - Continue DVT/GI prophylaxis  - Incentive Spirometry 10 times an hour  - Continue to advance physical activity as tolerated and continue PT/OT as directed  1. CAD: Continue ASA, statin, BB  2. HTN:   3. A. Fib:   4. COPD/Hypoxia:   5. DM/Glucose Control:     Social Service Disposition: OPERATIVE PROCEDURE(s):     Cabgx1/MAZE           POD #   2                    58yFemale  SURGEON(s): YANCY Reed  SUBJECTIVE ASSESSMENT: pt seen and examined. doing well today, pain is better today   Vital Signs Last 24 Hrs  T(F): 100 (24 Feb 2019 04:00), Max: 100 (23 Feb 2019 16:00)  HR: 82 (24 Feb 2019 06:00) (74 - 89)  BP: 116/59 (23 Feb 2019 07:30) (116/59 - 116/59)  ABP: 130/66 (24 Feb 2019 06:00) (98/52 - 158/72)  ABP(mean): 90 (24 Feb 2019 06:00)  RR: 18 (24 Feb 2019 06:02) (11 - 30)  SpO2: 98% (24 Feb 2019 06:02) (96% - 100%)  CVP(mm Hg): 16 (23 Feb 2019 09:30)  CO: 7.3 (23 Feb 2019 07:30)  CI: 3 (23 Feb 2019 07:30)  PA: 46/25 (23 Feb 2019 09:30)  SVR: 700 (23 Feb 2019 07:30)    I&O's Detail    23 Feb 2019 07:01  -  24 Feb 2019 07:00  --------------------------------------------------------  IN:    IV PiggyBack: 200 mL    Oral Fluid: 630 mL    sodium chloride 0.9%.: 160 mL  Total IN: 990 mL    OUT:    Chest Tube: 190 mL    Chest Tube: 16 mL    Indwelling Catheter - Urethral: 639 mL  Total OUT: 845 mL        Net:   I&O's Detail    22 Feb 2019 07:01  -  23 Feb 2019 07:00  --------------------------------------------------------  Total NET: 446.8 mL      23 Feb 2019 07:01  -  24 Feb 2019 07:00  --------------------------------------------------------  Total NET: 145 mL        CAPILLARY BLOOD GLUCOSE  168 (23 Feb 2019 16:08)  118 (23 Feb 2019 11:00)      POCT Blood Glucose.: 127 mg/dL (24 Feb 2019 06:08)  POCT Blood Glucose.: 168 mg/dL (23 Feb 2019 16:08)  POCT Blood Glucose.: 118 mg/dL (23 Feb 2019 11:02)    Physical Exam:  General: NAD; A&Ox3  Cardiac: S1/S2, RRR, no murmur, no rubs  Lungs: decreased bs at bases   Abdomen: Soft/NT/ND; positive bowel sounds x 4  Sternum: Intact, no click, incision healing well with no drainage  Incisions: Incisions clean/dry/intact  Extremities: trace edema b/l lower extremities; good capillary refill; no cyanosis; palpable 1+ pedal pulses b/l    Central Venous Catheter: Yes[x]  No[] , If Yes indication:     access      Day #2  Cochran Catheter: Yes  [x] , No  [] , If yes indication:       strict i/o               Day #2  NGT: Yes [] No [x] ,    If Yes Placement:                                     Day #  EPICARDIAL WIRES:  [x] YES [] NO                                              Day #2  BOWEL MOVEMENT:  [] YES [x] NO, If No, Timing since last BM:      Day #  CHEST TUBE(Left/Right):  [] YES [x] NO, If yes -  AIR LEAKS:  [] YES [] NO            LABS:                        8.6<L>  12.23<H> )-----------( 156      ( 24 Feb 2019 03:00 )             27.3<L>                        8.5<L>  10.74 )-----------( 164      ( 23 Feb 2019 04:09 )             26.4<L>    02-24    140  |  106  |  20  ----------------------------<  119<H>  4.3   |  25  |  0.9  02-23    143  |  106  |  17  ----------------------------<  91  4.6   |  23  |  0.9    Ca    9.1      24 Feb 2019 03:00  Mg     2.8     02-24    TPro  5.7<L> [6.0 - 8.0]  /  Alb  4.1 [3.5 - 5.2]  /  TBili  0.7 [0.2 - 1.2]  /  DBili  0.2 [0.0 - 0.2]  /  AST  28 [0 - 41]  /  ALT  12 [0 - 41]  /  AlkPhos  42 [30 - 115]  02-24    PT/INR - ( 24 Feb 2019 03:50 )   PT: ;   INR: 1.15 ratio         PT/INR - ( 23 Feb 2019 04:09 )   PT: ;   INR: 1.23 ratio         PTT - ( 24 Feb 2019 03:50 )  PTT:26.7 sec, PTT - ( 23 Feb 2019 04:09 )  PTT:28.2 sec    ABG - ( 24 Feb 2019 06:00 )  pH: 7.44  /  pCO2: 38    /  pO2: 107   / HCO3: 26    / Base Excess: 1.6   /  SaO2: 99    /  LA: 1.0              RADIOLOGY & ADDITIONAL TESTS:  CXR: < from: Xray Chest 1 View- PORTABLE-Urgent (02.23.19 @ 16:00) >  Impression:      Interval removal of left chest tube; no radiographic evidence of   pneumothorax.    < end of copied text >    EKG:  MEDICATIONS  (STANDING):  aspirin enteric coated 325 milliGRAM(s) Oral daily  atorvastatin 40 milliGRAM(s) Oral at bedtime  chlorhexidine 0.12% Liquid 5 milliLiter(s) Oral Mucosa two times a day  chlorhexidine 4% Liquid 1 Application(s) Topical <User Schedule>  dextrose 5%. 1000 milliLiter(s) (50 mL/Hr) IV Continuous <Continuous>  dextrose 50% Injectable 12.5 Gram(s) IV Push once  dextrose 50% Injectable 25 Gram(s) IV Push once  dextrose 50% Injectable 25 Gram(s) IV Push once  diltiazem    Tablet 30 milliGRAM(s) Oral three times a day  docusate sodium 100 milliGRAM(s) Oral three times a day  escitalopram 10 milliGRAM(s) Oral daily  famotidine    Tablet 20 milliGRAM(s) Oral two times a day  guaiFENesin  milliGRAM(s) Oral every 12 hours  insulin lispro (HumaLOG) corrective regimen sliding scale   SubCutaneous three times a day before meals  magnesium sulfate  IVPB 1 Gram(s) IV Intermittent every 12 hours  meperidine     Injectable 25 milliGRAM(s) IV Push once  mometasone 220 MICROgram(s) Inhaler 1 Puff(s) Inhalation daily  sodium chloride 0.9%. 1000 milliLiter(s) (20 mL/Hr) IV Continuous <Continuous>  sodium chloride 0.9%. 1000 milliLiter(s) (75 mL/Hr) IV Continuous <Continuous>  tiotropium 18 MICROgram(s) Capsule 1 Capsule(s) Inhalation daily    MEDICATIONS  (PRN):  dextrose 40% Gel 15 Gram(s) Oral once PRN Blood Glucose LESS THAN 70 milliGRAM(s)/deciliter  glucagon  Injectable 1 milliGRAM(s) IntraMuscular once PRN Glucose LESS THAN 70 milligrams/deciliter  ketorolac   Injectable 30 milliGRAM(s) IV Push every 6 hours PRN Moderate Pain (4 - 6)  ondansetron Injectable 4 milliGRAM(s) IV Push every 8 hours PRN Nausea  oxyCODONE    IR 5 milliGRAM(s) Oral every 4 hours PRN Moderate Pain (4 - 6)  oxyCODONE    IR 10 milliGRAM(s) Oral four times a day PRN Severe Pain (7 - 10)    HEPARIN:  [] YES [x] NO  Dose: XX UNITS/HR UNITS Q8H  LOVENOX:[] YES [x] NO  Dose: XX mg Q24H  COUMADIN: []  YES [x] NO  Dose: XX mg  Q24H  SCD's: YES b/l  GI Prophylaxis: Protonix [], Pepcid [x], None [], (Contra-indication:.....)    Post-Op Beta-Blockers: Yes [], No[x], If No, then contraindication:  a paced, but will start today  Post-Op Aspirin: Yes [x],  No [], If No, then contraindication:  Post-Op Statin: Yes [x], No[], If No, then contraindication:  Allergies    amiodarone (Flushing)  iodine (Unknown)  shellfish (Unknown)    Intolerances      Ambulation/Activity Status: ambulate     Assessment/Plan:  58y Female status-post CABGx1/MAZE   POD#2  - Case and plan discussed with CTU Intensivist and CT Surgeon - Dr. Reed/Jackson/Leonidas   - Continue CTU supportive care    - Continue DVT/GI prophylaxis  - Incentive Spirometry 10 times an hour  - Continue to advance physical activity as tolerated and continue PT/OT as directed  1. CAD: Continue ASA, statin, start low dose bb   2. A. Fib: on cardizem as per EP  3. COPD/Hypoxia: cont nebs, mucinex  4. DM/Glucose Control: insulin sliding scale     Social Service Disposition:  home

## 2019-02-24 NOTE — PHYSICAL THERAPY INITIAL EVALUATION ADULT - GENERAL OBSERVATIONS, REHAB EVAL
[id number] am Chart reviewed. Pt. seen out of bed in recliner, in No apparent distress, + telemetry, Cordis cath, left radial A line, holley catheter, family at bedside

## 2019-02-25 LAB
ANION GAP SERPL CALC-SCNC: 10 MMOL/L — SIGNIFICANT CHANGE UP (ref 7–14)
BUN SERPL-MCNC: 17 MG/DL — SIGNIFICANT CHANGE UP (ref 10–20)
CALCIUM SERPL-MCNC: 9.3 MG/DL — SIGNIFICANT CHANGE UP (ref 8.5–10.1)
CHLORIDE SERPL-SCNC: 106 MMOL/L — SIGNIFICANT CHANGE UP (ref 98–110)
CO2 SERPL-SCNC: 24 MMOL/L — SIGNIFICANT CHANGE UP (ref 17–32)
CREAT SERPL-MCNC: 0.7 MG/DL — SIGNIFICANT CHANGE UP (ref 0.7–1.5)
GAS PNL BLDA: SIGNIFICANT CHANGE UP
GLUCOSE BLDC GLUCOMTR-MCNC: 107 MG/DL — HIGH (ref 70–99)
GLUCOSE BLDC GLUCOMTR-MCNC: 120 MG/DL — HIGH (ref 70–99)
GLUCOSE BLDC GLUCOMTR-MCNC: 148 MG/DL — HIGH (ref 70–99)
GLUCOSE SERPL-MCNC: 108 MG/DL — HIGH (ref 70–99)
HCT VFR BLD CALC: 25.6 % — LOW (ref 37–47)
HCT VFR BLD CALC: 27.2 % — LOW (ref 37–47)
HGB BLD-MCNC: 8.2 G/DL — LOW (ref 12–16)
HGB BLD-MCNC: 8.5 G/DL — LOW (ref 12–16)
MCHC RBC-ENTMCNC: 25.5 PG — LOW (ref 27–31)
MCHC RBC-ENTMCNC: 26 PG — LOW (ref 27–31)
MCHC RBC-ENTMCNC: 31.3 G/DL — LOW (ref 32–37)
MCHC RBC-ENTMCNC: 32 G/DL — SIGNIFICANT CHANGE UP (ref 32–37)
MCV RBC AUTO: 81.3 FL — SIGNIFICANT CHANGE UP (ref 81–99)
MCV RBC AUTO: 81.7 FL — SIGNIFICANT CHANGE UP (ref 81–99)
NRBC # BLD: 0 /100 WBCS — SIGNIFICANT CHANGE UP (ref 0–0)
NRBC # BLD: 0 /100 WBCS — SIGNIFICANT CHANGE UP (ref 0–0)
PLATELET # BLD AUTO: 177 K/UL — SIGNIFICANT CHANGE UP (ref 130–400)
PLATELET # BLD AUTO: 221 K/UL — SIGNIFICANT CHANGE UP (ref 130–400)
POTASSIUM SERPL-MCNC: 3.8 MMOL/L — SIGNIFICANT CHANGE UP (ref 3.5–5)
POTASSIUM SERPL-SCNC: 3.8 MMOL/L — SIGNIFICANT CHANGE UP (ref 3.5–5)
RBC # BLD: 3.15 M/UL — LOW (ref 4.2–5.4)
RBC # BLD: 3.33 M/UL — LOW (ref 4.2–5.4)
RBC # FLD: 13.8 % — SIGNIFICANT CHANGE UP (ref 11.5–14.5)
RBC # FLD: 14.1 % — SIGNIFICANT CHANGE UP (ref 11.5–14.5)
SODIUM SERPL-SCNC: 140 MMOL/L — SIGNIFICANT CHANGE UP (ref 135–146)
WBC # BLD: 10.67 K/UL — SIGNIFICANT CHANGE UP (ref 4.8–10.8)
WBC # BLD: 9.86 K/UL — SIGNIFICANT CHANGE UP (ref 4.8–10.8)
WBC # FLD AUTO: 10.67 K/UL — SIGNIFICANT CHANGE UP (ref 4.8–10.8)
WBC # FLD AUTO: 9.86 K/UL — SIGNIFICANT CHANGE UP (ref 4.8–10.8)

## 2019-02-25 RX ORDER — POTASSIUM CHLORIDE 20 MEQ
20 PACKET (EA) ORAL ONCE
Qty: 0 | Refills: 0 | Status: COMPLETED | OUTPATIENT
Start: 2019-02-25 | End: 2019-02-25

## 2019-02-25 RX ORDER — FUROSEMIDE 40 MG
20 TABLET ORAL ONCE
Qty: 0 | Refills: 0 | Status: COMPLETED | OUTPATIENT
Start: 2019-02-25 | End: 2019-02-25

## 2019-02-25 RX ORDER — COLCHICINE 0.6 MG
0.6 TABLET ORAL EVERY 12 HOURS
Qty: 0 | Refills: 0 | Status: DISCONTINUED | OUTPATIENT
Start: 2019-02-25 | End: 2019-02-25

## 2019-02-25 RX ORDER — METOPROLOL TARTRATE 50 MG
25 TABLET ORAL
Qty: 0 | Refills: 0 | Status: DISCONTINUED | OUTPATIENT
Start: 2019-02-25 | End: 2019-02-28

## 2019-02-25 RX ORDER — ACETAMINOPHEN 500 MG
650 TABLET ORAL EVERY 6 HOURS
Qty: 0 | Refills: 0 | Status: DISCONTINUED | OUTPATIENT
Start: 2019-02-25 | End: 2019-02-28

## 2019-02-25 RX ADMIN — Medication 100 GRAM(S): at 18:17

## 2019-02-25 RX ADMIN — CHLORHEXIDINE GLUCONATE 5 MILLILITER(S): 213 SOLUTION TOPICAL at 05:17

## 2019-02-25 RX ADMIN — Medication 12.5 MILLIGRAM(S): at 05:17

## 2019-02-25 RX ADMIN — Medication 600 MILLIGRAM(S): at 18:12

## 2019-02-25 RX ADMIN — OXYCODONE HYDROCHLORIDE 5 MILLIGRAM(S): 5 TABLET ORAL at 22:30

## 2019-02-25 RX ADMIN — ATORVASTATIN CALCIUM 40 MILLIGRAM(S): 80 TABLET, FILM COATED ORAL at 22:34

## 2019-02-25 RX ADMIN — MOMETASONE FUROATE 1 PUFF(S): 220 INHALANT RESPIRATORY (INHALATION) at 08:15

## 2019-02-25 RX ADMIN — ESCITALOPRAM OXALATE 10 MILLIGRAM(S): 10 TABLET, FILM COATED ORAL at 11:57

## 2019-02-25 RX ADMIN — HEPARIN SODIUM 5000 UNIT(S): 5000 INJECTION INTRAVENOUS; SUBCUTANEOUS at 22:34

## 2019-02-25 RX ADMIN — ONDANSETRON 4 MILLIGRAM(S): 8 TABLET, FILM COATED ORAL at 12:30

## 2019-02-25 RX ADMIN — Medication 20 MILLIEQUIVALENT(S): at 08:56

## 2019-02-25 RX ADMIN — Medication 100 GRAM(S): at 05:22

## 2019-02-25 RX ADMIN — Medication 600 MILLIGRAM(S): at 05:17

## 2019-02-25 RX ADMIN — OXYCODONE HYDROCHLORIDE 5 MILLIGRAM(S): 5 TABLET ORAL at 23:15

## 2019-02-25 RX ADMIN — Medication 325 MILLIGRAM(S): at 11:57

## 2019-02-25 RX ADMIN — OXYCODONE HYDROCHLORIDE 5 MILLIGRAM(S): 5 TABLET ORAL at 07:14

## 2019-02-25 RX ADMIN — Medication 25 MILLIGRAM(S): at 11:57

## 2019-02-25 RX ADMIN — Medication 100 MILLIGRAM(S): at 05:17

## 2019-02-25 RX ADMIN — TIOTROPIUM BROMIDE 1 CAPSULE(S): 18 CAPSULE ORAL; RESPIRATORY (INHALATION) at 08:02

## 2019-02-25 RX ADMIN — HEPARIN SODIUM 5000 UNIT(S): 5000 INJECTION INTRAVENOUS; SUBCUTANEOUS at 13:29

## 2019-02-25 RX ADMIN — Medication 100 MILLIGRAM(S): at 13:29

## 2019-02-25 RX ADMIN — Medication 100 MILLIGRAM(S): at 22:34

## 2019-02-25 RX ADMIN — HEPARIN SODIUM 5000 UNIT(S): 5000 INJECTION INTRAVENOUS; SUBCUTANEOUS at 05:17

## 2019-02-25 RX ADMIN — CHLORHEXIDINE GLUCONATE 1 APPLICATION(S): 213 SOLUTION TOPICAL at 05:22

## 2019-02-25 RX ADMIN — Medication 20 MILLIGRAM(S): at 08:26

## 2019-02-25 RX ADMIN — OXYCODONE HYDROCHLORIDE 5 MILLIGRAM(S): 5 TABLET ORAL at 06:30

## 2019-02-25 RX ADMIN — Medication 650 MILLIGRAM(S): at 13:30

## 2019-02-25 RX ADMIN — FAMOTIDINE 20 MILLIGRAM(S): 10 INJECTION INTRAVENOUS at 05:17

## 2019-02-25 RX ADMIN — FAMOTIDINE 20 MILLIGRAM(S): 10 INJECTION INTRAVENOUS at 18:12

## 2019-02-25 RX ADMIN — OXYCODONE HYDROCHLORIDE 5 MILLIGRAM(S): 5 TABLET ORAL at 13:14

## 2019-02-25 RX ADMIN — Medication 650 MILLIGRAM(S): at 12:30

## 2019-02-25 RX ADMIN — OXYCODONE HYDROCHLORIDE 5 MILLIGRAM(S): 5 TABLET ORAL at 12:10

## 2019-02-25 NOTE — PROGRESS NOTE ADULT - SUBJECTIVE AND OBJECTIVE BOX
OPERATIVE PROCEDURE(s):                POD #                       SURGEON(s): YANCY Reed  SUBJECTIVE ASSESSMENT: 58y Female patient seen and examined at bedside.    Vital Signs Last 24 Hrs  T(F): 100.1 (25 Feb 2019 04:00), Max: 100.4 (24 Feb 2019 17:00)  HR: 68 (25 Feb 2019 07:00) (68 - 92)  BP: 124/67 (24 Feb 2019 15:21) (106/67 - 124/67)  BP(mean): 91 (24 Feb 2019 15:21) (73 - 91)  ABP: 112/58 (25 Feb 2019 07:00) (98/42 - 148/70)  ABP(mean): 78 (25 Feb 2019 07:00)  RR: 22 (25 Feb 2019 07:00) (17 - 33)  SpO2: 96% (25 Feb 2019 07:00) (87% - 99%)    I&O's Detail    24 Feb 2019 07:01  -  25 Feb 2019 07:00  --------------------------------------------------------  IN:    Albumin 5%  - 500 mL: 500 mL    IV PiggyBack: 200 mL    Oral Fluid: 690 mL  Total IN: 1390 mL    OUT:    Indwelling Catheter - Urethral: 741 mL  Total OUT: 741 mL    Net: I&O's Detail    23 Feb 2019 07:01  -  24 Feb 2019 07:00  --------------------------------------------------------  Total NET: 177 mL    24 Feb 2019 07:01  -  25 Feb 2019 07:00  --------------------------------------------------------  Total NET: 649 mL      CAPILLARY BLOOD GLUCOSE  POCT Blood Glucose.: 120 mg/dL (25 Feb 2019 06:32)  POCT Blood Glucose.: 136 mg/dL (24 Feb 2019 16:06)  POCT Blood Glucose.: 121 mg/dL (24 Feb 2019 11:11)      Physical Exam:  General: NAD; A&Ox3  Cardiac: S1/S2, RRR, no murmur, no rubs  Lungs: unlabored respirations, CTA b/l, no wheeze, no rales, no crackles  Abdomen: Soft/NT/ND; positive bowel sounds x 4  Sternum: Intact, no click, incision healing well with no drainage  Incisions: Incisions clean/dry/intact  Extremities: No edema b/l lower extremities; good capillary refill; no cyanosis; palpable 1+ pedal pulses b/l    Central Venous Catheter: Yes[x]  No[] , If Yes indication: IV Access                    Day # 3  Cochran Catheter: Yes  [x] , No  [] , If yes indication: Monitor strict in/out              Day # 3  EPICARDIAL WIRES:  [x] YES [] NO                                                                  Day # 3  BOWEL MOVEMENT:  [] YES [x] NO, If No, Timing since last BM Day #  CHEST TUBE(Left/Right):  [] YES [x] NO, If yes -  AIR LEAKS:  [] YES [] NO        LABS:                        8.2<L>  9.86  )-----------( 177      ( 25 Feb 2019 06:40 )             25.6<L>                        8.6<L>  12.23<H> )-----------( 156      ( 24 Feb 2019 03:00 )             27.3<L>    02-25    140  |  106  |  17  ----------------------------<  108<H>  3.8   |  24  |  0.7  02-24    140  |  106  |  20  ----------------------------<  119<H>  4.3   |  25  |  0.9    Ca    9.3      25 Feb 2019 04:45  Mg     2.8     02-24    TPro  5.7<L> [6.0 - 8.0]  /  Alb  4.1 [3.5 - 5.2]  /  TBili  0.7 [0.2 - 1.2]  /  DBili  0.2 [0.0 - 0.2]  /  AST  28 [0 - 41]  /  ALT  12 [0 - 41]  /  AlkPhos  42 [30 - 115]  02-24    PT/INR - ( 24 Feb 2019 03:50 )   PT: ;   INR: 1.15 ratio         PTT - ( 24 Feb 2019 03:50 )  PTT:26.7 sec    ABG - ( 25 Feb 2019 05:14 )  pH: 7.44  /  pCO2: 40    /  pO2: 72    / HCO3: 27    / Base Excess: 2.9   /  SaO2: 96    /  LA: 0.4              RADIOLOGY & ADDITIONAL TESTS:  CXR:   EKG:  Allergies    amiodarone (Flushing)  iodine (Unknown)  shellfish (Unknown)    Intolerances      MEDICATIONS  (STANDING):  aspirin enteric coated 325 milliGRAM(s) Oral daily  atorvastatin 40 milliGRAM(s) Oral at bedtime  chlorhexidine 0.12% Liquid 5 milliLiter(s) Oral Mucosa two times a day  chlorhexidine 4% Liquid 1 Application(s) Topical <User Schedule>  dextrose 5%. 1000 milliLiter(s) (50 mL/Hr) IV Continuous <Continuous>  dextrose 50% Injectable 12.5 Gram(s) IV Push once  dextrose 50% Injectable 25 Gram(s) IV Push once  dextrose 50% Injectable 25 Gram(s) IV Push once  diltiazem    Tablet 30 milliGRAM(s) Oral three times a day  docusate sodium 100 milliGRAM(s) Oral three times a day  escitalopram 10 milliGRAM(s) Oral daily  famotidine    Tablet 20 milliGRAM(s) Oral two times a day  guaiFENesin  milliGRAM(s) Oral every 12 hours  heparin  Injectable 5000 Unit(s) SubCutaneous every 8 hours  insulin lispro (HumaLOG) corrective regimen sliding scale   SubCutaneous three times a day before meals  magnesium sulfate  IVPB 1 Gram(s) IV Intermittent every 12 hours  meperidine     Injectable 25 milliGRAM(s) IV Push once  metoprolol tartrate 12.5 milliGRAM(s) Oral two times a day  mometasone 220 MICROgram(s) Inhaler 1 Puff(s) Inhalation daily  sodium chloride 0.9%. 1000 milliLiter(s) (20 mL/Hr) IV Continuous <Continuous>  sodium chloride 0.9%. 1000 milliLiter(s) (75 mL/Hr) IV Continuous <Continuous>  tiotropium 18 MICROgram(s) Capsule 1 Capsule(s) Inhalation daily    MEDICATIONS  (PRN):  dextrose 40% Gel 15 Gram(s) Oral once PRN Blood Glucose LESS THAN 70 milliGRAM(s)/deciliter  glucagon  Injectable 1 milliGRAM(s) IntraMuscular once PRN Glucose LESS THAN 70 milligrams/deciliter  ibuprofen  Tablet. 400 milliGRAM(s) Oral every 6 hours PRN Temp greater or equal to 38C (100.4F), Moderate Pain (4 - 6)  ondansetron Injectable 4 milliGRAM(s) IV Push every 8 hours PRN Nausea  oxyCODONE    IR 5 milliGRAM(s) Oral every 4 hours PRN Moderate Pain (4 - 6)  oxyCODONE    IR 10 milliGRAM(s) Oral four times a day PRN Severe Pain (7 - 10)      Pharmacologic DVT Prophylaxis: [] YES, []NO: Contraindication:   [] HEPARIN: Dose: XX mg  Q24H    [] LOVENOX: Dose: XX mg  Q24H                 SCD's: YES b/l    GI Prophylaxis: Protonix [], Pepcid []    Post-Op Beta-Blockers: []Yes, []No: contraindication:  Post-Op Nitrate: []Yes, []No: contraindication:  Post-Op Aspirin: []Yes,  []No: contraindication:  Post-Op Statin: []Yes, []No: contraindication:      Ambulation/Activity Status:    Assessment/Plan:  58y Female status-post  - Case and plan discussed with CTU Intensivist and CT Surgeon - Dr. Reed/Jackson/Leonidas   - Continue CTU supportive care and ongoing plan of care as per continuing CTU rounds.    - Continue DVT/GI prophylaxis  - Incentive Spirometry 10 times an hour  - Continue to advance physical activity as tolerated and continue PT/OT as directed  1. CAD: Continue ASA, statin, BB  2. HTN:   3. A. Fib:   4. COPD/Hypoxia:   5. DM/Glucose Control:     Social Service Disposition: OPERATIVE PROCEDURE(s):   CABGx1/MAZE/LLA closure             POD # 3                      SURGEON(s): YANCY Reed  SUBJECTIVE ASSESSMENT: 58y Female patient seen and examined at bedside.     Vital Signs Last 24 Hrs  T(F): 100.1 (25 Feb 2019 04:00), Max: 100.4 (24 Feb 2019 17:00)  HR: 68 (25 Feb 2019 07:00) (68 - 92)  BP: 124/67 (24 Feb 2019 15:21) (106/67 - 124/67)  BP(mean): 91 (24 Feb 2019 15:21) (73 - 91)  ABP: 112/58 (25 Feb 2019 07:00) (98/42 - 148/70)  ABP(mean): 78 (25 Feb 2019 07:00)  RR: 22 (25 Feb 2019 07:00) (17 - 33)  SpO2: 96% (25 Feb 2019 07:00) (87% - 99%)    I&O's Detail    24 Feb 2019 07:01  -  25 Feb 2019 07:00  --------------------------------------------------------  IN:    Albumin 5%  - 500 mL: 500 mL    IV PiggyBack: 200 mL    Oral Fluid: 690 mL  Total IN: 1390 mL    OUT:    Indwelling Catheter - Urethral: 741 mL  Total OUT: 741 mL    Net: I&O's Detail    23 Feb 2019 07:01  -  24 Feb 2019 07:00  --------------------------------------------------------  Total NET: 177 mL    24 Feb 2019 07:01  -  25 Feb 2019 07:00  --------------------------------------------------------  Total NET: 649 mL      CAPILLARY BLOOD GLUCOSE  POCT Blood Glucose.: 120 mg/dL (25 Feb 2019 06:32)  POCT Blood Glucose.: 136 mg/dL (24 Feb 2019 16:06)  POCT Blood Glucose.: 121 mg/dL (24 Feb 2019 11:11)      Physical Exam:  General: NAD; A&Ox3  Cardiac: S1/S2, RRR, no murmur, no rubs  Lungs: decreased bs at bases   Abdomen: Soft/NT/ND; positive bowel sounds x 4  Sternum: Intact, no click, incision healing well with no drainage  Incisions: Incisions clean/dry/intact  Extremities: trace edema b/l lower extremities; good capillary refill; no cyanosis; palpable 1+ pedal pulses b/l    Central Venous Catheter: Yes[x]  No[] , If Yes indication: IV Access                    Day # 3  Cochran Catheter: Yes  [x] , No  [] , If yes indication: Monitor strict in/out              Day # 3  EPICARDIAL WIRES:  [x] YES [] NO                                                                  Day # 3  BOWEL MOVEMENT:  [] YES [x] NO, If No, Timing since last BM Day #  CHEST TUBE(Left/Right):  [] YES [x] NO, If yes -  AIR LEAKS:  [] YES [] NO        LABS:                        8.2<L>  9.86  )-----------( 177      ( 25 Feb 2019 06:40 )             25.6<L>                        8.6<L>  12.23<H> )-----------( 156      ( 24 Feb 2019 03:00 )             27.3<L>    02-25    140  |  106  |  17  ----------------------------<  108<H>  3.8   |  24  |  0.7  02-24    140  |  106  |  20  ----------------------------<  119<H>  4.3   |  25  |  0.9    Ca    9.3      25 Feb 2019 04:45  Mg     2.8     02-24    TPro  5.7<L> [6.0 - 8.0]  /  Alb  4.1 [3.5 - 5.2]  /  TBili  0.7 [0.2 - 1.2]  /  DBili  0.2 [0.0 - 0.2]  /  AST  28 [0 - 41]  /  ALT  12 [0 - 41]  /  AlkPhos  42 [30 - 115]  02-24    PT/INR - ( 24 Feb 2019 03:50 )   PT: ;   INR: 1.15 ratio       PTT - ( 24 Feb 2019 03:50 )  PTT:26.7 sec    ABG - ( 25 Feb 2019 05:14 )  pH: 7.44  /  pCO2: 40    /  pO2: 72    / HCO3: 27    / Base Excess: 2.9   /  SaO2: 96    /  LA: 0.4        RADIOLOGY & ADDITIONAL TESTS:  CXR: < from: Xray Chest 1 View- PORTABLE-Routine (02.25.19 @ 05:10) >  Impression:  No radiographic evidence of acute cardiopulmonary disease.  < end of copied text >     EKG: < from: 12 Lead ECG (02.25.19 @ 07:35) >  Ventricular Rate 73 BPM  Atrial Rate 73 BPM  P-R Interval 130 ms  QRS Duration 86 ms  Q-T Interval 370 ms  QTC Calculation(Bezet) 407 ms  P Axis 75 degrees  R Axis 45 degrees  T Axis 22 degrees  Diagnosis Line Normal sinus rhythm  Normal ECG  Confirmed by Nanda Clements MD ([id number]) on 2/25/2019 10:08:33 AM  < end of copied text >    Allergies  amiodarone (Flushing)  iodine (Unknown)  shellfish (Unknown)  Intolerances      MEDICATIONS  (STANDING):  aspirin enteric coated 325 milliGRAM(s) Oral daily  atorvastatin 40 milliGRAM(s) Oral at bedtime  chlorhexidine 0.12% Liquid 5 milliLiter(s) Oral Mucosa two times a day  chlorhexidine 4% Liquid 1 Application(s) Topical <User Schedule>  dextrose 5%. 1000 milliLiter(s) (50 mL/Hr) IV Continuous <Continuous>  dextrose 50% Injectable 12.5 Gram(s) IV Push once  dextrose 50% Injectable 25 Gram(s) IV Push once  dextrose 50% Injectable 25 Gram(s) IV Push once  diltiazem    Tablet 30 milliGRAM(s) Oral three times a day  docusate sodium 100 milliGRAM(s) Oral three times a day  escitalopram 10 milliGRAM(s) Oral daily  famotidine    Tablet 20 milliGRAM(s) Oral two times a day  guaiFENesin  milliGRAM(s) Oral every 12 hours  heparin  Injectable 5000 Unit(s) SubCutaneous every 8 hours  insulin lispro (HumaLOG) corrective regimen sliding scale   SubCutaneous three times a day before meals  magnesium sulfate  IVPB 1 Gram(s) IV Intermittent every 12 hours  meperidine     Injectable 25 milliGRAM(s) IV Push once  metoprolol tartrate 12.5 milliGRAM(s) Oral two times a day  mometasone 220 MICROgram(s) Inhaler 1 Puff(s) Inhalation daily  sodium chloride 0.9%. 1000 milliLiter(s) (20 mL/Hr) IV Continuous <Continuous>  sodium chloride 0.9%. 1000 milliLiter(s) (75 mL/Hr) IV Continuous <Continuous>  tiotropium 18 MICROgram(s) Capsule 1 Capsule(s) Inhalation daily    MEDICATIONS  (PRN):  dextrose 40% Gel 15 Gram(s) Oral once PRN Blood Glucose LESS THAN 70 milliGRAM(s)/deciliter  glucagon  Injectable 1 milliGRAM(s) IntraMuscular once PRN Glucose LESS THAN 70 milligrams/deciliter  ibuprofen  Tablet. 400 milliGRAM(s) Oral every 6 hours PRN Temp greater or equal to 38C (100.4F), Moderate Pain (4 - 6)  ondansetron Injectable 4 milliGRAM(s) IV Push every 8 hours PRN Nausea  oxyCODONE    IR 5 milliGRAM(s) Oral every 4 hours PRN Moderate Pain (4 - 6)  oxyCODONE    IR 10 milliGRAM(s) Oral four times a day PRN Severe Pain (7 - 10)      Pharmacologic DVT Prophylaxis: heparin  Injectable 5000 Unit(s) SubCutaneous every 8 hours   SCD's: YES b/l    GI Prophylaxis: famotidine    Tablet 20 milliGRAM(s) Oral two times a day    Post-Op Beta-Blockers: []Yes, []No: contraindication:  Post-Op Nitrate: []Yes, []No: contraindication:  Post-Op Aspirin: []Yes,  []No: contraindication:  Post-Op Statin: []Yes, []No: contraindication:      Ambulation/Activity Status:    Assessment/Plan:  58y Female status-post  - Case and plan discussed with CTU Intensivist and CT Surgeon - Dr. Reed/Jackson/Leonidas   - Continue CTU supportive care and ongoing plan of care as per continuing CTU rounds.    - Continue DVT/GI prophylaxis  - Incentive Spirometry 10 times an hour  - Continue to advance physical activity as tolerated and continue PT/OT as directed  1. CAD: Continue ASA, statin, BB  2. Hypervolemia: Lasix 40 iv x1  3. A. Fib: continue magnesium sulfate  IVPB 1 Gram(s) IV Intermittent every 12 hours w/diltiazem    Tablet 30 milliGRAM(s) Oral three times a day  as per EP Dr. Feliz.  4. COPD/Hypoxia: continue current inhalers and mucinex.  5. DM/Glucose Control: cont insulin lispro (HumaLOG) corrective regimen sliding scale   SubCutaneous three times a day before meals

## 2019-02-25 NOTE — PROGRESS NOTE ADULT - SUBJECTIVE AND OBJECTIVE BOX
57 yo Female with PAST MEDICAL & SURGICAL HISTORY: x-smoker, Obesity, Paroxysmal A fib on Xarelto, COPD, HTN, presented via EMS with c/o rapid HR. In ED noted with elevated Trops and EKG with inferio-lateral ST depressions.   -19 - Cardiac cath with CAD.   - 19 - s/p CABG x1 (LIMA -> LAD), MAZE with atrial crlip    -Extubated, off drips    Surgical hx:  Closed fracture of foot, unspecified laterality, sequela  History of  section    Vital Signs Last 24 Hrs  T(C): 37.9 (2019 08:00), Max: 38 (2019 17:00)  T(F): 100.2 (2019 08:00), Max: 100.4 (2019 17:00)  HR: 74 (2019 08:00) (68 - 92)  BP: 115/56 (2019 08:00) (113/52 - 124/67)  BP(mean): 73 (2019 08:00) (73 - 91)  RR: 27 (2019 08:00) (17 - 33)  SpO2: 98% (2019 08:00) (87% - 99%)    alert, awake, verbal  follows commands  right IJ Cordis  clean sternal incision  S1S2 Reg rate  B/l air entry, diminished effort  soft abdomen, non distended  b/l pedal edema    WBC 9.9, hg 8.2, plt 177  K 3.8    a/p:    57 yo F X smoker, with COPD, Paroxysmal A fib no Xarelto at home, Obesity, HTN. Found to have CAD s/p CABG x1 and MAZE    PO ASA, statin, beta blocker  d/c cordis, A line  KCl 20 meq PO x1, then lasix 20 mg IV   keep holley for today  Pain control PRN  Glycemic control   DVT proph  OOB to chair.

## 2019-02-26 ENCOUNTER — TRANSCRIPTION ENCOUNTER (OUTPATIENT)
Age: 59
End: 2019-02-26

## 2019-02-26 LAB
ANION GAP SERPL CALC-SCNC: 12 MMOL/L — SIGNIFICANT CHANGE UP (ref 7–14)
BUN SERPL-MCNC: 17 MG/DL — SIGNIFICANT CHANGE UP (ref 10–20)
CALCIUM SERPL-MCNC: 9.1 MG/DL — SIGNIFICANT CHANGE UP (ref 8.5–10.1)
CHLORIDE SERPL-SCNC: 98 MMOL/L — SIGNIFICANT CHANGE UP (ref 98–110)
CO2 SERPL-SCNC: 26 MMOL/L — SIGNIFICANT CHANGE UP (ref 17–32)
CREAT SERPL-MCNC: 0.8 MG/DL — SIGNIFICANT CHANGE UP (ref 0.7–1.5)
GLUCOSE BLDC GLUCOMTR-MCNC: 102 MG/DL — HIGH (ref 70–99)
GLUCOSE BLDC GLUCOMTR-MCNC: 117 MG/DL — HIGH (ref 70–99)
GLUCOSE BLDC GLUCOMTR-MCNC: 136 MG/DL — HIGH (ref 70–99)
GLUCOSE BLDC GLUCOMTR-MCNC: 140 MG/DL — HIGH (ref 70–99)
GLUCOSE BLDC GLUCOMTR-MCNC: 92 MG/DL — SIGNIFICANT CHANGE UP (ref 70–99)
GLUCOSE SERPL-MCNC: 132 MG/DL — HIGH (ref 70–99)
MAGNESIUM SERPL-MCNC: 2.4 MG/DL — SIGNIFICANT CHANGE UP (ref 1.8–2.4)
POTASSIUM SERPL-MCNC: 3.9 MMOL/L — SIGNIFICANT CHANGE UP (ref 3.5–5)
POTASSIUM SERPL-SCNC: 3.9 MMOL/L — SIGNIFICANT CHANGE UP (ref 3.5–5)
SODIUM SERPL-SCNC: 136 MMOL/L — SIGNIFICANT CHANGE UP (ref 135–146)

## 2019-02-26 RX ORDER — WARFARIN SODIUM 2.5 MG/1
3 TABLET ORAL ONCE
Qty: 0 | Refills: 0 | Status: COMPLETED | OUTPATIENT
Start: 2019-02-26 | End: 2019-02-26

## 2019-02-26 RX ORDER — FUROSEMIDE 40 MG
20 TABLET ORAL ONCE
Qty: 0 | Refills: 0 | Status: COMPLETED | OUTPATIENT
Start: 2019-02-26 | End: 2019-02-26

## 2019-02-26 RX ORDER — POTASSIUM CHLORIDE 20 MEQ
20 PACKET (EA) ORAL ONCE
Qty: 0 | Refills: 0 | Status: COMPLETED | OUTPATIENT
Start: 2019-02-26 | End: 2019-02-26

## 2019-02-26 RX ORDER — SENNA PLUS 8.6 MG/1
1 TABLET ORAL
Qty: 0 | Refills: 0 | Status: DISCONTINUED | OUTPATIENT
Start: 2019-02-26 | End: 2019-02-28

## 2019-02-26 RX ADMIN — CHLORHEXIDINE GLUCONATE 1 APPLICATION(S): 213 SOLUTION TOPICAL at 05:35

## 2019-02-26 RX ADMIN — WARFARIN SODIUM 3 MILLIGRAM(S): 2.5 TABLET ORAL at 22:53

## 2019-02-26 RX ADMIN — ATORVASTATIN CALCIUM 40 MILLIGRAM(S): 80 TABLET, FILM COATED ORAL at 22:53

## 2019-02-26 RX ADMIN — Medication 400 MILLIGRAM(S): at 13:20

## 2019-02-26 RX ADMIN — Medication 20 MILLIGRAM(S): at 11:00

## 2019-02-26 RX ADMIN — Medication 600 MILLIGRAM(S): at 17:52

## 2019-02-26 RX ADMIN — FAMOTIDINE 20 MILLIGRAM(S): 10 INJECTION INTRAVENOUS at 05:35

## 2019-02-26 RX ADMIN — HEPARIN SODIUM 5000 UNIT(S): 5000 INJECTION INTRAVENOUS; SUBCUTANEOUS at 22:54

## 2019-02-26 RX ADMIN — HEPARIN SODIUM 5000 UNIT(S): 5000 INJECTION INTRAVENOUS; SUBCUTANEOUS at 05:35

## 2019-02-26 RX ADMIN — Medication 20 MILLIEQUIVALENT(S): at 06:41

## 2019-02-26 RX ADMIN — MOMETASONE FUROATE 1 PUFF(S): 220 INHALANT RESPIRATORY (INHALATION) at 12:48

## 2019-02-26 RX ADMIN — Medication 400 MILLIGRAM(S): at 12:49

## 2019-02-26 RX ADMIN — Medication 600 MILLIGRAM(S): at 05:35

## 2019-02-26 RX ADMIN — SENNA PLUS 1 TABLET(S): 8.6 TABLET ORAL at 17:52

## 2019-02-26 RX ADMIN — FAMOTIDINE 20 MILLIGRAM(S): 10 INJECTION INTRAVENOUS at 17:52

## 2019-02-26 RX ADMIN — Medication 400 MILLIGRAM(S): at 20:14

## 2019-02-26 RX ADMIN — Medication 100 MILLIGRAM(S): at 13:08

## 2019-02-26 RX ADMIN — Medication 325 MILLIGRAM(S): at 12:48

## 2019-02-26 RX ADMIN — Medication 100 GRAM(S): at 05:35

## 2019-02-26 RX ADMIN — HEPARIN SODIUM 5000 UNIT(S): 5000 INJECTION INTRAVENOUS; SUBCUTANEOUS at 13:08

## 2019-02-26 RX ADMIN — Medication 100 MILLIGRAM(S): at 05:35

## 2019-02-26 RX ADMIN — Medication 100 MILLIGRAM(S): at 22:53

## 2019-02-26 RX ADMIN — Medication 100 GRAM(S): at 17:53

## 2019-02-26 RX ADMIN — ESCITALOPRAM OXALATE 10 MILLIGRAM(S): 10 TABLET, FILM COATED ORAL at 12:48

## 2019-02-26 RX ADMIN — TIOTROPIUM BROMIDE 1 CAPSULE(S): 18 CAPSULE ORAL; RESPIRATORY (INHALATION) at 07:44

## 2019-02-26 RX ADMIN — Medication 25 MILLIGRAM(S): at 05:34

## 2019-02-26 RX ADMIN — Medication 25 MILLIGRAM(S): at 17:52

## 2019-02-26 NOTE — PROGRESS NOTE ADULT - SUBJECTIVE AND OBJECTIVE BOX
OPERATIVE PROCEDURE(s):                POD #                       SURGEON(s): YANCY Reed  SUBJECTIVE ASSESSMENT:58yFemale patient seen and examined at bedside.    Vital Signs Last 24 Hrs  T(F): 98.8 (26 Feb 2019 08:00), Max: 100.9 (25 Feb 2019 13:00)  HR: 80 (26 Feb 2019 09:00) (72 - 94)  BP: 114/61 (26 Feb 2019 08:00) (87/52 - 149/87)  BP(mean): 79 (26 Feb 2019 08:00) (56 - 103)  RR: 39 (26 Feb 2019 09:00) (19 - 39)  SpO2: 98% (26 Feb 2019 09:00) (96% - 100%)    I&O's Detail    25 Feb 2019 07:01  -  26 Feb 2019 07:00  --------------------------------------------------------  IN:    IV PiggyBack: 100 mL    Oral Fluid: 1050 mL  Total IN: 1150 mL    OUT:    Indwelling Catheter - Urethral: 784 mL    Voided: 435 mL  Total OUT: 1219 mL    Net: I&O's Detail    24 Feb 2019 07:01  -  25 Feb 2019 07:00  --------------------------------------------------------  Total NET: 612 mL    25 Feb 2019 07:01  -  26 Feb 2019 07:00  --------------------------------------------------------  Total NET: -69 mL      CAPILLARY BLOOD GLUCOSE  POCT Blood Glucose.: 117 mg/dL (26 Feb 2019 06:44)  POCT Blood Glucose.: 148 mg/dL (25 Feb 2019 22:29)  POCT Blood Glucose.: 136 mg/dL (25 Feb 2019 16:06)  POCT Blood Glucose.: 107 mg/dL (25 Feb 2019 11:14)      Physical Exam:  General: NAD; A&Ox3  Cardiac: S1/S2, RRR, no murmur, no rubs  Lungs: unlabored respirations, CTA b/l, no wheeze, no rales, no crackles  Abdomen: Soft/NT/ND; positive bowel sounds x 4  Sternum: Intact, no click, incision healing well with no drainage  Incisions: Incisions clean/dry/intact  Extremities: No edema b/l lower extremities; good capillary refill; no cyanosis; palpable 1+ pedal pulses b/l    Central Venous Catheter: Yes[]  No[] , If Yes indication:                    Day #  Cochran Catheter: Yes  [] , No  [] , If yes indication:                                 Day #  NGT: Yes [] No [] ,    If Yes Placement:                                                   Day #  EPICARDIAL WIRES:  [] YES [] NO                                                            Day #  BOWEL MOVEMENT:  [] YES [] NO, If No, Timing since last BM Day #  CHEST TUBE(Left/Right):  [] YES [] NO, If yes -  AIR LEAKS:  [] YES [] NO        LABS:                        8.5<L>  10.67 )-----------( 221      ( 25 Feb 2019 23:20 )             27.2<L>                        8.2<L>  9.86  )-----------( 177      ( 25 Feb 2019 06:40 )             25.6<L>    02-25    136  |  98  |  17  ----------------------------<  132<H>  3.9   |  26  |  0.8  02-25    140  |  106  |  17  ----------------------------<  108<H>  3.8   |  24  |  0.7    Ca    9.1      25 Feb 2019 23:20  Mg     2.4     02-25    TPro  5.7<L> [6.0 - 8.0]  /  Alb  4.1 [3.5 - 5.2]  /  TBili  0.7 [0.2 - 1.2]  /  DBili  0.2 [0.0 - 0.2]  /  AST  28 [0 - 41]  /  ALT  12 [0 - 41]  /  AlkPhos  42 [30 - 115]  02-24        ABG - ( 25 Feb 2019 05:14 )  pH: 7.44  /  pCO2: 40    /  pO2: 72    / HCO3: 27    / Base Excess: 2.9   /  SaO2: 96    /  LA: 0.4              RADIOLOGY & ADDITIONAL TESTS:  CXR: < from: Xray Chest 1 View- PORTABLE-Urgent (02.26.19 @ 06:34) >  Impression:    Cardiomegaly. Left lung opacification, improving.  < end of copied text >    EKG: < from: 12 Lead ECG (02.25.19 @ 07:35) >  Ventricular Rate 73 BPM  Atrial Rate 73 BPM  P-R Interval 130 ms  QRS Duration 86 ms  Q-T Interval 370 ms  QTC Calculation(Bezet) 407 ms  P Axis 75 degrees  R Axis 45 degrees  T Axis 22 degrees  Diagnosis Line Normal sinus rhythm  Normal ECG  Confirmed by Nanda Clements MD ([id number]) on 2/25/2019 10:08:33 AM  < end of copied text >      Allergies  amiodarone (Flushing)  iodine (Unknown)  shellfish (Unknown)  Intolerances      MEDICATIONS  (STANDING):  aspirin enteric coated 325 milliGRAM(s) Oral daily  atorvastatin 40 milliGRAM(s) Oral at bedtime  chlorhexidine 4% Liquid 1 Application(s) Topical <User Schedule>  dextrose 5%. 1000 milliLiter(s) (50 mL/Hr) IV Continuous <Continuous>  dextrose 50% Injectable 12.5 Gram(s) IV Push once  dextrose 50% Injectable 25 Gram(s) IV Push once  dextrose 50% Injectable 25 Gram(s) IV Push once  diltiazem    Tablet 30 milliGRAM(s) Oral three times a day  docusate sodium 100 milliGRAM(s) Oral three times a day  escitalopram 10 milliGRAM(s) Oral daily  famotidine    Tablet 20 milliGRAM(s) Oral two times a day  furosemide   Injectable 20 milliGRAM(s) IV Push once  guaiFENesin  milliGRAM(s) Oral every 12 hours  heparin  Injectable 5000 Unit(s) SubCutaneous every 8 hours  insulin lispro (HumaLOG) corrective regimen sliding scale   SubCutaneous three times a day before meals  magnesium sulfate  IVPB 1 Gram(s) IV Intermittent every 12 hours  metoprolol tartrate 25 milliGRAM(s) Oral two times a day  mometasone 220 MICROgram(s) Inhaler 1 Puff(s) Inhalation daily  tiotropium 18 MICROgram(s) Capsule 1 Capsule(s) Inhalation daily  warfarin 3 milliGRAM(s) Oral once    MEDICATIONS  (PRN):  acetaminophen   Tablet .. 650 milliGRAM(s) Oral every 6 hours PRN Temp greater or equal to 38C (100.4F), Mild Pain (1 - 3)  dextrose 40% Gel 15 Gram(s) Oral once PRN Blood Glucose LESS THAN 70 milliGRAM(s)/deciliter  glucagon  Injectable 1 milliGRAM(s) IntraMuscular once PRN Glucose LESS THAN 70 milligrams/deciliter  ibuprofen  Tablet. 400 milliGRAM(s) Oral every 6 hours PRN Temp greater or equal to 38C (100.4F), Moderate Pain (4 - 6)  ondansetron Injectable 4 milliGRAM(s) IV Push every 8 hours PRN Nausea  oxyCODONE    IR 5 milliGRAM(s) Oral every 4 hours PRN Moderate Pain (4 - 6)  oxyCODONE    IR 10 milliGRAM(s) Oral four times a day PRN Severe Pain (7 - 10)      Pharmacologic DVT Prophylaxis: [] YES, []NO: Contraindication:   [] HEPARIN: Dose: XX mg  Q24H    [] LOVENOX: Dose: XX mg  Q24H                 SCD's: YES b/l    GI Prophylaxis: Protonix [], Pepcid []    Post-Op Beta-Blockers: []Yes, []No: contraindication:  Post-Op Nitrate: []Yes, []No: contraindication:  Post-Op Aspirin: []Yes,  []No: contraindication:  Post-Op Statin: []Yes, []No: contraindication:      Ambulation/Activity Status:    Assessment/Plan:  58y Female status-post  - Case and plan discussed with CTU Intensivist and CT Surgeon - Dr. Reed/Jackson/Leonidas   - Continue CTU supportive care and ongoing plan of care as per continuing CTU rounds.    - Continue DVT/GI prophylaxis  - Incentive Spirometry 10 times an hour  - Continue to advance physical activity as tolerated and continue PT/OT as directed  1. CAD: Continue ASA, statin, BB  2. HTN:   3. A. Fib:   4. COPD/Hypoxia:   5. DM/Glucose Control:     Social Service Disposition: OPERATIVE PROCEDURE(s):  CABGx1 / MAZE              POD # 4                      SURGEON(s): YANCY Reed    SUBJECTIVE ASSESSMENT: 58y female patient seen and examined at bedside.    Vital Signs Last 24 Hrs  T(F): 98.8 (26 Feb 2019 08:00), Max: 100.9 (25 Feb 2019 13:00)  HR: 80 (26 Feb 2019 09:00) (72 - 94)  BP: 114/61 (26 Feb 2019 08:00) (87/52 - 149/87)  BP(mean): 79 (26 Feb 2019 08:00) (56 - 103)  RR: 39 (26 Feb 2019 09:00) (19 - 39)  SpO2: 98% (26 Feb 2019 09:00) (96% - 100%)    I&O's Detail    25 Feb 2019 07:01  -  26 Feb 2019 07:00  --------------------------------------------------------  IN:    IV PiggyBack: 100 mL    Oral Fluid: 1050 mL  Total IN: 1150 mL    OUT:    Indwelling Catheter - Urethral: 784 mL    Voided: 435 mL  Total OUT: 1219 mL    Net: I&O's Detail    24 Feb 2019 07:01  -  25 Feb 2019 07:00  --------------------------------------------------------  Total NET: 612 mL    25 Feb 2019 07:01  -  26 Feb 2019 07:00  --------------------------------------------------------  Total NET: -69 mL      CAPILLARY BLOOD GLUCOSE  POCT Blood Glucose.: 117 mg/dL (26 Feb 2019 06:44)  POCT Blood Glucose.: 148 mg/dL (25 Feb 2019 22:29)  POCT Blood Glucose.: 136 mg/dL (25 Feb 2019 16:06)  POCT Blood Glucose.: 107 mg/dL (25 Feb 2019 11:14)      Physical Exam:  General: Patient is sitting up in chair, in no apparent distress. A&Ox3   Cardiac: S1/S2, RRR, no murmur, no rubs  Lungs: unlabored respirations, CTA b/l, no wheeze, no rales, no crackles  Abdomen: Soft/NT/ND; positive bowel sounds x 4  Sternum: Intact, no click, incision healing well with no drainage  Incisions: Incisions clean/dry/intact  Extremities: No edema b/l lower extremities; good capillary refill; no cyanosis; palpable 1+ pedal pulses b/l    Central Venous Catheter: Yes[]  No[X] , If Yes indication:                    Day #  Cochran Catheter: Yes  [] , No  [X] , If yes indication:                                 Day #  EPICARDIAL WIRES:  [] YES [] NO                                                            Day #  BOWEL MOVEMENT:  [] YES [] NO, If No, Timing since last BM Day #  CHEST TUBE(Left/Right):  [] YES [X] NO, If yes -  AIR LEAKS:  [] YES [] NO        LABS:                        8.5<L>  10.67 )-----------( 221      ( 25 Feb 2019 23:20 )             27.2<L>                        8.2<L>  9.86  )-----------( 177      ( 25 Feb 2019 06:40 )             25.6<L>    02-25    136  |  98  |  17  ----------------------------<  132<H>  3.9   |  26  |  0.8  02-25    140  |  106  |  17  ----------------------------<  108<H>  3.8   |  24  |  0.7    Ca    9.1      25 Feb 2019 23:20  Mg     2.4     02-25    TPro  5.7<L> [6.0 - 8.0]  /  Alb  4.1 [3.5 - 5.2]  /  TBili  0.7 [0.2 - 1.2]  /  DBili  0.2 [0.0 - 0.2]  /  AST  28 [0 - 41]  /  ALT  12 [0 - 41]  /  AlkPhos  42 [30 - 115]  02-24        ABG - ( 25 Feb 2019 05:14 )  pH: 7.44  /  pCO2: 40    /  pO2: 72    / HCO3: 27    / Base Excess: 2.9   /  SaO2: 96    /  LA: 0.4        RADIOLOGY & ADDITIONAL TESTS:  CXR: < from: Xray Chest 1 View- PORTABLE-Urgent (02.26.19 @ 06:34) >  Impression:    Cardiomegaly. Left lung opacification, improving.  < end of copied text >    EKG: < from: 12 Lead ECG (02.25.19 @ 07:35) >  Ventricular Rate 73 BPM  Atrial Rate 73 BPM  P-R Interval 130 ms  QRS Duration 86 ms  Q-T Interval 370 ms  QTC Calculation(Bezet) 407 ms  P Axis 75 degrees  R Axis 45 degrees  T Axis 22 degrees  Diagnosis Line Normal sinus rhythm  Normal ECG  Confirmed by Nanda Clements MD ([id number]) on 2/25/2019 10:08:33 AM  < end of copied text >    Allergies  amiodarone (Flushing)  iodine (Unknown)  shellfish (Unknown)  Intolerances    MEDICATIONS  (STANDING):  aspirin enteric coated 325 milliGRAM(s) Oral daily  atorvastatin 40 milliGRAM(s) Oral at bedtime  chlorhexidine 4% Liquid 1 Application(s) Topical <User Schedule>  dextrose 5%. 1000 milliLiter(s) (50 mL/Hr) IV Continuous <Continuous>  dextrose 50% Injectable 12.5 Gram(s) IV Push once  dextrose 50% Injectable 25 Gram(s) IV Push once  dextrose 50% Injectable 25 Gram(s) IV Push once  diltiazem    Tablet 30 milliGRAM(s) Oral three times a day  docusate sodium 100 milliGRAM(s) Oral three times a day  escitalopram 10 milliGRAM(s) Oral daily  famotidine    Tablet 20 milliGRAM(s) Oral two times a day  furosemide   Injectable 20 milliGRAM(s) IV Push once  guaiFENesin  milliGRAM(s) Oral every 12 hours  heparin  Injectable 5000 Unit(s) SubCutaneous every 8 hours  insulin lispro (HumaLOG) corrective regimen sliding scale   SubCutaneous three times a day before meals  magnesium sulfate  IVPB 1 Gram(s) IV Intermittent every 12 hours  metoprolol tartrate 25 milliGRAM(s) Oral two times a day  mometasone 220 MICROgram(s) Inhaler 1 Puff(s) Inhalation daily  tiotropium 18 MICROgram(s) Capsule 1 Capsule(s) Inhalation daily  warfarin 3 milliGRAM(s) Oral once    MEDICATIONS  (PRN):  acetaminophen   Tablet .. 650 milliGRAM(s) Oral every 6 hours PRN Temp greater or equal to 38C (100.4F), Mild Pain (1 - 3)  dextrose 40% Gel 15 Gram(s) Oral once PRN Blood Glucose LESS THAN 70 milliGRAM(s)/deciliter  glucagon  Injectable 1 milliGRAM(s) IntraMuscular once PRN Glucose LESS THAN 70 milligrams/deciliter  ibuprofen  Tablet. 400 milliGRAM(s) Oral every 6 hours PRN Temp greater or equal to 38C (100.4F), Moderate Pain (4 - 6)  ondansetron Injectable 4 milliGRAM(s) IV Push every 8 hours PRN Nausea  oxyCODONE    IR 5 milliGRAM(s) Oral every 4 hours PRN Moderate Pain (4 - 6)  oxyCODONE    IR 10 milliGRAM(s) Oral four times a day PRN Severe Pain (7 - 10)      Pharmacologic DVT Prophylaxis: [x] YES, []NO: Contraindication:   [x] HEPARIN: Dose: 5000 mg  Q24H    [] LOVENOX: Dose: XX mg  Q24H                 SCD's: YES b/l    GI Prophylaxis: Protonix [], Pepcid [X]    Post-Op Beta-Blockers: [X]Yes, []No: contraindication:  Post-Op Nitrate: []Yes, [X]No: contraindication:  Post-Op Aspirin: [X]Yes,  []No: contraindication:  Post-Op Statin: [X]Yes, []No: contraindication:    Ambulation/Activity Status: Ambulating out of bed into chair, walking hallways with PT     Assessment/Plan:  58y Female with hx of PMH of Afib, Asthma, HTN s/p CABG x1/MAZE POD #4  - Case and plan discussed with CTU Intensivist and CT Surgeon - Dr. Reed  - Continue CTU supportive care and ongoing plan of care as per continuing CTU rounds.    - Continue DVT/GI prophylaxis  - Incentive Spirometry 10 times an hour  - Continue to advance physical activity as tolerated and continue PT/OT as directed  1. CAD: Continue ASA, statin, BB  2. HTN: On Lopressor and Diltiazem   3. A. Fib: On Lopressor and Mg IV  4. COPD/Hypoxia: Continue with nebulizers and mucinex  5. DM/Glucose Control: On lispro sliding scale   6. Fluid overload - Lasix 20mg IV   7. Anticoag - On Coumadin 3mg, monitor INR     Social Service Disposition: OPERATIVE PROCEDURE(s):  CABGx1 / MAZE              POD # 4                      SURGEON(s): YANCY Reed    SUBJECTIVE ASSESSMENT: 58y female patient seen and examined at bedside. Pt reports incisional pain, but otherwise comfortable at this time.     Vital Signs Last 24 Hrs  T(F): 98.8 (26 Feb 2019 08:00), Max: 100.9 (25 Feb 2019 13:00)  HR: 80 (26 Feb 2019 09:00) (72 - 94)  BP: 114/61 (26 Feb 2019 08:00) (87/52 - 149/87)  BP(mean): 79 (26 Feb 2019 08:00) (56 - 103)  RR: 39 (26 Feb 2019 09:00) (19 - 39)  SpO2: 98% (26 Feb 2019 09:00) (96% - 100%)    I&O's Detail    25 Feb 2019 07:01  -  26 Feb 2019 07:00  --------------------------------------------------------  IN:    IV PiggyBack: 100 mL    Oral Fluid: 1050 mL  Total IN: 1150 mL    OUT:    Indwelling Catheter - Urethral: 784 mL    Voided: 435 mL  Total OUT: 1219 mL    Net: I&O's Detail    24 Feb 2019 07:01  -  25 Feb 2019 07:00  --------------------------------------------------------  Total NET: 612 mL    25 Feb 2019 07:01  -  26 Feb 2019 07:00  --------------------------------------------------------  Total NET: -69 mL      CAPILLARY BLOOD GLUCOSE  POCT Blood Glucose.: 117 mg/dL (26 Feb 2019 06:44)  POCT Blood Glucose.: 148 mg/dL (25 Feb 2019 22:29)  POCT Blood Glucose.: 136 mg/dL (25 Feb 2019 16:06)  POCT Blood Glucose.: 107 mg/dL (25 Feb 2019 11:14)      Physical Exam:  General: Patient is sitting up in chair, in no apparent distress. A&Ox3   Cardiac: S1/S2, RRR, no murmur, no rubs  Lungs: unlabored respirations, CTA b/l, no wheeze, no rales, no crackles  Abdomen: Soft/NT/ND; positive bowel sounds x 4  Sternum: Intact, no click, incision healing well with no drainage  Incisions: Incisions clean/dry/intact  Extremities: No edema b/l lower extremities; good capillary refill; no cyanosis; palpable 1+ pedal pulses b/l    Central Venous Catheter: Yes[]  No[X] , If Yes indication:                    Day #  Cochran Catheter: Yes  [] , No  [X] , If yes indication:                                 Day #  EPICARDIAL WIRES:  [X] YES [] NO                                                            Day # 4  BOWEL MOVEMENT:  [X] YES [] NO, If No, Timing since last BM Day #  CHEST TUBE(Left/Right):  [] YES [X] NO, If yes -  AIR LEAKS:  [] YES [] NO        LABS:                        8.5<L>  10.67 )-----------( 221      ( 25 Feb 2019 23:20 )             27.2<L>                        8.2<L>  9.86  )-----------( 177      ( 25 Feb 2019 06:40 )             25.6<L>    02-25    136  |  98  |  17  ----------------------------<  132<H>  3.9   |  26  |  0.8  02-25    140  |  106  |  17  ----------------------------<  108<H>  3.8   |  24  |  0.7    Ca    9.1      25 Feb 2019 23:20  Mg     2.4     02-25    TPro  5.7<L> [6.0 - 8.0]  /  Alb  4.1 [3.5 - 5.2]  /  TBili  0.7 [0.2 - 1.2]  /  DBili  0.2 [0.0 - 0.2]  /  AST  28 [0 - 41]  /  ALT  12 [0 - 41]  /  AlkPhos  42 [30 - 115]  02-24        ABG - ( 25 Feb 2019 05:14 )  pH: 7.44  /  pCO2: 40    /  pO2: 72    / HCO3: 27    / Base Excess: 2.9   /  SaO2: 96    /  LA: 0.4        RADIOLOGY & ADDITIONAL TESTS:  CXR: < from: Xray Chest 1 View- PORTABLE-Urgent (02.26.19 @ 06:34) >  Impression:    Cardiomegaly. Left lung opacification, improving.  < end of copied text >    EKG: < from: 12 Lead ECG (02.25.19 @ 07:35) >  Ventricular Rate 73 BPM  Atrial Rate 73 BPM  P-R Interval 130 ms  QRS Duration 86 ms  Q-T Interval 370 ms  QTC Calculation(Bezet) 407 ms  P Axis 75 degrees  R Axis 45 degrees  T Axis 22 degrees  Diagnosis Line Normal sinus rhythm  Normal ECG  Confirmed by Nanda Clements MD ([id number]) on 2/25/2019 10:08:33 AM  < end of copied text >    Allergies  amiodarone (Flushing)  iodine (Unknown)  shellfish (Unknown)  Intolerances    MEDICATIONS  (STANDING):  aspirin enteric coated 325 milliGRAM(s) Oral daily  atorvastatin 40 milliGRAM(s) Oral at bedtime  chlorhexidine 4% Liquid 1 Application(s) Topical <User Schedule>  dextrose 5%. 1000 milliLiter(s) (50 mL/Hr) IV Continuous <Continuous>  dextrose 50% Injectable 12.5 Gram(s) IV Push once  dextrose 50% Injectable 25 Gram(s) IV Push once  dextrose 50% Injectable 25 Gram(s) IV Push once  diltiazem    Tablet 30 milliGRAM(s) Oral three times a day  docusate sodium 100 milliGRAM(s) Oral three times a day  escitalopram 10 milliGRAM(s) Oral daily  famotidine    Tablet 20 milliGRAM(s) Oral two times a day  furosemide   Injectable 20 milliGRAM(s) IV Push once  guaiFENesin  milliGRAM(s) Oral every 12 hours  heparin  Injectable 5000 Unit(s) SubCutaneous every 8 hours  insulin lispro (HumaLOG) corrective regimen sliding scale   SubCutaneous three times a day before meals  magnesium sulfate  IVPB 1 Gram(s) IV Intermittent every 12 hours  metoprolol tartrate 25 milliGRAM(s) Oral two times a day  mometasone 220 MICROgram(s) Inhaler 1 Puff(s) Inhalation daily  tiotropium 18 MICROgram(s) Capsule 1 Capsule(s) Inhalation daily  warfarin 3 milliGRAM(s) Oral once    MEDICATIONS  (PRN):  acetaminophen   Tablet .. 650 milliGRAM(s) Oral every 6 hours PRN Temp greater or equal to 38C (100.4F), Mild Pain (1 - 3)  dextrose 40% Gel 15 Gram(s) Oral once PRN Blood Glucose LESS THAN 70 milliGRAM(s)/deciliter  glucagon  Injectable 1 milliGRAM(s) IntraMuscular once PRN Glucose LESS THAN 70 milligrams/deciliter  ibuprofen  Tablet. 400 milliGRAM(s) Oral every 6 hours PRN Temp greater or equal to 38C (100.4F), Moderate Pain (4 - 6)  ondansetron Injectable 4 milliGRAM(s) IV Push every 8 hours PRN Nausea  oxyCODONE    IR 5 milliGRAM(s) Oral every 4 hours PRN Moderate Pain (4 - 6)  oxyCODONE    IR 10 milliGRAM(s) Oral four times a day PRN Severe Pain (7 - 10)      Pharmacologic DVT Prophylaxis: [x] YES, []NO: Contraindication:   [x] HEPARIN: Dose: 5000 mg  Q24H    [] LOVENOX: Dose: XX mg  Q24H                 SCD's: YES b/l    GI Prophylaxis: Protonix [], Pepcid [X]    Post-Op Beta-Blockers: [X]Yes, []No: contraindication:  Post-Op Nitrate: []Yes, [X]No: contraindication:  Post-Op Aspirin: [X]Yes,  []No: contraindication:  Post-Op Statin: [X]Yes, []No: contraindication:    Ambulation/Activity Status: Ambulating out of bed into chair, walking hallways with PT     Assessment/Plan:  58y Female with hx of PMH of Afib, Asthma, HTN s/p CABG x1/MAZE POD #4  - Case and plan discussed with CTU Intensivist and CT Surgeon - Dr. Reed  - Continue CTU supportive care and ongoing plan of care as per continuing CTU rounds.    - Continue DVT/GI prophylaxis  - Incentive Spirometry 10 times an hour  - Continue to advance physical activity as tolerated and continue PT/OT as directed  1. CAD: Continue ASA, statin, BB  2. HTN: On Lopressor and Diltiazem   3. A. Fib: On Lopressor and Mg IV  4. COPD/Hypoxia: Continue with nebulizers and mucinex  5. DM/Glucose Control: On lispro sliding scale   6. Fluid overload - Lasix 20mg IV   7. Anticoag - On Coumadin 3mg, monitor INR     Social Service Disposition: OPERATIVE PROCEDURE(s):  CABGx1 / MAZE              POD # 4                      SURGEON(s): YANCY Reed    SUBJECTIVE ASSESSMENT: 58y female patient seen and examined at bedside. Pt reports incisional pain, but otherwise comfortable at this time.     Vital Signs Last 24 Hrs  T(F): 98.8 (26 Feb 2019 08:00), Max: 100.9 (25 Feb 2019 13:00)  HR: 80 (26 Feb 2019 09:00) (72 - 94)  BP: 114/61 (26 Feb 2019 08:00) (87/52 - 149/87)  BP(mean): 79 (26 Feb 2019 08:00) (56 - 103)  RR: 39 (26 Feb 2019 09:00) (19 - 39)  SpO2: 98% (26 Feb 2019 09:00) (96% - 100%)    I&O's Detail    25 Feb 2019 07:01  -  26 Feb 2019 07:00  --------------------------------------------------------  IN:    IV PiggyBack: 100 mL    Oral Fluid: 1050 mL  Total IN: 1150 mL    OUT:    Indwelling Catheter - Urethral: 784 mL    Voided: 435 mL  Total OUT: 1219 mL    Net: I&O's Detail    24 Feb 2019 07:01  -  25 Feb 2019 07:00  --------------------------------------------------------  Total NET: 612 mL    25 Feb 2019 07:01  -  26 Feb 2019 07:00  --------------------------------------------------------  Total NET: -69 mL      CAPILLARY BLOOD GLUCOSE  POCT Blood Glucose.: 117 mg/dL (26 Feb 2019 06:44)  POCT Blood Glucose.: 148 mg/dL (25 Feb 2019 22:29)  POCT Blood Glucose.: 136 mg/dL (25 Feb 2019 16:06)  POCT Blood Glucose.: 107 mg/dL (25 Feb 2019 11:14)      Physical Exam:  General: Patient is sitting up in chair, in no apparent distress. A&Ox3   Cardiac: S1/S2, RRR, no murmur, no rubs  Lungs: unlabored respirations, CTA b/l, no wheeze, no rales, no crackles  Abdomen: Soft/NT/ND; positive bowel sounds x 4  Sternum: Intact, no click, incision healing well with no drainage  Incisions: Incisions clean/dry/intact  Extremities: No edema b/l lower extremities; good capillary refill; no cyanosis; palpable 1+ pedal pulses b/l    Central Venous Catheter: Yes[]  No[X] , If Yes indication:                    Day #  Cochran Catheter: Yes  [] , No  [X] , If yes indication:                                 Day #  EPICARDIAL WIRES:  [X] YES [] NO                                                            Day # 4  BOWEL MOVEMENT:  [X] YES [] NO, If No, Timing since last BM Day #  CHEST TUBE(Left/Right):  [] YES [X] NO, If yes -  AIR LEAKS:  [] YES [] NO        LABS:                        8.5<L>  10.67 )-----------( 221      ( 25 Feb 2019 23:20 )             27.2<L>                        8.2<L>  9.86  )-----------( 177      ( 25 Feb 2019 06:40 )             25.6<L>    02-25    136  |  98  |  17  ----------------------------<  132<H>  3.9   |  26  |  0.8  02-25    140  |  106  |  17  ----------------------------<  108<H>  3.8   |  24  |  0.7    Ca    9.1      25 Feb 2019 23:20  Mg     2.4     02-25    TPro  5.7<L> [6.0 - 8.0]  /  Alb  4.1 [3.5 - 5.2]  /  TBili  0.7 [0.2 - 1.2]  /  DBili  0.2 [0.0 - 0.2]  /  AST  28 [0 - 41]  /  ALT  12 [0 - 41]  /  AlkPhos  42 [30 - 115]  02-24    ABG - ( 25 Feb 2019 05:14 )  pH: 7.44  /  pCO2: 40    /  pO2: 72    / HCO3: 27    / Base Excess: 2.9   /  SaO2: 96    /  LA: 0.4        RADIOLOGY & ADDITIONAL TESTS:  CXR: < from: Xray Chest 1 View- PORTABLE-Urgent (02.26.19 @ 06:34) >  Impression:    Cardiomegaly. Left lung opacification, improving.  < end of copied text >    EKG: < from: 12 Lead ECG (02.25.19 @ 07:35) >  Ventricular Rate 73 BPM  Atrial Rate 73 BPM  P-R Interval 130 ms  QRS Duration 86 ms  Q-T Interval 370 ms  QTC Calculation(Bezet) 407 ms  P Axis 75 degrees  R Axis 45 degrees  T Axis 22 degrees  Diagnosis Line Normal sinus rhythm  Normal ECG  Confirmed by Nanda Clements MD ([id number]) on 2/25/2019 10:08:33 AM  < end of copied text >    Allergies  amiodarone (Flushing)  iodine (Unknown)  shellfish (Unknown)  Intolerances    MEDICATIONS  (STANDING):  aspirin enteric coated 325 milliGRAM(s) Oral daily  atorvastatin 40 milliGRAM(s) Oral at bedtime  chlorhexidine 4% Liquid 1 Application(s) Topical <User Schedule>  dextrose 5%. 1000 milliLiter(s) (50 mL/Hr) IV Continuous <Continuous>  dextrose 50% Injectable 12.5 Gram(s) IV Push once  dextrose 50% Injectable 25 Gram(s) IV Push once  dextrose 50% Injectable 25 Gram(s) IV Push once  diltiazem    Tablet 30 milliGRAM(s) Oral three times a day  docusate sodium 100 milliGRAM(s) Oral three times a day  escitalopram 10 milliGRAM(s) Oral daily  famotidine    Tablet 20 milliGRAM(s) Oral two times a day  furosemide   Injectable 20 milliGRAM(s) IV Push once  guaiFENesin  milliGRAM(s) Oral every 12 hours  heparin  Injectable 5000 Unit(s) SubCutaneous every 8 hours  insulin lispro (HumaLOG) corrective regimen sliding scale   SubCutaneous three times a day before meals  magnesium sulfate  IVPB 1 Gram(s) IV Intermittent every 12 hours  metoprolol tartrate 25 milliGRAM(s) Oral two times a day  mometasone 220 MICROgram(s) Inhaler 1 Puff(s) Inhalation daily  tiotropium 18 MICROgram(s) Capsule 1 Capsule(s) Inhalation daily  warfarin 3 milliGRAM(s) Oral once    MEDICATIONS  (PRN):  acetaminophen   Tablet .. 650 milliGRAM(s) Oral every 6 hours PRN Temp greater or equal to 38C (100.4F), Mild Pain (1 - 3)  dextrose 40% Gel 15 Gram(s) Oral once PRN Blood Glucose LESS THAN 70 milliGRAM(s)/deciliter  glucagon  Injectable 1 milliGRAM(s) IntraMuscular once PRN Glucose LESS THAN 70 milligrams/deciliter  ibuprofen  Tablet. 400 milliGRAM(s) Oral every 6 hours PRN Temp greater or equal to 38C (100.4F), Moderate Pain (4 - 6)  ondansetron Injectable 4 milliGRAM(s) IV Push every 8 hours PRN Nausea  oxyCODONE    IR 5 milliGRAM(s) Oral every 4 hours PRN Moderate Pain (4 - 6)  oxyCODONE    IR 10 milliGRAM(s) Oral four times a day PRN Severe Pain (7 - 10)      Pharmacologic DVT Prophylaxis: [x] YES, []NO: Contraindication:   [x] HEPARIN: Dose: 5000 mg  Q24H    [] LOVENOX: Dose: XX mg  Q24H                 SCD's: YES b/l    GI Prophylaxis: Protonix [], Pepcid [X]    Post-Op Beta-Blockers: [X]Yes, []No: contraindication:  Post-Op Nitrate: []Yes, [X]No: contraindication:  Post-Op Aspirin: [X]Yes,  []No: contraindication:  Post-Op Statin: [X]Yes, []No: contraindication:    Ambulation/Activity Status: Ambulating out of bed into chair, walking hallways with PT     Assessment/Plan:  58y Female with hx of PMH of Afib, Asthma, HTN s/p CABG x1/MAZE POD #4  - Case and plan discussed with CTU Intensivist and CT Surgeon - Dr. Reed  - Continue CTU supportive care and ongoing plan of care as per continuing CTU rounds.    - Continue DVT/GI prophylaxis  - Incentive Spirometry 10 times an hour  - Continue to advance physical activity as tolerated and continue PT/OT as directed  1. CAD: Continue ASA, statin, BB  2. HTN: On Lopressor and Diltiazem   3. A. Fib: On Lopressor and Mg IV  4. COPD/Hypoxia: Continue with nebulizers and mucinex  5. DM/Glucose Control: On lispro sliding scale   6. Fluid overload - Lasix 20mg IV   7. Anticoag - On Coumadin 3mg, monitor INR     Social Service Disposition:

## 2019-02-26 NOTE — DISCHARGE NOTE ADULT - NS AS ACTIVITY OBS
No Heavy lifting/straining/Walking-Outdoors allowed/Do not drive or operate machinery/Showering allowed/Walking-Indoors allowed

## 2019-02-26 NOTE — DISCHARGE NOTE ADULT - MEDICATION SUMMARY - MEDICATIONS TO TAKE
I will START or STAY ON the medications listed below when I get home from the hospital:    oxycodone-acetaminophen 5 mg-325 mg oral tablet  -- 1 tab(s) by mouth every 4 hours, As Needed -for moderate pain MDD:6   -- Caution federal law prohibits the transfer of this drug to any person other  than the person for whom it was prescribed.  May cause drowsiness.  Alcohol may intensify this effect.  Use care when operating dangerous machinery.  This prescription cannot be refilled.  This product contains acetaminophen.  Do not use  with any other product containing acetaminophen to prevent possible liver damage.  Using more of this medication than prescribed may cause serious breathing problems.    -- Indication: For CAD in native artery    aspirin 81 mg oral tablet, chewable  -- 1 tab(s) by mouth once a day  -- Indication: For CAD in native artery    warfarin 4 mg oral tablet  -- 1 tab(s) by mouth once a day   -- Indication: For Atrial fibrillation with RVR    Lexapro 10 mg oral tablet  -- 1 tab(s) by mouth once a day  -- Indication: For depression    meclizine 12.5 mg oral tablet  -- 1 tab(s) by mouth once a day, As Needed -Dizziness - for dizziness   -- Indication: For dizziness    atorvastatin 40 mg oral tablet  -- 1 tab(s) by mouth once a day (at bedtime)  -- Indication: For for cholesterol    metoprolol tartrate 25 mg oral tablet  -- 1 tab(s) by mouth 2 times a day  -- Indication: For blood pressure    Symbicort 160 mcg-4.5 mcg/inh inhalation aerosol  -- 2 puff(s) inhaled 2 times a day   -- Check with your doctor before becoming pregnant.  For inhalation only.  Rinse mouth thoroughly after use.    -- Indication: For COPD    Spiriva 18 mcg inhalation capsule  -- 1 cap(s) inhaled once a day   -- Check with your doctor before becoming pregnant.  For inhalation only.  It is very important that you take or use this exactly as directed.  Do not skip doses or discontinue unless directed by your doctor.  Obtain medical advice before taking any non-prescription drugs as some may affect the action of this medication.    -- Indication: For COPD    Lasix 20 mg oral tablet  -- 1 tab(s) by mouth once a day   -- Avoid prolonged or excessive exposure to direct and/or artificial sunlight while taking this medication.  It is very important that you take or use this exactly as directed.  Do not skip doses or discontinue unless directed by your doctor.  It may be advisable to drink a full glass orange juice or eat a banana daily while taking this medication.    -- Indication: For CAD in native artery    famotidine 20 mg oral tablet  -- 1 tab(s) by mouth 2 times a day  -- Indication: For Antiacid    senna oral tablet  -- 1 tab(s) by mouth 2 times a day  -- Indication: For stool softner    K-Tab 10 mEq oral tablet, extended release  -- 1 tab(s) by mouth once a day   -- It is very important that you take or use this exactly as directed.  Do not skip doses or discontinue unless directed by your doctor.  Medication should be taken with plenty of water.  Take with food or milk.    -- Indication: For supplement

## 2019-02-26 NOTE — DISCHARGE NOTE ADULT - MEDICATION SUMMARY - MEDICATIONS TO STOP TAKING
I will STOP taking the medications listed below when I get home from the hospital:    losartan-hydrochlorothiazide 50mg-12.5mg oral tablet  -- 1 tab(s) by mouth once a day    Cardizem  -- Cardizem 180 XT once a day    Xarelto 20 mg oral tablet  -- 1 tab(s) by mouth once a day (in the evening)

## 2019-02-26 NOTE — PROGRESS NOTE ADULT - SUBJECTIVE AND OBJECTIVE BOX
57 yo Female with PAST MEDICAL & SURGICAL HISTORY: x-smoker, Obesity, Paroxysmal A fib on Xarelto, COPD, HTN, presented via EMS with c/o rapid HR. In ED noted with elevated Trops and EKG with inferio-lateral ST depressions.   -19 - Cardiac cath with CAD.   - 19 - s/p CABG x1 (LIMA -> LAD), MAZE with atrial clip    Surgical hx:  Closed fracture of foot, unspecified laterality, sequela  History of  section    -Extubated, off drips    Vital Signs Last 24 Hrs  T(C): 37.1 (2019 08:00), Max: 38.3 (2019 13:00)  T(F): 98.8 (2019 08:00), Max: 100.9 (2019 13:00)  HR: 80 (2019 09:00) (72 - 94)  BP: 114/61 (2019 08:00) (87/52 - 135/65)  BP(mean): 79 (2019 08:00) (56 - 93)  RR: 39 (2019 09:00) (19 - 39)  SpO2: 98% (2019 09:00) (96% - 100%)    alert, awake, verbal  follows commands  clean sternal incision  S1S2 Reg rate  B/l air entry, diminished effort  soft abdomen, non distended  b/l pedal edema    WBc 10.7, hg 8.5, plt 221  Cr 0.8, K 3.9    CXR - B/l congestion.     a/p:     57 yo F X smoker, with COPD, Paroxysmal A fib no Xarelto at home, Obesity, HTN. Found to have CAD s/p CABG x1 and MAZE    PO ASA, statin, beta blocker  PO Cardizem for rate control  start with a/c - coumadin as per CT surgery request.  lasix x 1 today, negative fluid balance is the goal. replace lytes PO.   Pain control PRN  Glycemic control   DVT proph  OOB to chair.

## 2019-02-26 NOTE — DISCHARGE NOTE ADULT - CARE PLAN
Principal Discharge DX:	CAD in native artery  Goal:	revasculirization  Assessment and plan of treatment:	coronary artery bypass grafting

## 2019-02-26 NOTE — DISCHARGE NOTE ADULT - MEDICATION SUMMARY - MEDICATIONS TO CHANGE
I will SWITCH the dose or number of times a day I take the medications listed below when I get home from the hospital:    albuterol    Symbicort 160 mcg-4.5 mcg/inh inhalation aerosol  -- 2 puff(s) inhaled 2 times a day   -- Check with your doctor before becoming pregnant.  For inhalation only.  Rinse mouth thoroughly after use.    Spiriva 18 mcg inhalation capsule  -- 1 cap(s) inhaled once a day   -- Check with your doctor before becoming pregnant.  For inhalation only.  It is very important that you take or use this exactly as directed.  Do not skip doses or discontinue unless directed by your doctor.  Obtain medical advice before taking any non-prescription drugs as some may affect the action of this medication.    Ventolin HFA 90 mcg/inh inhalation aerosol  -- 2 puff(s) inhaled every 6 hours   -- For inhalation only.  It is very important that you take or use this exactly as directed.  Do not skip doses or discontinue unless directed by your doctor.  Obtain medical advice before taking any non-prescription drugs as some may affect the action of this medication.  Shake well before use.    Lexapro 10 mg oral tablet  -- 1 tab(s) by mouth once a day

## 2019-02-26 NOTE — DISCHARGE NOTE ADULT - PATIENT PORTAL LINK FT
You can access the [facility] Patient Portal, offered by [facility], by registering with the following website: [email or web address]

## 2019-02-26 NOTE — DISCHARGE NOTE ADULT - NS AS DC FOLLOWUP STROKE INST
Smoking Cessation/Influenza vaccination (VIS Pub Date: August 7, 2015) Smoking Cessation/Coumadin/Warfarin/Influenza vaccination (VIS Pub Date: August 7, 2015)

## 2019-02-26 NOTE — DISCHARGE NOTE ADULT - HOSPITAL COURSE
59 yo female with PMH of Afib on Xarelto (ran out a few weeks ago), asthma, HTN, presents via EMS with c/o rapid HR.  Pt states she woke up feeling palpitations, flushed, clammy, felt as if her "jaw was locking".  + nausea, no vomiting.  EMS found pt with .  They gave 6mg Adenosine followed by 2 doses of 12 mg x 2.  Followed by 25 mg Cardizem which slowed pts HR to 120s.On 2/19/2019 the patient was transferred to [facility] for cardiac catheterization that revealed severe single vessel CAP with ostial LAD stenosis. Pt underwent Coronary Artery Bypass Grafting without complications. Post op pt developed vertigo and was treated with meclizine. Pt was discharged home in stable condition on POD #6.

## 2019-02-27 LAB
ANION GAP SERPL CALC-SCNC: 15 MMOL/L — HIGH (ref 7–14)
BUN SERPL-MCNC: 15 MG/DL — SIGNIFICANT CHANGE UP (ref 10–20)
CALCIUM SERPL-MCNC: 9.5 MG/DL — SIGNIFICANT CHANGE UP (ref 8.5–10.1)
CHLORIDE SERPL-SCNC: 100 MMOL/L — SIGNIFICANT CHANGE UP (ref 98–110)
CO2 SERPL-SCNC: 26 MMOL/L — SIGNIFICANT CHANGE UP (ref 17–32)
CREAT SERPL-MCNC: 0.7 MG/DL — SIGNIFICANT CHANGE UP (ref 0.7–1.5)
GLUCOSE BLDC GLUCOMTR-MCNC: 100 MG/DL — HIGH (ref 70–99)
GLUCOSE BLDC GLUCOMTR-MCNC: 85 MG/DL — SIGNIFICANT CHANGE UP (ref 70–99)
GLUCOSE BLDC GLUCOMTR-MCNC: 88 MG/DL — SIGNIFICANT CHANGE UP (ref 70–99)
GLUCOSE BLDC GLUCOMTR-MCNC: 93 MG/DL — SIGNIFICANT CHANGE UP (ref 70–99)
GLUCOSE SERPL-MCNC: 90 MG/DL — SIGNIFICANT CHANGE UP (ref 70–99)
HCT VFR BLD CALC: 29.6 % — LOW (ref 37–47)
HGB BLD-MCNC: 9.4 G/DL — LOW (ref 12–16)
INR BLD: 1.18 RATIO — SIGNIFICANT CHANGE UP (ref 0.65–1.3)
MAGNESIUM SERPL-MCNC: 2.3 MG/DL — SIGNIFICANT CHANGE UP (ref 1.8–2.4)
MCHC RBC-ENTMCNC: 26 PG — LOW (ref 27–31)
MCHC RBC-ENTMCNC: 31.8 G/DL — LOW (ref 32–37)
MCV RBC AUTO: 81.8 FL — SIGNIFICANT CHANGE UP (ref 81–99)
NRBC # BLD: 0 /100 WBCS — SIGNIFICANT CHANGE UP (ref 0–0)
PLATELET # BLD AUTO: 284 K/UL — SIGNIFICANT CHANGE UP (ref 130–400)
POTASSIUM SERPL-MCNC: 4 MMOL/L — SIGNIFICANT CHANGE UP (ref 3.5–5)
POTASSIUM SERPL-SCNC: 4 MMOL/L — SIGNIFICANT CHANGE UP (ref 3.5–5)
PROTHROM AB SERPL-ACNC: 13.6 SEC — HIGH (ref 9.95–12.87)
RBC # BLD: 3.62 M/UL — LOW (ref 4.2–5.4)
RBC # FLD: 13.8 % — SIGNIFICANT CHANGE UP (ref 11.5–14.5)
SODIUM SERPL-SCNC: 141 MMOL/L — SIGNIFICANT CHANGE UP (ref 135–146)
WBC # BLD: 10.1 K/UL — SIGNIFICANT CHANGE UP (ref 4.8–10.8)
WBC # FLD AUTO: 10.1 K/UL — SIGNIFICANT CHANGE UP (ref 4.8–10.8)

## 2019-02-27 RX ORDER — WARFARIN SODIUM 2.5 MG/1
4 TABLET ORAL ONCE
Qty: 0 | Refills: 0 | Status: COMPLETED | OUTPATIENT
Start: 2019-02-27 | End: 2019-02-27

## 2019-02-27 RX ORDER — WARFARIN SODIUM 2.5 MG/1
3 TABLET ORAL ONCE
Qty: 0 | Refills: 0 | Status: DISCONTINUED | OUTPATIENT
Start: 2019-02-27 | End: 2019-02-27

## 2019-02-27 RX ORDER — FUROSEMIDE 40 MG
20 TABLET ORAL ONCE
Qty: 0 | Refills: 0 | Status: COMPLETED | OUTPATIENT
Start: 2019-02-27 | End: 2019-02-27

## 2019-02-27 RX ADMIN — WARFARIN SODIUM 4 MILLIGRAM(S): 2.5 TABLET ORAL at 22:30

## 2019-02-27 RX ADMIN — Medication 100 MILLIGRAM(S): at 22:30

## 2019-02-27 RX ADMIN — CHLORHEXIDINE GLUCONATE 1 APPLICATION(S): 213 SOLUTION TOPICAL at 07:18

## 2019-02-27 RX ADMIN — SENNA PLUS 1 TABLET(S): 8.6 TABLET ORAL at 05:18

## 2019-02-27 RX ADMIN — Medication 100 GRAM(S): at 17:37

## 2019-02-27 RX ADMIN — OXYCODONE HYDROCHLORIDE 5 MILLIGRAM(S): 5 TABLET ORAL at 12:30

## 2019-02-27 RX ADMIN — Medication 100 GRAM(S): at 07:19

## 2019-02-27 RX ADMIN — Medication 30 MILLILITER(S): at 15:40

## 2019-02-27 RX ADMIN — Medication 600 MILLIGRAM(S): at 17:37

## 2019-02-27 RX ADMIN — MOMETASONE FUROATE 1 PUFF(S): 220 INHALANT RESPIRATORY (INHALATION) at 10:26

## 2019-02-27 RX ADMIN — Medication 325 MILLIGRAM(S): at 11:38

## 2019-02-27 RX ADMIN — HEPARIN SODIUM 5000 UNIT(S): 5000 INJECTION INTRAVENOUS; SUBCUTANEOUS at 14:40

## 2019-02-27 RX ADMIN — Medication 25 MILLIGRAM(S): at 17:37

## 2019-02-27 RX ADMIN — OXYCODONE HYDROCHLORIDE 5 MILLIGRAM(S): 5 TABLET ORAL at 22:30

## 2019-02-27 RX ADMIN — TIOTROPIUM BROMIDE 1 CAPSULE(S): 18 CAPSULE ORAL; RESPIRATORY (INHALATION) at 07:55

## 2019-02-27 RX ADMIN — Medication 20 MILLIGRAM(S): at 08:48

## 2019-02-27 RX ADMIN — ESCITALOPRAM OXALATE 10 MILLIGRAM(S): 10 TABLET, FILM COATED ORAL at 11:38

## 2019-02-27 RX ADMIN — FAMOTIDINE 20 MILLIGRAM(S): 10 INJECTION INTRAVENOUS at 05:17

## 2019-02-27 RX ADMIN — Medication 25 MILLIGRAM(S): at 05:17

## 2019-02-27 RX ADMIN — ATORVASTATIN CALCIUM 40 MILLIGRAM(S): 80 TABLET, FILM COATED ORAL at 22:30

## 2019-02-27 RX ADMIN — OXYCODONE HYDROCHLORIDE 5 MILLIGRAM(S): 5 TABLET ORAL at 12:06

## 2019-02-27 RX ADMIN — Medication 400 MILLIGRAM(S): at 01:53

## 2019-02-27 RX ADMIN — FAMOTIDINE 20 MILLIGRAM(S): 10 INJECTION INTRAVENOUS at 17:37

## 2019-02-27 RX ADMIN — HEPARIN SODIUM 5000 UNIT(S): 5000 INJECTION INTRAVENOUS; SUBCUTANEOUS at 22:30

## 2019-02-27 RX ADMIN — Medication 100 MILLIGRAM(S): at 05:17

## 2019-02-27 RX ADMIN — Medication 600 MILLIGRAM(S): at 05:17

## 2019-02-27 NOTE — PROGRESS NOTE ADULT - SUBJECTIVE AND OBJECTIVE BOX
57 yo Female with PAST MEDICAL & SURGICAL HISTORY: x-smoker, Obesity, Paroxysmal A fib on Xarelto, COPD, HTN, presented via EMS with c/o rapid HR. In ED noted with elevated Trops and EKG with inferio-lateral ST depressions.   -19 - Cardiac cath with CAD.   - 19 - s/p CABG x1 (LIMA -> LAD), MAZE with atrial clip    Surgical hx:  Closed fracture of foot, unspecified laterality, sequela  History of  section    -Extubated, off drips      Vital Signs Last 24 Hrs  T(C): 36.8 (2019 08:00), Max: 37.9 (2019 20:00)  T(F): 98.3 (2019 08:00), Max: 100.3 (2019 20:00)  HR: 74 (2019 08:00) (74 - 98)  BP: 106/56 (2019 08:00) (95/66 - 126/78)  BP(mean): 74 (2019 08:00) (60 - 99)  RR: 24 (2019 08:00) (19 - 39)  SpO2: 99% (2019 08:00) (97% - 100%)    alert, awake, verbal  follows commands  clean sternal incision  S1S2 Reg rate  B/l air entry, diminished effort  soft abdomen, non distended  b/l pedal edema    INR 1.2  Cr 0.7  WBC 10.1, Hg 9.4, plt 284    CXR left lower lobe congestion    a/p:    57 yo F X smoker, with COPD, Paroxysmal A fib no Xarelto at home, Obesity, HTN. Found to have CAD s/p CABG x1 and MAZE    lasix 20 mg IV x1  PO ASA, statin, beta blocker  PO Cardizem for rate control  cont with a/c - coumadin as per CT surgery request.  D/C sternal wires  Pain control PRN  Glycemic control   DVT proph  OOB to chair.

## 2019-02-27 NOTE — CONSULT NOTE ADULT - CONSULT REASON
dizziness r/o labyrinthitis
Afib with RVR
CABG eval for single vessel CAD
afib
tachycardia
AFib with RVR

## 2019-02-27 NOTE — PROGRESS NOTE ADULT - SUBJECTIVE AND OBJECTIVE BOX
OPERATIVE PROCEDURE(s):     cabg x 1 / maze           POD # 5    SURGEON(s):     SUBJECTIVE ASSESSMENT: pt is without complaints and wants to go home    Vital Signs Last 24 Hrs  T(C): 36.8 (27 Feb 2019 08:00), Max: 37.9 (26 Feb 2019 20:00)  T(F): 98.3 (27 Feb 2019 08:00), Max: 100.3 (26 Feb 2019 20:00)  HR: 74 (27 Feb 2019 08:00) (74 - 98)  BP: 106/56 (27 Feb 2019 08:00) (95/66 - 126/78)  BP(mean): 74 (27 Feb 2019 08:00) (60 - 99)  RR: 24 (27 Feb 2019 08:00) (19 - 39)  SpO2: 99% (27 Feb 2019 08:00) (97% - 100%)  02-26-19 @ 07:01  -  02-27-19 @ 07:00  --------------------------------------------------------  IN: 1420 mL / OUT: 1400 mL / NET: 20 mL    02-27-19 @ 07:01  -  02-27-19 @ 08:24  --------------------------------------------------------  IN: 240 mL / OUT: 0 mL / NET: 240 mL        Physical Exam  General: alert and oriented x 3  Chest: lungs cta bl  CVS: s1s2  Abd: pos bs, soft nt  GI/ :  Ext: trace edema  incisions: c/d/i  sternum- intact    Central Venous Catheter: Yes[ ]  No[ x] , If Yes indication:           Day #    Cochran Catheter: Yes  [ ] , No [x ] : If yes indication:                         Day #    NGT: Yes [ ] No [x  ]     If Yes Placement:                                          Day #    Post-Op Beta-Blockers: Yes [x ], No[ ], If No, then contraindication:    CHEST TUBE:  [ ] YES [x ] NO  OUTPUT:     per 24 hours    AIR LEAKS:  [ ] YES [ ] NO      EPICARDIAL WIRES:  [ ] YES [x ] NO      BOWEL MOVEMENT:  [x] YES [ ] NO          LABS:                        9.4    10.10 )-----------( 284      ( 27 Feb 2019 06:10 )             29.6     COUMADIN:   [ x] YES [ ] NO    PT/INR - ( 27 Feb 2019 06:10 )   PT: 13.60 sec;   INR: 1.18 ratio           02-27    141  |  100  |  15  ----------------------------<  90  4.0   |  26  |  0.7    Ca    9.5      27 Feb 2019 06:10  Mg     2.3     02-27          MEDICATIONS  (STANDING):  aspirin enteric coated 325 milliGRAM(s) Oral daily  atorvastatin 40 milliGRAM(s) Oral at bedtime  chlorhexidine 4% Liquid 1 Application(s) Topical <User Schedule>  dextrose 5%. 1000 milliLiter(s) (50 mL/Hr) IV Continuous <Continuous>  dextrose 50% Injectable 12.5 Gram(s) IV Push once  dextrose 50% Injectable 25 Gram(s) IV Push once  dextrose 50% Injectable 25 Gram(s) IV Push once  diltiazem    Tablet 30 milliGRAM(s) Oral three times a day  docusate sodium 100 milliGRAM(s) Oral three times a day  escitalopram 10 milliGRAM(s) Oral daily  famotidine    Tablet 20 milliGRAM(s) Oral two times a day  furosemide   Injectable 20 milliGRAM(s) IV Push once  guaiFENesin  milliGRAM(s) Oral every 12 hours  heparin  Injectable 5000 Unit(s) SubCutaneous every 8 hours  insulin lispro (HumaLOG) corrective regimen sliding scale   SubCutaneous three times a day before meals  magnesium sulfate  IVPB 1 Gram(s) IV Intermittent every 12 hours  metoprolol tartrate 25 milliGRAM(s) Oral two times a day  mometasone 220 MICROgram(s) Inhaler 1 Puff(s) Inhalation daily  senna 1 Tablet(s) Oral two times a day  tiotropium 18 MICROgram(s) Capsule 1 Capsule(s) Inhalation daily  warfarin 4 milliGRAM(s) Oral once    MEDICATIONS  (PRN):  acetaminophen   Tablet .. 650 milliGRAM(s) Oral every 6 hours PRN Temp greater or equal to 38C (100.4F), Mild Pain (1 - 3)  dextrose 40% Gel 15 Gram(s) Oral once PRN Blood Glucose LESS THAN 70 milliGRAM(s)/deciliter  glucagon  Injectable 1 milliGRAM(s) IntraMuscular once PRN Glucose LESS THAN 70 milligrams/deciliter  ibuprofen  Tablet. 400 milliGRAM(s) Oral every 6 hours PRN Temp greater or equal to 38C (100.4F), Moderate Pain (4 - 6)  ondansetron Injectable 4 milliGRAM(s) IV Push every 8 hours PRN Nausea  oxyCODONE    IR 5 milliGRAM(s) Oral every 4 hours PRN Moderate Pain (4 - 6)  oxyCODONE    IR 10 milliGRAM(s) Oral four times a day PRN Severe Pain (7 - 10)      Allergies    amiodarone (Flushing)  iodine (Unknown)  shellfish (Unknown)    Intolerances        Ambulation/Activity Status: pt is ambulating well and needs to do stairs with pt prior to d/c        RADIOLOGY & ADDITIONAL TESTS:    EXAM:  XR CHEST PORTABLE URGENT 1V            PROCEDURE DATE:  02/26/2019      INTERPRETATION:  Clinical History / Reason for exam: Coronary artery   disease.    Comparison : Chest radiograph prior day.    Technique/Positioning: Frontal portable.    Findings:    Support devices: Telemetry leads overlie the thorax.    Cardiac/mediastinum/hilum: Patient is status post median sternotomy. The   heart is enlarged. Cardiac device is seen.    Lung parenchyma/Pleura: Interstitial opacities are seen within the left   lung.    Skeleton/soft tissues: Unchanged.    Impression:      Cardiomegaly. Left lung opacification, improving.      Assessment/Plan: Patient is a 59 yo female s/p cabg/maze pod # 5  cont tx as per ct surgeon  s/p cabg- cont asas, lop, and statin  s/p maze for paroxysmal a. fib- cont coumadin and pt will f/u with coumadin clinic for further coumadin dosing; pt has remained in sr for entire post op period and will remain on cardizem for rate control being she has amio allergy  d/c pacing wires  coumadin clinic follow up   gentle diuresis  pt to ambulate with pt for stair therapy      Social Service Disposition:  d/c home today OPERATIVE PROCEDURE(s):     cabg x 1 / maze           POD # 5    SURGEON(s):     SUBJECTIVE ASSESSMENT: pt states that when she went for a walk with PT she began to feel dizzy and also complains of mild heartburn type symptoms    Vital Signs Last 24 Hrs  T(C): 36.8 (27 Feb 2019 08:00), Max: 37.9 (26 Feb 2019 20:00)  T(F): 98.3 (27 Feb 2019 08:00), Max: 100.3 (26 Feb 2019 20:00)  HR: 74 (27 Feb 2019 08:00) (74 - 98)  BP: 106/56 (27 Feb 2019 08:00) (95/66 - 126/78)  BP(mean): 74 (27 Feb 2019 08:00) (60 - 99)  RR: 24 (27 Feb 2019 08:00) (19 - 39)  SpO2: 99% (27 Feb 2019 08:00) (97% - 100%)  02-26-19 @ 07:01 - 02-27-19 @ 07:00  --------------------------------------------------------  IN: 1420 mL / OUT: 1400 mL / NET: 20 mL    02-27-19 @ 07:01  -  02-27-19 @ 08:24  --------------------------------------------------------  IN: 240 mL / OUT: 0 mL / NET: 240 mL        Physical Exam  General: alert and oriented x 3  Chest: lungs cta bl  CVS: s1s2  Abd: pos bs, soft nt  GI/ :  Ext: trace edema  incisions: c/d/i  sternum- intact    Central Venous Catheter: Yes[ ]  No[ x] , If Yes indication:           Day #    Cochran Catheter: Yes  [ ] , No [x ] : If yes indication:                         Day #    NGT: Yes [ ] No [x  ]     If Yes Placement:                                          Day #    Post-Op Beta-Blockers: Yes [x ], No[ ], If No, then contraindication:    CHEST TUBE:  [ ] YES [x ] NO  OUTPUT:     per 24 hours    AIR LEAKS:  [ ] YES [ ] NO      EPICARDIAL WIRES:  [ ] YES [x ] NO      BOWEL MOVEMENT:  [x] YES [ ] NO          LABS:                        9.4    10.10 )-----------( 284      ( 27 Feb 2019 06:10 )             29.6     COUMADIN:   [ x] YES [ ] NO    PT/INR - ( 27 Feb 2019 06:10 )   PT: 13.60 sec;   INR: 1.18 ratio           02-27    141  |  100  |  15  ----------------------------<  90  4.0   |  26  |  0.7    Ca    9.5      27 Feb 2019 06:10  Mg     2.3     02-27          MEDICATIONS  (STANDING):  aspirin enteric coated 325 milliGRAM(s) Oral daily  atorvastatin 40 milliGRAM(s) Oral at bedtime  chlorhexidine 4% Liquid 1 Application(s) Topical <User Schedule>  dextrose 5%. 1000 milliLiter(s) (50 mL/Hr) IV Continuous <Continuous>  dextrose 50% Injectable 12.5 Gram(s) IV Push once  dextrose 50% Injectable 25 Gram(s) IV Push once  dextrose 50% Injectable 25 Gram(s) IV Push once  diltiazem    Tablet 30 milliGRAM(s) Oral three times a day  docusate sodium 100 milliGRAM(s) Oral three times a day  escitalopram 10 milliGRAM(s) Oral daily  famotidine    Tablet 20 milliGRAM(s) Oral two times a day  furosemide   Injectable 20 milliGRAM(s) IV Push once  guaiFENesin  milliGRAM(s) Oral every 12 hours  heparin  Injectable 5000 Unit(s) SubCutaneous every 8 hours  insulin lispro (HumaLOG) corrective regimen sliding scale   SubCutaneous three times a day before meals  magnesium sulfate  IVPB 1 Gram(s) IV Intermittent every 12 hours  metoprolol tartrate 25 milliGRAM(s) Oral two times a day  mometasone 220 MICROgram(s) Inhaler 1 Puff(s) Inhalation daily  senna 1 Tablet(s) Oral two times a day  tiotropium 18 MICROgram(s) Capsule 1 Capsule(s) Inhalation daily  warfarin 4 milliGRAM(s) Oral once    MEDICATIONS  (PRN):  acetaminophen   Tablet .. 650 milliGRAM(s) Oral every 6 hours PRN Temp greater or equal to 38C (100.4F), Mild Pain (1 - 3)  dextrose 40% Gel 15 Gram(s) Oral once PRN Blood Glucose LESS THAN 70 milliGRAM(s)/deciliter  glucagon  Injectable 1 milliGRAM(s) IntraMuscular once PRN Glucose LESS THAN 70 milligrams/deciliter  ibuprofen  Tablet. 400 milliGRAM(s) Oral every 6 hours PRN Temp greater or equal to 38C (100.4F), Moderate Pain (4 - 6)  ondansetron Injectable 4 milliGRAM(s) IV Push every 8 hours PRN Nausea  oxyCODONE    IR 5 milliGRAM(s) Oral every 4 hours PRN Moderate Pain (4 - 6)  oxyCODONE    IR 10 milliGRAM(s) Oral four times a day PRN Severe Pain (7 - 10)      Allergies    amiodarone (Flushing)  iodine (Unknown)  shellfish (Unknown)    Intolerances        Ambulation/Activity Status: pt is ambulating well and needs to do stairs with pt prior to d/c        RADIOLOGY & ADDITIONAL TESTS:    EXAM:  XR CHEST PORTABLE URGENT 1V            PROCEDURE DATE:  02/26/2019      INTERPRETATION:  Clinical History / Reason for exam: Coronary artery   disease.    Comparison : Chest radiograph prior day.    Technique/Positioning: Frontal portable.    Findings:    Support devices: Telemetry leads overlie the thorax.    Cardiac/mediastinum/hilum: Patient is status post median sternotomy. The   heart is enlarged. Cardiac device is seen.    Lung parenchyma/Pleura: Interstitial opacities are seen within the left   lung.    Skeleton/soft tissues: Unchanged.    Impression:      Cardiomegaly. Left lung opacification, improving.      Assessment/Plan: Patient is a 59 yo female s/p cabg/maze pod # 5  cont tx as per ct surgeon  s/p cabg- cont asa, lop, and statin  s/p maze for paroxysmal a. fib- cont coumadin and pt will f/u with coumadin clinic for further coumadin dosing; pt has remained in sr for entire post op period and will remain on cardizem for rate control being she has amio allergy  d/c pacing wires  coumadin clinic follow up   gentle diuresis  pt to ambulate with pt for stair therapy once she feels better  maalox prn for heartburn as well as pepcid    Social Service Disposition:  d/c home today if she is able to do stairs with pt

## 2019-02-27 NOTE — CONSULT NOTE ADULT - ASSESSMENT
58y old Female s/p CABG POD#5 c/o episode of dizziness while ambulating -- may be secondary to dehydration or orthostatics, less likely labyrinthitis.    PLAN:  1.	Encourage fluid hydration and diet  2.	Check orthostatics  3.	Can start Meclizine for symptomatic relief if dizziness continues as this has helped pt in past

## 2019-02-27 NOTE — CONSULT NOTE ADULT - CONSULT REQUESTED DATE/TIME
27-Feb-2019
17-Feb-2019 16:11
18-Feb-2019 07:10
19-Feb-2019 15:46
21-Feb-2019 14:36
18-Feb-2019 08:58

## 2019-02-27 NOTE — CONSULT NOTE ADULT - SUBJECTIVE AND OBJECTIVE BOX
58y old Female s/p CABG POD#5 c/o episode of dizziness, self spinning when she got up to ambulate and during ambulation today. Pt has also had intermittent episodes of nausea, no vomiting. Pt w hearing aids, no acute change in hearing, no tinnitus, no headache, no gait imbalance. Episodes are not brought about by head movements. Pt states she has had episodes of vertigo and labyrinthitis in the past in which she has used Meclizine for symptomatic relief. Currently, while sitting, pt asymptomatic.     PAST MEDICAL & SURGICAL HISTORY:  Hearing aid worn  Hearing decreased  Asthma  Essential hypertension  Afib  Closed fracture of foot, unspecified laterality, sequela  History of  section  Bilateral carpal tunnel syndrome  No significant past surgical history    MEDS: acetaminophen   Tablet .. 650 milliGRAM(s) PRN  aluminum hydroxide/magnesium hydroxide/simethicone Suspension 30 milliLiter(s) PRN  aspirin enteric coated 325 milliGRAM(s)  atorvastatin 40 milliGRAM(s)  chlorhexidine 4% Liquid 1 Application(s)  dextrose 40% Gel 15 Gram(s) PRN  dextrose 5%. 1000 milliLiter(s)  dextrose 50% Injectable 12.5 Gram(s)  dextrose 50% Injectable 25 Gram(s)  dextrose 50% Injectable 25 Gram(s)  diltiazem    Tablet 30 milliGRAM(s)  docusate sodium 100 milliGRAM(s)  escitalopram 10 milliGRAM(s)  famotidine    Tablet 20 milliGRAM(s)  glucagon  Injectable 1 milliGRAM(s) PRN  guaiFENesin  milliGRAM(s)  heparin  Injectable 5000 Unit(s)  ibuprofen  Tablet. 400 milliGRAM(s) PRN  insulin lispro (HumaLOG) corrective regimen sliding scale    magnesium sulfate  IVPB 1 Gram(s)  metoprolol tartrate 25 milliGRAM(s)  mometasone 220 MICROgram(s) Inhaler 1 Puff(s)  ondansetron Injectable 4 milliGRAM(s) PRN  oxyCODONE    IR 5 milliGRAM(s) PRN  oxyCODONE    IR 10 milliGRAM(s) PRN  senna 1 Tablet(s)  tiotropium 18 MICROgram(s) Capsule 1 Capsule(s)  warfarin 4 milliGRAM(s)    MEDICATIONS  (PRN):  acetaminophen   Tablet .. 650 milliGRAM(s) Oral every 6 hours PRN Temp greater or equal to 38C (100.4F), Mild Pain (1 - 3)  aluminum hydroxide/magnesium hydroxide/simethicone Suspension 30 milliLiter(s) Oral every 6 hours PRN Dyspepsia  dextrose 40% Gel 15 Gram(s) Oral once PRN Blood Glucose LESS THAN 70 milliGRAM(s)/deciliter  glucagon  Injectable 1 milliGRAM(s) IntraMuscular once PRN Glucose LESS THAN 70 milligrams/deciliter  ibuprofen  Tablet. 400 milliGRAM(s) Oral every 6 hours PRN Temp greater or equal to 38C (100.4F), Moderate Pain (4 - 6)  ondansetron Injectable 4 milliGRAM(s) IV Push every 8 hours PRN Nausea  oxyCODONE    IR 5 milliGRAM(s) Oral every 4 hours PRN Moderate Pain (4 - 6)  oxyCODONE    IR 10 milliGRAM(s) Oral four times a day PRN Severe Pain (7 - 10)    ALLERGIES: amiodarone (Flushing)  iodine (Unknown)  shellfish (Unknown)    VS: T(F): 98, Max: 100.3 ( @ 20:00)  HR: 84 (74 - 100)  BP: 126/67 (99/50 - 140/78)  RR: 18  SpO2: 99% (96% - 100%)  GEN: Alert, awake, NAD  HEENT: posterior OP pink, no erythema, PERRL, EOMI, no nystagmus, hearing aids removed, R EAR: no pre/post auricular or mastoid TTP, external ear normal, EAC clear, TM grey, L ear: no pre/post auricular or mastoid TTP, external ear normal, EAC with some cerumen, TM grey, neck supple, no LAD, [place] not performed as pt sitting in chair    LABS/IMAGIN.4    10.10 )-----------( 284      ( 2019 06:10 )             29.6         141  |  100  |  15  ----------------------------<  90  4.0   |  26  |  0.7    Ca    9.5      2019 06:10  Mg     2.3         PT/INR - ( 2019 06:10 )   PT: 13.60 sec;   INR: 1.18 ratio

## 2019-02-27 NOTE — PROGRESS NOTE ADULT - SUBJECTIVE AND OBJECTIVE BOX
PT SEEN AND WARFARIN EDUCATION INITIATED.  ANSWERED PT QUESTIONS.  EXPLAINED POLICIES AND PROCEDURES OF ANTICOAGULATION CENTER TO PT.  PT GIVEN APPT FOR FRIDAY 3/1 AT 2PM [address] [place] [phone]    BARBIE MAHAN ANP-CACP

## 2019-02-28 VITALS
HEART RATE: 92 BPM | SYSTOLIC BLOOD PRESSURE: 119 MMHG | DIASTOLIC BLOOD PRESSURE: 89 MMHG | RESPIRATION RATE: 22 BRPM | OXYGEN SATURATION: 100 %

## 2019-02-28 LAB
ANION GAP SERPL CALC-SCNC: 15 MMOL/L — HIGH (ref 7–14)
BUN SERPL-MCNC: 13 MG/DL — SIGNIFICANT CHANGE UP (ref 10–20)
CALCIUM SERPL-MCNC: 9.1 MG/DL — SIGNIFICANT CHANGE UP (ref 8.5–10.1)
CHLORIDE SERPL-SCNC: 99 MMOL/L — SIGNIFICANT CHANGE UP (ref 98–110)
CO2 SERPL-SCNC: 25 MMOL/L — SIGNIFICANT CHANGE UP (ref 17–32)
CREAT SERPL-MCNC: 0.7 MG/DL — SIGNIFICANT CHANGE UP (ref 0.7–1.5)
GLUCOSE BLDC GLUCOMTR-MCNC: 91 MG/DL — SIGNIFICANT CHANGE UP (ref 70–99)
GLUCOSE SERPL-MCNC: 108 MG/DL — HIGH (ref 70–99)
HCT VFR BLD CALC: 26.9 % — LOW (ref 37–47)
HGB BLD-MCNC: 8.7 G/DL — LOW (ref 12–16)
INR BLD: 1.33 RATIO — HIGH (ref 0.65–1.3)
MCHC RBC-ENTMCNC: 25.8 PG — LOW (ref 27–31)
MCHC RBC-ENTMCNC: 32.3 G/DL — SIGNIFICANT CHANGE UP (ref 32–37)
MCV RBC AUTO: 79.8 FL — LOW (ref 81–99)
NRBC # BLD: 0 /100 WBCS — SIGNIFICANT CHANGE UP (ref 0–0)
PLATELET # BLD AUTO: 320 K/UL — SIGNIFICANT CHANGE UP (ref 130–400)
POTASSIUM SERPL-MCNC: 3.8 MMOL/L — SIGNIFICANT CHANGE UP (ref 3.5–5)
POTASSIUM SERPL-SCNC: 3.8 MMOL/L — SIGNIFICANT CHANGE UP (ref 3.5–5)
PROTHROM AB SERPL-ACNC: 15.3 SEC — HIGH (ref 9.95–12.87)
RBC # BLD: 3.37 M/UL — LOW (ref 4.2–5.4)
RBC # FLD: 13.7 % — SIGNIFICANT CHANGE UP (ref 11.5–14.5)
SODIUM SERPL-SCNC: 139 MMOL/L — SIGNIFICANT CHANGE UP (ref 135–146)
WBC # BLD: 11.18 K/UL — HIGH (ref 4.8–10.8)
WBC # FLD AUTO: 11.18 K/UL — HIGH (ref 4.8–10.8)

## 2019-02-28 PROCEDURE — 99222 1ST HOSP IP/OBS MODERATE 55: CPT

## 2019-02-28 RX ORDER — WARFARIN SODIUM 2.5 MG/1
1 TABLET ORAL
Qty: 30 | Refills: 0
Start: 2019-02-28 | End: 2019-03-29

## 2019-02-28 RX ORDER — LOSARTAN/HYDROCHLOROTHIAZIDE 100MG-25MG
1 TABLET ORAL
Qty: 0 | Refills: 0 | COMMUNITY

## 2019-02-28 RX ORDER — RIVAROXABAN 15 MG-20MG
1 KIT ORAL
Qty: 0 | Refills: 0 | COMMUNITY

## 2019-02-28 RX ORDER — SENNA PLUS 8.6 MG/1
1 TABLET ORAL
Qty: 60 | Refills: 0
Start: 2019-02-28

## 2019-02-28 RX ORDER — POTASSIUM CHLORIDE 20 MEQ
20 PACKET (EA) ORAL ONCE
Qty: 0 | Refills: 0 | Status: COMPLETED | OUTPATIENT
Start: 2019-02-28 | End: 2019-02-28

## 2019-02-28 RX ORDER — METOPROLOL TARTRATE 50 MG
1 TABLET ORAL
Qty: 60 | Refills: 0
Start: 2019-02-28

## 2019-02-28 RX ORDER — POTASSIUM CHLORIDE 20 MEQ
1 PACKET (EA) ORAL
Qty: 5 | Refills: 0
Start: 2019-02-28 | End: 2019-03-04

## 2019-02-28 RX ORDER — ESCITALOPRAM OXALATE 10 MG/1
1 TABLET, FILM COATED ORAL
Qty: 30 | Refills: 0
Start: 2019-02-28

## 2019-02-28 RX ORDER — ATORVASTATIN CALCIUM 80 MG/1
1 TABLET, FILM COATED ORAL
Qty: 30 | Refills: 0
Start: 2019-02-28

## 2019-02-28 RX ORDER — WARFARIN SODIUM 2.5 MG/1
4 TABLET ORAL ONCE
Qty: 0 | Refills: 0 | Status: DISCONTINUED | OUTPATIENT
Start: 2019-02-28 | End: 2019-02-28

## 2019-02-28 RX ORDER — ASPIRIN/CALCIUM CARB/MAGNESIUM 324 MG
81 TABLET ORAL DAILY
Qty: 0 | Refills: 0 | Status: DISCONTINUED | OUTPATIENT
Start: 2019-02-28 | End: 2019-02-28

## 2019-02-28 RX ORDER — FUROSEMIDE 40 MG
1 TABLET ORAL
Qty: 5 | Refills: 0
Start: 2019-02-28 | End: 2019-03-04

## 2019-02-28 RX ORDER — MECLIZINE HCL 12.5 MG
12.5 TABLET ORAL DAILY
Qty: 0 | Refills: 0 | Status: DISCONTINUED | OUTPATIENT
Start: 2019-02-28 | End: 2019-02-28

## 2019-02-28 RX ORDER — ESCITALOPRAM OXALATE 10 MG/1
1 TABLET, FILM COATED ORAL
Qty: 0 | Refills: 0 | COMMUNITY

## 2019-02-28 RX ORDER — ASPIRIN/CALCIUM CARB/MAGNESIUM 324 MG
1 TABLET ORAL
Qty: 30 | Refills: 0
Start: 2019-02-28

## 2019-02-28 RX ORDER — ALBUTEROL 90 UG/1
0 AEROSOL, METERED ORAL
Qty: 0 | Refills: 0 | COMMUNITY

## 2019-02-28 RX ORDER — MECLIZINE HCL 12.5 MG
1 TABLET ORAL
Qty: 30 | Refills: 0
Start: 2019-02-28

## 2019-02-28 RX ORDER — FAMOTIDINE 10 MG/ML
1 INJECTION INTRAVENOUS
Qty: 60 | Refills: 0
Start: 2019-02-28

## 2019-02-28 RX ADMIN — OXYCODONE HYDROCHLORIDE 5 MILLIGRAM(S): 5 TABLET ORAL at 03:49

## 2019-02-28 RX ADMIN — FAMOTIDINE 20 MILLIGRAM(S): 10 INJECTION INTRAVENOUS at 06:12

## 2019-02-28 RX ADMIN — Medication 600 MILLIGRAM(S): at 06:11

## 2019-02-28 RX ADMIN — ESCITALOPRAM OXALATE 10 MILLIGRAM(S): 10 TABLET, FILM COATED ORAL at 11:33

## 2019-02-28 RX ADMIN — Medication 100 MILLIGRAM(S): at 06:11

## 2019-02-28 RX ADMIN — Medication 25 MILLIGRAM(S): at 06:13

## 2019-02-28 RX ADMIN — TIOTROPIUM BROMIDE 1 CAPSULE(S): 18 CAPSULE ORAL; RESPIRATORY (INHALATION) at 08:30

## 2019-02-28 RX ADMIN — HEPARIN SODIUM 5000 UNIT(S): 5000 INJECTION INTRAVENOUS; SUBCUTANEOUS at 06:11

## 2019-02-28 RX ADMIN — Medication 20 MILLIEQUIVALENT(S): at 07:09

## 2019-02-28 RX ADMIN — Medication 81 MILLIGRAM(S): at 11:32

## 2019-02-28 RX ADMIN — Medication 100 MILLIGRAM(S): at 14:20

## 2019-02-28 RX ADMIN — Medication 600 MILLIGRAM(S): at 18:22

## 2019-02-28 RX ADMIN — Medication 100 GRAM(S): at 06:12

## 2019-02-28 RX ADMIN — SENNA PLUS 1 TABLET(S): 8.6 TABLET ORAL at 06:38

## 2019-02-28 RX ADMIN — FAMOTIDINE 20 MILLIGRAM(S): 10 INJECTION INTRAVENOUS at 18:22

## 2019-02-28 RX ADMIN — Medication 12.5 MILLIGRAM(S): at 08:30

## 2019-02-28 NOTE — PROGRESS NOTE ADULT - SUBJECTIVE AND OBJECTIVE BOX
59 yo Female with PAST MEDICAL & SURGICAL HISTORY: x-smoker, Obesity, Paroxysmal A fib on Xarelto, COPD, HTN, presented via EMS with c/o rapid HR. In ED noted with elevated Trops and EKG with inferio-lateral ST depressions.   -19 - Cardiac cath with CAD.   - 19 - s/p CABG x1 (LIMA -> LAD), MAZE with atrial clip    Surgical hx:  Closed fracture of foot, unspecified laterality, sequela  History of  section    -Extubated, off drips    Vital Signs Last 24 Hrs  T(C): 36.7 (2019 21:00), Max: 36.8 (2019 12:00)  T(F): 98.1 (2019 21:00), Max: 98.2 (2019 12:00)  HR: 84 (2019 06:30) (78 - 100)  BP: 115/61 (2019 06:30) (101/56 - 140/78)  BP(mean): 72 (2019 06:30) (69 - 104)  RR: 21 (2019 06:30) (18 - 28)  SpO2: 99% (2019 06:30) (96% - 100%)    alert, awake, verbal  follows commands  clean sternal incision  S1S2 Reg rate  B/l air entry, diminished effort  soft abdomen, non distended  b/l pedal edema, slightly decreased ROM    WBC 11.2, Hg 8.7, plt 320  Cr 0.7, K 3.8  INR 1.3    CXR - left lower lobe infiltrate    a/p:    59 yo F X smoker, with COPD, Paroxysmal A fib no Xarelto at home, Obesity, HTN. Found to have CAD    POD # 6 CABG x1 and MAZE    hold diuresis, start meclizine PO  PO ASA, statin, beta blocker  PO Cardizem for rate control  coumadin at night  Pain control PRN  Glycemic control   DVT proph  improved ambulation today. plan is to d/c her home

## 2019-02-28 NOTE — PROGRESS NOTE ADULT - PROVIDER SPECIALTY LIST ADULT
Anticoag Management
CT Surgery
Cardiology
Critical Care
Electrophysiology
Internal Medicine
Pulmonology
CT Surgery
Critical Care

## 2019-02-28 NOTE — PROGRESS NOTE ADULT - REASON FOR ADMISSION
Palpitations

## 2019-02-28 NOTE — PROGRESS NOTE ADULT - SUBJECTIVE AND OBJECTIVE BOX
OPERATIVE PROCEDURE(s):                POD #                       SURGEON(s): YANCY Reed  SUBJECTIVE ASSESSMENT:58yFemale patient seen and examined at bedside.    Vital Signs Last 24 Hrs  T(F): 98.1 (27 Feb 2019 21:00), Max: 98.2 (27 Feb 2019 12:00)  HR: 84 (28 Feb 2019 06:30) (76 - 100)  BP: 115/61 (28 Feb 2019 06:30) (99/50 - 140/78)  BP(mean): 72 (28 Feb 2019 06:30) (64 - 104)  ABP: --  ABP(mean): --  RR: 21 (28 Feb 2019 06:30) (18 - 28)  SpO2: 99% (28 Feb 2019 06:30) (96% - 100%)  CVP(mm Hg): --  CVP(cm H2O): --  CO: --  CI: --  PA: --  SVR: --    I&O's Detail    27 Feb 2019 07:01  -  28 Feb 2019 07:00  --------------------------------------------------------  IN:    IV PiggyBack: 200 mL    Oral Fluid: 1080 mL  Total IN: 1280 mL    OUT:    Voided: 1575 mL  Total OUT: 1575 mL        Net: I&O's Detail    26 Feb 2019 07:01  -  27 Feb 2019 07:00  --------------------------------------------------------  Total NET: 20 mL      27 Feb 2019 07:01  -  28 Feb 2019 07:00  --------------------------------------------------------  Total NET: -295 mL        CAPILLARY BLOOD GLUCOSE      POCT Blood Glucose.: 91 mg/dL (28 Feb 2019 06:23)  POCT Blood Glucose.: 93 mg/dL (27 Feb 2019 22:40)  POCT Blood Glucose.: 88 mg/dL (27 Feb 2019 15:49)  POCT Blood Glucose.: 100 mg/dL (27 Feb 2019 10:54)      Physical Exam:  General: NAD; A&Ox3  Cardiac: S1/S2, RRR, no murmur, no rubs  Lungs: unlabored respirations, CTA b/l, no wheeze, no rales, no crackles  Abdomen: Soft/NT/ND; positive bowel sounds x 4  Sternum: Intact, no click, incision healing well with no drainage  Incisions: Incisions clean/dry/intact  Extremities: No edema b/l lower extremities; good capillary refill; no cyanosis; palpable 1+ pedal pulses b/l    Central Venous Catheter: Yes[]  No[] , If Yes indication:                    Day #  Cochran Catheter: Yes  [] , No  [] , If yes indication:                                 Day #  NGT: Yes [] No [] ,    If Yes Placement:                                                   Day #  EPICARDIAL WIRES:  [] YES [] NO                                                            Day #  BOWEL MOVEMENT:  [] YES [] NO, If No, Timing since last BM Day #  CHEST TUBE(Left/Right):  [] YES [] NO, If yes -  AIR LEAKS:  [] YES [] NO        LABS:                        8.7<L>  11.18<H> )-----------( 320      ( 28 Feb 2019 01:05 )             26.9<L>                        9.4<L>  10.10 )-----------( 284      ( 27 Feb 2019 06:10 )             29.6<L>    02-28    139  |  99  |  13  ----------------------------<  108<H>  3.8   |  25  |  0.7  02-27    141  |  100  |  15  ----------------------------<  90  4.0   |  26  |  0.7    Ca    9.1      28 Feb 2019 01:05  Mg     2.3     02-27      PT/INR - ( 28 Feb 2019 01:05 )   PT: ;   INR: 1.33 ratio         PT/INR - ( 27 Feb 2019 06:10 )   PT: ;   INR: 1.18 ratio               RADIOLOGY & ADDITIONAL TESTS:  CXR:   EKG:  Allergies    amiodarone (Flushing)  iodine (Unknown)  shellfish (Unknown)    Intolerances      MEDICATIONS  (STANDING):  aspirin enteric coated 325 milliGRAM(s) Oral daily  atorvastatin 40 milliGRAM(s) Oral at bedtime  chlorhexidine 4% Liquid 1 Application(s) Topical <User Schedule>  dextrose 5%. 1000 milliLiter(s) (50 mL/Hr) IV Continuous <Continuous>  dextrose 50% Injectable 12.5 Gram(s) IV Push once  dextrose 50% Injectable 25 Gram(s) IV Push once  dextrose 50% Injectable 25 Gram(s) IV Push once  diltiazem    Tablet 30 milliGRAM(s) Oral three times a day  docusate sodium 100 milliGRAM(s) Oral three times a day  escitalopram 10 milliGRAM(s) Oral daily  famotidine    Tablet 20 milliGRAM(s) Oral two times a day  guaiFENesin  milliGRAM(s) Oral every 12 hours  heparin  Injectable 5000 Unit(s) SubCutaneous every 8 hours  magnesium sulfate  IVPB 1 Gram(s) IV Intermittent every 12 hours  metoprolol tartrate 25 milliGRAM(s) Oral two times a day  mometasone 220 MICROgram(s) Inhaler 1 Puff(s) Inhalation daily  senna 1 Tablet(s) Oral two times a day  tiotropium 18 MICROgram(s) Capsule 1 Capsule(s) Inhalation daily    MEDICATIONS  (PRN):  acetaminophen   Tablet .. 650 milliGRAM(s) Oral every 6 hours PRN Temp greater or equal to 38C (100.4F), Mild Pain (1 - 3)  aluminum hydroxide/magnesium hydroxide/simethicone Suspension 30 milliLiter(s) Oral every 6 hours PRN Dyspepsia  dextrose 40% Gel 15 Gram(s) Oral once PRN Blood Glucose LESS THAN 70 milliGRAM(s)/deciliter  glucagon  Injectable 1 milliGRAM(s) IntraMuscular once PRN Glucose LESS THAN 70 milligrams/deciliter  ibuprofen  Tablet. 400 milliGRAM(s) Oral every 6 hours PRN Temp greater or equal to 38C (100.4F), Moderate Pain (4 - 6)  ondansetron Injectable 4 milliGRAM(s) IV Push every 8 hours PRN Nausea  oxyCODONE    IR 5 milliGRAM(s) Oral every 4 hours PRN Moderate Pain (4 - 6)  oxyCODONE    IR 10 milliGRAM(s) Oral four times a day PRN Severe Pain (7 - 10)      Pharmacologic DVT Prophylaxis: [] YES, []NO: Contraindication:   [] HEPARIN: Dose: XX mg  Q24H    [] LOVENOX: Dose: XX mg  Q24H                 SCD's: YES b/l    GI Prophylaxis: Protonix [], Pepcid []    Post-Op Beta-Blockers: [x]Yes, []No: contraindication:  Post-Op Nitrate: []Yes, []No: contraindication:  Post-Op Aspirin: [x]Yes,  []No: contraindication:  Post-Op Statin: []Yes, []No: contraindication:      Ambulation/Activity Status: Ambulate     Assessment/Plan:  58y Female status-post cabg/ maze with ELENITA clipping on coumadin. Pt ger vertigo.   - Maze/ ELENITA clipping- decrease asa to 81mg, coumadin 4mg and f/u in coumadin clinic tomorrow.  -CAD continue Beta blockers  - will d/c home today without standing diuresis as per CTU team.  - ambulate today and do stairs.      Social Service Disposition: OPERATIVE PROCEDURE(s):      cabg x 1/ MAZE with ELENITA clipping          POD #   6                    SURGEON(s): YANCY Reed  SUBJECTIVE ASSESSMENT:58yFemale patient seen and examined at bedside. Pt states she is still feeling a bit dizzy but improved since yesterday. Pt had no acute events O/N.    Vital Signs Last 24 Hrs  T(F): 98.1 (27 Feb 2019 21:00), Max: 98.2 (27 Feb 2019 12:00)  HR: 84 (28 Feb 2019 06:30) (76 - 100)  BP: 115/61 (28 Feb 2019 06:30) (99/50 - 140/78)  BP(mean): 72 (28 Feb 2019 06:30) (64 - 104)  RR: 21 (28 Feb 2019 06:30) (18 - 28)  SpO2: 99% (28 Feb 2019 06:30) (96% - 100%)    I&O's Detail    27 Feb 2019 07:01  -  28 Feb 2019 07:00  --------------------------------------------------------  IN:    IV PiggyBack: 200 mL    Oral Fluid: 1080 mL  Total IN: 1280 mL    OUT:    Voided: 1575 mL  Total OUT: 1575 mL        Net: I&O's Detail    26 Feb 2019 07:01  -  27 Feb 2019 07:00  --------------------------------------------------------  Total NET: 20 mL      27 Feb 2019 07:01  -  28 Feb 2019 07:00  --------------------------------------------------------  Total NET: -295 mL        CAPILLARY BLOOD GLUCOSE      POCT Blood Glucose.: 91 mg/dL (28 Feb 2019 06:23)  POCT Blood Glucose.: 93 mg/dL (27 Feb 2019 22:40)  POCT Blood Glucose.: 88 mg/dL (27 Feb 2019 15:49)  POCT Blood Glucose.: 100 mg/dL (27 Feb 2019 10:54)      Physical Exam:  General: NAD; A&Ox3  Cardiac: S1/S2, RRR, no murmur, no rubs  Lungs: unlabored respirations, slightly decrease BS on left base, no wheeze, no rales, no crackles b/l  Abdomen: Soft/NT/ND; positive bowel sounds x 4  Sternum: Intact, no click, incision healing well with no drainage  Incisions: Incisions clean/dry/intact  Extremities: trace edema b/l lower extremities; good capillary refill; no cyanosis; palpable 1+ pedal pulses b/l    Central Venous Catheter: Yes[]  No[] , If Yes indication:                    Day #  Cochran Catheter: Yes  [] , No  [x] , If yes indication:                                 Day #  NGT: Yes [] No [X} ,    If Yes Placement:                                                   Day #  EPICARDIAL WIRES:  [] YES [] NO                                                            Day #  BOWEL MOVEMENT:  [] YES [] NO, If No, Timing since last BM Day #  CHEST TUBE(Left/Right):  [] YES [] NO, If yes -  AIR LEAKS:  [] YES [] NO        LABS:                        8.7<L>  11.18<H> )-----------( 320      ( 28 Feb 2019 01:05 )             26.9<L>                        9.4<L>  10.10 )-----------( 284      ( 27 Feb 2019 06:10 )             29.6<L>    02-28    139  |  99  |  13  ----------------------------<  108<H>  3.8   |  25  |  0.7  02-27    141  |  100  |  15  ----------------------------<  90  4.0   |  26  |  0.7    Ca    9.1      28 Feb 2019 01:05  Mg     2.3     02-27      PT/INR - ( 28 Feb 2019 01:05 )   PT: ;   INR: 1.33 ratio         PT/INR - ( 27 Feb 2019 06:10 )   PT: ;   INR: 1.18 ratio               RADIOLOGY & ADDITIONAL TESTS:  CXR: < from: Xray Chest 1 View- PORTABLE-Routine (02.27.19 @ 05:06) >  Stable left basilar opacity/effusion.    < end of copied text >      Allergies    amiodarone (Flushing)  iodine (Unknown)  shellfish (Unknown)    Intolerances      MEDICATIONS  (STANDING):  aspirin enteric coated 325 milliGRAM(s) Oral daily  atorvastatin 40 milliGRAM(s) Oral at bedtime  chlorhexidine 4% Liquid 1 Application(s) Topical <User Schedule>  dextrose 5%. 1000 milliLiter(s) (50 mL/Hr) IV Continuous <Continuous>  dextrose 50% Injectable 12.5 Gram(s) IV Push once  dextrose 50% Injectable 25 Gram(s) IV Push once  dextrose 50% Injectable 25 Gram(s) IV Push once  diltiazem    Tablet 30 milliGRAM(s) Oral three times a day  docusate sodium 100 milliGRAM(s) Oral three times a day  escitalopram 10 milliGRAM(s) Oral daily  famotidine    Tablet 20 milliGRAM(s) Oral two times a day  guaiFENesin  milliGRAM(s) Oral every 12 hours  heparin  Injectable 5000 Unit(s) SubCutaneous every 8 hours  magnesium sulfate  IVPB 1 Gram(s) IV Intermittent every 12 hours  metoprolol tartrate 25 milliGRAM(s) Oral two times a day  mometasone 220 MICROgram(s) Inhaler 1 Puff(s) Inhalation daily  senna 1 Tablet(s) Oral two times a day  tiotropium 18 MICROgram(s) Capsule 1 Capsule(s) Inhalation daily    MEDICATIONS  (PRN):  acetaminophen   Tablet .. 650 milliGRAM(s) Oral every 6 hours PRN Temp greater or equal to 38C (100.4F), Mild Pain (1 - 3)  aluminum hydroxide/magnesium hydroxide/simethicone Suspension 30 milliLiter(s) Oral every 6 hours PRN Dyspepsia  dextrose 40% Gel 15 Gram(s) Oral once PRN Blood Glucose LESS THAN 70 milliGRAM(s)/deciliter  glucagon  Injectable 1 milliGRAM(s) IntraMuscular once PRN Glucose LESS THAN 70 milligrams/deciliter  ibuprofen  Tablet. 400 milliGRAM(s) Oral every 6 hours PRN Temp greater or equal to 38C (100.4F), Moderate Pain (4 - 6)  ondansetron Injectable 4 milliGRAM(s) IV Push every 8 hours PRN Nausea  oxyCODONE    IR 5 milliGRAM(s) Oral every 4 hours PRN Moderate Pain (4 - 6)  oxyCODONE    IR 10 milliGRAM(s) Oral four times a day PRN Severe Pain (7 - 10)      Pharmacologic DVT Prophylaxis: [] YES, []NO: Contraindication:   [x] HEPARIN: Dose: 5000 mg  Q8H               SCD's: YES b/l    GI Prophylaxis: Protonix [], Pepcid [x]    Post-Op Beta-Blockers: [x]Yes, []No: contraindication:  Post-Op Aspirin: [x]Yes,  []No: contraindication:  Post-Op Statin: [x]Yes, []No: contraindication:      Ambulation/Activity Status: Ambulate     Assessment/Plan:  58y Female status-post cabg/ maze with ELENITA clipping on coumadin. Pt ger vertigo.   - Maze/ ELENITA clipping- decrease asa to 81mg, coumadin 4mg and f/u in coumadin clinic tomorrow.  -CAD continue Beta blockers  - will d/c home today without standing diuresis as per CTU team.  - ambulate today and do stairs.  - start meclizine for dizziness      Social Service Disposition:  d/c home today

## 2019-03-01 ENCOUNTER — OUTPATIENT (OUTPATIENT)
Dept: OUTPATIENT SERVICES | Facility: HOSPITAL | Age: 59
LOS: 1 days | Discharge: HOME | End: 2019-03-01

## 2019-03-01 DIAGNOSIS — Z79.01 LONG TERM (CURRENT) USE OF ANTICOAGULANTS: ICD-10-CM

## 2019-03-01 DIAGNOSIS — I48.91 UNSPECIFIED ATRIAL FIBRILLATION: ICD-10-CM

## 2019-03-01 LAB
POCT INR: 2.4 RATIO — HIGH (ref 0.9–1.2)
POCT PT: 28.3 SEC — HIGH (ref 10–13.4)

## 2019-03-04 ENCOUNTER — OUTPATIENT (OUTPATIENT)
Dept: OUTPATIENT SERVICES | Facility: HOSPITAL | Age: 59
LOS: 1 days | Discharge: HOME | End: 2019-03-04

## 2019-03-04 DIAGNOSIS — I82.91 CHRONIC EMBOLISM AND THROMBOSIS OF UNSPECIFIED VEIN: ICD-10-CM

## 2019-03-04 DIAGNOSIS — Z79.01 LONG TERM (CURRENT) USE OF ANTICOAGULANTS: ICD-10-CM

## 2019-03-04 LAB
POCT INR: 3.1 RATIO — HIGH (ref 0.9–1.2)
POCT PT: 37 SEC — HIGH (ref 10–13.4)

## 2019-03-07 ENCOUNTER — OUTPATIENT (OUTPATIENT)
Dept: OUTPATIENT SERVICES | Facility: HOSPITAL | Age: 59
LOS: 1 days | Discharge: HOME | End: 2019-03-07

## 2019-03-07 DIAGNOSIS — I82.91 CHRONIC EMBOLISM AND THROMBOSIS OF UNSPECIFIED VEIN: ICD-10-CM

## 2019-03-07 DIAGNOSIS — Z79.01 LONG TERM (CURRENT) USE OF ANTICOAGULANTS: ICD-10-CM

## 2019-03-07 LAB
POCT INR: 3.1 RATIO — HIGH (ref 0.9–1.2)
POCT PT: 36.9 SEC — HIGH (ref 10–13.4)

## 2019-03-08 ENCOUNTER — APPOINTMENT (OUTPATIENT)
Dept: CARDIOTHORACIC SURGERY | Facility: CLINIC | Age: 59
End: 2019-03-08

## 2019-03-08 VITALS
DIASTOLIC BLOOD PRESSURE: 75 MMHG | RESPIRATION RATE: 13 BRPM | HEART RATE: 80 BPM | SYSTOLIC BLOOD PRESSURE: 115 MMHG | HEIGHT: 67 IN | BODY MASS INDEX: 40.18 KG/M2 | TEMPERATURE: 98.6 F | OXYGEN SATURATION: 98 % | WEIGHT: 256 LBS

## 2019-03-08 RX ORDER — METOPROLOL TARTRATE 25 MG/1
25 TABLET, FILM COATED ORAL
Refills: 0 | Status: ACTIVE | COMMUNITY

## 2019-03-08 RX ORDER — SENNOSIDES 8.6 MG/1
CAPSULE, GELATIN COATED ORAL
Refills: 0 | Status: ACTIVE | COMMUNITY

## 2019-03-08 RX ORDER — ASPIRIN 81 MG
81 TABLET, DELAYED RELEASE (ENTERIC COATED) ORAL
Refills: 0 | Status: ACTIVE | COMMUNITY

## 2019-03-08 RX ORDER — TIOTROPIUM BROMIDE 18 UG/1
18 CAPSULE ORAL; RESPIRATORY (INHALATION)
Refills: 0 | Status: ACTIVE | COMMUNITY

## 2019-03-08 RX ORDER — BUDESONIDE AND FORMOTEROL FUMARATE DIHYDRATE 160; 4.5 UG/1; UG/1
160-4.5 AEROSOL RESPIRATORY (INHALATION)
Refills: 0 | Status: ACTIVE | COMMUNITY

## 2019-03-08 RX ORDER — ATORVASTATIN CALCIUM 40 MG/1
40 TABLET, FILM COATED ORAL
Refills: 0 | Status: ACTIVE | COMMUNITY

## 2019-03-11 ENCOUNTER — OUTPATIENT (OUTPATIENT)
Dept: OUTPATIENT SERVICES | Facility: HOSPITAL | Age: 59
LOS: 1 days | Discharge: HOME | End: 2019-03-11

## 2019-03-11 DIAGNOSIS — I82.91 CHRONIC EMBOLISM AND THROMBOSIS OF UNSPECIFIED VEIN: ICD-10-CM

## 2019-03-11 DIAGNOSIS — Z79.01 LONG TERM (CURRENT) USE OF ANTICOAGULANTS: ICD-10-CM

## 2019-03-11 LAB
POCT INR: 1.6 RATIO — HIGH (ref 0.9–1.2)
POCT PT: 19.5 SEC — HIGH (ref 10–13.4)

## 2019-03-15 ENCOUNTER — OUTPATIENT (OUTPATIENT)
Dept: OUTPATIENT SERVICES | Facility: HOSPITAL | Age: 59
LOS: 1 days | Discharge: HOME | End: 2019-03-15

## 2019-03-15 DIAGNOSIS — I82.91 CHRONIC EMBOLISM AND THROMBOSIS OF UNSPECIFIED VEIN: ICD-10-CM

## 2019-03-15 DIAGNOSIS — Z79.01 LONG TERM (CURRENT) USE OF ANTICOAGULANTS: ICD-10-CM

## 2019-03-15 LAB
POCT INR: 1.8 RATIO — HIGH (ref 0.9–1.2)
POCT PT: 21.6 SEC — HIGH (ref 10–13.4)

## 2019-03-15 NOTE — CHART NOTE - NSCHARTNOTEFT_GEN_A_CORE
Chart reviewed for clarification if patient had an NSTEMI. Cardiac enzyme's confirmed patient had a NSTEMI.

## 2019-03-18 ENCOUNTER — OUTPATIENT (OUTPATIENT)
Dept: OUTPATIENT SERVICES | Facility: HOSPITAL | Age: 59
LOS: 1 days | Discharge: HOME | End: 2019-03-18

## 2019-03-18 DIAGNOSIS — Z79.01 LONG TERM (CURRENT) USE OF ANTICOAGULANTS: ICD-10-CM

## 2019-03-18 DIAGNOSIS — I82.91 CHRONIC EMBOLISM AND THROMBOSIS OF UNSPECIFIED VEIN: ICD-10-CM

## 2019-03-18 LAB
POCT INR: 1.7 RATIO — HIGH (ref 0.9–1.2)
POCT PT: 20.2 SEC — HIGH (ref 10–13.4)

## 2019-03-20 PROBLEM — Z98.890 S/P MAZE OPERATION FOR ATRIAL FIBRILLATION: Status: ACTIVE | Noted: 2019-03-08

## 2019-03-20 PROBLEM — Z95.1 S/P CABG (CORONARY ARTERY BYPASS GRAFT): Status: ACTIVE | Noted: 2019-03-08

## 2019-03-21 ENCOUNTER — OUTPATIENT (OUTPATIENT)
Dept: OUTPATIENT SERVICES | Facility: HOSPITAL | Age: 59
LOS: 1 days | Discharge: HOME | End: 2019-03-21

## 2019-03-21 DIAGNOSIS — I82.91 CHRONIC EMBOLISM AND THROMBOSIS OF UNSPECIFIED VEIN: ICD-10-CM

## 2019-03-21 DIAGNOSIS — Z79.01 LONG TERM (CURRENT) USE OF ANTICOAGULANTS: ICD-10-CM

## 2019-03-21 LAB
POCT INR: 1.9 RATIO — HIGH (ref 0.9–1.2)
POCT PT: 22.5 SEC — HIGH (ref 10–13.4)

## 2019-03-22 ENCOUNTER — APPOINTMENT (OUTPATIENT)
Dept: CARDIOTHORACIC SURGERY | Facility: CLINIC | Age: 59
End: 2019-03-22

## 2019-03-22 VITALS
RESPIRATION RATE: 13 BRPM | BODY MASS INDEX: 40.26 KG/M2 | HEART RATE: 78 BPM | DIASTOLIC BLOOD PRESSURE: 81 MMHG | SYSTOLIC BLOOD PRESSURE: 128 MMHG | WEIGHT: 256.5 LBS | OXYGEN SATURATION: 98 % | TEMPERATURE: 98.5 F | HEIGHT: 67 IN

## 2019-03-22 DIAGNOSIS — Z86.79 OTHER SPECIFIED POSTPROCEDURAL STATES: ICD-10-CM

## 2019-03-22 DIAGNOSIS — Z95.1 PRESENCE OF AORTOCORONARY BYPASS GRAFT: ICD-10-CM

## 2019-03-22 DIAGNOSIS — Z98.890 OTHER SPECIFIED POSTPROCEDURAL STATES: ICD-10-CM

## 2019-03-22 RX ORDER — MECLIZINE HYDROCHLORIDE 12.5 MG/1
12.5 TABLET ORAL
Refills: 0 | Status: DISCONTINUED | COMMUNITY
End: 2019-03-22

## 2019-03-22 RX ORDER — FAMOTIDINE 20 MG/1
20 TABLET, FILM COATED ORAL
Refills: 0 | Status: DISCONTINUED | COMMUNITY
End: 2019-03-22

## 2019-03-26 RX ORDER — WARFARIN 4 MG/1
TABLET ORAL
Refills: 0 | Status: DISCONTINUED | COMMUNITY
End: 2019-03-26

## 2019-11-08 ENCOUNTER — OUTPATIENT (OUTPATIENT)
Dept: OUTPATIENT SERVICES | Facility: HOSPITAL | Age: 59
LOS: 1 days | Discharge: HOME | End: 2019-11-08
Payer: MEDICAID

## 2019-11-08 DIAGNOSIS — Z98.890 OTHER SPECIFIED POSTPROCEDURAL STATES: Chronic | ICD-10-CM

## 2019-11-08 DIAGNOSIS — Z95.1 PRESENCE OF AORTOCORONARY BYPASS GRAFT: Chronic | ICD-10-CM

## 2019-11-08 LAB
ANION GAP SERPL CALC-SCNC: 13 MMOL/L — SIGNIFICANT CHANGE UP (ref 7–14)
BUN SERPL-MCNC: 16 MG/DL — SIGNIFICANT CHANGE UP (ref 10–20)
CALCIUM SERPL-MCNC: 9.8 MG/DL — SIGNIFICANT CHANGE UP (ref 8.5–10.1)
CHLORIDE SERPL-SCNC: 102 MMOL/L — SIGNIFICANT CHANGE UP (ref 98–110)
CO2 SERPL-SCNC: 24 MMOL/L — SIGNIFICANT CHANGE UP (ref 17–32)
CREAT SERPL-MCNC: 1 MG/DL — SIGNIFICANT CHANGE UP (ref 0.7–1.5)
GLUCOSE SERPL-MCNC: 98 MG/DL — SIGNIFICANT CHANGE UP (ref 70–99)
HCT VFR BLD CALC: 40.8 % — SIGNIFICANT CHANGE UP (ref 37–47)
HGB BLD-MCNC: 12.8 G/DL — SIGNIFICANT CHANGE UP (ref 12–16)
MCHC RBC-ENTMCNC: 25.5 PG — LOW (ref 27–31)
MCHC RBC-ENTMCNC: 31.4 G/DL — LOW (ref 32–37)
MCV RBC AUTO: 81.4 FL — SIGNIFICANT CHANGE UP (ref 81–99)
NRBC # BLD: 0 /100 WBCS — SIGNIFICANT CHANGE UP (ref 0–0)
PLATELET # BLD AUTO: 283 K/UL — SIGNIFICANT CHANGE UP (ref 130–400)
POTASSIUM SERPL-MCNC: 4.4 MMOL/L — SIGNIFICANT CHANGE UP (ref 3.5–5)
POTASSIUM SERPL-SCNC: 4.4 MMOL/L — SIGNIFICANT CHANGE UP (ref 3.5–5)
RBC # BLD: 5.01 M/UL — SIGNIFICANT CHANGE UP (ref 4.2–5.4)
RBC # FLD: 13.2 % — SIGNIFICANT CHANGE UP (ref 11.5–14.5)
SODIUM SERPL-SCNC: 139 MMOL/L — SIGNIFICANT CHANGE UP (ref 135–146)
WBC # BLD: 6.38 K/UL — SIGNIFICANT CHANGE UP (ref 4.8–10.8)
WBC # FLD AUTO: 6.38 K/UL — SIGNIFICANT CHANGE UP (ref 4.8–10.8)

## 2019-11-08 PROCEDURE — 93306 TTE W/DOPPLER COMPLETE: CPT | Mod: 26

## 2019-11-08 NOTE — H&P CARDIOLOGY - HISTORY OF PRESENT ILLNESS
59 years old female patient with PMHx of CAD s/p CABG LIMA to LAD on 2/2019, morbid obesity, SILAS, asthma, COPD on inhaler steroids, HTN, DL presented for [facility], have been complaining of MURCIA worsening recently    Pre cath note:    indication: MURCIA post CABG  [ ] STEMI                [ ] NSTEMI                 [ ] Acute coronary syndrome                     [ ]Unstable Angina   [ ] high risk  [ ] intermediate risk  [ ] low risk                     [ ] Stable Angina     non-invasive testing:                          Date:                     result: [ ] high risk  [ ] intermediate risk  [ ] low risk    Anti- Anginal medications:                    [ ] not used                       [ x] used                   [ ] not used but strong indication not to use    Ejection Fraction                   [ ] <29            [ ] 30-39%   [ ] 40-49%     [ ]>50%    CHF                   [ ] active (within last 14 days on meds   [ ] Chronic (on meds but no exacerbation)    COPD                   [ ] mild (on chronic bronchodilators)  [x ] moderate (on chronic steroid therapy)      [ ] severe (indication for home O2 or PACO2 >50)    Other risk factors:                       [ ] Previous MI                     [ ] CVA/ stroke                    [ ] carotid stent/ CEA                    [ ] PVD/PAD- (arterial aneurysm, non-palpable pulses, tortuous vessel with inability to insert catheter, infra-renal dissection, renal or subclavian artery stenosis)                    [ ] diabetic                    [x ] previous CABG                    [ ] Renal Failure 59 years old female patient with PMHx of CAD s/p CABG LIMA to LAD on 2/2019, morbid obesity, SILAS, asthma, COPD on inhaler steroids, HTN, DL presented for [facility], have been complaining of MURCIA worsening recently    Pre cath note:    indication: MURCIA post CABG  [ ] STEMI                [ ] NSTEMI                 [ ] Acute coronary syndrome                     [ ]Unstable Angina   [ ] high risk  [ ] intermediate risk  [ ] low risk                     [ ] Stable Angina     non-invasive testing:                          Date:                     result: [ ] high risk  [ ] intermediate risk  [ ] low risk    Anti- Anginal medications:                    [ ] not used                       [ x] used                   [ ] not used but strong indication not to use    Ejection Fraction                   [ ] <29            [ ] 30-39%   [ ] 40-49%     [x ]>50%    CHF                   [ ] active (within last 14 days on meds   [ ] Chronic (on meds but no exacerbation)    COPD                   [ ] mild (on chronic bronchodilators)  [x ] moderate (on chronic steroid therapy)      [ ] severe (indication for home O2 or PACO2 >50)    Other risk factors:                       [ ] Previous MI                     [ ] CVA/ stroke                    [ ] carotid stent/ CEA                    [ ] PVD/PAD- (arterial aneurysm, non-palpable pulses, tortuous vessel with inability to insert catheter, infra-renal dissection, renal or subclavian artery stenosis)                    [ ] diabetic                    [x ] previous CABG                    [ ] Renal Failure

## 2019-11-08 NOTE — CHART NOTE - NSCHARTNOTEFT_GEN_A_CORE
PRE-OP DIAGNOSIS: hx of recent CABG in 2/2019, complains of new dyspnea on minimal exertion    PROCEDURE: right and LHC with coronary angiography    Physician: Dr Pitts  Assistant: Blanca Palacios     ANESTHESIA TYPE:  [  ]General Anesthesia  [  ] Sedation  [ x ] Local/Regional    ESTIMATED BLOOD LOSS:    10   mL    CONDITION  [  ] Critical  [  ] Serious  [  ]Fair  [ x ]Good      SPECIMENS REMOVED (IF APPLICABLE): N/A      IV CONTRAST:   80          mL      IMPLANTS (IF APPLICABLE)      FINDINGS    Left Heart Catheterization:  LVEF%: 60  LVEDP: mild elevation   [ ] Normal Coronary Arteries        LEFT HEART CATHETERIZATION                                    Left main  very short bifurcating system     LAD: ostial 90-95% tubular lesion                         Diag: patent    Left Circumflex: normal   OM: mild disease     Right Coronary Artery: small non dominant     LIMA to LAD patent       DOMINANCE: Right    ACCESS: right femoral artery, right femoral vein   CLOSURE: per close for femoral artery, manual for femoral    INTERVENTION  none     RIGHT HEART CATHETERIZATION  PA: severe pulm HTN  PCW: elevated 30 marked V wave on wedge curve  CO/CI: normal  hemodynamic suggestive of constrictive pattern       POST-OP DIAGNOSIS  patent LIMA to LAD, significant ostial LAD lesion         PLAN OF CARE  [x] D/C Home today  continue home medication   will do echo to check for valve and constrictive pattern PRE-OP DIAGNOSIS: hx of recent CABG in 2/2019, complains of new dyspnea on minimal exertion    PROCEDURE: right and LHC with coronary angiography    Physician: Dr Pitts  Assistant: Blanca Palacios     ANESTHESIA TYPE:  [  ]General Anesthesia  [  ] Sedation  [ x ] Local/Regional    ESTIMATED BLOOD LOSS:    10   mL    CONDITION  [  ] Critical  [  ] Serious  [  ]Fair  [ x ]Good      SPECIMENS REMOVED (IF APPLICABLE): N/A      IV CONTRAST:   80          mL      IMPLANTS (IF APPLICABLE)      FINDINGS    Left Heart Catheterization:  LVEF%: 60  LVEDP: mild elevation   [ ] Normal Coronary Arteries        LEFT HEART CATHETERIZATION                                    Left main  very short bifurcating system     LAD: ostial 90-95% tubular lesion                         Diag: patent    Left Circumflex: normal   OM: mild disease     Right Coronary Artery: small non dominant     LIMA to LAD patent       DOMINANCE: Right    ACCESS: right femoral artery, right femoral vein   CLOSURE: per close for femoral artery, manual for femoral    INTERVENTION  none     RIGHT HEART CATHETERIZATION  PA: severe pulm HTN  PCW: elevated 30 marked V wave on wedge curve  CO/CI: normal  ? constrictive pattern in hemodynamic RV and LV curves       POST-OP DIAGNOSIS  patent LIMA to LAD, significant ostial LAD lesion         PLAN OF CARE  [x] D/C Home today  continue home medication   will do echo to check for valve and constrictive pattern

## 2019-11-15 ENCOUNTER — OUTPATIENT (OUTPATIENT)
Dept: OUTPATIENT SERVICES | Facility: HOSPITAL | Age: 59
LOS: 1 days | Discharge: HOME | End: 2019-11-15

## 2019-11-15 DIAGNOSIS — Z95.1 PRESENCE OF AORTOCORONARY BYPASS GRAFT: Chronic | ICD-10-CM

## 2019-11-15 DIAGNOSIS — Z98.890 OTHER SPECIFIED POSTPROCEDURAL STATES: Chronic | ICD-10-CM

## 2019-11-25 DIAGNOSIS — E66.01 MORBID (SEVERE) OBESITY DUE TO EXCESS CALORIES: ICD-10-CM

## 2019-11-25 DIAGNOSIS — J45.909 UNSPECIFIED ASTHMA, UNCOMPLICATED: ICD-10-CM

## 2019-11-25 DIAGNOSIS — I50.9 HEART FAILURE, UNSPECIFIED: ICD-10-CM

## 2019-11-25 DIAGNOSIS — Z95.1 PRESENCE OF AORTOCORONARY BYPASS GRAFT: ICD-10-CM

## 2019-11-25 DIAGNOSIS — Z91.041 RADIOGRAPHIC DYE ALLERGY STATUS: ICD-10-CM

## 2019-11-25 DIAGNOSIS — I25.10 ATHEROSCLEROTIC HEART DISEASE OF NATIVE CORONARY ARTERY WITHOUT ANGINA PECTORIS: ICD-10-CM

## 2019-11-25 DIAGNOSIS — E78.00 PURE HYPERCHOLESTEROLEMIA, UNSPECIFIED: ICD-10-CM

## 2019-11-25 DIAGNOSIS — Z88.8 ALLERGY STATUS TO OTHER DRUGS, MEDICAMENTS AND BIOLOGICAL SUBSTANCES STATUS: ICD-10-CM

## 2019-11-25 DIAGNOSIS — I48.91 UNSPECIFIED ATRIAL FIBRILLATION: ICD-10-CM

## 2019-11-25 DIAGNOSIS — Z87.891 PERSONAL HISTORY OF NICOTINE DEPENDENCE: ICD-10-CM

## 2019-11-25 DIAGNOSIS — I27.20 PULMONARY HYPERTENSION, UNSPECIFIED: ICD-10-CM

## 2019-11-25 DIAGNOSIS — Z91.013 ALLERGY TO SEAFOOD: ICD-10-CM

## 2019-11-29 ENCOUNTER — OUTPATIENT (OUTPATIENT)
Dept: OUTPATIENT SERVICES | Facility: HOSPITAL | Age: 59
LOS: 1 days | Discharge: HOME | End: 2019-11-29

## 2019-11-29 DIAGNOSIS — Z98.890 OTHER SPECIFIED POSTPROCEDURAL STATES: Chronic | ICD-10-CM

## 2019-11-29 DIAGNOSIS — F33.1 MAJOR DEPRESSIVE DISORDER, RECURRENT, MODERATE: ICD-10-CM

## 2019-11-29 DIAGNOSIS — Z95.1 PRESENCE OF AORTOCORONARY BYPASS GRAFT: Chronic | ICD-10-CM

## 2019-12-05 ENCOUNTER — NON-APPOINTMENT (OUTPATIENT)
Age: 59
End: 2019-12-05

## 2019-12-05 ENCOUNTER — OUTPATIENT (OUTPATIENT)
Dept: OUTPATIENT SERVICES | Facility: HOSPITAL | Age: 59
LOS: 1 days | Discharge: HOME | End: 2019-12-05
Payer: MEDICAID

## 2019-12-05 DIAGNOSIS — Z98.890 OTHER SPECIFIED POSTPROCEDURAL STATES: Chronic | ICD-10-CM

## 2019-12-05 DIAGNOSIS — Z95.1 PRESENCE OF AORTOCORONARY BYPASS GRAFT: Chronic | ICD-10-CM

## 2019-12-05 DIAGNOSIS — F33.1 MAJOR DEPRESSIVE DISORDER, RECURRENT, MODERATE: ICD-10-CM

## 2019-12-05 PROCEDURE — 90792 PSYCH DIAG EVAL W/MED SRVCS: CPT

## 2019-12-13 ENCOUNTER — OUTPATIENT (OUTPATIENT)
Dept: OUTPATIENT SERVICES | Facility: HOSPITAL | Age: 59
LOS: 1 days | Discharge: HOME | End: 2019-12-13

## 2019-12-13 DIAGNOSIS — Z98.890 OTHER SPECIFIED POSTPROCEDURAL STATES: Chronic | ICD-10-CM

## 2019-12-13 DIAGNOSIS — F33.1 MAJOR DEPRESSIVE DISORDER, RECURRENT, MODERATE: ICD-10-CM

## 2019-12-13 DIAGNOSIS — Z95.1 PRESENCE OF AORTOCORONARY BYPASS GRAFT: Chronic | ICD-10-CM

## 2020-01-08 ENCOUNTER — OUTPATIENT (OUTPATIENT)
Dept: OUTPATIENT SERVICES | Facility: HOSPITAL | Age: 60
LOS: 1 days | Discharge: HOME | End: 2020-01-08
Payer: MEDICAID

## 2020-01-08 DIAGNOSIS — Z95.1 PRESENCE OF AORTOCORONARY BYPASS GRAFT: Chronic | ICD-10-CM

## 2020-01-08 DIAGNOSIS — F33.1 MAJOR DEPRESSIVE DISORDER, RECURRENT, MODERATE: ICD-10-CM

## 2020-01-08 DIAGNOSIS — Z98.890 OTHER SPECIFIED POSTPROCEDURAL STATES: Chronic | ICD-10-CM

## 2020-01-08 PROCEDURE — 99213 OFFICE O/P EST LOW 20 MIN: CPT

## 2020-01-30 ENCOUNTER — OUTPATIENT (OUTPATIENT)
Dept: OUTPATIENT SERVICES | Facility: HOSPITAL | Age: 60
LOS: 1 days | Discharge: HOME | End: 2020-01-30

## 2020-01-30 DIAGNOSIS — F33.1 MAJOR DEPRESSIVE DISORDER, RECURRENT, MODERATE: ICD-10-CM

## 2020-01-30 DIAGNOSIS — Z95.1 PRESENCE OF AORTOCORONARY BYPASS GRAFT: Chronic | ICD-10-CM

## 2020-01-30 DIAGNOSIS — Z98.890 OTHER SPECIFIED POSTPROCEDURAL STATES: Chronic | ICD-10-CM

## 2020-02-06 ENCOUNTER — OUTPATIENT (OUTPATIENT)
Dept: OUTPATIENT SERVICES | Facility: HOSPITAL | Age: 60
LOS: 1 days | Discharge: HOME | End: 2020-02-06
Payer: MEDICAID

## 2020-02-06 DIAGNOSIS — Z98.890 OTHER SPECIFIED POSTPROCEDURAL STATES: Chronic | ICD-10-CM

## 2020-02-06 DIAGNOSIS — Z95.1 PRESENCE OF AORTOCORONARY BYPASS GRAFT: Chronic | ICD-10-CM

## 2020-02-06 DIAGNOSIS — F33.1 MAJOR DEPRESSIVE DISORDER, RECURRENT, MODERATE: ICD-10-CM

## 2020-02-06 PROCEDURE — 99213 OFFICE O/P EST LOW 20 MIN: CPT

## 2020-02-07 ENCOUNTER — OUTPATIENT (OUTPATIENT)
Dept: OUTPATIENT SERVICES | Facility: HOSPITAL | Age: 60
LOS: 1 days | Discharge: HOME | End: 2020-02-07

## 2020-02-07 DIAGNOSIS — Z98.890 OTHER SPECIFIED POSTPROCEDURAL STATES: Chronic | ICD-10-CM

## 2020-02-07 DIAGNOSIS — F33.1 MAJOR DEPRESSIVE DISORDER, RECURRENT, MODERATE: ICD-10-CM

## 2020-02-07 DIAGNOSIS — Z95.1 PRESENCE OF AORTOCORONARY BYPASS GRAFT: Chronic | ICD-10-CM

## 2020-02-21 ENCOUNTER — OUTPATIENT (OUTPATIENT)
Dept: OUTPATIENT SERVICES | Facility: HOSPITAL | Age: 60
LOS: 1 days | Discharge: HOME | End: 2020-02-21

## 2020-02-21 DIAGNOSIS — Z98.890 OTHER SPECIFIED POSTPROCEDURAL STATES: Chronic | ICD-10-CM

## 2020-02-21 DIAGNOSIS — Z95.1 PRESENCE OF AORTOCORONARY BYPASS GRAFT: Chronic | ICD-10-CM

## 2020-02-21 DIAGNOSIS — F33.1 MAJOR DEPRESSIVE DISORDER, RECURRENT, MODERATE: ICD-10-CM

## 2020-03-05 ENCOUNTER — OUTPATIENT (OUTPATIENT)
Dept: OUTPATIENT SERVICES | Facility: HOSPITAL | Age: 60
LOS: 1 days | Discharge: HOME | End: 2020-03-05
Payer: MEDICAID

## 2020-03-05 DIAGNOSIS — Z98.890 OTHER SPECIFIED POSTPROCEDURAL STATES: Chronic | ICD-10-CM

## 2020-03-05 DIAGNOSIS — Z95.1 PRESENCE OF AORTOCORONARY BYPASS GRAFT: Chronic | ICD-10-CM

## 2020-03-05 DIAGNOSIS — F33.1 MAJOR DEPRESSIVE DISORDER, RECURRENT, MODERATE: ICD-10-CM

## 2020-03-05 PROCEDURE — 99213 OFFICE O/P EST LOW 20 MIN: CPT

## 2020-05-01 ENCOUNTER — OUTPATIENT (OUTPATIENT)
Dept: OUTPATIENT SERVICES | Facility: HOSPITAL | Age: 60
LOS: 1 days | Discharge: HOME | End: 2020-05-01
Payer: MEDICAID

## 2020-05-01 DIAGNOSIS — Z98.890 OTHER SPECIFIED POSTPROCEDURAL STATES: Chronic | ICD-10-CM

## 2020-05-01 DIAGNOSIS — F33.1 MAJOR DEPRESSIVE DISORDER, RECURRENT, MODERATE: ICD-10-CM

## 2020-05-01 DIAGNOSIS — Z95.1 PRESENCE OF AORTOCORONARY BYPASS GRAFT: Chronic | ICD-10-CM

## 2020-05-01 PROCEDURE — 99213 OFFICE O/P EST LOW 20 MIN: CPT

## 2020-05-14 ENCOUNTER — OUTPATIENT (OUTPATIENT)
Dept: OUTPATIENT SERVICES | Facility: HOSPITAL | Age: 60
LOS: 1 days | Discharge: HOME | End: 2020-05-14

## 2020-05-14 DIAGNOSIS — Z98.890 OTHER SPECIFIED POSTPROCEDURAL STATES: Chronic | ICD-10-CM

## 2020-05-14 DIAGNOSIS — Z95.1 PRESENCE OF AORTOCORONARY BYPASS GRAFT: Chronic | ICD-10-CM

## 2020-05-14 DIAGNOSIS — F33.1 MAJOR DEPRESSIVE DISORDER, RECURRENT, MODERATE: ICD-10-CM

## 2020-06-11 ENCOUNTER — OUTPATIENT (OUTPATIENT)
Dept: OUTPATIENT SERVICES | Facility: HOSPITAL | Age: 60
LOS: 1 days | Discharge: HOME | End: 2020-06-11

## 2020-06-11 DIAGNOSIS — F33.1 MAJOR DEPRESSIVE DISORDER, RECURRENT, MODERATE: ICD-10-CM

## 2020-06-11 DIAGNOSIS — Z98.890 OTHER SPECIFIED POSTPROCEDURAL STATES: Chronic | ICD-10-CM

## 2020-06-11 DIAGNOSIS — Z95.1 PRESENCE OF AORTOCORONARY BYPASS GRAFT: Chronic | ICD-10-CM

## 2020-06-30 ENCOUNTER — OUTPATIENT (OUTPATIENT)
Dept: OUTPATIENT SERVICES | Facility: HOSPITAL | Age: 60
LOS: 1 days | Discharge: HOME | End: 2020-06-30

## 2020-06-30 DIAGNOSIS — Z98.890 OTHER SPECIFIED POSTPROCEDURAL STATES: Chronic | ICD-10-CM

## 2020-06-30 DIAGNOSIS — F33.1 MAJOR DEPRESSIVE DISORDER, RECURRENT, MODERATE: ICD-10-CM

## 2020-06-30 DIAGNOSIS — Z95.1 PRESENCE OF AORTOCORONARY BYPASS GRAFT: Chronic | ICD-10-CM

## 2020-07-09 ENCOUNTER — OUTPATIENT (OUTPATIENT)
Dept: OUTPATIENT SERVICES | Facility: HOSPITAL | Age: 60
LOS: 1 days | Discharge: HOME | End: 2020-07-09

## 2020-07-09 DIAGNOSIS — Z98.890 OTHER SPECIFIED POSTPROCEDURAL STATES: Chronic | ICD-10-CM

## 2020-07-09 DIAGNOSIS — Z95.1 PRESENCE OF AORTOCORONARY BYPASS GRAFT: Chronic | ICD-10-CM

## 2020-07-09 DIAGNOSIS — F33.1 MAJOR DEPRESSIVE DISORDER, RECURRENT, MODERATE: ICD-10-CM

## 2020-07-27 ENCOUNTER — OUTPATIENT (OUTPATIENT)
Dept: OUTPATIENT SERVICES | Facility: HOSPITAL | Age: 60
LOS: 1 days | Discharge: HOME | End: 2020-07-27

## 2020-07-27 DIAGNOSIS — Z95.1 PRESENCE OF AORTOCORONARY BYPASS GRAFT: Chronic | ICD-10-CM

## 2020-07-27 DIAGNOSIS — Z98.890 OTHER SPECIFIED POSTPROCEDURAL STATES: Chronic | ICD-10-CM

## 2020-07-28 DIAGNOSIS — F33.1 MAJOR DEPRESSIVE DISORDER, RECURRENT, MODERATE: ICD-10-CM

## 2020-08-12 ENCOUNTER — OUTPATIENT (OUTPATIENT)
Dept: OUTPATIENT SERVICES | Facility: HOSPITAL | Age: 60
LOS: 1 days | Discharge: HOME | End: 2020-08-12

## 2020-08-12 DIAGNOSIS — F33.1 MAJOR DEPRESSIVE DISORDER, RECURRENT, MODERATE: ICD-10-CM

## 2020-08-12 DIAGNOSIS — Z98.890 OTHER SPECIFIED POSTPROCEDURAL STATES: Chronic | ICD-10-CM

## 2020-08-12 DIAGNOSIS — Z95.1 PRESENCE OF AORTOCORONARY BYPASS GRAFT: Chronic | ICD-10-CM

## 2020-08-27 ENCOUNTER — OUTPATIENT (OUTPATIENT)
Dept: OUTPATIENT SERVICES | Facility: HOSPITAL | Age: 60
LOS: 1 days | Discharge: HOME | End: 2020-08-27
Payer: MEDICAID

## 2020-08-27 DIAGNOSIS — F33.1 MAJOR DEPRESSIVE DISORDER, RECURRENT, MODERATE: ICD-10-CM

## 2020-08-27 DIAGNOSIS — Z95.1 PRESENCE OF AORTOCORONARY BYPASS GRAFT: Chronic | ICD-10-CM

## 2020-08-27 DIAGNOSIS — Z98.890 OTHER SPECIFIED POSTPROCEDURAL STATES: Chronic | ICD-10-CM

## 2020-08-27 PROCEDURE — ZZZZZ: CPT

## 2020-09-16 ENCOUNTER — OUTPATIENT (OUTPATIENT)
Dept: OUTPATIENT SERVICES | Facility: HOSPITAL | Age: 60
LOS: 1 days | Discharge: HOME | End: 2020-09-16

## 2020-09-16 DIAGNOSIS — Z95.1 PRESENCE OF AORTOCORONARY BYPASS GRAFT: Chronic | ICD-10-CM

## 2020-09-16 DIAGNOSIS — F33.1 MAJOR DEPRESSIVE DISORDER, RECURRENT, MODERATE: ICD-10-CM

## 2020-09-16 DIAGNOSIS — Z98.890 OTHER SPECIFIED POSTPROCEDURAL STATES: Chronic | ICD-10-CM

## 2020-10-14 ENCOUNTER — OUTPATIENT (OUTPATIENT)
Dept: OUTPATIENT SERVICES | Facility: HOSPITAL | Age: 60
LOS: 1 days | Discharge: HOME | End: 2020-10-14

## 2020-10-14 DIAGNOSIS — Z95.1 PRESENCE OF AORTOCORONARY BYPASS GRAFT: Chronic | ICD-10-CM

## 2020-10-14 DIAGNOSIS — F33.1 MAJOR DEPRESSIVE DISORDER, RECURRENT, MODERATE: ICD-10-CM

## 2020-10-14 DIAGNOSIS — Z98.890 OTHER SPECIFIED POSTPROCEDURAL STATES: Chronic | ICD-10-CM

## 2020-10-22 ENCOUNTER — OUTPATIENT (OUTPATIENT)
Dept: OUTPATIENT SERVICES | Facility: HOSPITAL | Age: 60
LOS: 1 days | Discharge: HOME | End: 2020-10-22
Payer: MEDICAID

## 2020-10-22 DIAGNOSIS — F33.1 MAJOR DEPRESSIVE DISORDER, RECURRENT, MODERATE: ICD-10-CM

## 2020-10-22 DIAGNOSIS — Z98.890 OTHER SPECIFIED POSTPROCEDURAL STATES: Chronic | ICD-10-CM

## 2020-10-22 DIAGNOSIS — Z95.1 PRESENCE OF AORTOCORONARY BYPASS GRAFT: Chronic | ICD-10-CM

## 2020-10-22 PROCEDURE — 99213 OFFICE O/P EST LOW 20 MIN: CPT | Mod: 95

## 2020-11-11 ENCOUNTER — OUTPATIENT (OUTPATIENT)
Dept: OUTPATIENT SERVICES | Facility: HOSPITAL | Age: 60
LOS: 1 days | Discharge: HOME | End: 2020-11-11

## 2020-11-11 DIAGNOSIS — F33.1 MAJOR DEPRESSIVE DISORDER, RECURRENT, MODERATE: ICD-10-CM

## 2020-11-11 DIAGNOSIS — Z98.890 OTHER SPECIFIED POSTPROCEDURAL STATES: Chronic | ICD-10-CM

## 2020-11-11 DIAGNOSIS — Z95.1 PRESENCE OF AORTOCORONARY BYPASS GRAFT: Chronic | ICD-10-CM

## 2020-12-09 ENCOUNTER — NON-APPOINTMENT (OUTPATIENT)
Age: 60
End: 2020-12-09

## 2020-12-09 ENCOUNTER — APPOINTMENT (OUTPATIENT)
Dept: PSYCHIATRY | Facility: CLINIC | Age: 60
End: 2020-12-09

## 2020-12-09 ENCOUNTER — TRANSCRIPTION ENCOUNTER (OUTPATIENT)
Age: 60
End: 2020-12-09

## 2020-12-09 ENCOUNTER — OUTPATIENT (OUTPATIENT)
Dept: OUTPATIENT SERVICES | Facility: HOSPITAL | Age: 60
LOS: 1 days | Discharge: HOME | End: 2020-12-09

## 2020-12-09 DIAGNOSIS — Z95.1 PRESENCE OF AORTOCORONARY BYPASS GRAFT: Chronic | ICD-10-CM

## 2020-12-09 DIAGNOSIS — Z98.890 OTHER SPECIFIED POSTPROCEDURAL STATES: Chronic | ICD-10-CM

## 2020-12-09 DIAGNOSIS — I10 ESSENTIAL (PRIMARY) HYPERTENSION: ICD-10-CM

## 2020-12-09 DIAGNOSIS — F33.1 MAJOR DEPRESSIVE DISORDER, RECURRENT, MODERATE: ICD-10-CM

## 2020-12-09 RX ORDER — UMECLIDINIUM 62.5 UG/1
AEROSOL, POWDER ORAL
Refills: 0 | Status: ACTIVE | COMMUNITY

## 2020-12-16 ENCOUNTER — OUTPATIENT (OUTPATIENT)
Dept: OUTPATIENT SERVICES | Facility: HOSPITAL | Age: 60
LOS: 1 days | Discharge: HOME | End: 2020-12-16

## 2020-12-16 ENCOUNTER — APPOINTMENT (OUTPATIENT)
Dept: PSYCHIATRY | Facility: CLINIC | Age: 60
End: 2020-12-16
Payer: MEDICAID

## 2020-12-16 DIAGNOSIS — Z98.890 OTHER SPECIFIED POSTPROCEDURAL STATES: Chronic | ICD-10-CM

## 2020-12-16 DIAGNOSIS — Z95.1 PRESENCE OF AORTOCORONARY BYPASS GRAFT: Chronic | ICD-10-CM

## 2020-12-16 DIAGNOSIS — F33.1 MAJOR DEPRESSIVE DISORDER, RECURRENT, MODERATE: ICD-10-CM

## 2020-12-16 PROCEDURE — 99213 OFFICE O/P EST LOW 20 MIN: CPT | Mod: 95

## 2021-01-05 ENCOUNTER — OUTPATIENT (OUTPATIENT)
Dept: OUTPATIENT SERVICES | Facility: HOSPITAL | Age: 61
LOS: 1 days | Discharge: HOME | End: 2021-01-05

## 2021-01-05 ENCOUNTER — APPOINTMENT (OUTPATIENT)
Dept: PSYCHIATRY | Facility: CLINIC | Age: 61
End: 2021-01-05
Payer: MEDICAID

## 2021-01-05 DIAGNOSIS — Z95.1 PRESENCE OF AORTOCORONARY BYPASS GRAFT: Chronic | ICD-10-CM

## 2021-01-05 DIAGNOSIS — Z98.890 OTHER SPECIFIED POSTPROCEDURAL STATES: Chronic | ICD-10-CM

## 2021-01-05 DIAGNOSIS — F33.1 MAJOR DEPRESSIVE DISORDER, RECURRENT, MODERATE: ICD-10-CM

## 2021-01-05 PROCEDURE — ZZZZZ: CPT

## 2021-01-06 ENCOUNTER — OUTPATIENT (OUTPATIENT)
Dept: OUTPATIENT SERVICES | Facility: HOSPITAL | Age: 61
LOS: 1 days | Discharge: HOME | End: 2021-01-06

## 2021-01-06 ENCOUNTER — APPOINTMENT (OUTPATIENT)
Dept: PSYCHIATRY | Facility: CLINIC | Age: 61
End: 2021-01-06

## 2021-01-06 DIAGNOSIS — F33.1 MAJOR DEPRESSIVE DISORDER, RECURRENT, MODERATE: ICD-10-CM

## 2021-01-06 DIAGNOSIS — Z98.890 OTHER SPECIFIED POSTPROCEDURAL STATES: Chronic | ICD-10-CM

## 2021-01-06 DIAGNOSIS — Z95.1 PRESENCE OF AORTOCORONARY BYPASS GRAFT: Chronic | ICD-10-CM

## 2021-02-03 ENCOUNTER — APPOINTMENT (OUTPATIENT)
Dept: PSYCHIATRY | Facility: CLINIC | Age: 61
End: 2021-02-03

## 2021-03-01 ENCOUNTER — OUTPATIENT (OUTPATIENT)
Dept: OUTPATIENT SERVICES | Facility: HOSPITAL | Age: 61
LOS: 1 days | Discharge: HOME | End: 2021-03-01

## 2021-03-01 ENCOUNTER — APPOINTMENT (OUTPATIENT)
Dept: PSYCHIATRY | Facility: CLINIC | Age: 61
End: 2021-03-01

## 2021-03-01 DIAGNOSIS — Z95.1 PRESENCE OF AORTOCORONARY BYPASS GRAFT: Chronic | ICD-10-CM

## 2021-03-01 DIAGNOSIS — Z98.890 OTHER SPECIFIED POSTPROCEDURAL STATES: Chronic | ICD-10-CM

## 2021-03-01 DIAGNOSIS — F33.1 MAJOR DEPRESSIVE DISORDER, RECURRENT, MODERATE: ICD-10-CM

## 2021-03-18 NOTE — ED PROVIDER NOTE - NSTIMEPROVIDERCAREINITIATE_GEN_ER
Assessment & Plan       ICD-10-CM    1. Lymphedema  I89.0 PHYSICAL THERAPY REFERRAL   2. Cough  R05 benzonatate (TESSALON) 200 MG capsule   3. Bariatric surgery status  Z98.84    4. Dyspnea, unspecified type  R06.00    5. Mild intermittent asthma without complication  J45.20 albuterol (PROAIR HFA/PROVENTIL HFA/VENTOLIN HFA) 108 (90 Base) MCG/ACT inhaler     Patient with complicated clinical picture after extensive skin removal and panniculectomy performed about 3 weeks ago.  She subsequently returned to the operating room for evacuation of a hematoma.  More recently, she has struggled with cough, fever, and dyspnea.  Evaluations for COVID-19 have been negative to date.  She does have history of asthma, and her exam today does show evidence of wheeze.  We will plan treatment with albuterol and have her update me with how she is doing over the next 24 hours.  Given her fluid retention and her close proximity to surgery, discussed prednisone, but elected to avoid it at this time.    Also discussed that swelling is likely related to third spacing of fluid after extensive surgical procedure.  We discussed optimizing nutrition status and trying to improve albumin over time to help promote appropriate diuresis.  Patient will continue to keep me posted in this regard.  Also placed a lymphedema referral to help with some very gentle lymph manipulation that can help with this process.      40 minutes spent on the date of the encounter doing chart review, review of outside records, patient visit and documentation            Return in about 1 week (around 3/25/2021), or if symptoms worsen or fail to improve.    Kelly Bedoya MD  [facility] MORRIS Perez is a 52 year old who presents for the following health issues     HPI     Acute Illness  Acute illness concerns: fever, 3/16/21 Negative covid test  Onset/Duration: 4 days  Symptoms:  Fever: YES  Chills/Sweats: YES  Headache (location?):  YES  Sinus Pressure: no  Conjunctivitis:  no  Ear Pain: no  Rhinorrhea: no  Congestion: YES  Sore Throat: no  Cough: YES  Wheeze: no  Decreased Appetite: no  Nausea: YES  Vomiting: no  Diarrhea: no  Dysuria/Freq.: no  Dysuria or Hematuria: no  Fatigue/Achiness: YES  Sick/Strep Exposure: no  Therapies tried and outcome: None    Patient had a complicated recent clinical course.  Had extensive skin removal after panniculectomy and residual skin removal after massive weight loss from gastric bypass surgery.  Postoperative course was complicated by hematoma formation that required evacuation in the OR.    Patient seen in follow-up with plastic surgery yesterday.  3 drains remain in place, however drain output has been slowly decreasing.  Drain output has been serous fluid with a small amount of blood, but no purulent drainage.    Patient had messaged me a couple of days ago regarding dyspnea.  As she had not been able to have any anticoagulation postoperatively due to her hematoma formation, and her postoperative status put her at increased risk of clot, I advised her to go to the emergency department due to my concern for pulmonary embolus.  She did go and had a negative CT angio at that time.  Extensive lab work performed at her emergency department visit showed normal electrolytes, kidney function, liver function, white blood cell count, BNP, along with negative bilateral lower extremity venous ultrasounds for DVT.    Her testing for SARS-CoV-2 virus was also negative.    She is had dyspnea and increased cough that she remarks is felt similar to past attacks of an asthma-like process.  She does not currently have an inhaler she has not had to use one for many years.  She has not had a fever over the last couple of days, and her energy level is actually improved today compared to yesterday.    She is also had significant weight gain, gaining 67 pounds overnight.  She feels like the skin is tight and she has fluid in  "the tissues of her abdomen and legs.    She is been working on her nutrition status postoperatively, however this is been difficult.  We reviewed her labs from the emergency department with a low albumin and protein levels.    Last ibuprofen and tylenol dosing was 2 days ago.  Afebrile today.    Patient has been off work since her surgery, and is hoping to go back in the near future.        Review of Systems   Constitutional, HEENT, cardiovascular, pulmonary, gi and gu, Derm systems are negative, except as otherwise noted.      Objective    /60 (Cuff Size: Adult Large)   Pulse 76   Temp 97.9  F (36.6  C) (Tympanic)   Resp 16   Ht 1.626 m (5' 4\")   Wt 99.8 kg (220 lb)   LMP 10/01/2016 (Approximate)   SpO2 100%   BMI 37.76 kg/m    Body mass index is 37.76 kg/m .   Wt Readings from Last 4 Encounters:   03/18/21 99.8 kg (220 lb)   02/25/21 103.1 kg (227 lb 6.4 oz)   02/10/21 103.4 kg (228 lb)   01/25/21 100.2 kg (221 lb)       Physical Exam   GENERAL: alert, no distress and fatigued  EYES: Eyes grossly normal to inspection, PERRL and conjunctivae and sclerae normal  RESP: lungs clear to auscultation - no rales, rhonchi or wheezes  CV: regular rate and rhythm, normal S1 S2, no S3 or S4, no murmur, click or rub, no peripheral edema and peripheral pulses strong  ABDOMEN: Positive bowel sounds, obese, surgical incision sites are healing well.  There is minimal erythema around the 3 drain sites.  Fluid in bulbs of drain is serous which is a small amount of blood in one of the 3 drains.  No rebound or guarding.  Tissues of the abdomen and thighs are swollen bilaterally.  There is no pitting edema.  PSYCH: mentation appears normal, affect normal/bright    Lab results and imaging reviewed in epic    Kelly Bedoya MD          " 17-Feb-2019 13:28

## 2021-03-19 ENCOUNTER — APPOINTMENT (OUTPATIENT)
Dept: PULMONOLOGY | Facility: CLINIC | Age: 61
End: 2021-03-19
Payer: MEDICAID

## 2021-03-19 ENCOUNTER — OUTPATIENT (OUTPATIENT)
Dept: OUTPATIENT SERVICES | Facility: HOSPITAL | Age: 61
LOS: 1 days | Discharge: HOME | End: 2021-03-19

## 2021-03-19 VITALS
SYSTOLIC BLOOD PRESSURE: 100 MMHG | TEMPERATURE: 98.5 F | OXYGEN SATURATION: 99 % | DIASTOLIC BLOOD PRESSURE: 72 MMHG | HEIGHT: 67 IN | HEART RATE: 108 BPM | BODY MASS INDEX: 40.49 KG/M2 | WEIGHT: 258 LBS

## 2021-03-19 DIAGNOSIS — E66.9 OBESITY, UNSPECIFIED: ICD-10-CM

## 2021-03-19 DIAGNOSIS — Z95.1 PRESENCE OF AORTOCORONARY BYPASS GRAFT: Chronic | ICD-10-CM

## 2021-03-19 DIAGNOSIS — Z87.891 PERSONAL HISTORY OF NICOTINE DEPENDENCE: ICD-10-CM

## 2021-03-19 DIAGNOSIS — J44.9 CHRONIC OBSTRUCTIVE PULMONARY DISEASE, UNSPECIFIED: ICD-10-CM

## 2021-03-19 DIAGNOSIS — Z78.9 OTHER SPECIFIED HEALTH STATUS: ICD-10-CM

## 2021-03-19 DIAGNOSIS — R06.00 DYSPNEA, UNSPECIFIED: ICD-10-CM

## 2021-03-19 DIAGNOSIS — Z98.890 OTHER SPECIFIED POSTPROCEDURAL STATES: Chronic | ICD-10-CM

## 2021-03-19 DIAGNOSIS — G47.19 OTHER HYPERSOMNIA: ICD-10-CM

## 2021-03-19 PROCEDURE — 99203 OFFICE O/P NEW LOW 30 MIN: CPT | Mod: GC

## 2021-03-19 RX ORDER — FUROSEMIDE 80 MG/1
TABLET ORAL
Refills: 0 | Status: DISCONTINUED | COMMUNITY
End: 2021-03-19

## 2021-03-19 RX ORDER — RIVAROXABAN 20 MG/1
20 TABLET, FILM COATED ORAL DAILY
Qty: 30 | Refills: 0 | Status: ACTIVE | COMMUNITY
Start: 2019-03-26

## 2021-03-19 RX ORDER — ISOSORBIDE MONONITRATE 30 MG/1
30 TABLET, EXTENDED RELEASE ORAL DAILY
Refills: 0 | Status: ACTIVE | COMMUNITY
Start: 2021-03-19

## 2021-03-19 RX ORDER — OXYCODONE AND ACETAMINOPHEN 5; 325 MG/1; MG/1
5-325 TABLET ORAL
Refills: 0 | Status: DISCONTINUED | COMMUNITY
End: 2021-03-19

## 2021-03-19 NOTE — PHYSICAL EXAM
[No Acute Distress] : no acute distress [Well Nourished] : well nourished [Normal Oropharynx] : normal oropharynx [II] : Mallampati Class: II [2+] : Right Tonsil: 2+ [Normal Appearance] : normal appearance [Neck Circumference: ___] : neck circumference: [unfilled] [No Neck Mass] : no neck mass [No JVD] : no jvd [Normal Rate/Rhythm] : normal rate/rhythm [Normal S1, S2] : normal s1, s2 [No Murmurs] : no murmurs [No Resp Distress] : no resp distress [No Acc Muscle Use] : no acc muscle use [Clear to Auscultation Bilaterally] : clear to auscultation bilaterally [Surgical scars] : surgical scars [Benign] : benign [Normal Gait] : normal gait [No Clubbing] : no clubbing [No Cyanosis] : no cyanosis [No Edema] : no edema [Normal Color/ Pigmentation] : normal color/ pigmentation [No Focal Deficits] : no focal deficits [Oriented x3] : oriented x3 [Normal Affect] : normal affect [TextBox_80] : Midline Sternotomy scar visible

## 2021-03-19 NOTE — HISTORY OF PRESENT ILLNESS
[Former] : former [Never] : never [Awakes Unrefreshed] : awakes unrefreshed [Daytime Somnolence] : daytime somnolence [Hypersomnolence] : hypersomnolence [Nonrestorative Sleep] : nonrestorative sleep [Snoring] : snoring [Witnessed Apneas] : witnessed apneas [TextBox_4] : 61 YO F w/PMHx of triple vessel Dz s/p CABG x 1 and MAZE procedure, HTN, Paroxysmal Atrial Fibrillation s/p ablation, on Xarelto and ASA Asthma/COPD, 20PY Smoking Hx quit 12 years ago, formerly a patient of Dr. Ramos. Presenting today because since her CABG in 2019 she has been experiencing dyspnea worse on exertion. Prior echo reveals PASP of 38.6mmHg and LVEF 60-65%. Pt has never had polysomnography, reports having had 2 episodes of atrial fibrillation when taking sudafed several years before her CABG. Pt reports chronic daytime somnolence, dyspnea worse on exertion, denies leg swelling, no chest pain, no palpitations, recently lost 24lbs intentionally, she is practicing flaca restricted diet. STOP_BANG is 6. Per daughter who is accompanying pt, pt has frequent hypopneic/apneic episodes during sleep, is known to snore. Last CXR was performed in 2019 revealed LLL Opacity. Has not had repeat since then, has not had Low Dose CT.  [TextBox_13] : 20 [YearQuit] : 2009 [Cataplexy] : denies cataplexy [Difficulty Maintaining Sleep] : does not have difficulty maintaining sleep [Frequent Nocturnal Awakening] : denies frequent nocturnal awakening [Hypnopompic Hallucinations] : denies hypnopompic hallucinations [Recent  Weight Gain] : no recent weight gain [TextBox_11] : 6 [TextBox_9] : Was told by her Cardiologist several years ago she has Pulm HTN after echo revealed a borderline PASP.

## 2021-03-19 NOTE — REASON FOR VISIT
[Initial] : an initial visit [Pulmonary Hypertension] : pulmonary hypertension [Shortness of Breath] : shortness of breath [Family Member] : family member [Hypersomnolence] : hypersomnolence [TextBox_13] : Dr. Pettit

## 2021-03-23 ENCOUNTER — OUTPATIENT (OUTPATIENT)
Dept: OUTPATIENT SERVICES | Facility: HOSPITAL | Age: 61
LOS: 1 days | Discharge: HOME | End: 2021-03-23

## 2021-03-23 ENCOUNTER — APPOINTMENT (OUTPATIENT)
Dept: PSYCHIATRY | Facility: CLINIC | Age: 61
End: 2021-03-23
Payer: MEDICAID

## 2021-03-23 DIAGNOSIS — Z98.890 OTHER SPECIFIED POSTPROCEDURAL STATES: Chronic | ICD-10-CM

## 2021-03-23 DIAGNOSIS — Z95.1 PRESENCE OF AORTOCORONARY BYPASS GRAFT: Chronic | ICD-10-CM

## 2021-03-23 DIAGNOSIS — F33.1 MAJOR DEPRESSIVE DISORDER, RECURRENT, MODERATE: ICD-10-CM

## 2021-03-23 PROCEDURE — 99213 OFFICE O/P EST LOW 20 MIN: CPT | Mod: 95

## 2021-03-24 ENCOUNTER — NON-APPOINTMENT (OUTPATIENT)
Age: 61
End: 2021-03-24

## 2021-03-29 ENCOUNTER — APPOINTMENT (OUTPATIENT)
Dept: PSYCHIATRY | Facility: CLINIC | Age: 61
End: 2021-03-29

## 2021-03-31 ENCOUNTER — APPOINTMENT (OUTPATIENT)
Dept: PSYCHIATRY | Facility: CLINIC | Age: 61
End: 2021-03-31

## 2021-03-31 ENCOUNTER — OUTPATIENT (OUTPATIENT)
Dept: OUTPATIENT SERVICES | Facility: HOSPITAL | Age: 61
LOS: 1 days | Discharge: HOME | End: 2021-03-31

## 2021-03-31 DIAGNOSIS — Z95.1 PRESENCE OF AORTOCORONARY BYPASS GRAFT: Chronic | ICD-10-CM

## 2021-03-31 DIAGNOSIS — Z98.890 OTHER SPECIFIED POSTPROCEDURAL STATES: Chronic | ICD-10-CM

## 2021-03-31 DIAGNOSIS — F33.1 MAJOR DEPRESSIVE DISORDER, RECURRENT, MODERATE: ICD-10-CM

## 2021-04-05 ENCOUNTER — LABORATORY RESULT (OUTPATIENT)
Age: 61
End: 2021-04-05

## 2021-04-05 ENCOUNTER — OUTPATIENT (OUTPATIENT)
Dept: OUTPATIENT SERVICES | Facility: HOSPITAL | Age: 61
LOS: 1 days | Discharge: HOME | End: 2021-04-05

## 2021-04-05 DIAGNOSIS — Z98.890 OTHER SPECIFIED POSTPROCEDURAL STATES: Chronic | ICD-10-CM

## 2021-04-05 DIAGNOSIS — Z11.59 ENCOUNTER FOR SCREENING FOR OTHER VIRAL DISEASES: ICD-10-CM

## 2021-04-05 DIAGNOSIS — Z95.1 PRESENCE OF AORTOCORONARY BYPASS GRAFT: Chronic | ICD-10-CM

## 2021-04-08 ENCOUNTER — OUTPATIENT (OUTPATIENT)
Dept: OUTPATIENT SERVICES | Facility: HOSPITAL | Age: 61
LOS: 1 days | Discharge: HOME | End: 2021-04-08
Payer: MEDICAID

## 2021-04-08 DIAGNOSIS — Z98.890 OTHER SPECIFIED POSTPROCEDURAL STATES: Chronic | ICD-10-CM

## 2021-04-08 DIAGNOSIS — Z95.1 PRESENCE OF AORTOCORONARY BYPASS GRAFT: Chronic | ICD-10-CM

## 2021-04-08 PROCEDURE — 95811 POLYSOM 6/>YRS CPAP 4/> PARM: CPT | Mod: 26

## 2021-04-09 DIAGNOSIS — G47.33 OBSTRUCTIVE SLEEP APNEA (ADULT) (PEDIATRIC): ICD-10-CM

## 2021-04-21 ENCOUNTER — NON-APPOINTMENT (OUTPATIENT)
Age: 61
End: 2021-04-21

## 2021-04-22 ENCOUNTER — OUTPATIENT (OUTPATIENT)
Dept: OUTPATIENT SERVICES | Facility: HOSPITAL | Age: 61
LOS: 1 days | Discharge: HOME | End: 2021-04-22

## 2021-04-22 ENCOUNTER — APPOINTMENT (OUTPATIENT)
Dept: PSYCHIATRY | Facility: CLINIC | Age: 61
End: 2021-04-22
Payer: MEDICAID

## 2021-04-22 DIAGNOSIS — F33.1 MAJOR DEPRESSIVE DISORDER, RECURRENT, MODERATE: ICD-10-CM

## 2021-04-22 DIAGNOSIS — Z98.890 OTHER SPECIFIED POSTPROCEDURAL STATES: Chronic | ICD-10-CM

## 2021-04-22 DIAGNOSIS — Z95.1 PRESENCE OF AORTOCORONARY BYPASS GRAFT: Chronic | ICD-10-CM

## 2021-04-22 PROCEDURE — 99213 OFFICE O/P EST LOW 20 MIN: CPT | Mod: 95

## 2021-04-27 ENCOUNTER — OUTPATIENT (OUTPATIENT)
Dept: OUTPATIENT SERVICES | Facility: HOSPITAL | Age: 61
LOS: 1 days | Discharge: HOME | End: 2021-04-27
Payer: MEDICAID

## 2021-04-27 ENCOUNTER — RESULT REVIEW (OUTPATIENT)
Age: 61
End: 2021-04-27

## 2021-04-27 DIAGNOSIS — R06.02 SHORTNESS OF BREATH: ICD-10-CM

## 2021-04-27 DIAGNOSIS — Z98.890 OTHER SPECIFIED POSTPROCEDURAL STATES: Chronic | ICD-10-CM

## 2021-04-27 DIAGNOSIS — E66.9 OBESITY, UNSPECIFIED: ICD-10-CM

## 2021-04-27 DIAGNOSIS — Z95.1 PRESENCE OF AORTOCORONARY BYPASS GRAFT: Chronic | ICD-10-CM

## 2021-04-27 PROCEDURE — 71271 CT THORAX LUNG CANCER SCR C-: CPT | Mod: 26

## 2021-04-28 ENCOUNTER — APPOINTMENT (OUTPATIENT)
Dept: PSYCHIATRY | Facility: CLINIC | Age: 61
End: 2021-04-28

## 2021-04-28 ENCOUNTER — OUTPATIENT (OUTPATIENT)
Dept: OUTPATIENT SERVICES | Facility: HOSPITAL | Age: 61
LOS: 1 days | Discharge: HOME | End: 2021-04-28

## 2021-04-28 DIAGNOSIS — F33.1 MAJOR DEPRESSIVE DISORDER, RECURRENT, MODERATE: ICD-10-CM

## 2021-04-28 DIAGNOSIS — Z98.890 OTHER SPECIFIED POSTPROCEDURAL STATES: Chronic | ICD-10-CM

## 2021-04-28 DIAGNOSIS — Z95.1 PRESENCE OF AORTOCORONARY BYPASS GRAFT: Chronic | ICD-10-CM

## 2021-05-03 ENCOUNTER — LABORATORY RESULT (OUTPATIENT)
Age: 61
End: 2021-05-03

## 2021-05-03 ENCOUNTER — OUTPATIENT (OUTPATIENT)
Dept: OUTPATIENT SERVICES | Facility: HOSPITAL | Age: 61
LOS: 1 days | Discharge: HOME | End: 2021-05-03

## 2021-05-03 DIAGNOSIS — Z11.59 ENCOUNTER FOR SCREENING FOR OTHER VIRAL DISEASES: ICD-10-CM

## 2021-05-03 DIAGNOSIS — Z95.1 PRESENCE OF AORTOCORONARY BYPASS GRAFT: Chronic | ICD-10-CM

## 2021-05-03 DIAGNOSIS — Z98.890 OTHER SPECIFIED POSTPROCEDURAL STATES: Chronic | ICD-10-CM

## 2021-05-06 ENCOUNTER — OUTPATIENT (OUTPATIENT)
Dept: OUTPATIENT SERVICES | Facility: HOSPITAL | Age: 61
LOS: 1 days | Discharge: HOME | End: 2021-05-06
Payer: MEDICAID

## 2021-05-06 DIAGNOSIS — E66.9 OBESITY, UNSPECIFIED: ICD-10-CM

## 2021-05-06 DIAGNOSIS — Z98.890 OTHER SPECIFIED POSTPROCEDURAL STATES: Chronic | ICD-10-CM

## 2021-05-06 DIAGNOSIS — Z95.1 PRESENCE OF AORTOCORONARY BYPASS GRAFT: Chronic | ICD-10-CM

## 2021-05-06 PROCEDURE — 94060 EVALUATION OF WHEEZING: CPT | Mod: 26

## 2021-05-06 PROCEDURE — 94727 GAS DIL/WSHOT DETER LNG VOL: CPT | Mod: 26

## 2021-05-06 PROCEDURE — 94729 DIFFUSING CAPACITY: CPT | Mod: 26

## 2021-05-21 ENCOUNTER — OUTPATIENT (OUTPATIENT)
Dept: OUTPATIENT SERVICES | Facility: HOSPITAL | Age: 61
LOS: 1 days | Discharge: HOME | End: 2021-05-21

## 2021-05-21 ENCOUNTER — APPOINTMENT (OUTPATIENT)
Dept: PSYCHIATRY | Facility: CLINIC | Age: 61
End: 2021-05-21
Payer: MEDICAID

## 2021-05-21 DIAGNOSIS — F32.9 MAJOR DEPRESSIVE DISORDER, SINGLE EPISODE, UNSPECIFIED: ICD-10-CM

## 2021-05-21 DIAGNOSIS — Z95.1 PRESENCE OF AORTOCORONARY BYPASS GRAFT: Chronic | ICD-10-CM

## 2021-05-21 DIAGNOSIS — Z98.890 OTHER SPECIFIED POSTPROCEDURAL STATES: Chronic | ICD-10-CM

## 2021-05-21 DIAGNOSIS — F41.1 GENERALIZED ANXIETY DISORDER: ICD-10-CM

## 2021-05-21 PROCEDURE — 99213 OFFICE O/P EST LOW 20 MIN: CPT | Mod: 95

## 2021-05-26 ENCOUNTER — OUTPATIENT (OUTPATIENT)
Dept: OUTPATIENT SERVICES | Facility: HOSPITAL | Age: 61
LOS: 1 days | Discharge: HOME | End: 2021-05-26

## 2021-05-26 ENCOUNTER — APPOINTMENT (OUTPATIENT)
Dept: PSYCHIATRY | Facility: CLINIC | Age: 61
End: 2021-05-26

## 2021-05-26 DIAGNOSIS — F32.9 MAJOR DEPRESSIVE DISORDER, SINGLE EPISODE, UNSPECIFIED: ICD-10-CM

## 2021-05-26 DIAGNOSIS — F41.1 GENERALIZED ANXIETY DISORDER: ICD-10-CM

## 2021-05-26 DIAGNOSIS — Z95.1 PRESENCE OF AORTOCORONARY BYPASS GRAFT: Chronic | ICD-10-CM

## 2021-05-26 DIAGNOSIS — Z98.890 OTHER SPECIFIED POSTPROCEDURAL STATES: Chronic | ICD-10-CM

## 2021-06-18 ENCOUNTER — APPOINTMENT (OUTPATIENT)
Dept: PSYCHIATRY | Facility: CLINIC | Age: 61
End: 2021-06-18
Payer: MEDICAID

## 2021-06-18 ENCOUNTER — OUTPATIENT (OUTPATIENT)
Dept: OUTPATIENT SERVICES | Facility: HOSPITAL | Age: 61
LOS: 1 days | Discharge: HOME | End: 2021-06-18

## 2021-06-18 DIAGNOSIS — Z98.890 OTHER SPECIFIED POSTPROCEDURAL STATES: Chronic | ICD-10-CM

## 2021-06-18 DIAGNOSIS — F32.9 MAJOR DEPRESSIVE DISORDER, SINGLE EPISODE, UNSPECIFIED: ICD-10-CM

## 2021-06-18 DIAGNOSIS — Z95.1 PRESENCE OF AORTOCORONARY BYPASS GRAFT: Chronic | ICD-10-CM

## 2021-06-18 DIAGNOSIS — F41.1 GENERALIZED ANXIETY DISORDER: ICD-10-CM

## 2021-06-18 PROCEDURE — 99213 OFFICE O/P EST LOW 20 MIN: CPT | Mod: 95

## 2021-07-16 ENCOUNTER — APPOINTMENT (OUTPATIENT)
Dept: PSYCHIATRY | Facility: CLINIC | Age: 61
End: 2021-07-16

## 2021-07-16 ENCOUNTER — NON-APPOINTMENT (OUTPATIENT)
Age: 61
End: 2021-07-16

## 2021-08-13 ENCOUNTER — NON-APPOINTMENT (OUTPATIENT)
Age: 61
End: 2021-08-13

## 2021-08-26 ENCOUNTER — OUTPATIENT (OUTPATIENT)
Dept: OUTPATIENT SERVICES | Facility: HOSPITAL | Age: 61
LOS: 1 days | Discharge: HOME | End: 2021-08-26

## 2021-08-26 ENCOUNTER — APPOINTMENT (OUTPATIENT)
Dept: PSYCHIATRY | Facility: CLINIC | Age: 61
End: 2021-08-26

## 2021-08-26 ENCOUNTER — NON-APPOINTMENT (OUTPATIENT)
Age: 61
End: 2021-08-26

## 2021-08-26 DIAGNOSIS — F32.9 MAJOR DEPRESSIVE DISORDER, SINGLE EPISODE, UNSPECIFIED: ICD-10-CM

## 2021-08-26 DIAGNOSIS — Z98.890 OTHER SPECIFIED POSTPROCEDURAL STATES: Chronic | ICD-10-CM

## 2021-08-26 DIAGNOSIS — Z95.1 PRESENCE OF AORTOCORONARY BYPASS GRAFT: Chronic | ICD-10-CM

## 2021-08-26 DIAGNOSIS — F41.1 GENERALIZED ANXIETY DISORDER: ICD-10-CM

## 2021-09-03 ENCOUNTER — NON-APPOINTMENT (OUTPATIENT)
Age: 61
End: 2021-09-03

## 2021-09-03 ENCOUNTER — APPOINTMENT (OUTPATIENT)
Dept: PULMONOLOGY | Facility: CLINIC | Age: 61
End: 2021-09-03
Payer: MEDICAID

## 2021-09-03 ENCOUNTER — OUTPATIENT (OUTPATIENT)
Dept: OUTPATIENT SERVICES | Facility: HOSPITAL | Age: 61
LOS: 1 days | Discharge: HOME | End: 2021-09-03

## 2021-09-03 VITALS
HEIGHT: 67 IN | BODY MASS INDEX: 39.08 KG/M2 | OXYGEN SATURATION: 98 % | HEART RATE: 66 BPM | DIASTOLIC BLOOD PRESSURE: 81 MMHG | SYSTOLIC BLOOD PRESSURE: 139 MMHG | TEMPERATURE: 97 F | WEIGHT: 249 LBS

## 2021-09-03 DIAGNOSIS — E66.9 OBESITY, UNSPECIFIED: ICD-10-CM

## 2021-09-03 DIAGNOSIS — J44.9 CHRONIC OBSTRUCTIVE PULMONARY DISEASE, UNSPECIFIED: ICD-10-CM

## 2021-09-03 DIAGNOSIS — Z98.890 OTHER SPECIFIED POSTPROCEDURAL STATES: Chronic | ICD-10-CM

## 2021-09-03 DIAGNOSIS — R06.00 DYSPNEA, UNSPECIFIED: ICD-10-CM

## 2021-09-03 DIAGNOSIS — G47.19 OTHER HYPERSOMNIA: ICD-10-CM

## 2021-09-03 DIAGNOSIS — Z87.891 PERSONAL HISTORY OF NICOTINE DEPENDENCE: ICD-10-CM

## 2021-09-03 DIAGNOSIS — J45.909 UNSPECIFIED ASTHMA, UNCOMPLICATED: ICD-10-CM

## 2021-09-03 DIAGNOSIS — Z95.1 PRESENCE OF AORTOCORONARY BYPASS GRAFT: Chronic | ICD-10-CM

## 2021-09-03 DIAGNOSIS — J98.4 OTHER DISORDERS OF LUNG: ICD-10-CM

## 2021-09-03 PROCEDURE — 99214 OFFICE O/P EST MOD 30 MIN: CPT | Mod: GC

## 2021-09-03 RX ORDER — PREDNISONE 20 MG/1
20 TABLET ORAL DAILY
Qty: 10 | Refills: 0 | Status: ACTIVE | COMMUNITY
Start: 2021-09-03 | End: 1900-01-01

## 2021-09-03 RX ORDER — ALBUTEROL 90 MCG
AEROSOL (GRAM) INHALATION
Refills: 0 | Status: ACTIVE | COMMUNITY

## 2021-09-03 NOTE — HISTORY OF PRESENT ILLNESS
[Former] : former [< 30 pack-years] : < 30 pack-years [Never] : never [Obstructive Sleep Apnea] : obstructive sleep apnea [Snoring] : snoring [BPAP:] : BPAP [Full Face mask] : full face mask [Diet Meds] : diet meds [1] : 1 [TextBox_4] : Ms. Kang is a 61 YO F w/PMHx of Asthma/COPD, Smoking Cessation 13yrs ago, Severe SILAS (at home NIV), Obesity Triple vessel Dz s/p CABG x 1 and MAZE procedure, HTN, Paroxysmal Atrial Fibrillation s/p ablation (Xarelto and ASA) formerly a patient of Dr. Ramos presented for f/u. Pt c/o worsening SOB, MURCIA and chronic daytime somnolence . She has increase use of inhaler now 6x/day. She uses BIPAP nightly as well as utilize 4 pillows during sleep. She denies recent illness, travel , palpitations, leg swelling, chest pain, change in weight. Pt is also concern w/ result of sleep study, PFT and CT chest. \par \par  [YearQuit] : 2008 [TextBox_133] : 75 [TextBox_137] : 75 [TextBox_141] : 5 [TextBox_9] : Topiramate  [TextBox_12] : 02/2019 [TextBox_27] : 04/2021 [TextBox_52] : 2

## 2021-09-03 NOTE — PHYSICAL EXAM
[Normal Appearance] : normal appearance [Well Groomed] : well groomed [Normal Oropharynx] : normal oropharynx [No Neck Mass] : no neck mass [Normal Rate/Rhythm] : normal rate/rhythm [No Varicosities] : no varicosities [No Murmurs] : no murmurs [Clear to Auscultation Bilaterally] : clear to auscultation bilaterally [No Abnormalities] : no abnormalities [Surgical scars] : surgical scars [Benign] : benign [No Clubbing] : no clubbing [No Cyanosis] : no cyanosis [No Edema] : no edema [FROM] : FROM [Normal Color/ Pigmentation] : normal color/ pigmentation [No Rash] : no rash [No Focal Deficits] : no focal deficits [Oriented x3] : oriented x3 [TextBox_68] : Unable to complete full sentences w/out taking a breath [TextBox_99] : Uses a rolling walker [TextBox_140] : Anxious

## 2021-09-03 NOTE — HISTORY OF PRESENT ILLNESS
[Former] : former [< 30 pack-years] : < 30 pack-years [Never] : never [Obstructive Sleep Apnea] : obstructive sleep apnea [Snoring] : snoring [BPAP:] : BPAP [Full Face mask] : full face mask [Diet Meds] : diet meds [1] : 1 [TextBox_4] : Ms. Kang is a 59 YO F w/PMHx of Asthma/COPD, Smoking Cessation 13yrs ago, Severe SILAS (at home NIV), Obesity Triple vessel Dz s/p CABG x 1 and MAZE procedure, HTN, Paroxysmal Atrial Fibrillation s/p ablation (Xarelto and ASA) formerly a patient of Dr. Ramos presented for f/u. Pt c/o worsening SOB, MURCIA and chronic daytime somnolence . She has increase use of inhaler now 6x/day. She uses BIPAP nightly as well as utilize 4 pillows during sleep. She denies recent illness, travel , palpitations, leg swelling, chest pain, change in weight. Pt is also concern w/ result of sleep study, PFT and CT chest. \par \par  [YearQuit] : 2008 [TextBox_133] : 75 [TextBox_137] : 75 [TextBox_141] : 5 [TextBox_9] : Topiramate  [TextBox_12] : 02/2019 [TextBox_27] : 04/2021 [TextBox_52] : 2

## 2021-09-03 NOTE — COUNSELING
[Benefits of weight loss discussed] : Benefits of weight loss discussed [Encouraged to increase physical activity] : Encouraged to increase physical activity [Good understanding] : Patient has a good understanding of lifestyle changes and steps needed to achieve self management goal [de-identified] : Avoid triggers of Asthma, keep a functional air conditioner at home. Encourage air purifier\par demonstrate appropriate and effective use of inhalers\par discuss oral intake around NIV use to avoid aspiration\par exercise as tolerated to encourage weight loss\par

## 2021-09-03 NOTE — COUNSELING
[Benefits of weight loss discussed] : Benefits of weight loss discussed [Encouraged to increase physical activity] : Encouraged to increase physical activity [Good understanding] : Patient has a good understanding of lifestyle changes and steps needed to achieve self management goal [de-identified] : Avoid triggers of Asthma, keep a functional air conditioner at home. Encourage air purifier\par demonstrate appropriate and effective use of inhalers\par discuss oral intake around NIV use to avoid aspiration\par exercise as tolerated to encourage weight loss\par

## 2021-09-03 NOTE — ASSESSMENT
[FreeTextEntry1] : Ms. Kang is a 61 YO F w/PMHx of Asthma/COPD, Smoking Cessation 13yrs ago, Severe SILAS (at home NIV), Obesity Triple vessel Dz s/p CABG x 1 and MAZE procedure, HTN, Paroxysmal Atrial Fibrillation s/p ablation (Xarelto and ASA) formerly a patient of Dr. Ramos presented for f/u.\par \par \par #Asthma\par #COPD\par #Tobacco use (cessation 13yrs)\par #SILAS\par - increase use of inhaler and worsening of symptoms\par - Education\par          - avoid triggers of asthma\par          - appropriate use of inhaler demonstrated\par          - adherence to medication and BIPAP\par          - effective diet and weight loss (lifestyle modification)\par - Prednisone 40mg QD x 5\par - c/w Albuterol / Incruse / Budesonide-Formeterol as prescribed \par - f/u w/ cardiology \par \par \par Obesity\par - diet and exercise as tolerated \par - RTC 3 months

## 2021-09-03 NOTE — REASON FOR VISIT
[Follow-Up] : a follow-up visit [Asthma] : asthma [Sleep Apnea] : sleep apnea [COPD] : COPD [Pulmonary Hypertension] : pulmonary hypertension [Obesity] : obesity [Family Member] : family member

## 2021-09-03 NOTE — REVIEW OF SYSTEMS
[Fatigue] : fatigue [Recent Wt Loss (___ Lbs)] : ~T recent [unfilled] lb weight loss [Sinus Problems] : sinus problems [Cough] : cough [Dyspnea] : dyspnea [SOB on Exertion] : sob on exertion [Orthopnea] : orthopnea [Watery Eyes] : watery eyes [Seasonal Allergies] : seasonal allergies [Fever] : no fever [Chills] : no chills [Poor Appetite] : no poor appetite [Sore Throat] : no sore throat [Eye Irritation] : no eye irritation [Nasal Congestion] : no nasal congestion [Dry Mouth] : no dry mouth [Hemoptysis] : no hemoptysis [Chest Tightness] : no chest tightness [Sputum] : no sputum [Pleuritic Pain] : no pleuritic pain [Wheezing] : no wheezing [Chest Discomfort] : no chest discomfort [Leg Cramps] : no leg cramps [Palpitations] : no palpitations [Syncope] : no syncope [Nausea] : no nausea [Vomiting] : no vomiting [Diarrhea] : no diarrhea

## 2021-09-03 NOTE — ASSESSMENT
[FreeTextEntry1] : Ms. Kang is a 59 YO F w/PMHx of Asthma/COPD, Smoking Cessation 13yrs ago, Severe SILAS (at home NIV), Obesity Triple vessel Dz s/p CABG x 1 and MAZE procedure, HTN, Paroxysmal Atrial Fibrillation s/p ablation (Xarelto and ASA) formerly a patient of Dr. Ramos presented for f/u.\par \par \par #Asthma\par #COPD\par #Tobacco use (cessation 13yrs)\par #SILAS\par - increase use of inhaler and worsening of symptoms\par - Education\par          - avoid triggers of asthma\par          - appropriate use of inhaler demonstrated\par          - adherence to medication and BIPAP\par          - effective diet and weight loss (lifestyle modification)\par - Prednisone 40mg QD x 5\par - c/w Albuterol / Incruse / Budesonide-Formeterol as prescribed \par - f/u w/ cardiology \par \par \par Obesity\par - diet and exercise as tolerated \par - RTC 3 months

## 2021-09-07 NOTE — ED ADULT NURSE NOTE - FINAL NURSING ELECTRONIC SIGNATURE
CONSULT NOTE    DATE OF SERVICE: 9/7/2021    REQUESTING PHYSICIAN: ELIZABETH Huerta    HISTORY OF PRESENT ILLNESS:  Juanita Hunt is being seen at the request of ELIZABETH Whittington for the complaint of thalassemia.     HEMATOLOGIC  HISTORY  -patient was diagnosed at age 13 with anemia and had iv iron therapy.  -numbers did not improve  -then testing was done  And was positive for that.diagnosed with B thal minor.    Patient arrives at the clinic today for an evaluation of the above diagnosis and consultation. Patient is doing well. She states that she does feel fatigued which she attributes to her Thalassemia. She states that she took oral iron supplementation when she was a kid and reacted poorly to it. She also states that after her negative reaction led to testing that discovered her thalassemia. She further states that she has since been living with Thalassemia and it has not affected her DLAs.    I will have hemoglobin electrophoresis labs run for the patient today, so that it can be placed in the patients chart for future reference and to prevent any unnecessary testing.    I discussed with the patient that she should stay up to date with her age appropriate cancer screenings and that she is due for her first colonoscopy in two years.    I will have the patient follow up with me via telephone in 10 days to discuss the results of her hemoglobin electrophoresis.     She gives a past medical history of having benign masses removed from her breasts.    She states that her sister that has thalassemia. She also states that her daughter has thalassemia.    She gives a social history of smoking one ppd for seven years. She is no longer smoking.     She has one healthy daughter and a .    REVIEW OF SYSTEMS:   Review of Systems  GENERAL:  Patient denies fevers, chills, night sweats, excessive fatigue, change in appetite, weight loss, dizziness, but complains of: headache for years   ALLERGIC/IMMUNOLOGIC:  Verified allergies: Yes  EYES:  Patient denies significant visual difficulties, double vision, blurred vision  ENT/MOUTH: Patient denies problems with hearing, sore throat, sinus drainage, mouth sores  ENDOCRINE:  Patient denies diabetes, thyroid disease, hormone replacement, hot flashes  HEMATOLOGIC/LYMPHATIC: Patient denies easy bruising, bleeding, swollen lymph nodes, but complains of: tender lymph nodes left side of breast hurts, Patient reports she had a lump removed from her breast in June and continues with pain. Rates the discomfort a 1 but with touching the area its a 5 for pain.   BREASTS: Patient denies abnormal masses of breast, nipple discharge, pain  RESPIRATORY:  Patient denies lung pain with breathing, cough, coughing up blood, shortness of breath  CARDIOVASCULAR:  Patient denies anginal chest pain, palpitations, shortness of breath when lying flat, peripheral edema  GASTROINTESTINAL: Patient denies abdominal pain , nausea, vomiting, diarrhea, GI bleeding, constipation, change in bowel habits, heartburn, sensation of feeling full, difficulty swallowing  : Patient denies abnormal genital masses, blood in the urine, frequency, urgency, burning with urination, hesitancy, incontinence, vaginal bleeding, discharge  MUSCULOSKELETAL:  Patient denies joint pain, bone pain, joint swelling, redness, decreased range of motion  SKIN:  Patient denies chronic rashes, inflammation, ulcerations, skin changes, itching  NEUROLOGIC:  Patient denies loss of balance, areas of focal weakness, abnormal gait, sensory problems, numbness, tingling  PSYCHIATRIC: Patient denies insomnia, depression, anxiety    Past Medical History:   Diagnosis Date   • Left breast mass, multiple 05/12/2021    Needs f/u 6 month US to check for stability. Due 11/2021   • Radial scar of breast 05/12/2021    Needs f/u 6 month US to check for stability. Due 11/2021   • Thalassemia     beta thalassemia minor       Past Surgical History:   Procedure  Laterality Date   • Breast lumpectomy  2021    Left breast  Excisional Biopsy   • Colposcopy N/A 2003   • Excise breast lesion Right    • Mole removal         Social History     Tobacco Use   • Smoking status: Former Smoker     Packs/day: 1.00     Types: Cigarettes     Start date:      Quit date:      Years since quittin.6   • Smokeless tobacco: Never Used   Substance Use Topics   • Alcohol use: Yes     Comment: Socially once every 2-3 months   • Drug use: Never       Family History   Problem Relation Age of Onset   • Patient is unaware of any medical problems Mother    • Patient is unaware of any medical problems Father    • Blood Disorder Sister         Thalassemia   • Patient is unaware of any medical problems Brother    • Patient is unaware of any medical problems Brother    • Patient is unaware of any medical problems Paternal Grandmother    • Alcohol Abuse Paternal Grandfather    • Patient is unaware of any medical problems Half-Sister    • Patient is unaware of any medical problems Half-Sister    • Patient is unaware of any medical problems Half-Brother    • Blood Disorder Daughter         thalassemia   • Heart Daughter         SVT     PHYSICAL EXAMINATION:  Physical Exam  Vitals and nursing note reviewed.   Cardiovascular:      Rate and Rhythm: Normal rate and regular rhythm.      Heart sounds: Normal heart sounds.   Pulmonary:      Breath sounds: Normal breath sounds.      Comments: Air entry is bilaterally equal.   Abdominal:      Palpations: Abdomen is soft.      Tenderness: There is no abdominal tenderness.      Comments: No organomegaly.    Musculoskeletal:      Right lower leg: No edema.      Left lower leg: No edema.      Comments: No pedal edema bilaterally.   Lymphadenopathy:      Cervical: No cervical adenopathy.      Upper Body:      Right upper body: No axillary adenopathy.      Left upper body: No axillary adenopathy.      Comments: No inguinal adenopathy per patient.         LABS:  Reviewed and confirmed in the EMR (Electronic Medical Record).  Recent Labs   Lab 09/07/21  1600 07/27/21  0749 04/23/21 0921   WBC 9.4 8.4 7.0   RBC 6.08* 6.19* 6.23*   HGB 11.8* 12.0 12.3   HCT 38.2 39.9 37.3   MCV 62.8* 64.5* 59.9*    380 388   Absolute Neutrophils 5.0  --  3.7     Recent Labs   Lab 09/07/21  1600 07/27/21  0749 04/23/21 0921   Sodium 140 138 141   Potassium 4.0 4.3 4.6   Chloride 108* 108* 105   BUN 13 12 13   Creatinine 0.80 0.82 0.82   Glucose 111* 90 102*   Calcium 8.6 8.8 9.5   Albumin 3.7 3.7 4.3   Globulin 3.8 3.2 3.2   Bilirubin, Total 0.2 0.4 0.4   GOT/AST 11 14 18   GPT 22 23 39   LD, Total 148  --   --      ASSESSMENT AND PLAN:  1) MICROCYTIC HYPOCHROMIC RBC  -patient reports that she was diagnosed with thalassemia at age 13.  -patient reports diagnosed of beta thalassemia trait.  -we do not have current testing on chart and will request for the same.  -if patient indeed diagnosed with beta thalassemia trait no further testing would be recommended  -patient has undergone genetic counseling when her daughter was diagnosed with thalassemia trait at [facility].  The do not plan any further pregnancies at this point in time no additional recommendation at this point in time we will obtain ligament was so that further hematology consultation is not carried up or patient is not treated for any oral iron.    2) ABNORMAL MAMMOGRAM  -patient follows up with 6 monthly imaging    3) AGE-APPROPRIATE CANCER SCREENING  -up-to-date on mammogram neck patient has undergone Pap smear.  Will be due for colonoscopy at age 45.    We discussed having a telephone visit in order to discuss the finding of hemoglobin electrophoresis  Darren Galarza MD 9/7/2021    This chart was documented by Enrike Ocasio, acting as scribe for Darren Galarza MD. 9/7/2021, 3:39 PM.    The documentation recorded by the scribe accurately and completely reflects the service(s) I personally performed and  the decisions made by me.      30-Mar-2018 01:48

## 2021-09-21 ENCOUNTER — OUTPATIENT (OUTPATIENT)
Dept: OUTPATIENT SERVICES | Facility: HOSPITAL | Age: 61
LOS: 1 days | Discharge: HOME | End: 2021-09-21

## 2021-09-21 ENCOUNTER — APPOINTMENT (OUTPATIENT)
Dept: PSYCHIATRY | Facility: CLINIC | Age: 61
End: 2021-09-21

## 2021-09-21 DIAGNOSIS — Z98.890 OTHER SPECIFIED POSTPROCEDURAL STATES: Chronic | ICD-10-CM

## 2021-09-21 DIAGNOSIS — F32.9 MAJOR DEPRESSIVE DISORDER, SINGLE EPISODE, UNSPECIFIED: ICD-10-CM

## 2021-09-21 DIAGNOSIS — Z95.1 PRESENCE OF AORTOCORONARY BYPASS GRAFT: Chronic | ICD-10-CM

## 2021-09-21 DIAGNOSIS — F41.1 GENERALIZED ANXIETY DISORDER: ICD-10-CM

## 2021-09-23 ENCOUNTER — OUTPATIENT (OUTPATIENT)
Dept: OUTPATIENT SERVICES | Facility: HOSPITAL | Age: 61
LOS: 1 days | Discharge: HOME | End: 2021-09-23

## 2021-09-23 ENCOUNTER — APPOINTMENT (OUTPATIENT)
Dept: PSYCHIATRY | Facility: CLINIC | Age: 61
End: 2021-09-23
Payer: MEDICAID

## 2021-09-23 DIAGNOSIS — Z98.890 OTHER SPECIFIED POSTPROCEDURAL STATES: Chronic | ICD-10-CM

## 2021-09-23 DIAGNOSIS — Z95.1 PRESENCE OF AORTOCORONARY BYPASS GRAFT: Chronic | ICD-10-CM

## 2021-09-23 DIAGNOSIS — F41.1 GENERALIZED ANXIETY DISORDER: ICD-10-CM

## 2021-09-23 DIAGNOSIS — F32.9 MAJOR DEPRESSIVE DISORDER, SINGLE EPISODE, UNSPECIFIED: ICD-10-CM

## 2021-09-23 PROCEDURE — 99214 OFFICE O/P EST MOD 30 MIN: CPT | Mod: 95

## 2021-10-04 ENCOUNTER — APPOINTMENT (OUTPATIENT)
Dept: PSYCHIATRY | Facility: CLINIC | Age: 61
End: 2021-10-04

## 2021-10-07 NOTE — PATIENT PROFILE ADULT - BRADEN NUTRITION
Patient calls stating that he received a letter stating that his [employer] machine is on recall.  His machine is very old.  He will register it with the company.  Order placed for a replacement machine.  Patient was advised he may require a new sleep study by medicare.  Order placed.    
(3) adequate

## 2021-10-12 ENCOUNTER — APPOINTMENT (OUTPATIENT)
Dept: PSYCHIATRY | Facility: CLINIC | Age: 61
End: 2021-10-12

## 2021-10-12 ENCOUNTER — NON-APPOINTMENT (OUTPATIENT)
Age: 61
End: 2021-10-12

## 2021-11-17 NOTE — ED ADULT TRIAGE NOTE - HEIGHT IN CM
Chief Complaint   Patient presents with    Follow Up Chronic Condition     discharged from rehab     1. \"Have you been to the ER, urgent care clinic since your last visit? Hospitalized since your last visit? \" No    2. \"Have you seen or consulted any other health care providers outside of the [address] [place] [address] since your last visit? \" No     3. For patients aged 39-70: Has the patient had a colonoscopy / FIT/ Cologuard? No     If the patient is female:    4. For patients aged 41-77: Has the patient had a mammogram within the past 2 years? No    5. For patients aged 21-65: Has the patient had a pap smear?  No 170.18

## 2021-11-18 ENCOUNTER — OUTPATIENT (OUTPATIENT)
Dept: OUTPATIENT SERVICES | Facility: HOSPITAL | Age: 61
LOS: 1 days | Discharge: HOME | End: 2021-11-18

## 2021-11-18 ENCOUNTER — APPOINTMENT (OUTPATIENT)
Dept: PSYCHIATRY | Facility: CLINIC | Age: 61
End: 2021-11-18
Payer: MEDICAID

## 2021-11-18 ENCOUNTER — NON-APPOINTMENT (OUTPATIENT)
Age: 61
End: 2021-11-18

## 2021-11-18 DIAGNOSIS — Z98.890 OTHER SPECIFIED POSTPROCEDURAL STATES: Chronic | ICD-10-CM

## 2021-11-18 DIAGNOSIS — Z95.1 PRESENCE OF AORTOCORONARY BYPASS GRAFT: Chronic | ICD-10-CM

## 2021-11-18 DIAGNOSIS — F41.1 GENERALIZED ANXIETY DISORDER: ICD-10-CM

## 2021-11-18 DIAGNOSIS — F32.9 MAJOR DEPRESSIVE DISORDER, SINGLE EPISODE, UNSPECIFIED: ICD-10-CM

## 2021-11-18 PROCEDURE — 99214 OFFICE O/P EST MOD 30 MIN: CPT | Mod: 95

## 2021-11-22 ENCOUNTER — OUTPATIENT (OUTPATIENT)
Dept: OUTPATIENT SERVICES | Facility: HOSPITAL | Age: 61
LOS: 1 days | Discharge: HOME | End: 2021-11-22

## 2021-11-22 ENCOUNTER — APPOINTMENT (OUTPATIENT)
Dept: PSYCHIATRY | Facility: CLINIC | Age: 61
End: 2021-11-22

## 2021-11-22 DIAGNOSIS — Z95.1 PRESENCE OF AORTOCORONARY BYPASS GRAFT: Chronic | ICD-10-CM

## 2021-11-22 DIAGNOSIS — F32.9 MAJOR DEPRESSIVE DISORDER, SINGLE EPISODE, UNSPECIFIED: ICD-10-CM

## 2021-11-22 DIAGNOSIS — Z98.890 OTHER SPECIFIED POSTPROCEDURAL STATES: Chronic | ICD-10-CM

## 2021-12-22 ENCOUNTER — OUTPATIENT (OUTPATIENT)
Dept: OUTPATIENT SERVICES | Facility: HOSPITAL | Age: 61
LOS: 1 days | Discharge: HOME | End: 2021-12-22

## 2021-12-22 ENCOUNTER — APPOINTMENT (OUTPATIENT)
Dept: PSYCHIATRY | Facility: CLINIC | Age: 61
End: 2021-12-22

## 2021-12-22 DIAGNOSIS — F32.9 MAJOR DEPRESSIVE DISORDER, SINGLE EPISODE, UNSPECIFIED: ICD-10-CM

## 2021-12-22 DIAGNOSIS — Z98.890 OTHER SPECIFIED POSTPROCEDURAL STATES: Chronic | ICD-10-CM

## 2021-12-22 DIAGNOSIS — Z95.1 PRESENCE OF AORTOCORONARY BYPASS GRAFT: Chronic | ICD-10-CM

## 2021-12-28 ENCOUNTER — EMERGENCY (EMERGENCY)
Facility: HOSPITAL | Age: 61
LOS: 0 days | Discharge: HOME | End: 2021-12-28
Attending: EMERGENCY MEDICINE | Admitting: EMERGENCY MEDICINE
Payer: MEDICAID

## 2021-12-28 VITALS
OXYGEN SATURATION: 98 % | TEMPERATURE: 98 F | HEIGHT: 67 IN | SYSTOLIC BLOOD PRESSURE: 142 MMHG | HEART RATE: 71 BPM | DIASTOLIC BLOOD PRESSURE: 82 MMHG | RESPIRATION RATE: 18 BRPM

## 2021-12-28 DIAGNOSIS — Z91.041 RADIOGRAPHIC DYE ALLERGY STATUS: ICD-10-CM

## 2021-12-28 DIAGNOSIS — R06.02 SHORTNESS OF BREATH: ICD-10-CM

## 2021-12-28 DIAGNOSIS — I25.10 ATHEROSCLEROTIC HEART DISEASE OF NATIVE CORONARY ARTERY WITHOUT ANGINA PECTORIS: ICD-10-CM

## 2021-12-28 DIAGNOSIS — E78.5 HYPERLIPIDEMIA, UNSPECIFIED: ICD-10-CM

## 2021-12-28 DIAGNOSIS — Z98.890 OTHER SPECIFIED POSTPROCEDURAL STATES: Chronic | ICD-10-CM

## 2021-12-28 DIAGNOSIS — U07.1 COVID-19: ICD-10-CM

## 2021-12-28 DIAGNOSIS — R05.1 ACUTE COUGH: ICD-10-CM

## 2021-12-28 DIAGNOSIS — J44.9 CHRONIC OBSTRUCTIVE PULMONARY DISEASE, UNSPECIFIED: ICD-10-CM

## 2021-12-28 DIAGNOSIS — Z95.1 PRESENCE OF AORTOCORONARY BYPASS GRAFT: Chronic | ICD-10-CM

## 2021-12-28 DIAGNOSIS — Z91.013 ALLERGY TO SEAFOOD: ICD-10-CM

## 2021-12-28 DIAGNOSIS — J06.9 ACUTE UPPER RESPIRATORY INFECTION, UNSPECIFIED: ICD-10-CM

## 2021-12-28 DIAGNOSIS — I48.91 UNSPECIFIED ATRIAL FIBRILLATION: ICD-10-CM

## 2021-12-28 DIAGNOSIS — Z88.8 ALLERGY STATUS TO OTHER DRUGS, MEDICAMENTS AND BIOLOGICAL SUBSTANCES: ICD-10-CM

## 2021-12-28 DIAGNOSIS — Z95.1 PRESENCE OF AORTOCORONARY BYPASS GRAFT: ICD-10-CM

## 2021-12-28 DIAGNOSIS — I10 ESSENTIAL (PRIMARY) HYPERTENSION: ICD-10-CM

## 2021-12-28 LAB — SARS-COV-2 RNA SPEC QL NAA+PROBE: DETECTED

## 2021-12-28 PROCEDURE — 99284 EMERGENCY DEPT VISIT MOD MDM: CPT

## 2021-12-28 PROCEDURE — 71045 X-RAY EXAM CHEST 1 VIEW: CPT | Mod: 26

## 2021-12-28 RX ORDER — IPRATROPIUM/ALBUTEROL SULFATE 18-103MCG
3 AEROSOL WITH ADAPTER (GRAM) INHALATION
Qty: 360 | Refills: 0
Start: 2021-12-28 | End: 2022-01-26

## 2021-12-28 RX ORDER — AZITHROMYCIN 500 MG/1
1 TABLET, FILM COATED ORAL
Qty: 3 | Refills: 0
Start: 2021-12-28

## 2021-12-28 NOTE — ED PROVIDER NOTE - NSFOLLOWUPINSTRUCTIONS_ED_ALL_ED_FT
COVID-19 (Coronavirus Disease 2019)    WHAT YOU NEED TO KNOW:    COVID-19 is the disease caused by a coronavirus first discovered in December 2019. Coronaviruses generally cause upper respiratory (nose, throat, and lung) infections, such as a cold. The 2019 virus spreads quickly and easily. It can be spread starting 2 to 3 days before symptoms even begin.    DISCHARGE INSTRUCTIONS:    Call your local emergency number (911 in the ) if:   •You have trouble breathing or shortness of breath at rest.      •You have chest pain or pressure that lasts longer than 5 minutes.      •You become confused or hard to wake.      •Your lips or face are blue.      Return to the emergency department if:   •You have a fever of 104°F (40°C) or higher.          Call your doctor if:   •You have symptoms of COVID-19.      •You have questions or concerns about your condition or care.      Medicines: You may need any of the following:   •Decongestants help reduce nasal congestion and help you breathe more easily. If you take decongestant pills, they may make you feel restless or cause problems with your sleep. Do not use decongestant sprays for more than a few days.      •Cough suppressants help reduce coughing. Ask your healthcare provider which type of cough medicine is best for you.      •Acetaminophen decreases pain and fever. It is available without a doctor's order. Ask how much to take and how often to take it. Follow directions. Read the labels of all other medicines you are using to see if they also contain acetaminophen, or ask your doctor or pharmacist. Acetaminophen can cause liver damage if not taken correctly. Do not use more than 4 grams (4,000 milligrams) total of acetaminophen in one day.       •NSAIDs, such as ibuprofen, help decrease swelling, pain, and fever. This medicine is available with or without a doctor's order. NSAIDs can cause stomach bleeding or kidney problems in certain people. If you take blood thinner medicine, always ask your healthcare provider if NSAIDs are safe for you. Always read the medicine label and follow directions.      •Blood thinners help prevent blood clots. Clots can cause strokes, heart attacks, and death. The following are general safety guidelines to follow while you are taking a blood thinner:?Watch for bleeding and bruising while you take blood thinners. Watch for bleeding from your gums or nose. Watch for blood in your urine and bowel movements. Use a soft washcloth on your skin, and a soft toothbrush to brush your teeth. This can keep your skin and gums from bleeding. If you shave, use an electric shaver. Do not play contact sports.       ?Tell your dentist and other healthcare providers that you take a blood thinner. Wear a bracelet or necklace that says you take this medicine.       ?Do not start or stop any other medicines unless your healthcare provider tells you to. Many medicines cannot be used with blood thinners.      ?Take your blood thinner exactly as prescribed by your healthcare provider. Do not skip does or take less than prescribed. Tell your provider right away if you forget to take your blood thinner, or if you take too much.      ?Warfarin is a blood thinner that you may need to take. The following are things you should be aware of if you take warfarin: ?Foods and medicines can affect the amount of warfarin in your blood. Do not make major changes to your diet while you take warfarin. Warfarin works best when you eat about the same amount of vitamin K every day. Vitamin K is found in green leafy vegetables and certain other foods. Ask for more information about what to eat when you are taking warfarin.      ?You will need to see your healthcare provider for follow-up visits when you are on warfarin. You will need regular blood tests. These tests are used to decide how much medicine you need.         •Take your medicine as directed. Contact your healthcare provider if you think your medicine is not helping or if you have side effects. Tell him or her if you are allergic to any medicine. Keep a list of the medicines, vitamins, and herbs you take. Include the amounts, and when and why you take them. Bring the list or the pill bottles to follow-up visits. Carry your medicine list with you in case of an emergency.      What you need to know about variants: The virus has changed into several new forms (called variants) since it was discovered. The variants may be more contagious (easily spread) than the original form. Some may also cause more severe illness than others.    What you need to know about COVID-19 vaccines: Healthcare providers recommend a COVID-19 vaccine, even if you have already had COVID-19. You are considered fully vaccinated against COVID-19 two weeks after the final dose of any COVID-19 vaccine. Let your healthcare provider know when you have received the final dose of the vaccine. Make a copy of your vaccination card. Keep the original with you in case you need to show it. Keep the copy in a safe place.  •COVID-19 vaccines are given as a shot in 1 or 2 doses. The vaccine is recommended for everyone 12 years or older.      •A third dose is recommended for adults with a weakened immune system who get a 2-dose vaccine. The third dose is given at least 28 days after the second.      •A booster (additional) dose is being recommended for other people as well. This is usually given 6 months after the initial doses are complete. Continue social distancing and other measures, even after you get the vaccine. Although it is not common, you can become infected after you get the vaccine. You may also be able to pass the virus to others without knowing you are infected.      •After you get the vaccine, check local, national, and international travel rules. You may need to be tested before you travel. Some countries require proof of a negative test before you travel. You may also need to quarantine after you return.      How the 2019 coronavirus spreads:   •Droplets are the main way all coronaviruses spread. The virus travels in droplets that form when a person talks, sings, coughs, or sneezes. The droplets can also float in the air for minutes or hours. Infection happens when you breathe in the droplets or get them in your eyes or nose. Close personal contact with an infected person increases your risk for infection. This means being within 6 feet (2 meters) of the person for at least 15 minutes over 24 hours.      •Person-to-person contact can spread the virus. For example, a person with the virus on his or her hands can spread it by shaking hands with someone.      •The virus can stay on objects and surfaces for up to 3 days. You may become infected by touching the object or surface and then touching your eyes or mouth.      Help lower the risk for COVID-19:   •Wash your hands often throughout the day. Use soap and water. Rub your soapy hands together, lacing your fingers, for at least 20 seconds. Rinse with warm, running water. Dry your hands with a clean towel or paper towel. Use hand  that contains alcohol if soap and water are not available. Teach children how to wash their hands and use hand .  Handwashing           •Cover sneezes and coughs. Turn your face away and cover your mouth and nose with a tissue. Throw the tissue away. Use the bend of your arm if a tissue is not available. Then wash your hands well with soap and water or use hand . Teach children how to cover a cough or sneeze.      •Wear a face covering (mask) when needed. Use a cloth covering with at least 2 layers. You can also create layers by putting a cloth covering over a disposable non-medical mask. Cover your mouth and your nose.  How to Wear a Face Covering (Mask)           •Follow worldwide, national, and local social distancing guidelines. Keep at least 6 feet (2 meters) between you and others.      •Try not to touch your face. If you get the virus on your hands, you can transfer it to your eyes, nose, or mouth and become infected. You can also transfer it to objects, surfaces, or people.      •Clean and disinfect high-touch surfaces and objects often. Use disinfecting wipes, or make a solution of 4 teaspoons of bleach in 1 quart (4 cups) of water.      •Ask about other vaccines you may need. Get the influenza (flu) vaccine as soon as recommended each year, usually starting in September or October. Get the pneumonia vaccine if recommended. Your healthcare provider can tell you if you should also get other vaccines, and when to get them.    Prevent COVID-19 Infection         Follow social distancing guidelines: National and local social distancing rules vary. Rules and restrictions may change over time as restrictions are lifted. The following are general guidelines:   •Stay home if you are sick or think you may have COVID-19. It is important to stay home if you are waiting for a testing appointment or for test results.      •Avoid close physical contact with anyone who does not live in your home. Do not shake hands with, hug, or kiss a person as a greeting. If you must use public transportation (such as a bus or subway), try to sit or stand away from others. Wear your face covering.      •Avoid in-person gatherings and crowds. Attend virtually if possible.      For more information:   •Centers for Disease Control and Prevention  [address] [place] [address]  Phone: 1-555.276.3566  Web Address: http://www.cdc.gov        Follow up with your doctor as directed: Write down your questions so you remember to ask them during your visits.

## 2021-12-28 NOTE — ED PROVIDER NOTE - OBJECTIVE STATEMENT
60 y/o pulmonary htn, COPD, cad S/P cabg p/w cough x 2 days, persistent, denies headache, ear pain, abdominal pain, n/v/d, change in bowel habits or urinary sx, rash or other associated complaints at present.

## 2021-12-28 NOTE — ED PROVIDER NOTE - NSFOLLOWUPCLINICS_GEN_ALL_ED_FT
[facility] [place] [address]  Phone: [phone]  Fax:

## 2021-12-28 NOTE — ED PROVIDER NOTE - PROGRESS NOTE DETAILS
ATTENDING NOTE:   62 y/o F with PMH of COPD not on home O2, DLD, Afib s/p ablation, asthma, CAD s/p CABG x 3 here for cough x few days. No f/c, SOB, CP, leg swelling. Pt called PMD who advised pt get an XR. On exam: NAD, CTAB, S1S2, soft NT abdomen, grossly neurologically intact.  Plan for XR, Zpack as pt with HX of COPD and DC.

## 2021-12-28 NOTE — ED PROVIDER NOTE - CARE PLAN
1 Principal Discharge DX:	Urinary tract infection   Principal Discharge DX:	Upper respiratory infection

## 2021-12-28 NOTE — ED PROVIDER NOTE - NSICDXPASTMEDICALHX_GEN_ALL_CORE_FT
PAST MEDICAL HISTORY:  Afib     Asthma     Bilateral carpal tunnel syndrome     Closed fracture of foot, unspecified laterality, sequela     Essential hypertension     Hearing aid worn     Hearing decreased     History of  section

## 2021-12-28 NOTE — ED ADULT TRIAGE NOTE - CAS EDP DISCH TYPE
Pt needs refill of tacrolimus 1 mg with instructions to take 1 capsule by mouth BID. Pt has appointment in May 2019. Three month supply sent out.    Home

## 2021-12-28 NOTE — ED PROVIDER NOTE - PATIENT PORTAL LINK FT
You can access the FollowMyHealth Patient Portal offered by [facility] by registering at the following website: [email or web address]. By joining [facility]’s FollowMyHealth portal, you will also be able to view your health information using other applications (apps) compatible with our system.

## 2022-01-13 ENCOUNTER — OUTPATIENT (OUTPATIENT)
Dept: OUTPATIENT SERVICES | Facility: HOSPITAL | Age: 62
LOS: 1 days | Discharge: HOME | End: 2022-01-13

## 2022-01-13 ENCOUNTER — APPOINTMENT (OUTPATIENT)
Dept: PSYCHIATRY | Facility: CLINIC | Age: 62
End: 2022-01-13
Payer: MEDICAID

## 2022-01-13 DIAGNOSIS — Z95.1 PRESENCE OF AORTOCORONARY BYPASS GRAFT: Chronic | ICD-10-CM

## 2022-01-13 DIAGNOSIS — Z98.890 OTHER SPECIFIED POSTPROCEDURAL STATES: Chronic | ICD-10-CM

## 2022-01-13 DIAGNOSIS — F32.9 MAJOR DEPRESSIVE DISORDER, SINGLE EPISODE, UNSPECIFIED: ICD-10-CM

## 2022-01-13 DIAGNOSIS — F41.1 GENERALIZED ANXIETY DISORDER: ICD-10-CM

## 2022-01-13 PROCEDURE — 99214 OFFICE O/P EST MOD 30 MIN: CPT | Mod: 95

## 2022-01-21 ENCOUNTER — OUTPATIENT (OUTPATIENT)
Dept: OUTPATIENT SERVICES | Facility: HOSPITAL | Age: 62
LOS: 1 days | Discharge: HOME | End: 2022-01-21

## 2022-01-21 ENCOUNTER — APPOINTMENT (OUTPATIENT)
Dept: PSYCHIATRY | Facility: CLINIC | Age: 62
End: 2022-01-21

## 2022-01-21 DIAGNOSIS — F32.9 MAJOR DEPRESSIVE DISORDER, SINGLE EPISODE, UNSPECIFIED: ICD-10-CM

## 2022-01-21 DIAGNOSIS — Z98.890 OTHER SPECIFIED POSTPROCEDURAL STATES: Chronic | ICD-10-CM

## 2022-01-21 DIAGNOSIS — F41.1 GENERALIZED ANXIETY DISORDER: ICD-10-CM

## 2022-01-21 DIAGNOSIS — Z95.1 PRESENCE OF AORTOCORONARY BYPASS GRAFT: Chronic | ICD-10-CM

## 2022-02-04 ENCOUNTER — OUTPATIENT (OUTPATIENT)
Dept: OUTPATIENT SERVICES | Facility: HOSPITAL | Age: 62
LOS: 1 days | Discharge: HOME | End: 2022-02-04

## 2022-02-04 ENCOUNTER — NON-APPOINTMENT (OUTPATIENT)
Age: 62
End: 2022-02-04

## 2022-02-04 ENCOUNTER — APPOINTMENT (OUTPATIENT)
Dept: PULMONOLOGY | Facility: CLINIC | Age: 62
End: 2022-02-04
Payer: MEDICAID

## 2022-02-04 VITALS
TEMPERATURE: 97.1 F | OXYGEN SATURATION: 99 % | BODY MASS INDEX: 38.45 KG/M2 | SYSTOLIC BLOOD PRESSURE: 115 MMHG | WEIGHT: 245 LBS | HEIGHT: 67 IN | HEART RATE: 62 BPM | DIASTOLIC BLOOD PRESSURE: 73 MMHG

## 2022-02-04 DIAGNOSIS — Z98.890 OTHER SPECIFIED POSTPROCEDURAL STATES: Chronic | ICD-10-CM

## 2022-02-04 DIAGNOSIS — Z95.1 PRESENCE OF AORTOCORONARY BYPASS GRAFT: Chronic | ICD-10-CM

## 2022-02-04 PROCEDURE — 99213 OFFICE O/P EST LOW 20 MIN: CPT | Mod: GC

## 2022-02-04 RX ORDER — MONTELUKAST 10 MG/1
10 TABLET, FILM COATED ORAL
Qty: 30 | Refills: 5 | Status: ACTIVE | COMMUNITY
Start: 2022-02-04 | End: 1900-01-01

## 2022-02-04 NOTE — HISTORY OF PRESENT ILLNESS
[Stable] : stable [None] : None [Adherent] : the patient is adherent with ~his/her~ medication regimen [Goals--Doing Well] : the patient is doing well with ~his/her~ COPD goals [PFTs] : pulmonary function tests

## 2022-02-22 ENCOUNTER — APPOINTMENT (OUTPATIENT)
Dept: PSYCHIATRY | Facility: CLINIC | Age: 62
End: 2022-02-22

## 2022-02-22 ENCOUNTER — OUTPATIENT (OUTPATIENT)
Dept: OUTPATIENT SERVICES | Facility: HOSPITAL | Age: 62
LOS: 1 days | Discharge: HOME | End: 2022-02-22

## 2022-02-22 DIAGNOSIS — Z98.890 OTHER SPECIFIED POSTPROCEDURAL STATES: Chronic | ICD-10-CM

## 2022-02-22 DIAGNOSIS — Z95.1 PRESENCE OF AORTOCORONARY BYPASS GRAFT: Chronic | ICD-10-CM

## 2022-02-22 DIAGNOSIS — F32.9 MAJOR DEPRESSIVE DISORDER, SINGLE EPISODE, UNSPECIFIED: ICD-10-CM

## 2022-02-22 DIAGNOSIS — F41.1 GENERALIZED ANXIETY DISORDER: ICD-10-CM

## 2022-03-08 ENCOUNTER — APPOINTMENT (OUTPATIENT)
Age: 62
End: 2022-03-08

## 2022-03-08 ENCOUNTER — OUTPATIENT (OUTPATIENT)
Dept: OUTPATIENT SERVICES | Facility: HOSPITAL | Age: 62
LOS: 1 days | Discharge: HOME | End: 2022-03-08

## 2022-03-08 DIAGNOSIS — F41.1 GENERALIZED ANXIETY DISORDER: ICD-10-CM

## 2022-03-08 DIAGNOSIS — Z95.1 PRESENCE OF AORTOCORONARY BYPASS GRAFT: Chronic | ICD-10-CM

## 2022-03-08 DIAGNOSIS — Z98.890 OTHER SPECIFIED POSTPROCEDURAL STATES: Chronic | ICD-10-CM

## 2022-03-08 DIAGNOSIS — F32.9 MAJOR DEPRESSIVE DISORDER, SINGLE EPISODE, UNSPECIFIED: ICD-10-CM

## 2022-03-10 ENCOUNTER — OUTPATIENT (OUTPATIENT)
Dept: OUTPATIENT SERVICES | Facility: HOSPITAL | Age: 62
LOS: 1 days | Discharge: HOME | End: 2022-03-10

## 2022-03-10 ENCOUNTER — APPOINTMENT (OUTPATIENT)
Dept: PSYCHIATRY | Facility: CLINIC | Age: 62
End: 2022-03-10
Payer: MEDICAID

## 2022-03-10 DIAGNOSIS — Z95.1 PRESENCE OF AORTOCORONARY BYPASS GRAFT: Chronic | ICD-10-CM

## 2022-03-10 DIAGNOSIS — F32.9 MAJOR DEPRESSIVE DISORDER, SINGLE EPISODE, UNSPECIFIED: ICD-10-CM

## 2022-03-10 DIAGNOSIS — Z98.890 OTHER SPECIFIED POSTPROCEDURAL STATES: Chronic | ICD-10-CM

## 2022-03-10 DIAGNOSIS — F41.1 GENERALIZED ANXIETY DISORDER: ICD-10-CM

## 2022-03-10 PROCEDURE — 99214 OFFICE O/P EST MOD 30 MIN: CPT | Mod: 95

## 2022-03-10 RX ORDER — TOPIRAMATE 100 MG/1
100 TABLET, FILM COATED ORAL TWICE DAILY
Qty: 180 | Refills: 0 | Status: DISCONTINUED | COMMUNITY
End: 2022-03-10

## 2022-03-22 ENCOUNTER — APPOINTMENT (OUTPATIENT)
Age: 62
End: 2022-03-22

## 2022-03-22 ENCOUNTER — OUTPATIENT (OUTPATIENT)
Dept: OUTPATIENT SERVICES | Facility: HOSPITAL | Age: 62
LOS: 1 days | Discharge: HOME | End: 2022-03-22

## 2022-03-22 DIAGNOSIS — F32.9 MAJOR DEPRESSIVE DISORDER, SINGLE EPISODE, UNSPECIFIED: ICD-10-CM

## 2022-03-22 DIAGNOSIS — Z95.1 PRESENCE OF AORTOCORONARY BYPASS GRAFT: Chronic | ICD-10-CM

## 2022-03-22 DIAGNOSIS — Z98.890 OTHER SPECIFIED POSTPROCEDURAL STATES: Chronic | ICD-10-CM

## 2022-03-22 DIAGNOSIS — F41.1 GENERALIZED ANXIETY DISORDER: ICD-10-CM

## 2022-04-05 ENCOUNTER — OUTPATIENT (OUTPATIENT)
Dept: OUTPATIENT SERVICES | Facility: HOSPITAL | Age: 62
LOS: 1 days | Discharge: HOME | End: 2022-04-05

## 2022-04-05 ENCOUNTER — APPOINTMENT (OUTPATIENT)
Age: 62
End: 2022-04-05

## 2022-04-05 DIAGNOSIS — Z98.890 OTHER SPECIFIED POSTPROCEDURAL STATES: Chronic | ICD-10-CM

## 2022-04-05 DIAGNOSIS — F32.9 MAJOR DEPRESSIVE DISORDER, SINGLE EPISODE, UNSPECIFIED: ICD-10-CM

## 2022-04-05 DIAGNOSIS — Z95.1 PRESENCE OF AORTOCORONARY BYPASS GRAFT: Chronic | ICD-10-CM

## 2022-04-05 DIAGNOSIS — F41.1 GENERALIZED ANXIETY DISORDER: ICD-10-CM

## 2022-04-07 ENCOUNTER — APPOINTMENT (OUTPATIENT)
Dept: PSYCHIATRY | Facility: CLINIC | Age: 62
End: 2022-04-07
Payer: MEDICAID

## 2022-04-07 ENCOUNTER — OUTPATIENT (OUTPATIENT)
Dept: OUTPATIENT SERVICES | Facility: HOSPITAL | Age: 62
LOS: 1 days | Discharge: HOME | End: 2022-04-07

## 2022-04-07 DIAGNOSIS — Z95.1 PRESENCE OF AORTOCORONARY BYPASS GRAFT: Chronic | ICD-10-CM

## 2022-04-07 DIAGNOSIS — Z98.890 OTHER SPECIFIED POSTPROCEDURAL STATES: Chronic | ICD-10-CM

## 2022-04-07 DIAGNOSIS — F32.9 MAJOR DEPRESSIVE DISORDER, SINGLE EPISODE, UNSPECIFIED: ICD-10-CM

## 2022-04-07 DIAGNOSIS — F41.1 GENERALIZED ANXIETY DISORDER: ICD-10-CM

## 2022-04-07 PROCEDURE — 99214 OFFICE O/P EST MOD 30 MIN: CPT | Mod: 95

## 2022-04-19 ENCOUNTER — APPOINTMENT (OUTPATIENT)
Age: 62
End: 2022-04-19

## 2022-04-19 ENCOUNTER — OUTPATIENT (OUTPATIENT)
Dept: OUTPATIENT SERVICES | Facility: HOSPITAL | Age: 62
LOS: 1 days | Discharge: HOME | End: 2022-04-19

## 2022-04-19 DIAGNOSIS — Z95.1 PRESENCE OF AORTOCORONARY BYPASS GRAFT: Chronic | ICD-10-CM

## 2022-04-19 DIAGNOSIS — F32.9 MAJOR DEPRESSIVE DISORDER, SINGLE EPISODE, UNSPECIFIED: ICD-10-CM

## 2022-04-19 DIAGNOSIS — Z98.890 OTHER SPECIFIED POSTPROCEDURAL STATES: Chronic | ICD-10-CM

## 2022-04-19 DIAGNOSIS — F41.1 GENERALIZED ANXIETY DISORDER: ICD-10-CM

## 2022-05-03 ENCOUNTER — APPOINTMENT (OUTPATIENT)
Age: 62
End: 2022-05-03

## 2022-05-03 ENCOUNTER — OUTPATIENT (OUTPATIENT)
Dept: OUTPATIENT SERVICES | Facility: HOSPITAL | Age: 62
LOS: 1 days | Discharge: HOME | End: 2022-05-03

## 2022-05-03 DIAGNOSIS — F32.9 MAJOR DEPRESSIVE DISORDER, SINGLE EPISODE, UNSPECIFIED: ICD-10-CM

## 2022-05-03 DIAGNOSIS — F41.1 GENERALIZED ANXIETY DISORDER: ICD-10-CM

## 2022-05-03 DIAGNOSIS — Z95.1 PRESENCE OF AORTOCORONARY BYPASS GRAFT: Chronic | ICD-10-CM

## 2022-05-03 DIAGNOSIS — Z98.890 OTHER SPECIFIED POSTPROCEDURAL STATES: Chronic | ICD-10-CM

## 2022-05-05 ENCOUNTER — APPOINTMENT (OUTPATIENT)
Dept: PSYCHIATRY | Facility: CLINIC | Age: 62
End: 2022-05-05
Payer: MEDICARE

## 2022-05-05 ENCOUNTER — OUTPATIENT (OUTPATIENT)
Dept: OUTPATIENT SERVICES | Facility: HOSPITAL | Age: 62
LOS: 1 days | Discharge: HOME | End: 2022-05-05

## 2022-05-05 DIAGNOSIS — Z98.890 OTHER SPECIFIED POSTPROCEDURAL STATES: Chronic | ICD-10-CM

## 2022-05-05 DIAGNOSIS — Z95.1 PRESENCE OF AORTOCORONARY BYPASS GRAFT: Chronic | ICD-10-CM

## 2022-05-05 DIAGNOSIS — F32.9 MAJOR DEPRESSIVE DISORDER, SINGLE EPISODE, UNSPECIFIED: ICD-10-CM

## 2022-05-05 DIAGNOSIS — F41.1 GENERALIZED ANXIETY DISORDER: ICD-10-CM

## 2022-05-05 PROCEDURE — 99214 OFFICE O/P EST MOD 30 MIN: CPT | Mod: 95

## 2022-05-18 ENCOUNTER — APPOINTMENT (OUTPATIENT)
Age: 62
End: 2022-05-18

## 2022-05-18 ENCOUNTER — OUTPATIENT (OUTPATIENT)
Dept: OUTPATIENT SERVICES | Facility: HOSPITAL | Age: 62
LOS: 1 days | Discharge: HOME | End: 2022-05-18

## 2022-05-18 DIAGNOSIS — Z95.1 PRESENCE OF AORTOCORONARY BYPASS GRAFT: Chronic | ICD-10-CM

## 2022-05-18 DIAGNOSIS — F41.1 GENERALIZED ANXIETY DISORDER: ICD-10-CM

## 2022-05-18 DIAGNOSIS — F32.9 MAJOR DEPRESSIVE DISORDER, SINGLE EPISODE, UNSPECIFIED: ICD-10-CM

## 2022-05-18 DIAGNOSIS — Z98.890 OTHER SPECIFIED POSTPROCEDURAL STATES: Chronic | ICD-10-CM

## 2022-06-02 ENCOUNTER — APPOINTMENT (OUTPATIENT)
Dept: PSYCHIATRY | Facility: CLINIC | Age: 62
End: 2022-06-02

## 2022-06-02 ENCOUNTER — OUTPATIENT (OUTPATIENT)
Dept: OUTPATIENT SERVICES | Facility: HOSPITAL | Age: 62
LOS: 1 days | Discharge: HOME | End: 2022-06-02

## 2022-06-02 DIAGNOSIS — Z98.890 OTHER SPECIFIED POSTPROCEDURAL STATES: Chronic | ICD-10-CM

## 2022-06-02 DIAGNOSIS — F41.1 GENERALIZED ANXIETY DISORDER: ICD-10-CM

## 2022-06-02 DIAGNOSIS — F32.9 MAJOR DEPRESSIVE DISORDER, SINGLE EPISODE, UNSPECIFIED: ICD-10-CM

## 2022-06-02 DIAGNOSIS — Z95.1 PRESENCE OF AORTOCORONARY BYPASS GRAFT: Chronic | ICD-10-CM

## 2022-06-02 PROCEDURE — 99214 OFFICE O/P EST MOD 30 MIN: CPT | Mod: 95

## 2022-06-03 ENCOUNTER — OUTPATIENT (OUTPATIENT)
Dept: OUTPATIENT SERVICES | Facility: HOSPITAL | Age: 62
LOS: 1 days | Discharge: HOME | End: 2022-06-03

## 2022-06-03 ENCOUNTER — APPOINTMENT (OUTPATIENT)
Age: 62
End: 2022-06-03

## 2022-06-03 DIAGNOSIS — F41.1 GENERALIZED ANXIETY DISORDER: ICD-10-CM

## 2022-06-03 DIAGNOSIS — F32.9 MAJOR DEPRESSIVE DISORDER, SINGLE EPISODE, UNSPECIFIED: ICD-10-CM

## 2022-06-03 DIAGNOSIS — Z98.890 OTHER SPECIFIED POSTPROCEDURAL STATES: Chronic | ICD-10-CM

## 2022-06-03 DIAGNOSIS — Z95.1 PRESENCE OF AORTOCORONARY BYPASS GRAFT: Chronic | ICD-10-CM

## 2022-06-21 ENCOUNTER — OUTPATIENT (OUTPATIENT)
Dept: OUTPATIENT SERVICES | Facility: HOSPITAL | Age: 62
LOS: 1 days | Discharge: HOME | End: 2022-06-21

## 2022-06-21 ENCOUNTER — NON-APPOINTMENT (OUTPATIENT)
Age: 62
End: 2022-06-21

## 2022-06-21 ENCOUNTER — APPOINTMENT (OUTPATIENT)
Age: 62
End: 2022-06-21

## 2022-06-21 DIAGNOSIS — Z98.890 OTHER SPECIFIED POSTPROCEDURAL STATES: Chronic | ICD-10-CM

## 2022-06-21 DIAGNOSIS — F41.1 GENERALIZED ANXIETY DISORDER: ICD-10-CM

## 2022-06-21 DIAGNOSIS — Z95.1 PRESENCE OF AORTOCORONARY BYPASS GRAFT: Chronic | ICD-10-CM

## 2022-07-05 ENCOUNTER — OUTPATIENT (OUTPATIENT)
Dept: OUTPATIENT SERVICES | Facility: HOSPITAL | Age: 62
LOS: 1 days | Discharge: HOME | End: 2022-07-05

## 2022-07-05 ENCOUNTER — APPOINTMENT (OUTPATIENT)
Age: 62
End: 2022-07-05

## 2022-07-05 DIAGNOSIS — Z95.1 PRESENCE OF AORTOCORONARY BYPASS GRAFT: Chronic | ICD-10-CM

## 2022-07-05 DIAGNOSIS — Z98.890 OTHER SPECIFIED POSTPROCEDURAL STATES: Chronic | ICD-10-CM

## 2022-07-05 DIAGNOSIS — F32.9 MAJOR DEPRESSIVE DISORDER, SINGLE EPISODE, UNSPECIFIED: ICD-10-CM

## 2022-07-05 DIAGNOSIS — F41.1 GENERALIZED ANXIETY DISORDER: ICD-10-CM

## 2022-07-16 ENCOUNTER — EMERGENCY (EMERGENCY)
Facility: HOSPITAL | Age: 62
LOS: 0 days | Discharge: HOME | End: 2022-07-16
Attending: STUDENT IN AN ORGANIZED HEALTH CARE EDUCATION/TRAINING PROGRAM | Admitting: STUDENT IN AN ORGANIZED HEALTH CARE EDUCATION/TRAINING PROGRAM

## 2022-07-16 VITALS
TEMPERATURE: 98 F | RESPIRATION RATE: 18 BRPM | HEART RATE: 69 BPM | SYSTOLIC BLOOD PRESSURE: 115 MMHG | DIASTOLIC BLOOD PRESSURE: 64 MMHG | OXYGEN SATURATION: 100 %

## 2022-07-16 VITALS
HEIGHT: 67 IN | SYSTOLIC BLOOD PRESSURE: 157 MMHG | RESPIRATION RATE: 20 BRPM | DIASTOLIC BLOOD PRESSURE: 67 MMHG | HEART RATE: 80 BPM | TEMPERATURE: 98 F | OXYGEN SATURATION: 98 %

## 2022-07-16 DIAGNOSIS — Z95.1 PRESENCE OF AORTOCORONARY BYPASS GRAFT: Chronic | ICD-10-CM

## 2022-07-16 DIAGNOSIS — K30 FUNCTIONAL DYSPEPSIA: ICD-10-CM

## 2022-07-16 DIAGNOSIS — Z87.891 PERSONAL HISTORY OF NICOTINE DEPENDENCE: ICD-10-CM

## 2022-07-16 DIAGNOSIS — R00.2 PALPITATIONS: ICD-10-CM

## 2022-07-16 DIAGNOSIS — I27.20 PULMONARY HYPERTENSION, UNSPECIFIED: ICD-10-CM

## 2022-07-16 DIAGNOSIS — I48.91 UNSPECIFIED ATRIAL FIBRILLATION: ICD-10-CM

## 2022-07-16 DIAGNOSIS — Z98.890 OTHER SPECIFIED POSTPROCEDURAL STATES: Chronic | ICD-10-CM

## 2022-07-16 DIAGNOSIS — Z79.01 LONG TERM (CURRENT) USE OF ANTICOAGULANTS: ICD-10-CM

## 2022-07-16 DIAGNOSIS — Z91.013 ALLERGY TO SEAFOOD: ICD-10-CM

## 2022-07-16 DIAGNOSIS — R06.02 SHORTNESS OF BREATH: ICD-10-CM

## 2022-07-16 DIAGNOSIS — Z88.8 ALLERGY STATUS TO OTHER DRUGS, MEDICAMENTS AND BIOLOGICAL SUBSTANCES STATUS: ICD-10-CM

## 2022-07-16 DIAGNOSIS — R05.9 COUGH, UNSPECIFIED: ICD-10-CM

## 2022-07-16 DIAGNOSIS — Z95.1 PRESENCE OF AORTOCORONARY BYPASS GRAFT: ICD-10-CM

## 2022-07-16 DIAGNOSIS — I10 ESSENTIAL (PRIMARY) HYPERTENSION: ICD-10-CM

## 2022-07-16 DIAGNOSIS — J45.909 UNSPECIFIED ASTHMA, UNCOMPLICATED: ICD-10-CM

## 2022-07-16 DIAGNOSIS — Z20.822 CONTACT WITH AND (SUSPECTED) EXPOSURE TO COVID-19: ICD-10-CM

## 2022-07-16 LAB
ALBUMIN SERPL ELPH-MCNC: 4.2 G/DL — SIGNIFICANT CHANGE UP (ref 3.5–5.2)
ALP SERPL-CCNC: 87 U/L — SIGNIFICANT CHANGE UP (ref 30–115)
ALT FLD-CCNC: 13 U/L — SIGNIFICANT CHANGE UP (ref 0–41)
ANION GAP SERPL CALC-SCNC: 11 MMOL/L — SIGNIFICANT CHANGE UP (ref 7–14)
AST SERPL-CCNC: 15 U/L — SIGNIFICANT CHANGE UP (ref 0–41)
BASOPHILS # BLD AUTO: 0.09 K/UL — SIGNIFICANT CHANGE UP (ref 0–0.2)
BASOPHILS NFR BLD AUTO: 1.3 % — HIGH (ref 0–1)
BILIRUB SERPL-MCNC: 0.3 MG/DL — SIGNIFICANT CHANGE UP (ref 0.2–1.2)
BUN SERPL-MCNC: 15 MG/DL — SIGNIFICANT CHANGE UP (ref 10–20)
CALCIUM SERPL-MCNC: 10 MG/DL — SIGNIFICANT CHANGE UP (ref 8.5–10.1)
CHLORIDE SERPL-SCNC: 105 MMOL/L — SIGNIFICANT CHANGE UP (ref 98–110)
CO2 SERPL-SCNC: 21 MMOL/L — SIGNIFICANT CHANGE UP (ref 17–32)
CREAT SERPL-MCNC: 1.2 MG/DL — SIGNIFICANT CHANGE UP (ref 0.7–1.5)
EGFR: 52 ML/MIN/1.73M2 — LOW
EOSINOPHIL # BLD AUTO: 0.5 K/UL — SIGNIFICANT CHANGE UP (ref 0–0.7)
EOSINOPHIL NFR BLD AUTO: 7.3 % — SIGNIFICANT CHANGE UP (ref 0–8)
GLUCOSE SERPL-MCNC: 99 MG/DL — SIGNIFICANT CHANGE UP (ref 70–99)
HCT VFR BLD CALC: 36.5 % — LOW (ref 37–47)
HGB BLD-MCNC: 12.3 G/DL — SIGNIFICANT CHANGE UP (ref 12–16)
IMM GRANULOCYTES NFR BLD AUTO: 0.3 % — SIGNIFICANT CHANGE UP (ref 0.1–0.3)
LYMPHOCYTES # BLD AUTO: 1.55 K/UL — SIGNIFICANT CHANGE UP (ref 1.2–3.4)
LYMPHOCYTES # BLD AUTO: 22.8 % — SIGNIFICANT CHANGE UP (ref 20.5–51.1)
MAGNESIUM SERPL-MCNC: 2.1 MG/DL — SIGNIFICANT CHANGE UP (ref 1.8–2.4)
MCHC RBC-ENTMCNC: 27.4 PG — SIGNIFICANT CHANGE UP (ref 27–31)
MCHC RBC-ENTMCNC: 33.7 G/DL — SIGNIFICANT CHANGE UP (ref 32–37)
MCV RBC AUTO: 81.3 FL — SIGNIFICANT CHANGE UP (ref 81–99)
MONOCYTES # BLD AUTO: 0.55 K/UL — SIGNIFICANT CHANGE UP (ref 0.1–0.6)
MONOCYTES NFR BLD AUTO: 8.1 % — SIGNIFICANT CHANGE UP (ref 1.7–9.3)
NEUTROPHILS # BLD AUTO: 4.1 K/UL — SIGNIFICANT CHANGE UP (ref 1.4–6.5)
NEUTROPHILS NFR BLD AUTO: 60.2 % — SIGNIFICANT CHANGE UP (ref 42.2–75.2)
NRBC # BLD: 0 /100 WBCS — SIGNIFICANT CHANGE UP (ref 0–0)
NT-PROBNP SERPL-SCNC: 698 PG/ML — HIGH (ref 0–300)
PLATELET # BLD AUTO: 284 K/UL — SIGNIFICANT CHANGE UP (ref 130–400)
POTASSIUM SERPL-MCNC: 4.2 MMOL/L — SIGNIFICANT CHANGE UP (ref 3.5–5)
POTASSIUM SERPL-SCNC: 4.2 MMOL/L — SIGNIFICANT CHANGE UP (ref 3.5–5)
PROT SERPL-MCNC: 6.8 G/DL — SIGNIFICANT CHANGE UP (ref 6–8)
RBC # BLD: 4.49 M/UL — SIGNIFICANT CHANGE UP (ref 4.2–5.4)
RBC # FLD: 13.3 % — SIGNIFICANT CHANGE UP (ref 11.5–14.5)
SARS-COV-2 RNA SPEC QL NAA+PROBE: SIGNIFICANT CHANGE UP
SODIUM SERPL-SCNC: 137 MMOL/L — SIGNIFICANT CHANGE UP (ref 135–146)
TROPONIN T SERPL-MCNC: <0.01 NG/ML — SIGNIFICANT CHANGE UP
TROPONIN T SERPL-MCNC: <0.01 NG/ML — SIGNIFICANT CHANGE UP
WBC # BLD: 6.81 K/UL — SIGNIFICANT CHANGE UP (ref 4.8–10.8)
WBC # FLD AUTO: 6.81 K/UL — SIGNIFICANT CHANGE UP (ref 4.8–10.8)

## 2022-07-16 PROCEDURE — 93010 ELECTROCARDIOGRAM REPORT: CPT | Mod: 76

## 2022-07-16 PROCEDURE — 99285 EMERGENCY DEPT VISIT HI MDM: CPT

## 2022-07-16 PROCEDURE — 71045 X-RAY EXAM CHEST 1 VIEW: CPT | Mod: 26

## 2022-07-16 RX ORDER — IPRATROPIUM/ALBUTEROL SULFATE 18-103MCG
3 AEROSOL WITH ADAPTER (GRAM) INHALATION ONCE
Refills: 0 | Status: COMPLETED | OUTPATIENT
Start: 2022-07-16 | End: 2022-07-16

## 2022-07-16 RX ADMIN — Medication 3 MILLILITER(S): at 13:54

## 2022-07-16 RX ADMIN — Medication 125 MILLIGRAM(S): at 13:54

## 2022-07-16 NOTE — ED PROVIDER NOTE - WET READ LAUNCH FT
There is 1 Wet Read(s) to document.
INFORMATION PTA  Diet PTA: Per Home Health Aide, pt follows a regular diet and eats 3 full, balanced meals daily, and drinks 1 Ensure daily.  Nutrition status PTA: well nourished  Nutrition Supplements PTA: potassium chloride  Food Allergies: NKFA  Weight History PTA: 59Kg on 8/30/18 admit, but pt at that time reported UBW of 55Kg. Dosing wt (9/30/19) = 57.5Kg. Home Health Aide endorses stable weight history.    INFORMATION THIS ADMISSION  Last BM: fecal incontinence noted 10/1  Other information: multiple pressure injuries noted on left foot (see below)

## 2022-07-16 NOTE — ED PROVIDER NOTE - PATIENT PORTAL LINK FT
You can access the FollowMyHealth Patient Portal offered by [facility] by registering at the following website: [email or web address]. By joining [facility]’s FollowMyHealth portal, you will also be able to view your health information using other applications (apps) compatible with our system.

## 2022-07-16 NOTE — ED ADULT NURSE NOTE - INTERVENTIONS DEFINITIONS
[place] to call system/Call bell, personal items and telephone within reach/Stretcher in lowest position, wheels locked, appropriate side rails in place/Monitor gait and stability/Reinforce activity limits and safety measures with patient and family

## 2022-07-16 NOTE — ED PROVIDER NOTE - NS ED ROS FT
Constitutional: See HPI.  Eyes: No visual changes,  ENMT: . No neck pain  Cardiac: see hpi  Respiratory: see hpi  GI: No nausea, vomiting  : No dysuria, frequency or burning. No Discharge  MS: No myalgia, muscle weakness, joint pain or back pain.  Psych: No suicidal or homicidal ideations.  Neuro: No headache  Skin: No skin rash.

## 2022-07-16 NOTE — ED PROVIDER NOTE - OBJECTIVE STATEMENT
61-year-old female with a past medical history of A. fib asthma CABG pulmonary hypertension not on home O2 presents here complaining of increasing shortness of breath x1 week indigestion x1 week.  Symptoms associated with cough productive of mucus.  Endorsed palpitations today which is what prompted the visit.  No fevers chills nausea vomiting.  Cardiologist Dr. Pitts 61-year-old female with a past medical history of A. fib s/p ablation but still on xarelto asthma CABG pulmonary hypertension not on home O2 presents here complaining of increasing shortness of breath x1 week indigestion x1 week.  Symptoms associated with cough productive of mucus.  Endorsed palpitations today which is what prompted the visit.  No fevers chills nausea vomiting.  Cardiologist Dr. Pitts

## 2022-07-16 NOTE — ED PROVIDER NOTE - CLINICAL SUMMARY MEDICAL DECISION MAKING FREE TEXT BOX
61-year-old female with a past medical history of A. fib s/p ablation but still on xarelto asthma CABG pulmonary hypertension not on home O2 presents here complaining of increasing shortness of breath x1 week indigestion x1 week.  Symptoms associated with cough productive of mucus.  Endorsed palpitations today which is what prompted the visit.  No fevers chills nausea vomiting.  Cardiologist Dr. Pitts. labs imaging ekg obtained and reviewed troponin x2 negative patient felt better after nebs and steroids. [facility] Patient to follow up with cardiologist & pulmolongist. Patient a spoken to in detail about results  All questions addressed.  Results of ED work up discussed and patient given a copy of the results. Patient has proper follow up. Return precautions given.

## 2022-07-16 NOTE — ED ADULT NURSE NOTE - OBJECTIVE STATEMENT
Pt c/o SOB for a few days, states she also felt "heart flutters" which she attributes to her work of breathing. Pt noted with increase work of breathing, rr 20, O2 sat 100% on room air. Ambulatory with personal walker, daughter at bedside.

## 2022-07-16 NOTE — ED PROVIDER NOTE - PROVIDER TOKENS
PROVIDER:[TOKEN:[[id number]],FOLLOWUP:[1-3 Days]],PROVIDER:[TOKEN:[[id number]],FOLLOWUP:[1-3 Days]]

## 2022-07-16 NOTE — ED PROVIDER NOTE - PROGRESS NOTE DETAILS
SR: patient feeling better after nebs and steroids SR: patient follows with Dr. shafer as her pulmonologist. patient felt better. Patient a spoken to in detail about results  All questions addressed.  Results of ED work up discussed and patient given a copy of the results. Patient has proper follow up. Return precautions given.

## 2022-07-16 NOTE — ED PROVIDER NOTE - PHYSICAL EXAMINATION
CONSTITUTIONAL: WA / WN / NAD  HEAD: NCAT  EYES: PERRL; EOMI;   ENT: Normal pharynx; mucous membranes pink/moist, no erythema.  NECK: Supple; no meningeal signs  CARD: RRR; nl S1/S2; no M/R/G  RESP: Respiratory rate and effort are normal; breath sounds clear and equal bilaterally.  ABD: Soft, NT ND   MSK/EXT: No gross deformities; full range of motion.  SKIN: Warm and dry;   NEURO: AAOx3,   PSYCH: Memory Intact, Normal Affect

## 2022-07-16 NOTE — ED PROVIDER NOTE - NSFOLLOWUPINSTRUCTIONS_ED_ALL_ED_FT
Please follow up with your pulmonologist 1-3 days  Please follow up with your cardiologist 1-3 days.     Shortness of breath    Shortness of breath (dyspnea) means you have trouble breathing and could indicate a medical problem. Causes include lung disease, heart disease, low amount of red blood cells (anemia), poor physical fitness, being overweight, smoking, etc. Your health care provider today may not be able to find a cause for your shortness of breath after your exam. In this case, it is important to have a follow-up exam with your primary care physician as instructed. If medicines were prescribed, take them as directed for the full length of time directed. Refrain from tobacco products.    SEEK IMMEDIATE MEDICAL CARE IF YOU HAVE ANY OF THE FOLLOWING SYMPTOMS: worsening shortness of breath, chest pain, back pain, abdominal pain, fever, coughing up blood, lightheadedness/dizziness.    Palpitations    A palpitation is the feeling that your heartbeat is irregular or is faster than normal. It may feel like your heart is fluttering or skipping a beat. They may be caused by many things, including smoking, caffeine, alcohol, stress, and certain medicines. Although most causes of palpitations are not serious, palpitations can be a sign of a serious medical problem. Avoid caffeine, alcohol, and tobacco products at home. Try to reduce stress and anxiety and make sure to get plenty of rest.     SEEK IMMEDIATE MEDICAL CARE IF YOU HAVE ANY OF THE FOLLOWING SYMPTOMS: chest pain, shortness of breath, severe headache, dizziness/lightheadedness, or fainting.

## 2022-07-16 NOTE — ED PROVIDER NOTE - CARE PROVIDER_API CALL
Russell Ramos (DO)  Critical Care Medicine; Pulmonary Disease; Sleep Medicine  [address] [place] [address], [address]  [place] [address]  Phone: [phone]  Fax: [phone]  Follow Up Time: 1-3 Days    Doreen Pitts)  Cardiovascular Disease; Interventional Cardiology  [address] [place] [address]  Phone: [phone]  Fax: [phone]  Follow Up Time: 1-3 Days

## 2022-07-19 ENCOUNTER — APPOINTMENT (OUTPATIENT)
Age: 62
End: 2022-07-19

## 2022-07-19 ENCOUNTER — OUTPATIENT (OUTPATIENT)
Dept: OUTPATIENT SERVICES | Facility: HOSPITAL | Age: 62
LOS: 1 days | Discharge: HOME | End: 2022-07-19

## 2022-07-19 DIAGNOSIS — Z98.890 OTHER SPECIFIED POSTPROCEDURAL STATES: Chronic | ICD-10-CM

## 2022-07-19 DIAGNOSIS — F41.1 GENERALIZED ANXIETY DISORDER: ICD-10-CM

## 2022-07-19 DIAGNOSIS — F32.9 MAJOR DEPRESSIVE DISORDER, SINGLE EPISODE, UNSPECIFIED: ICD-10-CM

## 2022-07-19 DIAGNOSIS — Z95.1 PRESENCE OF AORTOCORONARY BYPASS GRAFT: Chronic | ICD-10-CM

## 2022-07-28 ENCOUNTER — OUTPATIENT (OUTPATIENT)
Dept: OUTPATIENT SERVICES | Facility: HOSPITAL | Age: 62
LOS: 1 days | Discharge: HOME | End: 2022-07-28

## 2022-07-28 ENCOUNTER — APPOINTMENT (OUTPATIENT)
Dept: PSYCHIATRY | Facility: CLINIC | Age: 62
End: 2022-07-28

## 2022-07-28 DIAGNOSIS — Z98.890 OTHER SPECIFIED POSTPROCEDURAL STATES: Chronic | ICD-10-CM

## 2022-07-28 DIAGNOSIS — Z95.1 PRESENCE OF AORTOCORONARY BYPASS GRAFT: Chronic | ICD-10-CM

## 2022-07-28 PROCEDURE — 99214 OFFICE O/P EST MOD 30 MIN: CPT | Mod: 95

## 2022-08-09 ENCOUNTER — OUTPATIENT (OUTPATIENT)
Dept: OUTPATIENT SERVICES | Facility: HOSPITAL | Age: 62
LOS: 1 days | Discharge: HOME | End: 2022-08-09

## 2022-08-09 ENCOUNTER — APPOINTMENT (OUTPATIENT)
Age: 62
End: 2022-08-09

## 2022-08-09 DIAGNOSIS — Z98.890 OTHER SPECIFIED POSTPROCEDURAL STATES: Chronic | ICD-10-CM

## 2022-08-09 DIAGNOSIS — F32.9 MAJOR DEPRESSIVE DISORDER, SINGLE EPISODE, UNSPECIFIED: ICD-10-CM

## 2022-08-09 DIAGNOSIS — F41.1 GENERALIZED ANXIETY DISORDER: ICD-10-CM

## 2022-08-09 DIAGNOSIS — Z95.1 PRESENCE OF AORTOCORONARY BYPASS GRAFT: Chronic | ICD-10-CM

## 2022-08-09 DIAGNOSIS — G47.00 INSOMNIA, UNSPECIFIED: ICD-10-CM

## 2022-08-17 ENCOUNTER — APPOINTMENT (OUTPATIENT)
Dept: PSYCHIATRY | Facility: CLINIC | Age: 62
End: 2022-08-17

## 2022-08-17 ENCOUNTER — OUTPATIENT (OUTPATIENT)
Dept: OUTPATIENT SERVICES | Facility: HOSPITAL | Age: 62
LOS: 1 days | Discharge: HOME | End: 2022-08-17

## 2022-08-17 DIAGNOSIS — Z98.890 OTHER SPECIFIED POSTPROCEDURAL STATES: Chronic | ICD-10-CM

## 2022-08-17 DIAGNOSIS — G47.00 INSOMNIA, UNSPECIFIED: ICD-10-CM

## 2022-08-17 DIAGNOSIS — Z95.1 PRESENCE OF AORTOCORONARY BYPASS GRAFT: Chronic | ICD-10-CM

## 2022-08-17 DIAGNOSIS — F32.9 MAJOR DEPRESSIVE DISORDER, SINGLE EPISODE, UNSPECIFIED: ICD-10-CM

## 2022-08-17 DIAGNOSIS — F41.1 GENERALIZED ANXIETY DISORDER: ICD-10-CM

## 2022-08-17 PROCEDURE — 99214 OFFICE O/P EST MOD 30 MIN: CPT | Mod: 95

## 2022-08-23 ENCOUNTER — OUTPATIENT (OUTPATIENT)
Dept: OUTPATIENT SERVICES | Facility: HOSPITAL | Age: 62
LOS: 1 days | Discharge: HOME | End: 2022-08-23

## 2022-08-23 ENCOUNTER — APPOINTMENT (OUTPATIENT)
Age: 62
End: 2022-08-23

## 2022-08-23 DIAGNOSIS — Z98.890 OTHER SPECIFIED POSTPROCEDURAL STATES: Chronic | ICD-10-CM

## 2022-08-23 DIAGNOSIS — G47.00 INSOMNIA, UNSPECIFIED: ICD-10-CM

## 2022-08-23 DIAGNOSIS — F41.1 GENERALIZED ANXIETY DISORDER: ICD-10-CM

## 2022-08-23 DIAGNOSIS — F32.9 MAJOR DEPRESSIVE DISORDER, SINGLE EPISODE, UNSPECIFIED: ICD-10-CM

## 2022-08-23 DIAGNOSIS — Z95.1 PRESENCE OF AORTOCORONARY BYPASS GRAFT: Chronic | ICD-10-CM

## 2022-09-08 ENCOUNTER — OUTPATIENT (OUTPATIENT)
Dept: OUTPATIENT SERVICES | Facility: HOSPITAL | Age: 62
LOS: 1 days | Discharge: HOME | End: 2022-09-08

## 2022-09-08 ENCOUNTER — APPOINTMENT (OUTPATIENT)
Age: 62
End: 2022-09-08

## 2022-09-08 DIAGNOSIS — F41.1 GENERALIZED ANXIETY DISORDER: ICD-10-CM

## 2022-09-08 DIAGNOSIS — G47.00 INSOMNIA, UNSPECIFIED: ICD-10-CM

## 2022-09-08 DIAGNOSIS — Z95.1 PRESENCE OF AORTOCORONARY BYPASS GRAFT: Chronic | ICD-10-CM

## 2022-09-08 DIAGNOSIS — Z98.890 OTHER SPECIFIED POSTPROCEDURAL STATES: Chronic | ICD-10-CM

## 2022-09-08 DIAGNOSIS — F32.9 MAJOR DEPRESSIVE DISORDER, SINGLE EPISODE, UNSPECIFIED: ICD-10-CM

## 2022-09-29 ENCOUNTER — APPOINTMENT (OUTPATIENT)
Age: 62
End: 2022-09-29

## 2022-09-29 ENCOUNTER — OUTPATIENT (OUTPATIENT)
Dept: OUTPATIENT SERVICES | Facility: HOSPITAL | Age: 62
LOS: 1 days | Discharge: HOME | End: 2022-09-29

## 2022-09-29 DIAGNOSIS — Z98.890 OTHER SPECIFIED POSTPROCEDURAL STATES: Chronic | ICD-10-CM

## 2022-09-29 DIAGNOSIS — F32.9 MAJOR DEPRESSIVE DISORDER, SINGLE EPISODE, UNSPECIFIED: ICD-10-CM

## 2022-09-29 DIAGNOSIS — F41.1 GENERALIZED ANXIETY DISORDER: ICD-10-CM

## 2022-09-29 DIAGNOSIS — G47.00 INSOMNIA, UNSPECIFIED: ICD-10-CM

## 2022-09-29 DIAGNOSIS — Z95.1 PRESENCE OF AORTOCORONARY BYPASS GRAFT: Chronic | ICD-10-CM

## 2022-09-30 ENCOUNTER — INPATIENT (INPATIENT)
Facility: HOSPITAL | Age: 62
LOS: 2 days | Discharge: HOME | End: 2022-10-03
Attending: INTERNAL MEDICINE | Admitting: INTERNAL MEDICINE

## 2022-09-30 VITALS
HEART RATE: 75 BPM | TEMPERATURE: 98 F | OXYGEN SATURATION: 98 % | SYSTOLIC BLOOD PRESSURE: 152 MMHG | RESPIRATION RATE: 20 BRPM | DIASTOLIC BLOOD PRESSURE: 87 MMHG | WEIGHT: 255.07 LBS | HEIGHT: 67 IN

## 2022-09-30 DIAGNOSIS — Z95.1 PRESENCE OF AORTOCORONARY BYPASS GRAFT: Chronic | ICD-10-CM

## 2022-09-30 DIAGNOSIS — Z98.890 OTHER SPECIFIED POSTPROCEDURAL STATES: Chronic | ICD-10-CM

## 2022-09-30 LAB
ALBUMIN SERPL ELPH-MCNC: 4.5 G/DL — SIGNIFICANT CHANGE UP (ref 3.5–5.2)
ALP SERPL-CCNC: 95 U/L — SIGNIFICANT CHANGE UP (ref 30–115)
ALT FLD-CCNC: 14 U/L — SIGNIFICANT CHANGE UP (ref 0–41)
ANION GAP SERPL CALC-SCNC: 11 MMOL/L — SIGNIFICANT CHANGE UP (ref 7–14)
APTT BLD: 36 SEC — SIGNIFICANT CHANGE UP (ref 27–39.2)
AST SERPL-CCNC: 17 U/L — SIGNIFICANT CHANGE UP (ref 0–41)
BASOPHILS # BLD AUTO: 0.09 K/UL — SIGNIFICANT CHANGE UP (ref 0–0.2)
BASOPHILS NFR BLD AUTO: 1.1 % — HIGH (ref 0–1)
BILIRUB SERPL-MCNC: 0.4 MG/DL — SIGNIFICANT CHANGE UP (ref 0.2–1.2)
BUN SERPL-MCNC: 16 MG/DL — SIGNIFICANT CHANGE UP (ref 10–20)
CALCIUM SERPL-MCNC: 9.8 MG/DL — SIGNIFICANT CHANGE UP (ref 8.4–10.5)
CHLORIDE SERPL-SCNC: 106 MMOL/L — SIGNIFICANT CHANGE UP (ref 98–110)
CO2 SERPL-SCNC: 22 MMOL/L — SIGNIFICANT CHANGE UP (ref 17–32)
CREAT SERPL-MCNC: 1 MG/DL — SIGNIFICANT CHANGE UP (ref 0.7–1.5)
EGFR: 64 ML/MIN/1.73M2 — SIGNIFICANT CHANGE UP
EOSINOPHIL # BLD AUTO: 0.44 K/UL — SIGNIFICANT CHANGE UP (ref 0–0.7)
EOSINOPHIL NFR BLD AUTO: 5.5 % — SIGNIFICANT CHANGE UP (ref 0–8)
GLUCOSE SERPL-MCNC: 94 MG/DL — SIGNIFICANT CHANGE UP (ref 70–99)
HCT VFR BLD CALC: 41.4 % — SIGNIFICANT CHANGE UP (ref 37–47)
HGB BLD-MCNC: 13.7 G/DL — SIGNIFICANT CHANGE UP (ref 12–16)
IMM GRANULOCYTES NFR BLD AUTO: 0.1 % — SIGNIFICANT CHANGE UP (ref 0.1–0.3)
INR BLD: 1.14 RATIO — SIGNIFICANT CHANGE UP (ref 0.65–1.3)
LIDOCAIN IGE QN: 29 U/L — SIGNIFICANT CHANGE UP (ref 7–60)
LYMPHOCYTES # BLD AUTO: 2.61 K/UL — SIGNIFICANT CHANGE UP (ref 1.2–3.4)
LYMPHOCYTES # BLD AUTO: 32.7 % — SIGNIFICANT CHANGE UP (ref 20.5–51.1)
MAGNESIUM SERPL-MCNC: 2.1 MG/DL — SIGNIFICANT CHANGE UP (ref 1.8–2.4)
MCHC RBC-ENTMCNC: 26.9 PG — LOW (ref 27–31)
MCHC RBC-ENTMCNC: 33.1 G/DL — SIGNIFICANT CHANGE UP (ref 32–37)
MCV RBC AUTO: 81.2 FL — SIGNIFICANT CHANGE UP (ref 81–99)
MONOCYTES # BLD AUTO: 0.69 K/UL — HIGH (ref 0.1–0.6)
MONOCYTES NFR BLD AUTO: 8.6 % — SIGNIFICANT CHANGE UP (ref 1.7–9.3)
NEUTROPHILS # BLD AUTO: 4.15 K/UL — SIGNIFICANT CHANGE UP (ref 1.4–6.5)
NEUTROPHILS NFR BLD AUTO: 52 % — SIGNIFICANT CHANGE UP (ref 42.2–75.2)
NRBC # BLD: 0 /100 WBCS — SIGNIFICANT CHANGE UP (ref 0–0)
PLATELET # BLD AUTO: 304 K/UL — SIGNIFICANT CHANGE UP (ref 130–400)
POTASSIUM SERPL-MCNC: 4 MMOL/L — SIGNIFICANT CHANGE UP (ref 3.5–5)
POTASSIUM SERPL-SCNC: 4 MMOL/L — SIGNIFICANT CHANGE UP (ref 3.5–5)
PROT SERPL-MCNC: 7.2 G/DL — SIGNIFICANT CHANGE UP (ref 6–8)
PROTHROM AB SERPL-ACNC: 13.2 SEC — HIGH (ref 9.95–12.87)
RBC # BLD: 5.1 M/UL — SIGNIFICANT CHANGE UP (ref 4.2–5.4)
RBC # FLD: 13 % — SIGNIFICANT CHANGE UP (ref 11.5–14.5)
SARS-COV-2 RNA SPEC QL NAA+PROBE: SIGNIFICANT CHANGE UP
SODIUM SERPL-SCNC: 139 MMOL/L — SIGNIFICANT CHANGE UP (ref 135–146)
TROPONIN T SERPL-MCNC: <0.01 NG/ML — SIGNIFICANT CHANGE UP
WBC # BLD: 7.99 K/UL — SIGNIFICANT CHANGE UP (ref 4.8–10.8)
WBC # FLD AUTO: 7.99 K/UL — SIGNIFICANT CHANGE UP (ref 4.8–10.8)

## 2022-09-30 PROCEDURE — 93010 ELECTROCARDIOGRAM REPORT: CPT

## 2022-09-30 PROCEDURE — 99053 MED SERV 10PM-8AM 24 HR FAC: CPT

## 2022-09-30 PROCEDURE — 99285 EMERGENCY DEPT VISIT HI MDM: CPT

## 2022-09-30 PROCEDURE — 71045 X-RAY EXAM CHEST 1 VIEW: CPT | Mod: 26

## 2022-09-30 RX ORDER — METOPROLOL TARTRATE 50 MG
0 TABLET ORAL
Qty: 0 | Refills: 0 | DISCHARGE

## 2022-09-30 RX ORDER — ALPRAZOLAM 0.25 MG
0.5 TABLET ORAL
Refills: 0 | Status: DISCONTINUED | OUTPATIENT
Start: 2022-09-30 | End: 2022-10-03

## 2022-09-30 RX ORDER — ATORVASTATIN CALCIUM 80 MG/1
20 TABLET, FILM COATED ORAL AT BEDTIME
Refills: 0 | Status: DISCONTINUED | OUTPATIENT
Start: 2022-09-30 | End: 2022-10-03

## 2022-09-30 RX ORDER — BUPROPION HYDROCHLORIDE 150 MG/1
300 TABLET, EXTENDED RELEASE ORAL DAILY
Refills: 0 | Status: DISCONTINUED | OUTPATIENT
Start: 2022-09-30 | End: 2022-10-03

## 2022-09-30 RX ORDER — ESCITALOPRAM OXALATE 10 MG/1
20 TABLET, FILM COATED ORAL DAILY
Refills: 0 | Status: DISCONTINUED | OUTPATIENT
Start: 2022-09-30 | End: 2022-10-03

## 2022-09-30 RX ORDER — RIVAROXABAN 15 MG-20MG
20 KIT ORAL
Refills: 0 | Status: DISCONTINUED | OUTPATIENT
Start: 2022-10-01 | End: 2022-10-03

## 2022-09-30 RX ORDER — TIOTROPIUM BROMIDE 18 UG/1
1 CAPSULE ORAL; RESPIRATORY (INHALATION) DAILY
Refills: 0 | Status: DISCONTINUED | OUTPATIENT
Start: 2022-09-30 | End: 2022-10-03

## 2022-09-30 RX ORDER — MONTELUKAST 4 MG/1
10 TABLET, CHEWABLE ORAL DAILY
Refills: 0 | Status: DISCONTINUED | OUTPATIENT
Start: 2022-09-30 | End: 2022-10-03

## 2022-09-30 RX ORDER — INFLUENZA VIRUS VACCINE 15; 15; 15; 15 UG/.5ML; UG/.5ML; UG/.5ML; UG/.5ML
0.5 SUSPENSION INTRAMUSCULAR ONCE
Refills: 0 | Status: DISCONTINUED | OUTPATIENT
Start: 2022-09-30 | End: 2022-10-03

## 2022-09-30 RX ORDER — TOPIRAMATE 25 MG
50 TABLET ORAL DAILY
Refills: 0 | Status: DISCONTINUED | OUTPATIENT
Start: 2022-09-30 | End: 2022-10-03

## 2022-09-30 RX ORDER — FUROSEMIDE 40 MG
20 TABLET ORAL DAILY
Refills: 0 | Status: DISCONTINUED | OUTPATIENT
Start: 2022-09-30 | End: 2022-10-03

## 2022-09-30 RX ORDER — METOPROLOL TARTRATE 50 MG
25 TABLET ORAL
Refills: 0 | Status: DISCONTINUED | OUTPATIENT
Start: 2022-09-30 | End: 2022-10-03

## 2022-09-30 RX ORDER — TOPIRAMATE 25 MG
100 TABLET ORAL AT BEDTIME
Refills: 0 | Status: DISCONTINUED | OUTPATIENT
Start: 2022-09-30 | End: 2022-10-03

## 2022-09-30 NOTE — H&P ADULT - NSHPPHYSICALEXAM_GEN_ALL_CORE
GENERAL:  61y/o Female obese, hard of hearing,  NAD, resting comfortably.  HEAD:  Atraumatic, Normocephalic  EYES: EOMI, PERRLA, conjunctiva and sclera clear  NECK: Supple, No JVD, no cervical lymphadenopathy, non-tender  CHEST/LUNG: Clear to auscultation bilaterally; No wheeze, rhonchi, or rales  HEART: Regular rate and rhythm; S1&S2  ABDOMEN: Soft, Nontender, Nondistended x 4 quadrants; Bowel sounds present  EXTREMITIES:   Peripheral Pulses Present, No clubbing, no cyanosis, or no edema, no calf tenderness  PSYCH: AAOx3, cooperative, appropriate  NEUROLOGY: WNL  SKIN: WNL

## 2022-09-30 NOTE — ED PROVIDER NOTE - NS ED ATTENDING STATEMENT MOD
This was a shared visit with the INDIRA. I reviewed and verified the documentation and independently performed the documented:

## 2022-09-30 NOTE — PATIENT PROFILE ADULT - FALL HARM RISK - HARM RISK INTERVENTIONS

## 2022-09-30 NOTE — H&P ADULT - NSHPLABSRESULTS_GEN_ALL_CORE
13.7   7.99  )-----------( 304      ( 30 Sep 2022 20:45 )             41.4       09-30    139  |  106  |  16  ----------------------------<  94  4.0   |  22  |  1.0    Ca    9.8      30 Sep 2022 20:45  Mg     2.1     09-30    TPro  7.2  /  Alb  4.5  /  TBili  0.4  /  DBili  x   /  AST  17  /  ALT  14  /  AlkPhos  95  09-30                  PT/INR - ( 30 Sep 2022 20:45 )   PT: 13.20 sec;   INR: 1.14 ratio         PTT - ( 30 Sep 2022 20:45 )  PTT:36.0 sec    Lactate Trend      CARDIAC MARKERS ( 30 Sep 2022 20:45 )  x     / <0.01 ng/mL / x     / x     / x            CAPILLARY BLOOD GLUCOSE

## 2022-09-30 NOTE — H&P ADULT - ASSESSMENT
63 y/o female accompanied by her daughter c/o left sided chest pain 6/10 intensity, intermittent with mild SOB, pain for 1 week, today started radiating to back between shoulder blades. Pt denies fever,  pain at this time, last episode 1 hour ago. Pt has hx of afib , CAD, CABG 1 bypass, pulmonary HTN.       61 y/o female accompanied by her daughter c/o left sided chest pain 6/10 intensity, intermittent with mild SOB, pain for 1 week, today started radiating to back between shoulder blades. Pt denies fever,  pain at this time, last episode 1 hour ago. Pt has hx of afib , CAD, CABG 1 bypass, pulmonary HTN.    DX chest pain  tele  cardiology  CE x 3  on xarelto for Afib    pulmonary HTN:  on lasix op    continue home meds as per PMD    anxiety depression:   bupropion  escitalopram  topiramate  xanax    Prophylaxis GI/VTE         61 y/o female accompanied by her daughter c/o left sided chest pain 6/10 intensity, intermittent with mild SOB, pain for 1 week, today started radiating to back between shoulder blades. Pt denies fever,  pain at this time, last episode 1 hour ago. Pt has hx of afib , CAD, CABG 1 bypass, pulmonary HTN.    DX chest pain  tele  TTE  cardiology  CE x 3  on xarelto for Afib    pulmonary HTN:  on lasix op    continue home meds as per PMD    anxiety depression:   bupropion  escitalopram  topiramate  xanax    Prophylaxis GI/VTE

## 2022-09-30 NOTE — ED PROVIDER NOTE - INTERPRETATION
Normal sinus rhythm, normal axis, normal intervals, ST depressions inferiorly and anterolaterally similar morphology to prior but slightly more pronounced.  No STEMI criteria met.

## 2022-09-30 NOTE — ED PROVIDER NOTE - OBJECTIVE STATEMENT
patient c/o left sided chest pain, intermittent with mild SOB, pain for 1 week, today started radiating to back, No fever, no pain at this time, last episode 1 hour PTA, H/o CAD< CABG< AFib

## 2022-09-30 NOTE — H&P ADULT - HISTORY OF PRESENT ILLNESS
63 y/o female accompanied by her daughter c/o left sided chest pain 6/10 intensity, intermittent with mild SOB, pain for 1 week, today started radiating to back between shoulder blades. Pt denies fever,  pain at this time, last episode 1 hour ago. Pt has hx of afib , CAD, CABG 1 bypass, pulmonary HTN.     no

## 2022-09-30 NOTE — ED PROVIDER NOTE - CLINICAL SUMMARY MEDICAL DECISION MAKING FREE TEXT BOX
62-year-old female with a past medical history of CAD, status post CABG, atrial fibrillation presents with few days of fluttering in her chest with occasional left-sided chest discomfort worse tonight with radiation to left shoulder.  Also with worsening shortness of breath on exertion despite her baseline pulmonary disease.  On exam nontoxic, vital signs stable, heart regular no murmurs, lungs clear bilaterally, abdomen benign, no peripheral edema.  EKG with slight increase in ST depressions as above.  Repeat reassuring with improvement.  No STEMI criteria.  Initial troponin negative.  Chest x-ray clear.  High risk chest pain, EKG changes, admit to telemetry.

## 2022-10-01 LAB
CK MB CFR SERPL CALC: <1 NG/ML — SIGNIFICANT CHANGE UP (ref 0.6–6.3)
CK MB CFR SERPL CALC: <1 NG/ML — SIGNIFICANT CHANGE UP (ref 0.6–6.3)
CK SERPL-CCNC: 43 U/L — SIGNIFICANT CHANGE UP (ref 0–225)
CK SERPL-CCNC: 49 U/L — SIGNIFICANT CHANGE UP (ref 0–225)
TROPONIN T SERPL-MCNC: <0.01 NG/ML — SIGNIFICANT CHANGE UP
TROPONIN T SERPL-MCNC: <0.01 NG/ML — SIGNIFICANT CHANGE UP

## 2022-10-01 PROCEDURE — 99222 1ST HOSP IP/OBS MODERATE 55: CPT

## 2022-10-01 RX ORDER — REGADENOSON 0.08 MG/ML
0.4 INJECTION, SOLUTION INTRAVENOUS ONCE
Refills: 0 | Status: DISCONTINUED | OUTPATIENT
Start: 2022-10-01 | End: 2022-10-03

## 2022-10-01 RX ORDER — ACETAMINOPHEN 500 MG
650 TABLET ORAL ONCE
Refills: 0 | Status: COMPLETED | OUTPATIENT
Start: 2022-10-01 | End: 2022-10-01

## 2022-10-01 RX ORDER — BUDESONIDE AND FORMOTEROL FUMARATE DIHYDRATE 160; 4.5 UG/1; UG/1
2 AEROSOL RESPIRATORY (INHALATION)
Refills: 0 | Status: DISCONTINUED | OUTPATIENT
Start: 2022-10-01 | End: 2022-10-03

## 2022-10-01 RX ORDER — ASPIRIN/CALCIUM CARB/MAGNESIUM 324 MG
81 TABLET ORAL DAILY
Refills: 0 | Status: DISCONTINUED | OUTPATIENT
Start: 2022-10-01 | End: 2022-10-03

## 2022-10-01 RX ORDER — ALBUTEROL 90 UG/1
1 AEROSOL, METERED ORAL EVERY 4 HOURS
Refills: 0 | Status: DISCONTINUED | OUTPATIENT
Start: 2022-10-01 | End: 2022-10-03

## 2022-10-01 RX ADMIN — Medication 20 MILLIGRAM(S): at 06:33

## 2022-10-01 RX ADMIN — Medication 650 MILLIGRAM(S): at 21:49

## 2022-10-01 RX ADMIN — Medication 100 MILLIGRAM(S): at 21:48

## 2022-10-01 RX ADMIN — RIVAROXABAN 20 MILLIGRAM(S): KIT at 17:15

## 2022-10-01 RX ADMIN — BUPROPION HYDROCHLORIDE 300 MILLIGRAM(S): 150 TABLET, EXTENDED RELEASE ORAL at 11:18

## 2022-10-01 RX ADMIN — MONTELUKAST 10 MILLIGRAM(S): 4 TABLET, CHEWABLE ORAL at 11:18

## 2022-10-01 RX ADMIN — Medication 25 MILLIGRAM(S): at 17:15

## 2022-10-01 RX ADMIN — Medication 50 MILLIGRAM(S): at 11:19

## 2022-10-01 RX ADMIN — Medication 25 MILLIGRAM(S): at 06:33

## 2022-10-01 RX ADMIN — Medication 650 MILLIGRAM(S): at 21:57

## 2022-10-01 RX ADMIN — ESCITALOPRAM OXALATE 20 MILLIGRAM(S): 10 TABLET, FILM COATED ORAL at 11:18

## 2022-10-01 RX ADMIN — ATORVASTATIN CALCIUM 20 MILLIGRAM(S): 80 TABLET, FILM COATED ORAL at 21:49

## 2022-10-01 NOTE — CONSULT NOTE ADULT - SUBJECTIVE AND OBJECTIVE BOX
HPI:  61 y/o female accompanied by her daughter c/o left sided chest pain 6/10 intensity, intermittent with mild SOB, pain for 1 week, today started radiating to back between shoulder blades. Pt denies fever,  pain at this time, last episode 1 hour ago. Pt has hx of afib , CAD, CABG 1 bypass, pulmonary HTN.     (30 Sep 2022 23:24)        HPI-Cardiology   Pt with the above Hx, evaluated at bedside with Dr Parnell. Currently admitted for eval of CP. Pt states that last week she suddenly felt "clenching" on the left side of her chest, which has been intermittent, not lasting more than 5 min, but yesterday Pt states she developed "left upper shoulder" pain and prompted her to come to the hospital. Denies any aggravating or alleviating factors. States pain not similar to pain when she had CABG in 2019. Pt states she follows up with Dr Pitts. Currently denies any CP, SOB, palpitations, dizziness/lightheadedness, diaphoresis or nausea/vomiting. Radiology tests and hospital records, were reviewed, as well as previous notes on this patient.         PAST MEDICAL & SURGICAL HISTORY  Bilateral carpal tunnel syndrome    History of  section    Closed fracture of foot, unspecified laterality, sequela    Afib    Essential hypertension    Asthma    Hearing decreased    Hearing aid worn    Anxiety and depression    COPD (chronic obstructive pulmonary disease) case management patient    S/P CABG x 3    H/O prior ablation treatment        FAMILY HISTORY:  FAMILY HISTORY:  unknown      SOCIAL HISTORY:  []smoker-denies  []Alcohol-denies  []Drug-denies      ALLERGIES:  amiodarone ([place])  iodine (Unknown)  Seafood (Unknown)  shellfish (Unknown)      MEDICATIONS:  MEDICATIONS  (STANDING):  atorvastatin 20 milliGRAM(s) Oral at bedtime  buPROPion XL (24-Hour) . 300 milliGRAM(s) Oral daily  escitalopram 20 milliGRAM(s) Oral daily  furosemide    Tablet 20 milliGRAM(s) Oral daily  influenza   Vaccine 0.5 milliLiter(s) IntraMuscular once  metoprolol tartrate 25 milliGRAM(s) Oral two times a day  montelukast 10 milliGRAM(s) Oral daily  rivaroxaban 20 milliGRAM(s) Oral with dinner  tiotropium 18 MICROgram(s) Capsule 1 Capsule(s) Inhalation daily  topiramate 100 milliGRAM(s) Oral at bedtime  topiramate 50 milliGRAM(s) Oral daily    MEDICATIONS  (PRN):  ALPRAZolam 0.5 milliGRAM(s) Oral two times a day PRN anxiety      HOME MEDICATIONS:  Home Medications:  atorvastatin 20 mg oral tablet: 1 tab(s) orally once a day (at bedtime) (30 Sep 2022 23:43)  buPROPion 300 mg/24 hours (XL) oral tablet, extended release: 1 tab(s) orally every 24 hours (30 Sep 2022 23:45)  escitalopram 20 mg oral tablet: 1 tab(s) orally once a day (30 Sep 2022 23:40)  Lasix 20 mg oral tablet: 1 tab(s) orally once a day (30 Sep 2022 23:43)  metoprolol tartrate 25 mg oral tablet: 1 tab(s) orally 2 times a day (30 Sep 2022 23:38)  montelukast 10 mg oral tablet: 1 tab(s) orally once a day (30 Sep 2022 23:44)  topiramate 100 mg oral tablet: orally once a day (at bedtime) (30 Sep 2022 23:42)  topiramate 50 mg oral tablet: 50  orally once a day (30 Sep 2022 23:41)  Xanax 0.5 mg oral tablet: 1 tab(s) orally 2 times a day, As Needed (30 Sep 2022 23:42)  Xarelto 20 mg oral tablet: 1 tab(s) orally once a day (in the evening) (2019 08:00)      VITALS:   T(F): 96.4 (10-01 @ 05:13), Max: 98.4 ( @ 20:36)  HR: 60 (10-01 @ 05:13) (60 - 75)  BP: 142/66 (10-01 @ 05:13) (141/69 - 152/87)  BP(mean): --  RR: 18 (10-01 @ 05:13) (18 - 20)  SpO2: 98% ( @ 20:36) (98% - 98%)    I&O's Summary      REVIEW OF SYSTEMS:  See HPI      PHYSICAL EXAM:  NEURO: patient is awake , alert and oriented  GEN: Not in acute distress  NECK: no thyroid enlargement, no JVD  LUNGS: Clear to auscultation bilaterally   CARDIOVASCULAR: S1/S2 present, RRR , no murmurs or rubs, no carotid bruits,  + PP bilaterally  ABD: Soft, non-tender, non-distended, +BS  EXT: No DELMY  SKIN: Intact        LABS:                        13.7   7.99  )-----------( 304      ( 30 Sep 2022 20:45 )             41.4         139  |  106  |  16  ----------------------------<  94  4.0   |  22  |  1.0    Ca    9.8      30 Sep 2022 20:45  Mg     2.1         TPro  7.2  /  Alb  4.5  /  TBili  0.4  /  DBili  x   /  AST  17  /  ALT  14  /  AlkPhos  95      PT/INR - ( 30 Sep 2022 20:45 )   PT: 13.20 sec;   INR: 1.14 ratio         PTT - ( 30 Sep 2022 20:45 )  PTT:36.0 sec  Creatine Kinase, Serum: 43 U/L (10-01-22 @ 06:41)  Troponin T, Serum: <0.01 ng/mL (10-01-22 @ 06:41)  Troponin T, Serum: <0.01 ng/mL (22 @ 20:45)    CARDIAC MARKERS ( 01 Oct 2022 06:41 )  x     / <0.01 ng/mL / 43 U/L / x     / <1.0 ng/mL  CARDIAC MARKERS ( 30 Sep 2022 20:45 )  x     / <0.01 ng/mL / x     / x     / x              RADIOLOGY:  -CXR:  < from: Xray Chest 1 View- PORTABLE-Urgent (22 @ 20:53) >    Impression:    No radiographic evidence of acute cardiopulmonary disease.        --- End of Report ---      < end of copied text >              < from: Transthoracic Echocardiogram (19 @ 10:27) >  Summary:   1. Left ventricular ejection fraction, by visual estimation, is 60 to   65%.   2. Normal left ventricular size and wall thicknesses, with normal   systolic and diastolic function.   3. The left ventricular diastolic function could not be assessed in this   study.   4. Estimated pulmonary artery systolic pressure is 38.6 mmHg assuming a   right atrial pressure of 3 mmHg, which is consistent with borderline   pulmonary hypertension.   5. LA volume Index is 22.0 ml/m² ml/m2.    < end of copied text >            < from: Cardiac Cath Lab - Adult (19 @ 09:10) >    IMPRESSIONS: There is significant single vessel coronary artery disease.    Patent LIMA to LAD    significant pulm HTN, elevated PCWP, normal CO    < end of copied text >              < from: Cardiac Cath Lab - Adult (19 @ 13:38) >    IMPRESSIONS: There is significant single vessel coronary artery disease.    RECOMMENDATIONS:    Consultation will be obtained with CT surgery with further discussion of    the risk/benefits of revascularization with CABG/PCI.      < end of copied text >        ECG:  < from: 12 Lead ECG (22 @ 21:14) >   Normal sinus rhythm  NS ST-T change     Confirmed by ROXIE PARNELL MD () on 10/1/2022 6:57:25 AM    < end of copied text >

## 2022-10-01 NOTE — CONSULT NOTE ADULT - ASSESSMENT
61 y/o female with PMH of AFib(s/p Ablation), on Xarelto, CABG x3(2019), Asthma and COPD presented to the ED c/o left sided intermittent chest pain for 1 week      Impression:  #Chest pain: r/o ACS  #AFib    Plan:  Currently CP free  Troponin flat x2  CKMB is WNL  ECG: NS, ST-T change. Compared to previous no acute changes   Cxr: pending read    Plan:  Trend trop x1  Lipid profile, TSH, HgBA1C  Recommend Inpatient Lexiscan thallium stress test  TTE pending  Appears in sinus rhythm on monitor. C/w Metroprolol and Xarelto  C/w home dose of PO Lasix and Lipitor  Monitor lytes    Discussed with Dr Parnell

## 2022-10-01 NOTE — PROGRESS NOTE ADULT - ASSESSMENT
63 y/o female admitted with 1 week of intermittent chest pain     # CHEST PAIN Atypical with HX of CAD     - serial enzymes   - cardiology   - continue meds     # Atrial Fibrillation     - metoprolol  -eliquis     # Mood     - on Buproprion and Escitalopram       # Asthma mild intermittent     ADCVANCE CARE CPR     DIspostion : home

## 2022-10-01 NOTE — CONSULT NOTE ADULT - NSCONSULTADDITIONALINFOA_GEN_ALL_CORE
The patient has a history of having CAD 1 vessel CAD S/P LIMA to LAD , at the time of cath in 2018 she had an elevated PCW and pulmonary HTN . She has been on Lasix . Still with MURCIA and low exercise load. The patient has had left sided back pain and increased SOB . ECG shows inferior ST-T changes . Cardiac enzymes are negative . The patient has hx of PAF and has had an AF ablation as well. CXR is clear and she has no wheezing on exam . She is on beta blockers and statins . She is on DOAC but not on antithrombotic therapy . At this time will get echocardiogram. There is no wheezing on exam , she does not have distended neck veins or edema and appears euvolemic . Will get ischemia work up . The patient has a IV contrast allergy ( severe) . Will get Lexiscan stress test . ASA , beta blockers and Statins .

## 2022-10-02 LAB
A1C WITH ESTIMATED AVERAGE GLUCOSE RESULT: 5.4 % — SIGNIFICANT CHANGE UP (ref 4–5.6)
CHOLEST SERPL-MCNC: 167 MG/DL — SIGNIFICANT CHANGE UP
ESTIMATED AVERAGE GLUCOSE: 108 MG/DL — SIGNIFICANT CHANGE UP (ref 68–114)
HDLC SERPL-MCNC: 48 MG/DL — LOW
LIPID PNL WITH DIRECT LDL SERPL: 94 MG/DL — SIGNIFICANT CHANGE UP
NON HDL CHOLESTEROL: 119 MG/DL — SIGNIFICANT CHANGE UP
TRIGL SERPL-MCNC: 127 MG/DL — SIGNIFICANT CHANGE UP
TSH SERPL-MCNC: 2.47 UIU/ML — SIGNIFICANT CHANGE UP (ref 0.27–4.2)

## 2022-10-02 PROCEDURE — 93016 CV STRESS TEST SUPVJ ONLY: CPT

## 2022-10-02 PROCEDURE — 93018 CV STRESS TEST I&R ONLY: CPT

## 2022-10-02 PROCEDURE — 78452 HT MUSCLE IMAGE SPECT MULT: CPT | Mod: 26

## 2022-10-02 RX ADMIN — Medication 50 MILLIGRAM(S): at 15:41

## 2022-10-02 RX ADMIN — BUDESONIDE AND FORMOTEROL FUMARATE DIHYDRATE 2 PUFF(S): 160; 4.5 AEROSOL RESPIRATORY (INHALATION) at 15:39

## 2022-10-02 RX ADMIN — ATORVASTATIN CALCIUM 20 MILLIGRAM(S): 80 TABLET, FILM COATED ORAL at 21:26

## 2022-10-02 RX ADMIN — BUPROPION HYDROCHLORIDE 300 MILLIGRAM(S): 150 TABLET, EXTENDED RELEASE ORAL at 15:39

## 2022-10-02 RX ADMIN — TIOTROPIUM BROMIDE 1 CAPSULE(S): 18 CAPSULE ORAL; RESPIRATORY (INHALATION) at 09:50

## 2022-10-02 RX ADMIN — Medication 25 MILLIGRAM(S): at 17:50

## 2022-10-02 RX ADMIN — Medication 81 MILLIGRAM(S): at 15:45

## 2022-10-02 RX ADMIN — ESCITALOPRAM OXALATE 20 MILLIGRAM(S): 10 TABLET, FILM COATED ORAL at 15:40

## 2022-10-02 RX ADMIN — RIVAROXABAN 20 MILLIGRAM(S): KIT at 17:50

## 2022-10-02 RX ADMIN — Medication 100 MILLIGRAM(S): at 21:25

## 2022-10-02 RX ADMIN — MONTELUKAST 10 MILLIGRAM(S): 4 TABLET, CHEWABLE ORAL at 15:39

## 2022-10-02 NOTE — PROGRESS NOTE ADULT - ASSESSMENT
61 y/o female admitted with 1 week of intermittent chest pain     # CHEST PAIN Atypical with HX of CAD     - serial enzymes   - cardiology noted   - for Maryjo Scan todady   - continue meds     # Atrial Fibrillation     - metoprolol  - eliquis     # Mood     - on Buproprion and Escitalopram       # Asthma mild intermittent     ADCVANCE CARE CPR     DIspostion : home

## 2022-10-02 NOTE — PROGRESS NOTE ADULT - SUBJECTIVE AND OBJECTIVE BOX
61 y/o has hx of afib , CAD, CABG 1 bypass, pulmonary HTN    INTERVAL:  lying in bed ; going for MASHA Scan Today; anxious     PAST MEDICAL & SURGICAL HISTORY:    Bilateral carpal tunnel syndrome  History of  section  Closed fracture of foot, unspecified laterality, sequela  Afib  Essential hypertension  Asthma  Hearing decreased  Hearing aid worn  Anxiety and depression  COPD (chronic obstructive pulmonary disease) case management patient  S/P CABG x 3  H/O prior ablation treatment    ROS    no fever no chills     MEDICATIONS  (STANDING):  atorvastatin 20 milliGRAM(s) Oral at bedtime  buPROPion XL (24-Hour) . 300 milliGRAM(s) Oral daily  escitalopram 20 milliGRAM(s) Oral daily  furosemide    Tablet 20 milliGRAM(s) Oral daily  influenza   Vaccine 0.5 milliLiter(s) IntraMuscular once  metoprolol tartrate 25 milliGRAM(s) Oral two times a day  montelukast 10 milliGRAM(s) Oral daily  rivaroxaban 20 milliGRAM(s) Oral with dinner  tiotropium 18 MICROgram(s) Capsule 1 Capsule(s) Inhalation daily  topiramate 100 milliGRAM(s) Oral at bedtime  topiramate 50 milliGRAM(s) Oral daily    MEDICATIONS  (PRN):  ALPRAZolam 0.5 milliGRAM(s) Oral two times a day PRN anxiety    Vital Signs Last 24 Hrs  T(C): 35.8 (02 Oct 2022 04:31), Max: 36.5 (01 Oct 2022 13:16)  T(F): 96.4 (02 Oct 2022 04:31), Max: 97.7 (01 Oct 2022 13:16)  HR: 56 (02 Oct 2022 04:31) (53 - 60)  BP: 129/57 (02 Oct 2022 04:31) (127/59 - 143/76)  BP(mean): --  RR: 18 (02 Oct 2022 04:31) (18 - 18)  SpO2: --      PHYSICAL EXAM     GENERAL: Alert Awake NAD   HEENT: PERRLA EOMI + [place]  NECK: no jvd  CHEST/BAck + pain tenderness at left scapula medial border   CARD: S1 S2   LUNG: CTA no wheezed   ABD: obese  EXT; no c/c/e   NEURO: non focal                           13.7   7.99  )-----------( 304      ( 30 Sep 2022 20:45 )             41.4         139  |  106  |  16  ----------------------------<  94  4.0   |  22  |  1.0    Ca    9.8      30 Sep 2022 20:45  Mg     2.1         TPro  7.2  /  Alb  4.5  /  TBili  0.4  /  DBili  x   /  AST  17  /  ALT  14  /  AlkPhos  95      CARDIAC MARKERS ( 01 Oct 2022 15:15 )  x     / <0.01 ng/mL / 49 U/L / x     / <1.0 ng/mL  CARDIAC MARKERS ( 01 Oct 2022 06:41 )  x     / <0.01 ng/mL / 43 U/L / x     / <1.0 ng/mL  CARDIAC MARKERS ( 30 Sep 2022 20:45 )  x     / <0.01 ng/mL / x     / x     / x        
61 y/o female accompanied by her daughter c/o left sided chest pain 6/10 intensity, intermittent with mild SOB, pain for 1 week, today started radiating to back between shoulder blades. Pt denies fever,  pain at this time, last episode 1 hour ago. Pt has hx of afib , CAD, CABG 1 bypass, pulmonary HTN    INTERVAL:  lying in bed     PAST MEDICAL & SURGICAL HISTORY:    Bilateral carpal tunnel syndrome  History of  section  Closed fracture of foot, unspecified laterality, sequela  Afib  Essential hypertension  Asthma  Hearing decreased  Hearing aid worn  Anxiety and depression  COPD (chronic obstructive pulmonary disease) case management patient  S/P CABG x 3  H/O prior ablation treatment    ROS    no fever no chills     MEDICATIONS  (STANDING):  atorvastatin 20 milliGRAM(s) Oral at bedtime  buPROPion XL (24-Hour) . 300 milliGRAM(s) Oral daily  escitalopram 20 milliGRAM(s) Oral daily  furosemide    Tablet 20 milliGRAM(s) Oral daily  influenza   Vaccine 0.5 milliLiter(s) IntraMuscular once  metoprolol tartrate 25 milliGRAM(s) Oral two times a day  montelukast 10 milliGRAM(s) Oral daily  rivaroxaban 20 milliGRAM(s) Oral with dinner  tiotropium 18 MICROgram(s) Capsule 1 Capsule(s) Inhalation daily  topiramate 100 milliGRAM(s) Oral at bedtime  topiramate 50 milliGRAM(s) Oral daily    MEDICATIONS  (PRN):  ALPRAZolam 0.5 milliGRAM(s) Oral two times a day PRN anxiety    Vital Signs Last 24 Hrs  T(C): 35.8 (01 Oct 2022 05:13), Max: 36.9 (30 Sep 2022 20:36)  T(F): 96.4 (01 Oct 2022 05:13), Max: 98.4 (30 Sep 2022 20:36)  HR: 60 (01 Oct 2022 05:13) (60 - 75)  BP: 142/66 (01 Oct 2022 05:13) (141/69 - 152/87)  BP(mean): --  RR: 18 (01 Oct 2022 05:13) (18 - 20)  SpO2: 98% (30 Sep 2022 20:36) (98% - 98%)    Parameters below as of 30 Sep 2022 20:36  Patient On (Oxygen Delivery Method): room air      PHYSICAL EXAM     GENERAL: Alert Awake NAD   HEENT: PERRLA EOMI + [place]  NECK: no jvd  CHEST/BAck + pain tenderness at left scapula medial border   CARD: S1 S2   LUNG: CTA no wheezed   ABD: obese  EXT; no c/c/e   NEURO: non focal                           13.7   7.99  )-----------( 304      ( 30 Sep 2022 20:45 )             41.4     09-    139  |  106  |  16  ----------------------------<  94  4.0   |  22  |  1.0    Ca    9.8      30 Sep 2022 20:45  Mg     2.1         TPro  7.2  /  Alb  4.5  /  TBili  0.4  /  DBili  x   /  AST  17  /  ALT  14  /  AlkPhos  95  09-    CARDIAC MARKERS ( 01 Oct 2022 06:41 )  x     / <0.01 ng/mL / 43 U/L / x     / <1.0 ng/mL  CARDIAC MARKERS ( 30 Sep 2022 20:45 )  x     / <0.01 ng/mL / x     / x     / x

## 2022-10-03 ENCOUNTER — TRANSCRIPTION ENCOUNTER (OUTPATIENT)
Age: 62
End: 2022-10-03

## 2022-10-03 VITALS
SYSTOLIC BLOOD PRESSURE: 144 MMHG | HEART RATE: 65 BPM | DIASTOLIC BLOOD PRESSURE: 65 MMHG | TEMPERATURE: 96 F | RESPIRATION RATE: 18 BRPM

## 2022-10-03 RX ORDER — FUROSEMIDE 40 MG
1 TABLET ORAL
Qty: 30 | Refills: 0
Start: 2022-10-03 | End: 2022-11-01

## 2022-10-03 RX ORDER — MONTELUKAST 4 MG/1
1 TABLET, CHEWABLE ORAL
Qty: 30 | Refills: 0
Start: 2022-10-03 | End: 2022-11-01

## 2022-10-03 RX ORDER — FUROSEMIDE 40 MG
1 TABLET ORAL
Qty: 0 | Refills: 0 | DISCHARGE

## 2022-10-03 RX ORDER — MONTELUKAST 4 MG/1
1 TABLET, CHEWABLE ORAL
Qty: 0 | Refills: 0 | DISCHARGE

## 2022-10-03 RX ADMIN — Medication 25 MILLIGRAM(S): at 05:28

## 2022-10-03 RX ADMIN — Medication 20 MILLIGRAM(S): at 05:29

## 2022-10-03 NOTE — DISCHARGE NOTE NURSING/CASE MANAGEMENT/SOCIAL WORK - NSDCVIVACCINE_GEN_ALL_CORE_FT
influenza, injectable, quadrivalent, preservative free; 08-Jan-2019 15:12; Cristine Mondragon (RN); [place]; t57ea (Exp. Date: 30-Jun-2019); IntraMuscular; Deltoid Left.; 0.5 milliLiter(s); VIS (VIS Published: 07-Aug-2015, VIS Presented: 08-Jan-2019);

## 2022-10-03 NOTE — DISCHARGE NOTE PROVIDER - ATTENDING DISCHARGE PHYSICAL EXAMINATION:
patient seen and examined this am , no acute distress     General: NAD   HEENT: PERRLA + [place]  NECK: NO THOM   CARD: S1 S2 irreg  ABDO: soft   extL no cyanosis

## 2022-10-03 NOTE — DISCHARGE NOTE NURSING/CASE MANAGEMENT/SOCIAL WORK - NSDCPEFALRISK_GEN_ALL_CORE
For information on Fall & Injury Prevention, visit: [email or web address] OR  [email or web address] OR  https://www.cdc.gov/steadi/patient.html

## 2022-10-03 NOTE — DISCHARGE NOTE NURSING/CASE MANAGEMENT/SOCIAL WORK - PATIENT PORTAL LINK FT
You can access the FollowMyHealth Patient Portal offered by [facility] by registering at the following website: [email or web address]. By joining [employer]’s FollowMyHealth portal, you will also be able to view your health information using other applications (apps) compatible with our system.

## 2022-10-03 NOTE — DISCHARGE NOTE PROVIDER - HOSPITAL COURSE
62y female admitted with intermittent left sided chest pain shoulder pain for 1 week     PAST MEDICAL & SURGICAL HISTORY:  Bilateral carpal tunnel syndrome  History of  section  Closed fracture of foot, unspecified laterality, sequela  Afib  Essential hypertension  Asthma  Hearing decreased  Hearing aid worn  Anxiety and depression  COPD (chronic obstructive pulmonary disease) case management patient  S/P CABG x 3  H/O prior ablation treatment    had cardiac enzymes x 3 negative seen by cardiology had [place] scan - no acute ischemia

## 2022-10-03 NOTE — DISCHARGE NOTE PROVIDER - NSDCFUSCHEDAPPT_GEN_ALL_CORE_FT
Titi Burns  [facility]  PSYCHIATRY  [place]  Scheduled Appointment: 10/12/2022    [facility]  PSYCHIATRY  [place]  Scheduled Appointment: 10/13/2022    [facility]  PulmMed [place]  Scheduled Appointment: 12/02/2022

## 2022-10-03 NOTE — DISCHARGE NOTE PROVIDER - CARE PROVIDER_API CALL
Jerman Hernandez)  Internal Medicine  [address] [place] [address], [address]  [place] [address]  Phone: [phone]  Fax: [phone]  Established Patient  Follow Up Time: 1-3 days

## 2022-10-06 DIAGNOSIS — J45.20 MILD INTERMITTENT ASTHMA, UNCOMPLICATED: ICD-10-CM

## 2022-10-06 DIAGNOSIS — G56.03 CARPAL TUNNEL SYNDROME, BILATERAL UPPER LIMBS: ICD-10-CM

## 2022-10-06 DIAGNOSIS — R07.9 CHEST PAIN, UNSPECIFIED: ICD-10-CM

## 2022-10-06 DIAGNOSIS — I25.10 ATHEROSCLEROTIC HEART DISEASE OF NATIVE CORONARY ARTERY WITHOUT ANGINA PECTORIS: ICD-10-CM

## 2022-10-06 DIAGNOSIS — Z79.01 LONG TERM (CURRENT) USE OF ANTICOAGULANTS: ICD-10-CM

## 2022-10-06 DIAGNOSIS — Z91.013 ALLERGY TO SEAFOOD: ICD-10-CM

## 2022-10-06 DIAGNOSIS — Z91.041 RADIOGRAPHIC DYE ALLERGY STATUS: ICD-10-CM

## 2022-10-06 DIAGNOSIS — Z79.52 LONG TERM (CURRENT) USE OF SYSTEMIC STEROIDS: ICD-10-CM

## 2022-10-06 DIAGNOSIS — F32.A DEPRESSION, UNSPECIFIED: ICD-10-CM

## 2022-10-06 DIAGNOSIS — H91.90 UNSPECIFIED HEARING LOSS, UNSPECIFIED EAR: ICD-10-CM

## 2022-10-06 DIAGNOSIS — F41.9 ANXIETY DISORDER, UNSPECIFIED: ICD-10-CM

## 2022-10-06 DIAGNOSIS — Z97.4 PRESENCE OF EXTERNAL HEARING-AID: ICD-10-CM

## 2022-10-06 DIAGNOSIS — I48.0 PAROXYSMAL ATRIAL FIBRILLATION: ICD-10-CM

## 2022-10-06 DIAGNOSIS — J44.9 CHRONIC OBSTRUCTIVE PULMONARY DISEASE, UNSPECIFIED: ICD-10-CM

## 2022-10-06 DIAGNOSIS — Z95.1 PRESENCE OF AORTOCORONARY BYPASS GRAFT: ICD-10-CM

## 2022-10-06 DIAGNOSIS — I10 ESSENTIAL (PRIMARY) HYPERTENSION: ICD-10-CM

## 2022-10-06 DIAGNOSIS — R07.89 OTHER CHEST PAIN: ICD-10-CM

## 2022-10-06 DIAGNOSIS — Z88.8 ALLERGY STATUS TO OTHER DRUGS, MEDICAMENTS AND BIOLOGICAL SUBSTANCES STATUS: ICD-10-CM

## 2022-10-06 DIAGNOSIS — I27.20 PULMONARY HYPERTENSION, UNSPECIFIED: ICD-10-CM

## 2022-10-12 ENCOUNTER — OUTPATIENT (OUTPATIENT)
Dept: OUTPATIENT SERVICES | Facility: HOSPITAL | Age: 62
LOS: 1 days | Discharge: HOME | End: 2022-10-12

## 2022-10-12 ENCOUNTER — APPOINTMENT (OUTPATIENT)
Dept: PSYCHIATRY | Facility: CLINIC | Age: 62
End: 2022-10-12

## 2022-10-12 DIAGNOSIS — G47.00 INSOMNIA, UNSPECIFIED: ICD-10-CM

## 2022-10-12 DIAGNOSIS — Z95.1 PRESENCE OF AORTOCORONARY BYPASS GRAFT: Chronic | ICD-10-CM

## 2022-10-12 DIAGNOSIS — Z98.890 OTHER SPECIFIED POSTPROCEDURAL STATES: Chronic | ICD-10-CM

## 2022-10-12 DIAGNOSIS — F32.9 MAJOR DEPRESSIVE DISORDER, SINGLE EPISODE, UNSPECIFIED: ICD-10-CM

## 2022-10-12 DIAGNOSIS — F41.1 GENERALIZED ANXIETY DISORDER: ICD-10-CM

## 2022-10-12 PROBLEM — J44.9 CHRONIC OBSTRUCTIVE PULMONARY DISEASE, UNSPECIFIED: Chronic | Status: ACTIVE | Noted: 2022-09-30

## 2022-10-12 PROBLEM — F41.9 ANXIETY DISORDER, UNSPECIFIED: Chronic | Status: ACTIVE | Noted: 2022-09-30

## 2022-10-12 PROCEDURE — 99214 OFFICE O/P EST MOD 30 MIN: CPT | Mod: 95

## 2022-10-12 NOTE — REASON FOR VISIT
[FreeTextEntry1] : "I am well, but worried about my daughter at times." [Home] : at home, [unfilled] , at the time of the visit. [Medical Office: ([facility])___] : at the medical office located in  [Verbal consent obtained from patient] : the patient, [unfilled]

## 2022-10-12 NOTE — HISTORY OF PRESENT ILLNESS
[FreeTextEntry1] : This is a 62 year old patient who presents for medication management.  The patient reports  improved mood, good sleep and appetite.  The patient denies any symptoms of depression, seth, or psychosis at this time.  The patient has occasional anxiety that impairs their daily functioning. The patient denies any suicidal ideation, homicidal ideation, no auditory or visual hallucinations, or paranoid ideation.  The patient has chronic medical complaints. The patient denies any drug or alcohol use, and is not smoking at this time. I requested the patient's updated physical and labs from their PCP.  Coping skills were discussed and a safety plan was provided.  The patient was educated on the risks, benefits, and alternatives of their psychiatric medications.  The patient will engage in psychotherapy at this time.  The patient consents to ongoing medication management.  Continues with new therapist.   Patient will continue current medications.  Patient provided PHQ9, yet to complete. \par \par Will continue remeron 7.5mg at bedtime for mood/insomnia, risks, benefits discussed, ie serotonin syndrome. [No] : no

## 2022-10-12 NOTE — ASSESSMENT
[FreeTextEntry1] : Patient reports stable mood, at baseline.\par Continue:\par 1. Topamax 50mg in am and 100mg at pm\par 2. Lexapro 20mg po daily\par 3. Wellbutrin XL 300mg po qam\par 4. xanax 0.5mg-1mg at bedtime prn (RX not needed)\par 5. remeron 7.5mg at bedtime\par \par Follow up in 8 weeks, earlier if needed

## 2022-10-12 NOTE — REASON FOR VISIT
[FreeTextEntry1] : "I am well, but worried about my daughter at times." [Home] : at home, [unfilled] , at the time of the visit. [Medical Office: ([facility])___] : at the medical office located in  [Verbal consent obtained from patient] : the patient, [unfilled]

## 2022-10-12 NOTE — CURRENT PSYCHIATRIC SYMPTOMS
[Depressed Mood] : no depressed mood [Anhedonia] : no anhedonia [Guilt] : not feeling guilty [Decreased Concentration] : no decrease in concentrating ability [Hyperphagia] : no hyperphagia [Insomnia] : no insomnia disorder [Hypersomnia] : no ~T hypersomnia [Psychomotor Retardation] : no psychomotor retardation [Anorexia] : no anorexia [Euphoria] : no euphoria [Highly Irritable] : no high irritability [Increased Activity] : no increased in activity [Distractibility] : not distracted [Talkativeness] : no talkativeness [Grandeur] : no feelings of grandeur [Buying Sprees] : no buying sprees [Hypersexuality] : denied hypersexuality [Dec Need For Sleep] : no decreased need for sleep [Delusions] : no ~T delusions [Hallucination Visual] : no visual hallucinations [Hallucination Auditory] : no auditory hallucinations [Hallucination Tactile] : no tactile hallucinations [Thought Disorder] : ~T a thought disorder was not noted [Excessive Worry] : excessive worry [Ruminations] : no rumination disorder [Obsessions] : no obsessions [Re-experiencing] : no re-experiencing [Restlessness] : no restlessness [Panic] : no panic disorder [de-identified] : better [de-identified] : mild

## 2022-10-12 NOTE — CURRENT PSYCHIATRIC SYMPTOMS
[Depressed Mood] : no depressed mood [Anhedonia] : no anhedonia [Guilt] : not feeling guilty [Decreased Concentration] : no decrease in concentrating ability [Hyperphagia] : no hyperphagia [Insomnia] : no insomnia disorder [Hypersomnia] : no ~T hypersomnia [Psychomotor Retardation] : no psychomotor retardation [Anorexia] : no anorexia [Euphoria] : no euphoria [Highly Irritable] : no high irritability [Increased Activity] : no increased in activity [Distractibility] : not distracted [Talkativeness] : no talkativeness [Grandeur] : no feelings of grandeur [Buying Sprees] : no buying sprees [Hypersexuality] : denied hypersexuality [Dec Need For Sleep] : no decreased need for sleep [Delusions] : no ~T delusions [Hallucination Visual] : no visual hallucinations [Hallucination Auditory] : no auditory hallucinations [Hallucination Tactile] : no tactile hallucinations [Thought Disorder] : ~T a thought disorder was not noted [Excessive Worry] : excessive worry [Ruminations] : no rumination disorder [Obsessions] : no obsessions [Re-experiencing] : no re-experiencing [Restlessness] : no restlessness [Panic] : no panic disorder [de-identified] : better [de-identified] : mild

## 2022-10-13 ENCOUNTER — APPOINTMENT (OUTPATIENT)
Dept: PSYCHIATRY | Facility: CLINIC | Age: 62
End: 2022-10-13

## 2022-10-13 ENCOUNTER — OUTPATIENT (OUTPATIENT)
Dept: OUTPATIENT SERVICES | Facility: HOSPITAL | Age: 62
LOS: 1 days | Discharge: HOME | End: 2022-10-13

## 2022-10-13 DIAGNOSIS — Z95.1 PRESENCE OF AORTOCORONARY BYPASS GRAFT: Chronic | ICD-10-CM

## 2022-10-13 DIAGNOSIS — G47.00 INSOMNIA, UNSPECIFIED: ICD-10-CM

## 2022-10-13 DIAGNOSIS — Z98.890 OTHER SPECIFIED POSTPROCEDURAL STATES: Chronic | ICD-10-CM

## 2022-10-13 DIAGNOSIS — F41.1 GENERALIZED ANXIETY DISORDER: ICD-10-CM

## 2022-10-13 DIAGNOSIS — F32.9 MAJOR DEPRESSIVE DISORDER, SINGLE EPISODE, UNSPECIFIED: ICD-10-CM

## 2022-10-13 NOTE — PLAN
[FreeTextEntry2] : Goal #1- Management of depressive symptoms \par Obj #1- The patient will verbalize and utilize at least three effective coping mechanisms to manage her depression symptoms \par Obj #2- The patient will attend monthly medication management appointments with Dr. Burns and will take her prescribed psychotropic medications as prescribed \par Obj #3- The patient will attend bi weekly sessions with the writer and is no longer in the [facility] track. \par \par  [Cognitive and/or Behavior Therapy] : Cognitive and/or Behavior Therapy  [Skills training (all types)] : Skills training (all types)  [Supportive Therapy] : Supportive Therapy [de-identified] : The patient attended her telehealth session with the writer via [employer]. The patient explained that she has been feeling anxious with periods of mood fluctuations as she ed with her daughter's "challenging" behaviors. The writer actively listened to the patient and validated her feelings. The writer and the patient discussed setting boundaries with her daughter and also taking time out to ficus on self care. The writer provided the patient with information regarding [employer] where she can self register for support groups for people coping with family members with mental health conditions. \par The patient and the writer discussed coping mechanisms to manage her anxiety levels. The writer will continue to provide the patient with support and encouragement where needed. The patient is scheduled for a follow up visit on 10/24/2022 at 4pm. [Recommended Frequency of Visits: ____] : Recommended frequency of visits: [unfilled] [Return in ____ week(s)] : Return in [unfilled] week(s)

## 2022-10-13 NOTE — REASON FOR VISIT
[Home] : at home, [unfilled] , at the time of the visit. [Other Location: e.g. Home (Enter Location, City,State)___] : at [unfilled] [Self] : self [Patient] : Patient [FreeTextEntry1] : psychotherapy follow up

## 2022-10-24 ENCOUNTER — APPOINTMENT (OUTPATIENT)
Dept: PSYCHIATRY | Facility: CLINIC | Age: 62
End: 2022-10-24

## 2022-10-24 NOTE — DISCUSSION/SUMMARY
[FreeTextEntry1] : The patient called the writer to cancel her scheduled appointment as she reported that she is not feeling well. The writer called the patient to reschedule and a message was left with a callback number.

## 2022-10-30 ENCOUNTER — RX RENEWAL (OUTPATIENT)
Age: 62
End: 2022-10-30

## 2022-11-07 ENCOUNTER — OUTPATIENT (OUTPATIENT)
Dept: OUTPATIENT SERVICES | Facility: HOSPITAL | Age: 62
LOS: 1 days | Discharge: HOME | End: 2022-11-07

## 2022-11-07 ENCOUNTER — APPOINTMENT (OUTPATIENT)
Dept: PSYCHIATRY | Facility: CLINIC | Age: 62
End: 2022-11-07

## 2022-11-07 DIAGNOSIS — G47.00 INSOMNIA, UNSPECIFIED: ICD-10-CM

## 2022-11-07 DIAGNOSIS — Z98.890 OTHER SPECIFIED POSTPROCEDURAL STATES: Chronic | ICD-10-CM

## 2022-11-07 DIAGNOSIS — F41.1 GENERALIZED ANXIETY DISORDER: ICD-10-CM

## 2022-11-07 DIAGNOSIS — Z95.1 PRESENCE OF AORTOCORONARY BYPASS GRAFT: Chronic | ICD-10-CM

## 2022-11-07 DIAGNOSIS — F32.9 MAJOR DEPRESSIVE DISORDER, SINGLE EPISODE, UNSPECIFIED: ICD-10-CM

## 2022-11-07 NOTE — PLAN
[FreeTextEntry2] : Goal #1- Management of depressive symptoms \par Obj #1- The patient will verbalize and utilize at least three effective coping mechanisms to manage her depression symptoms \par Obj #2- The patient will attend monthly medication management appointments with Dr. Burns and will take her prescribed psychotropic medications as prescribed \par Obj #3- The patient will attend bi weekly sessions with the writer and is no longer in the CONNECT track. \par \par  [Cognitive and/or Behavior Therapy] : Cognitive and/or Behavior Therapy  [Skills training (all types)] : Skills training (all types)  [Supportive Therapy] : Supportive Therapy [de-identified] : The patient attended her telehealth session with the writer via [employer]. The patient explained that she has been doing better however, she has been having feelings of being "bored" as she stays at home most days. The writer and the patient discussed activities that she can engage in and the writer suggested free adult programming at the local [facility]. The patient reported that her anxiety and depression has been "okay" given that her daughter has started a new job and is happier than she was previously. \par The patient explained that she went to the virtual [facility] support group and she enjoyed the camaraderie that she had with some of the other parents in the support group. The writer recommended alternative reading material for the patient to further educate herself on BPD. The writer will continue to provide the patient with support and encouragement where needed. The patient is scheduled for a follow up visit on 11/21/2022 at 1pm. [Recommended Frequency of Visits: ____] : Recommended frequency of visits: [unfilled] [Return in ____ week(s)] : Return in [unfilled] week(s)

## 2022-11-08 ENCOUNTER — NON-APPOINTMENT (OUTPATIENT)
Age: 62
End: 2022-11-08

## 2022-11-10 ENCOUNTER — TRANSCRIPTION ENCOUNTER (OUTPATIENT)
Age: 62
End: 2022-11-10

## 2022-11-10 ENCOUNTER — APPOINTMENT (OUTPATIENT)
Dept: AFTER HOURS CARE | Facility: EMERGENCY ROOM | Age: 62
End: 2022-11-10

## 2022-11-10 PROCEDURE — 99204 OFFICE O/P NEW MOD 45 MIN: CPT | Mod: 95

## 2022-11-10 PROCEDURE — 99214 OFFICE O/P EST MOD 30 MIN: CPT | Mod: 95

## 2022-11-10 RX ORDER — FLUCONAZOLE 150 MG/1
150 TABLET ORAL DAILY
Qty: 9 | Refills: 0 | Status: ACTIVE | COMMUNITY
Start: 2022-11-10 | End: 1900-01-01

## 2022-11-10 RX ORDER — LEVOFLOXACIN 750 MG/1
750 TABLET, FILM COATED ORAL DAILY
Qty: 7 | Refills: 0 | Status: ACTIVE | COMMUNITY
Start: 2022-11-10 | End: 1900-01-01

## 2022-11-10 NOTE — PLAN
[With new medications prescribed] : Treat in place: with new medications prescribed [FreeTextEntry1] : rx levaquin\par rx diflucan ppx\par continue bronchodilators and steroids\par continue supportive care and promethazine\par prompt pmd f/u\par anticipatory guidance and precautions

## 2022-11-10 NOTE — ASSESSMENT
[FreeTextEntry1] : most c/w bronchitis +/- RAD flare in pt with significant pulm/med hx. no SOB but will escalate tx w/abx

## 2022-11-10 NOTE — HISTORY OF PRESENT ILLNESS
[Home] : at home, [unfilled] , at the time of the visit. [Other Location: e.g. Home (Enter Location, City,State)___] : at [unfilled] [Verbal consent obtained from patient] : the patient, [unfilled] [FreeTextEntry8] : 62y F hx asthma, COPD, pulm HTN, ex-smoker, obesity, Severe SILAS (at home NIV), ex-smoker, CAD s/p CABG x 1 and MAZE procedure, HTN, pAfib s/p ablation (Xarelto and ASA), depression, HTN now p/w cough, rhinorrhea, sore throat light wheezing, expectorating green mucous. Pt went to urgent care (flu, COVID, strep rapid all neg), was given albuterol, promethazine, prednisone 40mg daily. Fever started last night. No SOB.

## 2022-11-16 NOTE — ED PROVIDER NOTE - CROS ED EYES ALL NEG
negative... Niacinamide Pregnancy And Lactation Text: These medications are considered safe during pregnancy.

## 2022-11-21 ENCOUNTER — APPOINTMENT (OUTPATIENT)
Dept: PSYCHIATRY | Facility: CLINIC | Age: 62
End: 2022-11-21

## 2022-11-28 ENCOUNTER — APPOINTMENT (OUTPATIENT)
Dept: PSYCHIATRY | Facility: CLINIC | Age: 62
End: 2022-11-28

## 2022-11-28 ENCOUNTER — OUTPATIENT (OUTPATIENT)
Dept: OUTPATIENT SERVICES | Facility: HOSPITAL | Age: 62
LOS: 1 days | Discharge: HOME | End: 2022-11-28

## 2022-11-28 DIAGNOSIS — Z98.890 OTHER SPECIFIED POSTPROCEDURAL STATES: Chronic | ICD-10-CM

## 2022-11-28 DIAGNOSIS — F41.1 GENERALIZED ANXIETY DISORDER: ICD-10-CM

## 2022-11-28 DIAGNOSIS — F32.9 MAJOR DEPRESSIVE DISORDER, SINGLE EPISODE, UNSPECIFIED: ICD-10-CM

## 2022-11-28 DIAGNOSIS — G47.00 INSOMNIA, UNSPECIFIED: ICD-10-CM

## 2022-11-28 DIAGNOSIS — Z95.1 PRESENCE OF AORTOCORONARY BYPASS GRAFT: Chronic | ICD-10-CM

## 2022-11-28 NOTE — PLAN
[Cognitive and/or Behavior Therapy] : Cognitive and/or Behavior Therapy  [Skills training (all types)] : Skills training (all types)  [Supportive Therapy] : Supportive Therapy [FreeTextEntry2] : Goal #1- Management of depressive symptoms \par Obj #1- The patient will verbalize and utilize at least three effective coping mechanisms to manage her depression symptoms \par Obj #2- The patient will attend monthly medication management appointments with Dr. Burns and will take her prescribed psychotropic medications as prescribed \par Obj #3- The patient will attend bi weekly sessions with the writer and is no longer in the CONNECT track. \par \par  [de-identified] : The patient attended her scheduled telehealth session with the writer via [employer]. The patient explained that she has been doing well with her not feeling as depressed. The patient explained that both her daughters have been doing well with one of her daughters obtaining a job that she wanted. The patient explained that she is also hopeful that she will obtain a part time seasonal position. The writer commended the patient and this led to a discussion regarding the patient feeling "fulfilled" as her children are doing well. The patient reported that she has felt triggered as she has been reading on forums other parents experiences with children with BPD. The writer and the patient discussed the use of caution while reading these experiences. The patient reports medication adherence. The writer will continue to provide the patient with support and encouragement where needed. The patient is scheduled for a follow up visit on 12/12/2022 at 2pm.  [Recommended Frequency of Visits: ____] : Recommended frequency of visits: [unfilled] [Return in ____ week(s)] : Return in [unfilled] week(s)

## 2022-12-07 ENCOUNTER — APPOINTMENT (OUTPATIENT)
Dept: PSYCHIATRY | Facility: CLINIC | Age: 62
End: 2022-12-07

## 2022-12-07 ENCOUNTER — OUTPATIENT (OUTPATIENT)
Dept: OUTPATIENT SERVICES | Facility: HOSPITAL | Age: 62
LOS: 1 days | Discharge: HOME | End: 2022-12-07

## 2022-12-07 DIAGNOSIS — Z95.1 PRESENCE OF AORTOCORONARY BYPASS GRAFT: Chronic | ICD-10-CM

## 2022-12-07 DIAGNOSIS — F32.9 MAJOR DEPRESSIVE DISORDER, SINGLE EPISODE, UNSPECIFIED: ICD-10-CM

## 2022-12-07 DIAGNOSIS — Z98.890 OTHER SPECIFIED POSTPROCEDURAL STATES: Chronic | ICD-10-CM

## 2022-12-07 DIAGNOSIS — F41.1 GENERALIZED ANXIETY DISORDER: ICD-10-CM

## 2022-12-07 DIAGNOSIS — G47.00 INSOMNIA, UNSPECIFIED: ICD-10-CM

## 2022-12-07 PROCEDURE — 99214 OFFICE O/P EST MOD 30 MIN: CPT | Mod: 95

## 2022-12-12 ENCOUNTER — APPOINTMENT (OUTPATIENT)
Dept: PSYCHIATRY | Facility: CLINIC | Age: 62
End: 2022-12-12

## 2022-12-12 ENCOUNTER — OUTPATIENT (OUTPATIENT)
Dept: OUTPATIENT SERVICES | Facility: HOSPITAL | Age: 62
LOS: 1 days | Discharge: HOME | End: 2022-12-12

## 2022-12-12 DIAGNOSIS — Z95.1 PRESENCE OF AORTOCORONARY BYPASS GRAFT: Chronic | ICD-10-CM

## 2022-12-12 DIAGNOSIS — Z98.890 OTHER SPECIFIED POSTPROCEDURAL STATES: Chronic | ICD-10-CM

## 2022-12-12 DIAGNOSIS — G47.00 INSOMNIA, UNSPECIFIED: ICD-10-CM

## 2022-12-12 DIAGNOSIS — F32.9 MAJOR DEPRESSIVE DISORDER, SINGLE EPISODE, UNSPECIFIED: ICD-10-CM

## 2022-12-12 DIAGNOSIS — F41.1 GENERALIZED ANXIETY DISORDER: ICD-10-CM

## 2022-12-12 NOTE — PLAN
[Cognitive and/or Behavior Therapy] : Cognitive and/or Behavior Therapy  [Skills training (all types)] : Skills training (all types)  [Supportive Therapy] : Supportive Therapy [FreeTextEntry2] : Goal #1- Management of depressive symptoms \par Obj #1- The patient will verbalize and utilize at least three effective coping mechanisms to manage her depression symptoms \par Obj #2- The patient will attend monthly medication management appointments with Dr. Burns and will take her prescribed psychotropic medications as prescribed \par Obj #3- The patient will attend bi weekly sessions with the writer and is no longer in the CONNECT track. \par \par  [de-identified] : The patient attended her scheduled telehealth session with the writer via [employer]. The patient described an anxiety provoking experience as her daughter was involved in a car accident. The writer actively listened to the patient, validated her feelings and support was rendered. The patient explained that she has been doing well mentally with her depressive episodes being managed. The patient reported that she has been trying to lose weight and she expressed frustration that she has been unsuccessful in accomplishing this despite watching her calorie intake. The writer and the patient discussed the patient's medication that she is currently taking that could be contributing to her stalled weight loss. The writer encouraged the patient to discuss this issue with her physicians. The patient reports medication adherence. The writer will continue to provide the patient with support and encouragement where needed. The patient is scheduled for a follow up visit on 12/27/2022 at 2pm. [Recommended Frequency of Visits: ____] : Recommended frequency of visits: [unfilled] [Return in ____ week(s)] : Return in [unfilled] week(s)

## 2022-12-12 NOTE — REASON FOR VISIT
[Home] : at home, [unfilled] , at the time of the visit. [Other Location: e.g. Home (Enter Location, City,State)___] : at [unfilled] [Self] : self [Patient] : Patient [FreeTextEntry1] : psychotherapy therapy

## 2022-12-22 NOTE — CURRENT PSYCHIATRIC SYMPTOMS
[Excessive Worry] : excessive worry [Depressed Mood] : no depressed mood [Anhedonia] : no anhedonia [Guilt] : not feeling guilty [Decreased Concentration] : no decrease in concentrating ability [Hyperphagia] : no hyperphagia [Insomnia] : no insomnia disorder [Hypersomnia] : no ~T hypersomnia [Psychomotor Retardation] : no psychomotor retardation [Anorexia] : no anorexia [Euphoria] : no euphoria [Highly Irritable] : no high irritability [Increased Activity] : no increased in activity [Distractibility] : not distracted [Talkativeness] : no talkativeness [Grandeur] : no feelings of grandeur [Buying Sprees] : no buying sprees [Hypersexuality] : denied hypersexuality [Dec Need For Sleep] : no decreased need for sleep [Delusions] : no ~T delusions [Hallucination Visual] : no visual hallucinations [Hallucination Auditory] : no auditory hallucinations [Hallucination Tactile] : no tactile hallucinations [Thought Disorder] : ~T a thought disorder was not noted [Ruminations] : no rumination disorder [Obsessions] : no obsessions [Re-experiencing] : no re-experiencing [Restlessness] : no restlessness [Panic] : no panic disorder [de-identified] : better [de-identified] : mild

## 2022-12-22 NOTE — REASON FOR VISIT
[Home] : at home, [unfilled] , at the time of the visit. [Medical Office: ([facility])___] : at the medical office located in  [Verbal consent obtained from patient] : the patient, [unfilled] [FreeTextEntry1] : "I am well, daughter is better, I am trying to lose weight."

## 2022-12-22 NOTE — HISTORY OF PRESENT ILLNESS
[No] : no [FreeTextEntry1] : This is a 62 year old patient who presents for medication management.  The patient reports  improved mood, good sleep and appetite.  The patient denies any symptoms of depression, seth, or psychosis at this time.  The patient has occasional anxiety that impairs their daily functioning. The patient denies any suicidal ideation, homicidal ideation, no auditory or visual hallucinations, or paranoid ideation.  The patient has chronic medical complaints. The patient denies any drug or alcohol use, and is not smoking at this time. I requested the patient's updated physical and labs from their PCP.  Coping skills were discussed and a safety plan was provided.  The patient was educated on the risks, benefits, and alternatives of their psychiatric medications.  The patient will engage in psychotherapy at this time.  The patient consents to ongoing medication management.  Continues with new therapist.   Patient will continue current medications.  Patient provided PHQ9, yet to complete. \par \par Will continue remeron 7.5mg at bedtime for mood/insomnia, risks, benefits discussed, ie serotonin syndrome.\par \par 12/22:patient called, depression/anxiety increased, requested increase in lexapro, will ad 5mg in am daily, risks, benefits discussed 5HT syndrome

## 2022-12-27 ENCOUNTER — OUTPATIENT (OUTPATIENT)
Dept: OUTPATIENT SERVICES | Facility: HOSPITAL | Age: 62
LOS: 1 days | Discharge: HOME | End: 2022-12-27

## 2022-12-27 ENCOUNTER — APPOINTMENT (OUTPATIENT)
Dept: PSYCHIATRY | Facility: CLINIC | Age: 62
End: 2022-12-27

## 2022-12-27 DIAGNOSIS — Z95.1 PRESENCE OF AORTOCORONARY BYPASS GRAFT: Chronic | ICD-10-CM

## 2022-12-27 DIAGNOSIS — F41.1 GENERALIZED ANXIETY DISORDER: ICD-10-CM

## 2022-12-27 DIAGNOSIS — Z98.890 OTHER SPECIFIED POSTPROCEDURAL STATES: Chronic | ICD-10-CM

## 2022-12-27 DIAGNOSIS — G47.00 INSOMNIA, UNSPECIFIED: ICD-10-CM

## 2022-12-27 DIAGNOSIS — F32.9 MAJOR DEPRESSIVE DISORDER, SINGLE EPISODE, UNSPECIFIED: ICD-10-CM

## 2022-12-29 NOTE — PLAN
[Cognitive and/or Behavior Therapy] : Cognitive and/or Behavior Therapy  [Skills training (all types)] : Skills training (all types)  [Supportive Therapy] : Supportive Therapy [FreeTextEntry2] : Goal #1- Management of depressive symptoms \par Obj #1- The patient will verbalize and utilize at least three effective coping mechanisms to manage her depression symptoms \par Obj #2- The patient will attend monthly medication management appointments with Dr. Burns and will take her prescribed psychotropic medications as prescribed \par Obj #3- The patient will attend bi weekly sessions with the writer and is no longer in the CONNECT track. \par \par  [de-identified] : The patient attended her scheduled telehealth session with the writer via [employer]. The patient reported that she has had a difficult couple of weeks due to coping with her daughter's interpersonal issues. Ongoing psychoeducation was provided to the patient regarding BPD and the importance of establishing and sticking with healthy boundaries between her and her daughter. The patient explained that the discord has caused feelings of depression and anxiety. The writer actively listened to the patient, validated her feelings and support was rendered. The writer and the patient discussed the importance of taking time to enjoy activities that she enjoys. The patient reports that she has been watching television programming that fry an interest to her. The writer commended the patient. The patient reports medication adherence. The writer will continue to provide the patient with support and encouragement where needed. The patient is scheduled for a follow up visit on 1/10/2023 at 12:30pm.  [Recommended Frequency of Visits: ____] : Recommended frequency of visits: [unfilled] [Return in ____ week(s)] : Return in [unfilled] week(s)

## 2023-01-10 ENCOUNTER — APPOINTMENT (OUTPATIENT)
Dept: PSYCHIATRY | Facility: CLINIC | Age: 63
End: 2023-01-10

## 2023-01-10 ENCOUNTER — OUTPATIENT (OUTPATIENT)
Dept: OUTPATIENT SERVICES | Facility: HOSPITAL | Age: 63
LOS: 1 days | Discharge: HOME | End: 2023-01-10

## 2023-01-10 DIAGNOSIS — Z98.890 OTHER SPECIFIED POSTPROCEDURAL STATES: Chronic | ICD-10-CM

## 2023-01-10 DIAGNOSIS — F41.1 GENERALIZED ANXIETY DISORDER: ICD-10-CM

## 2023-01-10 DIAGNOSIS — Z95.1 PRESENCE OF AORTOCORONARY BYPASS GRAFT: Chronic | ICD-10-CM

## 2023-01-10 DIAGNOSIS — F32.9 MAJOR DEPRESSIVE DISORDER, SINGLE EPISODE, UNSPECIFIED: ICD-10-CM

## 2023-01-10 NOTE — PLAN
[Cognitive and/or Behavior Therapy] : Cognitive and/or Behavior Therapy  [Skills training (all types)] : Skills training (all types)  [Supportive Therapy] : Supportive Therapy [FreeTextEntry2] : Goal #1- Management of depressive symptoms \par Obj #1- The patient will verbalize and utilize at least three effective coping mechanisms to manage her depression symptoms \par Obj #2- The patient will attend monthly medication management appointments with Dr. Burns and will take her prescribed psychotropic medications as prescribed \par Obj #3- The patient will attend bi weekly sessions with the writer and is no longer in the CONNECT track. \par \par  [de-identified] : The patient attended her scheduled telehealth session with the writer via [employer]. The patient reported that she has been feeling "awful". The writer explored these feelings further and the patient reported that she has been feeling depressed and anxious due to a situation that involved her daughter while she was visiting with her father Tobias. The writer actively listened to the patient, validated her feelings and support was rendered. The writer provided the patient with psychoeducation regarding splitting and how this can be used as a tactic with individuals who have been diagnosed with BPD. The writer and the patient discussed the importance of ongoing boundary setting with her daughter. The patient reports mediation adherence. The writer will continue to provide the patient with support and encouragement where needed. The patient is scheduled for a follow up visit on 1/24/2023 at 12:30pm.  [Recommended Frequency of Visits: ____] : Recommended frequency of visits: [unfilled] [Return in ____ week(s)] : Return in [unfilled] week(s)

## 2023-01-10 NOTE — RISK ASSESSMENT
[No, patient denies ideation or behavior] : No, patient denies ideation or behavior [Low acute suicide risk] : Low acute suicide risk [No] : No [Not clinically indicated] : Safety Plan completed/updated (for individuals at risk): Not clinically indicated

## 2023-01-10 NOTE — REASON FOR VISIT
[Patient preference] : as per patient preference [Continuing, patient not seen in-person within last 12 months (provide details below)] : Telehealth services are continuing, patient not seen in-person within last 12 months.  [Telehealth (audio & video) - Individual/Group] : This visit was provided via telehealth using real-time 2-way audio visual technology. [Other Location: e.g. Home (Enter Location, City,State)___] : The provider was located at [unfilled]. [Home] : The patient, [unfilled], was located at home, [unfilled], at the time of the visit. [Verbal consent obtained from patient/other participant(s)] : Verbal consent for telehealth/telephonic services obtained from patient/other participant(s) [Patient] : Patient [FreeTextEntry4] : 12:34pm  [FreeTextEntry5] : 1:00pm [FreeTextEntry3] : patient requested telehealth and is appropriate for telehealth sessions [FreeTextEntry1] : psychotherapy follow up

## 2023-01-20 ENCOUNTER — NON-APPOINTMENT (OUTPATIENT)
Age: 63
End: 2023-01-20

## 2023-01-24 ENCOUNTER — APPOINTMENT (OUTPATIENT)
Dept: PSYCHIATRY | Facility: CLINIC | Age: 63
End: 2023-01-24

## 2023-01-24 ENCOUNTER — OUTPATIENT (OUTPATIENT)
Dept: OUTPATIENT SERVICES | Facility: HOSPITAL | Age: 63
LOS: 1 days | Discharge: HOME | End: 2023-01-24

## 2023-01-24 DIAGNOSIS — F41.1 GENERALIZED ANXIETY DISORDER: ICD-10-CM

## 2023-01-24 DIAGNOSIS — F32.9 MAJOR DEPRESSIVE DISORDER, SINGLE EPISODE, UNSPECIFIED: ICD-10-CM

## 2023-01-24 DIAGNOSIS — Z95.1 PRESENCE OF AORTOCORONARY BYPASS GRAFT: Chronic | ICD-10-CM

## 2023-01-24 DIAGNOSIS — Z98.890 OTHER SPECIFIED POSTPROCEDURAL STATES: Chronic | ICD-10-CM

## 2023-01-24 NOTE — REASON FOR VISIT
[Patient preference] : as per patient preference [Continuing, patient not seen in-person within last 12 months (provide details below)] : Telehealth services are continuing, patient not seen in-person within last 12 months.  [Telehealth (audio & video) - Individual/Group] : This visit was provided via telehealth using real-time 2-way audio visual technology. [Other Location: e.g. Home (Enter Location, City,State)___] : The provider was located at [unfilled]. [Home] : The patient, [unfilled], was located at home, [unfilled], at the time of the visit. [Verbal consent obtained from patient/other participant(s)] : Verbal consent for telehealth/telephonic services obtained from patient/other participant(s) [FreeTextEntry4] : 12:35pm  [FreeTextEntry5] : 1:16pm [FreeTextEntry3] : patient requested telehealth services and is appropriate for telehealth sessions [Patient] : Patient [FreeTextEntry1] : psychotherapy follow up

## 2023-01-24 NOTE — PLAN
[FreeTextEntry2] : Goal #1- Management of depressive symptoms \par Obj #1- The patient will verbalize and utilize at least three effective coping mechanisms to manage her depression symptoms \par Obj #2- The patient will attend monthly medication management appointments with Dr. Burns and will take her prescribed psychotropic medications as prescribed \par Obj #3- The patient will attend bi weekly sessions with the writer and is no longer in the CONNECT track. \par \par  [Cognitive and/or Behavior Therapy] : Cognitive and/or Behavior Therapy  [Skills training (all types)] : Skills training (all types)  [Supportive Therapy] : Supportive Therapy [de-identified] : The patient attended her scheduled telehealth session with the writer via [employer]. The patient reported that she has been feeling "very anxious and depressed". The writer explored these feelings with the patient further and she explained that she is having difficulty coping with her daughter's BPD diagnosis in regards to her behaviors. The writer actively listened to the patient, validated her feelings and support was rendered. Ongoing psychoeducation was provided to the patient regarding BPD and ways that she can cope with some of these behaviors. The importance of boundary setting was discussed with the patient.\par The patient verbalized feelings of sadness of being a "burden" as she is not as independent as she once were. This led to a discussion with the patient regarding looking at the reality of her health conditions and how this has impacted her overall health and mobility. The patient discussed feelings of nervousness  as she is scheduled for a lung cancer screening test. The patient reports medication adherence. The patient is not a cigarette smoker. The writer will continue to provide the patient with support and encouragement where needed. The patient is scheduled for a follow up visit on 1/31/2023 at 12:30pm.  [Recommended Frequency of Visits: ____] : Recommended frequency of visits: [unfilled] [Return in ____ week(s)] : Return in [unfilled] week(s)

## 2023-01-25 ENCOUNTER — OUTPATIENT (OUTPATIENT)
Dept: OUTPATIENT SERVICES | Facility: HOSPITAL | Age: 63
LOS: 1 days | Discharge: HOME | End: 2023-01-25
Payer: MEDICARE

## 2023-01-25 DIAGNOSIS — Z98.890 OTHER SPECIFIED POSTPROCEDURAL STATES: Chronic | ICD-10-CM

## 2023-01-25 DIAGNOSIS — Z95.1 PRESENCE OF AORTOCORONARY BYPASS GRAFT: Chronic | ICD-10-CM

## 2023-01-25 DIAGNOSIS — J44.9 CHRONIC OBSTRUCTIVE PULMONARY DISEASE, UNSPECIFIED: ICD-10-CM

## 2023-01-25 PROCEDURE — 71271 CT THORAX LUNG CANCER SCR C-: CPT | Mod: 26

## 2023-01-31 ENCOUNTER — OUTPATIENT (OUTPATIENT)
Dept: OUTPATIENT SERVICES | Facility: HOSPITAL | Age: 63
LOS: 1 days | Discharge: HOME | End: 2023-01-31

## 2023-01-31 ENCOUNTER — APPOINTMENT (OUTPATIENT)
Dept: PSYCHIATRY | Facility: CLINIC | Age: 63
End: 2023-01-31

## 2023-01-31 DIAGNOSIS — F41.1 GENERALIZED ANXIETY DISORDER: ICD-10-CM

## 2023-01-31 DIAGNOSIS — F32.9 MAJOR DEPRESSIVE DISORDER, SINGLE EPISODE, UNSPECIFIED: ICD-10-CM

## 2023-01-31 DIAGNOSIS — Z98.890 OTHER SPECIFIED POSTPROCEDURAL STATES: Chronic | ICD-10-CM

## 2023-01-31 DIAGNOSIS — Z95.1 PRESENCE OF AORTOCORONARY BYPASS GRAFT: Chronic | ICD-10-CM

## 2023-01-31 NOTE — PLAN
[FreeTextEntry2] : Goal #1- Management of depressive symptoms \par Obj #1- The patient will verbalize and utilize at least three effective coping mechanisms to manage her depression symptoms \par Obj #2- The patient will attend monthly medication management appointments with Dr. Burns and will take her prescribed psychotropic medications as prescribed \par Obj #3- The patient will attend bi weekly sessions with the writer and is no longer in the CONNECT track. \par \par  [Cognitive and/or Behavior Therapy] : Cognitive and/or Behavior Therapy  [Skills training (all types)] : Skills training (all types)  [Supportive Therapy] : Supportive Therapy [de-identified] : The patient attended her scheduled telehealth session with the writer via [employer]. The patient reported that she has been feeling better since last session as her lung cancer screening did not show any abnormalities. The patient reported that she continues to have periods of depression as she continues to learn how to cope with her daughter's BPD. The patient explained that she has watched a biography that has been helpful to her. The writer and the patient discussed ongoing boundary setting along with communication skills. The patient reports medication adherence. The patient is not a cigarette smoker. The writer will continue to provide the patient with support and encouragement where needed. The patient is scheduled for a follow up visit on 2/13/2023 at 1pm.  [Recommended Frequency of Visits: ____] : Recommended frequency of visits: [unfilled] [Return in ____ week(s)] : Return in [unfilled] week(s)

## 2023-01-31 NOTE — REASON FOR VISIT
[Continuing, patient not seen in-person within last 12 months (provide details below)] : Telehealth services are continuing, patient not seen in-person within last 12 months.  [Telehealth (audio & video) - Individual/Group] : This visit was provided via telehealth using real-time 2-way audio visual technology. [Other Location: e.g. Home (Enter Location, City,State)___] : The provider was located at [unfilled]. [Home] : The patient, [unfilled], was located at home, [unfilled], at the time of the visit. [Verbal consent obtained from patient/other participant(s)] : Verbal consent for telehealth/telephonic services obtained from patient/other participant(s) [FreeTextEntry4] : 12:37pm  [FreeTextEntry5] : 1:01pm  [FreeTextEntry3] : patient requested telehealth sessions and is appropriate for telehealth services  [Patient] : Patient [FreeTextEntry1] : psychotherapy follow up

## 2023-02-01 ENCOUNTER — OUTPATIENT (OUTPATIENT)
Dept: OUTPATIENT SERVICES | Facility: HOSPITAL | Age: 63
LOS: 1 days | Discharge: HOME | End: 2023-02-01

## 2023-02-01 ENCOUNTER — APPOINTMENT (OUTPATIENT)
Dept: PSYCHIATRY | Facility: CLINIC | Age: 63
End: 2023-02-01
Payer: MEDICARE

## 2023-02-01 DIAGNOSIS — F32.9 MAJOR DEPRESSIVE DISORDER, SINGLE EPISODE, UNSPECIFIED: ICD-10-CM

## 2023-02-01 DIAGNOSIS — Z98.890 OTHER SPECIFIED POSTPROCEDURAL STATES: Chronic | ICD-10-CM

## 2023-02-01 DIAGNOSIS — Z95.1 PRESENCE OF AORTOCORONARY BYPASS GRAFT: Chronic | ICD-10-CM

## 2023-02-01 DIAGNOSIS — G47.00 INSOMNIA, UNSPECIFIED: ICD-10-CM

## 2023-02-01 DIAGNOSIS — F41.1 GENERALIZED ANXIETY DISORDER: ICD-10-CM

## 2023-02-01 PROCEDURE — 99214 OFFICE O/P EST MOD 30 MIN: CPT | Mod: 95

## 2023-02-01 RX ORDER — MIRTAZAPINE 7.5 MG/1
7.5 TABLET, FILM COATED ORAL
Qty: 30 | Refills: 1 | Status: DISCONTINUED | COMMUNITY
Start: 2022-07-28 | End: 2023-02-01

## 2023-02-01 NOTE — REASON FOR VISIT
[FreeTextEntry1] : "I am well  I stopped the mirtazapine." [Home] : at home, [unfilled] , at the time of the visit. [Medical Office: ([facility])___] : at the medical office located in  [Verbal consent obtained from patient] : the patient, [unfilled]

## 2023-02-01 NOTE — DISCUSSION/SUMMARY
[Denied] : Denied [No] : No [Completed, copy requested] : Completed, copy requested [Ordered] : Ordered [Yes] : Yes [Patient is not prescribed antipsychotic medication] : Patient is not prescribed antipsychotic medication [Does patient use tobacco products?] : Patient does not use tobacco products [Does patient use medical marijuana?] : Patient does not use medical marijuana. [Patient has been tested for HIV?] : Patient has not been tested for HIV [Patient has been tested for Hepatitis?] : Patient has not been tested for hepatitis [Patient would like to be tested/re-tested?] : patient would not like to be tested/re-tested [Current or past COVID-19 diagnosis?] : Patient had a present or past COVID-19 diagnosis [Vaccinated?] : is vaccinated [Education provided about COVID-19?] : Education was not provided about COVID-19

## 2023-02-01 NOTE — CURRENT PSYCHIATRIC SYMPTOMS
[Depressed Mood] : no depressed mood [Guilt] : not feeling guilty [Anhedonia] : no anhedonia [Decreased Concentration] : no decrease in concentrating ability [Hyperphagia] : no hyperphagia [Insomnia] : no insomnia disorder [Hypersomnia] : no ~T hypersomnia [Psychomotor Retardation] : no psychomotor retardation [Anorexia] : no anorexia [Euphoria] : no euphoria [Highly Irritable] : no high irritability [Increased Activity] : no increased in activity [Distractibility] : not distracted [Talkativeness] : no talkativeness [Grandeur] : no feelings of grandeur [Buying Sprees] : no buying sprees [Hypersexuality] : denied hypersexuality [Dec Need For Sleep] : no decreased need for sleep [Delusions] : no ~T delusions [Hallucination Visual] : no visual hallucinations [Hallucination Auditory] : no auditory hallucinations [Hallucination Tactile] : no tactile hallucinations [Thought Disorder] : ~T a thought disorder was not noted [Excessive Worry] : excessive worry [Ruminations] : no rumination disorder [Obsessions] : no obsessions [Re-experiencing] : no re-experiencing [Restlessness] : no restlessness [Panic] : no panic disorder [de-identified] : better [de-identified] : mild

## 2023-02-01 NOTE — ASSESSMENT
[FreeTextEntry1] : Patient reports stable mood, at baseline.\par Continue:\par 1. Topamax 50mg in am and 100mg at pm\par 2. Lexapro 25mg po daily\par 3. Wellbutrin XL 300mg po qam\par 4. xanax 0.5mg-1mg at bedtime prn (RX not needed)\par 5. remeron 7.5mg at bedtime was d/c\par \par Follow up in 8 weeks, earlier if needed

## 2023-02-01 NOTE — CURRENT PSYCHIATRIC SYMPTOMS
[Depressed Mood] : no depressed mood [Anhedonia] : no anhedonia [Guilt] : not feeling guilty [Decreased Concentration] : no decrease in concentrating ability [Hyperphagia] : no hyperphagia [Insomnia] : no insomnia disorder [Hypersomnia] : no ~T hypersomnia [Psychomotor Retardation] : no psychomotor retardation [Anorexia] : no anorexia [Euphoria] : no euphoria [Highly Irritable] : no high irritability [Increased Activity] : no increased in activity [Distractibility] : not distracted [Talkativeness] : no talkativeness [Grandeur] : no feelings of grandeur [Buying Sprees] : no buying sprees [Hypersexuality] : denied hypersexuality [Dec Need For Sleep] : no decreased need for sleep [Delusions] : no ~T delusions [Hallucination Visual] : no visual hallucinations [Hallucination Auditory] : no auditory hallucinations [Hallucination Tactile] : no tactile hallucinations [Thought Disorder] : ~T a thought disorder was not noted [Excessive Worry] : excessive worry [Ruminations] : no rumination disorder [Obsessions] : no obsessions [Re-experiencing] : no re-experiencing [Restlessness] : no restlessness [Panic] : no panic disorder [de-identified] : better [de-identified] : mild

## 2023-02-01 NOTE — HISTORY OF PRESENT ILLNESS
[FreeTextEntry1] : This is a 62 year old patient who presents for medication management.  The patient reports  improved mood, good sleep and appetite.  The patient denies any symptoms of depression, seth, or psychosis at this time.  The patient has occasional anxiety that impairs their daily functioning. The patient denies any suicidal ideation, homicidal ideation, no auditory or visual hallucinations, or paranoid ideation.  The patient has chronic medical complaints. The patient denies any drug or alcohol use, and is not smoking at this time. I requested the patient's updated physical and labs from their PCP.  Coping skills were discussed and a safety plan was provided.  The patient was educated on the risks, benefits, and alternatives of their psychiatric medications.  The patient will engage in psychotherapy at this time.  The patient consents to ongoing medication management.  Continues with new therapist.   Patient will continue current medications.  Patient provided PHQ9, yet to complete. \par  [No] : no

## 2023-02-01 NOTE — REASON FOR VISIT
[FreeTextEntry1] : "I am well  I stopped the mirtazapine." [Home] : at home, [unfilled] , at the time of the visit. [Medical Office: ([facility])___] : at the medical office located in  [Verbal consent obtained from patient] : the patient, [unfilled]

## 2023-02-06 ENCOUNTER — NON-APPOINTMENT (OUTPATIENT)
Age: 63
End: 2023-02-06

## 2023-02-07 ENCOUNTER — INPATIENT (INPATIENT)
Facility: HOSPITAL | Age: 63
LOS: 2 days | Discharge: HOME | DRG: 291 | End: 2023-02-10
Attending: INTERNAL MEDICINE | Admitting: INTERNAL MEDICINE
Payer: MEDICARE

## 2023-02-07 VITALS
SYSTOLIC BLOOD PRESSURE: 149 MMHG | TEMPERATURE: 98 F | OXYGEN SATURATION: 99 % | DIASTOLIC BLOOD PRESSURE: 65 MMHG | RESPIRATION RATE: 17 BRPM | HEART RATE: 88 BPM | WEIGHT: 179.9 LBS

## 2023-02-07 DIAGNOSIS — Z95.1 PRESENCE OF AORTOCORONARY BYPASS GRAFT: Chronic | ICD-10-CM

## 2023-02-07 DIAGNOSIS — Z98.890 OTHER SPECIFIED POSTPROCEDURAL STATES: Chronic | ICD-10-CM

## 2023-02-07 DIAGNOSIS — I11.0 HYPERTENSIVE HEART DISEASE WITH HEART FAILURE: ICD-10-CM

## 2023-02-07 DIAGNOSIS — R06.02 SHORTNESS OF BREATH: ICD-10-CM

## 2023-02-07 DIAGNOSIS — I50.33 ACUTE ON CHRONIC DIASTOLIC (CONGESTIVE) HEART FAILURE: ICD-10-CM

## 2023-02-07 LAB
ALBUMIN SERPL ELPH-MCNC: 4.1 G/DL — SIGNIFICANT CHANGE UP (ref 3.5–5.2)
ALP SERPL-CCNC: 94 U/L — SIGNIFICANT CHANGE UP (ref 30–115)
ALT FLD-CCNC: 9 U/L — SIGNIFICANT CHANGE UP (ref 0–41)
ANION GAP SERPL CALC-SCNC: 8 MMOL/L — SIGNIFICANT CHANGE UP (ref 7–14)
AST SERPL-CCNC: 13 U/L — SIGNIFICANT CHANGE UP (ref 0–41)
BASE EXCESS BLDV CALC-SCNC: -0.8 MMOL/L — SIGNIFICANT CHANGE UP (ref -2–3)
BASOPHILS # BLD AUTO: 0.06 K/UL — SIGNIFICANT CHANGE UP (ref 0–0.2)
BASOPHILS NFR BLD AUTO: 0.9 % — SIGNIFICANT CHANGE UP (ref 0–1)
BILIRUB SERPL-MCNC: 0.4 MG/DL — SIGNIFICANT CHANGE UP (ref 0.2–1.2)
BUN SERPL-MCNC: 15 MG/DL — SIGNIFICANT CHANGE UP (ref 10–20)
CA-I SERPL-SCNC: 1.31 MMOL/L — SIGNIFICANT CHANGE UP (ref 1.15–1.33)
CALCIUM SERPL-MCNC: 10.1 MG/DL — SIGNIFICANT CHANGE UP (ref 8.4–10.5)
CHLORIDE SERPL-SCNC: 107 MMOL/L — SIGNIFICANT CHANGE UP (ref 98–110)
CO2 SERPL-SCNC: 23 MMOL/L — SIGNIFICANT CHANGE UP (ref 17–32)
CREAT SERPL-MCNC: 1.2 MG/DL — SIGNIFICANT CHANGE UP (ref 0.7–1.5)
EGFR: 51 ML/MIN/1.73M2 — LOW
EOSINOPHIL # BLD AUTO: 0.3 K/UL — SIGNIFICANT CHANGE UP (ref 0–0.7)
EOSINOPHIL NFR BLD AUTO: 4.7 % — SIGNIFICANT CHANGE UP (ref 0–8)
FLUAV AG NPH QL: SIGNIFICANT CHANGE UP
FLUBV AG NPH QL: SIGNIFICANT CHANGE UP
GAS PNL BLDV: 136 MMOL/L — SIGNIFICANT CHANGE UP (ref 136–145)
GAS PNL BLDV: SIGNIFICANT CHANGE UP
GLUCOSE SERPL-MCNC: 103 MG/DL — HIGH (ref 70–99)
HCO3 BLDV-SCNC: 26 MMOL/L — SIGNIFICANT CHANGE UP (ref 22–29)
HCT VFR BLD CALC: 40.1 % — SIGNIFICANT CHANGE UP (ref 37–47)
HCT VFR BLDA CALC: 38 % — LOW (ref 39–51)
HGB BLD CALC-MCNC: 12.6 G/DL — SIGNIFICANT CHANGE UP (ref 12.6–17.4)
HGB BLD-MCNC: 13 G/DL — SIGNIFICANT CHANGE UP (ref 12–16)
IMM GRANULOCYTES NFR BLD AUTO: 0.3 % — SIGNIFICANT CHANGE UP (ref 0.1–0.3)
LACTATE BLDV-MCNC: 1 MMOL/L — SIGNIFICANT CHANGE UP (ref 0.5–2)
LIDOCAIN IGE QN: 29 U/L — SIGNIFICANT CHANGE UP (ref 7–60)
LYMPHOCYTES # BLD AUTO: 1.62 K/UL — SIGNIFICANT CHANGE UP (ref 1.2–3.4)
LYMPHOCYTES # BLD AUTO: 25.5 % — SIGNIFICANT CHANGE UP (ref 20.5–51.1)
MAGNESIUM SERPL-MCNC: 2.2 MG/DL — SIGNIFICANT CHANGE UP (ref 1.8–2.4)
MCHC RBC-ENTMCNC: 27.2 PG — SIGNIFICANT CHANGE UP (ref 27–31)
MCHC RBC-ENTMCNC: 32.4 G/DL — SIGNIFICANT CHANGE UP (ref 32–37)
MCV RBC AUTO: 83.9 FL — SIGNIFICANT CHANGE UP (ref 81–99)
MONOCYTES # BLD AUTO: 0.58 K/UL — SIGNIFICANT CHANGE UP (ref 0.1–0.6)
MONOCYTES NFR BLD AUTO: 9.1 % — SIGNIFICANT CHANGE UP (ref 1.7–9.3)
NEUTROPHILS # BLD AUTO: 3.78 K/UL — SIGNIFICANT CHANGE UP (ref 1.4–6.5)
NEUTROPHILS NFR BLD AUTO: 59.5 % — SIGNIFICANT CHANGE UP (ref 42.2–75.2)
NRBC # BLD: 0 /100 WBCS — SIGNIFICANT CHANGE UP (ref 0–0)
NT-PROBNP SERPL-SCNC: 596 PG/ML — HIGH (ref 0–300)
PCO2 BLDV: 52 MMHG — HIGH (ref 39–42)
PH BLDV: 7.31 — LOW (ref 7.32–7.43)
PLATELET # BLD AUTO: 293 K/UL — SIGNIFICANT CHANGE UP (ref 130–400)
PO2 BLDV: 32 MMHG — SIGNIFICANT CHANGE UP
POTASSIUM BLDV-SCNC: 3.8 MMOL/L — SIGNIFICANT CHANGE UP (ref 3.5–5.1)
POTASSIUM SERPL-MCNC: 4.3 MMOL/L — SIGNIFICANT CHANGE UP (ref 3.5–5)
POTASSIUM SERPL-SCNC: 4.3 MMOL/L — SIGNIFICANT CHANGE UP (ref 3.5–5)
PROT SERPL-MCNC: 6.9 G/DL — SIGNIFICANT CHANGE UP (ref 6–8)
RBC # BLD: 4.78 M/UL — SIGNIFICANT CHANGE UP (ref 4.2–5.4)
RBC # FLD: 13.3 % — SIGNIFICANT CHANGE UP (ref 11.5–14.5)
RSV RNA NPH QL NAA+NON-PROBE: SIGNIFICANT CHANGE UP
SAO2 % BLDV: 46.6 % — SIGNIFICANT CHANGE UP
SARS-COV-2 RNA SPEC QL NAA+PROBE: SIGNIFICANT CHANGE UP
SODIUM SERPL-SCNC: 138 MMOL/L — SIGNIFICANT CHANGE UP (ref 135–146)
TROPONIN T SERPL-MCNC: <0.01 NG/ML — SIGNIFICANT CHANGE UP
WBC # BLD: 6.36 K/UL — SIGNIFICANT CHANGE UP (ref 4.8–10.8)
WBC # FLD AUTO: 6.36 K/UL — SIGNIFICANT CHANGE UP (ref 4.8–10.8)

## 2023-02-07 PROCEDURE — 97165 OT EVAL LOW COMPLEX 30 MIN: CPT | Mod: GO

## 2023-02-07 PROCEDURE — 84484 ASSAY OF TROPONIN QUANT: CPT

## 2023-02-07 PROCEDURE — 85379 FIBRIN DEGRADATION QUANT: CPT

## 2023-02-07 PROCEDURE — 80061 LIPID PANEL: CPT

## 2023-02-07 PROCEDURE — 93306 TTE W/DOPPLER COMPLETE: CPT

## 2023-02-07 PROCEDURE — 36415 COLL VENOUS BLD VENIPUNCTURE: CPT

## 2023-02-07 PROCEDURE — 83605 ASSAY OF LACTIC ACID: CPT

## 2023-02-07 PROCEDURE — 84145 PROCALCITONIN (PCT): CPT

## 2023-02-07 PROCEDURE — 82803 BLOOD GASES ANY COMBINATION: CPT

## 2023-02-07 PROCEDURE — 94640 AIRWAY INHALATION TREATMENT: CPT

## 2023-02-07 PROCEDURE — 97161 PT EVAL LOW COMPLEX 20 MIN: CPT | Mod: GP

## 2023-02-07 PROCEDURE — 82553 CREATINE MB FRACTION: CPT

## 2023-02-07 PROCEDURE — 85025 COMPLETE CBC W/AUTO DIFF WBC: CPT

## 2023-02-07 PROCEDURE — 82962 GLUCOSE BLOOD TEST: CPT

## 2023-02-07 PROCEDURE — 84443 ASSAY THYROID STIM HORMONE: CPT

## 2023-02-07 PROCEDURE — 80053 COMPREHEN METABOLIC PANEL: CPT

## 2023-02-07 PROCEDURE — 71275 CT ANGIOGRAPHY CHEST: CPT | Mod: 26,MA

## 2023-02-07 PROCEDURE — 71045 X-RAY EXAM CHEST 1 VIEW: CPT | Mod: 26

## 2023-02-07 PROCEDURE — 93010 ELECTROCARDIOGRAM REPORT: CPT

## 2023-02-07 PROCEDURE — 83735 ASSAY OF MAGNESIUM: CPT

## 2023-02-07 RX ORDER — SODIUM CHLORIDE 9 MG/ML
1000 INJECTION, SOLUTION INTRAVENOUS ONCE
Refills: 0 | Status: COMPLETED | OUTPATIENT
Start: 2023-02-07 | End: 2023-02-07

## 2023-02-07 RX ORDER — DIPHENHYDRAMINE HCL 50 MG
50 CAPSULE ORAL ONCE
Refills: 0 | Status: COMPLETED | OUTPATIENT
Start: 2023-02-07 | End: 2023-02-07

## 2023-02-07 RX ORDER — IPRATROPIUM/ALBUTEROL SULFATE 18-103MCG
3 AEROSOL WITH ADAPTER (GRAM) INHALATION ONCE
Refills: 0 | Status: COMPLETED | OUTPATIENT
Start: 2023-02-07 | End: 2023-02-07

## 2023-02-07 RX ADMIN — Medication 102 MILLIGRAM(S): at 20:26

## 2023-02-07 RX ADMIN — Medication 3 MILLILITER(S): at 19:20

## 2023-02-07 RX ADMIN — Medication 125 MILLIGRAM(S): at 20:24

## 2023-02-07 RX ADMIN — SODIUM CHLORIDE 1000 MILLILITER(S): 9 INJECTION, SOLUTION INTRAVENOUS at 18:35

## 2023-02-07 NOTE — ED ADULT NURSE NOTE - NSICDXPASTMEDICALHX_GEN_ALL_CORE_FT
PAST MEDICAL HISTORY:  Afib     Anxiety and depression     Asthma     Bilateral carpal tunnel syndrome     Closed fracture of foot, unspecified laterality, sequela     COPD (chronic obstructive pulmonary disease) case management patient     Essential hypertension     Hearing aid worn     Hearing decreased     History of  section

## 2023-02-07 NOTE — ED ADULT NURSE NOTE - NSIMPLEMENTINTERV_GEN_ALL_ED
Implemented All Fall with Harm Risk Interventions:  [place] to call system. Call bell, personal items and telephone within reach. Instruct patient to call for assistance. Room bathroom lighting operational. Non-slip footwear when patient is off stretcher. Physically safe environment: no spills, clutter or unnecessary equipment. Stretcher in lowest position, wheels locked, appropriate side rails in place. Provide visual cue, wrist band, yellow gown, etc. Monitor gait and stability. Monitor for mental status changes and reorient to person, place, and time. Review medications for side effects contributing to fall risk. Reinforce activity limits and safety measures with patient and family. Provide visual clues: red socks.

## 2023-02-07 NOTE — ED PROVIDER NOTE - OBJECTIVE STATEMENT
63 y/o female with PMH of bilateral carpal tunnel syndrome, Atrial fibrillation (on Xarelto), pulmonary hypertension, asthma, hearing impaired, anxiety, depression, COPD, S/P CABG x 3  accompanied by her daughter c/o shortness of breath. Patient endorses persistent SOB and intermittent non-specific chest discomfort for the past several weeks that has failed to improve with home medication and rest. Today, patient relates she had a sudden onset of bilateral upper back discomfort which prompted her to go to an urgent care for evaluation. Patient states her back pain has since improved but it was recommended she comes to the ED for further workup given her medical history and length of symptoms. Patient denies any coughing, congestion, diarrhea, fevers, abdominal pain, urinary symptoms, leg swelling/pain, or additional complaints.

## 2023-02-07 NOTE — ED PROVIDER NOTE - CLINICAL SUMMARY MEDICAL DECISION MAKING FREE TEXT BOX
61 yo F w cad s/p cabg, htn, afib on xarelto, anxiety/depression, asthma/copd presents for sob and chest pain now radiating to back worse with exertion. exam noted for tachypnea with minimal exertion, dec breath sounds. labs normal except for CO2 retention on vbg. cxr no focal pathology, ct angio with no PE. ekg no stemi. patient treated with duo neb and steroids for concern for possible COPD component to symptoms with no significant improvement in symptoms. admitted for further management.

## 2023-02-08 LAB
ALBUMIN SERPL ELPH-MCNC: 3.9 G/DL — SIGNIFICANT CHANGE UP (ref 3.5–5.2)
ALP SERPL-CCNC: 88 U/L — SIGNIFICANT CHANGE UP (ref 30–115)
ALT FLD-CCNC: 9 U/L — SIGNIFICANT CHANGE UP (ref 0–41)
ANION GAP SERPL CALC-SCNC: 12 MMOL/L — SIGNIFICANT CHANGE UP (ref 7–14)
AST SERPL-CCNC: 13 U/L — SIGNIFICANT CHANGE UP (ref 0–41)
BASOPHILS # BLD AUTO: 0.02 K/UL — SIGNIFICANT CHANGE UP (ref 0–0.2)
BASOPHILS NFR BLD AUTO: 0.3 % — SIGNIFICANT CHANGE UP (ref 0–1)
BILIRUB SERPL-MCNC: 0.3 MG/DL — SIGNIFICANT CHANGE UP (ref 0.2–1.2)
BUN SERPL-MCNC: 13 MG/DL — SIGNIFICANT CHANGE UP (ref 10–20)
CALCIUM SERPL-MCNC: 9.9 MG/DL — SIGNIFICANT CHANGE UP (ref 8.4–10.4)
CHLORIDE SERPL-SCNC: 108 MMOL/L — SIGNIFICANT CHANGE UP (ref 98–110)
CHOLEST SERPL-MCNC: 161 MG/DL — SIGNIFICANT CHANGE UP
CK MB CFR SERPL CALC: 1.1 NG/ML — SIGNIFICANT CHANGE UP (ref 0.6–6.3)
CO2 SERPL-SCNC: 21 MMOL/L — SIGNIFICANT CHANGE UP (ref 17–32)
CREAT SERPL-MCNC: 1.1 MG/DL — SIGNIFICANT CHANGE UP (ref 0.7–1.5)
D DIMER BLD IA.RAPID-MCNC: <150 NG/ML DDU — SIGNIFICANT CHANGE UP
EGFR: 57 ML/MIN/1.73M2 — LOW
EOSINOPHIL # BLD AUTO: 0.01 K/UL — SIGNIFICANT CHANGE UP (ref 0–0.7)
EOSINOPHIL NFR BLD AUTO: 0.2 % — SIGNIFICANT CHANGE UP (ref 0–8)
GLUCOSE SERPL-MCNC: 202 MG/DL — HIGH (ref 70–99)
HCT VFR BLD CALC: 40.1 % — SIGNIFICANT CHANGE UP (ref 37–47)
HDLC SERPL-MCNC: 59 MG/DL — SIGNIFICANT CHANGE UP
HGB BLD-MCNC: 12.7 G/DL — SIGNIFICANT CHANGE UP (ref 12–16)
IMM GRANULOCYTES NFR BLD AUTO: 0.3 % — SIGNIFICANT CHANGE UP (ref 0.1–0.3)
LIPID PNL WITH DIRECT LDL SERPL: 90 MG/DL — SIGNIFICANT CHANGE UP
LYMPHOCYTES # BLD AUTO: 0.71 K/UL — LOW (ref 1.2–3.4)
LYMPHOCYTES # BLD AUTO: 12.3 % — LOW (ref 20.5–51.1)
MAGNESIUM SERPL-MCNC: 2.2 MG/DL — SIGNIFICANT CHANGE UP (ref 1.8–2.4)
MCHC RBC-ENTMCNC: 26.7 PG — LOW (ref 27–31)
MCHC RBC-ENTMCNC: 31.7 G/DL — LOW (ref 32–37)
MCV RBC AUTO: 84.4 FL — SIGNIFICANT CHANGE UP (ref 81–99)
MONOCYTES # BLD AUTO: 0.09 K/UL — LOW (ref 0.1–0.6)
MONOCYTES NFR BLD AUTO: 1.6 % — LOW (ref 1.7–9.3)
NEUTROPHILS # BLD AUTO: 4.93 K/UL — SIGNIFICANT CHANGE UP (ref 1.4–6.5)
NEUTROPHILS NFR BLD AUTO: 85.3 % — HIGH (ref 42.2–75.2)
NON HDL CHOLESTEROL: 102 MG/DL — SIGNIFICANT CHANGE UP
NRBC # BLD: 0 /100 WBCS — SIGNIFICANT CHANGE UP (ref 0–0)
PLATELET # BLD AUTO: 272 K/UL — SIGNIFICANT CHANGE UP (ref 130–400)
POTASSIUM SERPL-MCNC: 4.7 MMOL/L — SIGNIFICANT CHANGE UP (ref 3.5–5)
POTASSIUM SERPL-SCNC: 4.7 MMOL/L — SIGNIFICANT CHANGE UP (ref 3.5–5)
PROCALCITONIN SERPL-MCNC: 0.04 NG/ML — SIGNIFICANT CHANGE UP (ref 0.02–0.1)
PROT SERPL-MCNC: 6.7 G/DL — SIGNIFICANT CHANGE UP (ref 6–8)
RBC # BLD: 4.75 M/UL — SIGNIFICANT CHANGE UP (ref 4.2–5.4)
RBC # FLD: 13.2 % — SIGNIFICANT CHANGE UP (ref 11.5–14.5)
SODIUM SERPL-SCNC: 141 MMOL/L — SIGNIFICANT CHANGE UP (ref 135–146)
TRIGL SERPL-MCNC: 62 MG/DL — SIGNIFICANT CHANGE UP
TROPONIN T SERPL-MCNC: <0.01 NG/ML — SIGNIFICANT CHANGE UP
TSH SERPL-MCNC: 0.83 UIU/ML — SIGNIFICANT CHANGE UP (ref 0.27–4.2)
WBC # BLD: 5.78 K/UL — SIGNIFICANT CHANGE UP (ref 4.8–10.8)
WBC # FLD AUTO: 5.78 K/UL — SIGNIFICANT CHANGE UP (ref 4.8–10.8)

## 2023-02-08 PROCEDURE — 99222 1ST HOSP IP/OBS MODERATE 55: CPT | Mod: GC

## 2023-02-08 RX ORDER — CHLORHEXIDINE GLUCONATE 213 G/1000ML
1 SOLUTION TOPICAL
Refills: 0 | Status: DISCONTINUED | OUTPATIENT
Start: 2023-02-08 | End: 2023-02-10

## 2023-02-08 RX ORDER — BUPROPION HYDROCHLORIDE 150 MG/1
300 TABLET, EXTENDED RELEASE ORAL DAILY
Refills: 0 | Status: DISCONTINUED | OUTPATIENT
Start: 2023-02-08 | End: 2023-02-10

## 2023-02-08 RX ORDER — MONTELUKAST 4 MG/1
10 TABLET, CHEWABLE ORAL DAILY
Refills: 0 | Status: DISCONTINUED | OUTPATIENT
Start: 2023-02-08 | End: 2023-02-10

## 2023-02-08 RX ORDER — ALBUTEROL 90 UG/1
2 AEROSOL, METERED ORAL EVERY 6 HOURS
Refills: 0 | Status: DISCONTINUED | OUTPATIENT
Start: 2023-02-08 | End: 2023-02-10

## 2023-02-08 RX ORDER — ALPRAZOLAM 0.25 MG
1 TABLET ORAL
Qty: 0 | Refills: 0 | DISCHARGE

## 2023-02-08 RX ORDER — FUROSEMIDE 40 MG
40 TABLET ORAL DAILY
Refills: 0 | Status: DISCONTINUED | OUTPATIENT
Start: 2023-02-08 | End: 2023-02-08

## 2023-02-08 RX ORDER — LANOLIN ALCOHOL/MO/W.PET/CERES
5 CREAM (GRAM) TOPICAL AT BEDTIME
Refills: 0 | Status: DISCONTINUED | OUTPATIENT
Start: 2023-02-08 | End: 2023-02-10

## 2023-02-08 RX ORDER — ESCITALOPRAM OXALATE 10 MG/1
20 TABLET, FILM COATED ORAL DAILY
Refills: 0 | Status: DISCONTINUED | OUTPATIENT
Start: 2023-02-08 | End: 2023-02-08

## 2023-02-08 RX ORDER — ESCITALOPRAM OXALATE 10 MG/1
1 TABLET, FILM COATED ORAL
Qty: 0 | Refills: 0 | DISCHARGE

## 2023-02-08 RX ORDER — MIRTAZAPINE 45 MG/1
7.5 TABLET, ORALLY DISINTEGRATING ORAL AT BEDTIME
Refills: 0 | Status: DISCONTINUED | OUTPATIENT
Start: 2023-02-08 | End: 2023-02-10

## 2023-02-08 RX ORDER — BUDESONIDE AND FORMOTEROL FUMARATE DIHYDRATE 160; 4.5 UG/1; UG/1
2 AEROSOL RESPIRATORY (INHALATION)
Refills: 0 | Status: DISCONTINUED | OUTPATIENT
Start: 2023-02-08 | End: 2023-02-10

## 2023-02-08 RX ORDER — ACETAMINOPHEN 500 MG
650 TABLET ORAL EVERY 6 HOURS
Refills: 0 | Status: DISCONTINUED | OUTPATIENT
Start: 2023-02-08 | End: 2023-02-10

## 2023-02-08 RX ORDER — METOPROLOL TARTRATE 50 MG
25 TABLET ORAL
Refills: 0 | Status: DISCONTINUED | OUTPATIENT
Start: 2023-02-08 | End: 2023-02-10

## 2023-02-08 RX ORDER — TOPIRAMATE 25 MG
100 TABLET ORAL AT BEDTIME
Refills: 0 | Status: DISCONTINUED | OUTPATIENT
Start: 2023-02-08 | End: 2023-02-10

## 2023-02-08 RX ORDER — FUROSEMIDE 40 MG
40 TABLET ORAL DAILY
Refills: 0 | Status: DISCONTINUED | OUTPATIENT
Start: 2023-02-08 | End: 2023-02-10

## 2023-02-08 RX ORDER — RIVAROXABAN 15 MG-20MG
20 KIT ORAL
Refills: 0 | Status: DISCONTINUED | OUTPATIENT
Start: 2023-02-08 | End: 2023-02-10

## 2023-02-08 RX ORDER — ESCITALOPRAM OXALATE 10 MG/1
25 TABLET, FILM COATED ORAL DAILY
Refills: 0 | Status: DISCONTINUED | OUTPATIENT
Start: 2023-02-08 | End: 2023-02-10

## 2023-02-08 RX ORDER — ASPIRIN/CALCIUM CARB/MAGNESIUM 324 MG
81 TABLET ORAL DAILY
Refills: 0 | Status: DISCONTINUED | OUTPATIENT
Start: 2023-02-08 | End: 2023-02-10

## 2023-02-08 RX ORDER — ESCITALOPRAM OXALATE 10 MG/1
25 TABLET, FILM COATED ORAL DAILY
Refills: 0 | Status: DISCONTINUED | OUTPATIENT
Start: 2023-02-08 | End: 2023-02-08

## 2023-02-08 RX ORDER — IPRATROPIUM/ALBUTEROL SULFATE 18-103MCG
3 AEROSOL WITH ADAPTER (GRAM) INHALATION EVERY 6 HOURS
Refills: 0 | Status: DISCONTINUED | OUTPATIENT
Start: 2023-02-08 | End: 2023-02-10

## 2023-02-08 RX ORDER — DIPHENHYDRAMINE HCL 50 MG
50 CAPSULE ORAL ONCE
Refills: 0 | Status: COMPLETED | OUTPATIENT
Start: 2023-02-08 | End: 2023-02-08

## 2023-02-08 RX ORDER — ATORVASTATIN CALCIUM 80 MG/1
20 TABLET, FILM COATED ORAL AT BEDTIME
Refills: 0 | Status: DISCONTINUED | OUTPATIENT
Start: 2023-02-08 | End: 2023-02-10

## 2023-02-08 RX ORDER — FUROSEMIDE 40 MG
20 TABLET ORAL DAILY
Refills: 0 | Status: DISCONTINUED | OUTPATIENT
Start: 2023-02-08 | End: 2023-02-08

## 2023-02-08 RX ORDER — TOPIRAMATE 25 MG
50 TABLET ORAL DAILY
Refills: 0 | Status: DISCONTINUED | OUTPATIENT
Start: 2023-02-08 | End: 2023-02-10

## 2023-02-08 RX ADMIN — Medication 40 MILLIGRAM(S): at 06:41

## 2023-02-08 RX ADMIN — Medication 25 MILLIGRAM(S): at 06:41

## 2023-02-08 RX ADMIN — BUDESONIDE AND FORMOTEROL FUMARATE DIHYDRATE 2 PUFF(S): 160; 4.5 AEROSOL RESPIRATORY (INHALATION) at 09:44

## 2023-02-08 RX ADMIN — MONTELUKAST 10 MILLIGRAM(S): 4 TABLET, CHEWABLE ORAL at 12:20

## 2023-02-08 RX ADMIN — RIVAROXABAN 20 MILLIGRAM(S): KIT at 17:57

## 2023-02-08 RX ADMIN — Medication 100 MILLIGRAM(S): at 21:56

## 2023-02-08 RX ADMIN — ESCITALOPRAM OXALATE 25 MILLIGRAM(S): 10 TABLET, FILM COATED ORAL at 12:20

## 2023-02-08 RX ADMIN — Medication 50 MILLIGRAM(S): at 12:20

## 2023-02-08 RX ADMIN — BUPROPION HYDROCHLORIDE 300 MILLIGRAM(S): 150 TABLET, EXTENDED RELEASE ORAL at 12:20

## 2023-02-08 RX ADMIN — BUDESONIDE AND FORMOTEROL FUMARATE DIHYDRATE 2 PUFF(S): 160; 4.5 AEROSOL RESPIRATORY (INHALATION) at 21:57

## 2023-02-08 RX ADMIN — ATORVASTATIN CALCIUM 20 MILLIGRAM(S): 80 TABLET, FILM COATED ORAL at 21:56

## 2023-02-08 RX ADMIN — Medication 81 MILLIGRAM(S): at 12:20

## 2023-02-08 RX ADMIN — Medication 3 MILLILITER(S): at 20:47

## 2023-02-08 RX ADMIN — Medication 50 MILLIGRAM(S): at 22:49

## 2023-02-08 RX ADMIN — Medication 3 MILLILITER(S): at 08:33

## 2023-02-08 RX ADMIN — Medication 3 MILLILITER(S): at 15:56

## 2023-02-08 RX ADMIN — Medication 25 MILLIGRAM(S): at 17:57

## 2023-02-08 NOTE — PHYSICAL THERAPY INITIAL EVALUATION ADULT - LEVEL OF INDEPENDENCE: BED TO CHAIR, REHAB EVAL
Mr. Singleton is a 58 yo M with PMHx of chronic back and neck pain from a spine injury 2019 from a work incident (fell on the concrete after slipping on snow) with now right sided hand contracture, and no other medical problems who presented to the hospital after he developed numbness/tingling to the left hand and foot, with weakness of the left leg and subsequent dragging of the left foot. He says that this started ~3 days ago and he felt it getting worse and worse so he decided to come to the hospital (was brought in by his son).  He denies any urinary incontinence or pain in legs, falls, injuries, trauma, shortness of breath fevers, chills, n/v/d, recent illnesses. Denies any bladder/bowel incontinence, saddle anesthesia, or pain that awakens him at night. He does not have any history of neurological conditions and does not take any medications for any chronic comorbid conditions. He says that he has had spinal injections and shoulder injections in the past for chronic pain.     In the ED, patient remained afebrile and hemodynamically stable. Did not receive any medications. Mr. Singleton is a 58 yo M with PMHx of chronic back and neck pain from a spine injury 2019 from a work incident (fell on the concrete after slipping on snow) with now right sided hand contracture, and no other medical problems who presented to the hospital after he developed numbness/tingling to the left hand and foot, with weakness of the left leg and subsequent dragging of the left foot. He says that this started ~3 days ago and he felt it getting worse and worse so he decided to come to the hospital (was brought in by his son).  He denies any urinary incontinence or pain in legs, falls, injuries, trauma, shortness of breath fevers, chills, n/v/d, recent illnesses. Denies any bladder/bowel incontinence, saddle anesthesia, or pain that awakens him at night. He does not have any history of neurological conditions and does not take any medications for any chronic comorbid conditions. He says that he has had spinal injections and shoulder injections in the past for chronic pain.     In the ED, patient remained afebrile and hemodynamically stable. Did not receive any medications. Admitted to the floor for foot drop and paresthesia w/u. Seen by neuro and w/u for vitamin deficiencies, lyme, syphilis, thyroid disease, diabetes, HLD, all came back normal. Full spine MRI showed abnormalities of the C and lumber spine which could be contributory to the pathology. Neuro recommended neurosurgery consult with outpatient EMG f/u. PT/OT both recommended outpatient f/u. Neurosurgery deferred to ortho team. Patient stood, turned and returned to sitting at EOB/independent Mr. Singleton is a 58 yo M with PMHx of chronic back and neck pain from a spine injury 2019 from a work incident (fell on the concrete after slipping on snow) with now right sided hand contracture, and no other medical problems who presented to the hospital after he developed numbness/tingling to the left hand and foot, with weakness of the left leg and subsequent dragging of the left foot. He says that this started ~3 days ago and he felt it getting worse and worse so he decided to come to the hospital (was brought in by his son).  He denies any urinary incontinence or pain in legs, falls, injuries, trauma, shortness of breath fevers, chills, n/v/d, recent illnesses. Denies any bladder/bowel incontinence, saddle anesthesia, or pain that awakens him at night. He does not have any history of neurological conditions and does not take any medications for any chronic comorbid conditions. He says that he has had spinal injections and shoulder injections in the past for chronic pain.     In the ED, patient remained afebrile and hemodynamically stable. Did not receive any medications. Admitted to the floor for foot drop and paresthesia w/u. Seen by neuro and w/u for vitamin deficiencies, lyme, syphilis, thyroid disease, diabetes, HLD, all came back normal.  Neuro recommending outpt neuro f/u and EMG. PT/OT both recommended outpatient f/u. Full spine MRI showed abnormalities of the C and lumber spine which could be contributory to the pathology. Ortho consulted for MRI spine findings, and they recommended Anterior Cervical Corpectomy and fusion with a posterior cervical fusion.     On day of discharge, patient is clinically stable with no new exam findings or acute symptoms compared to prior. The patient was seen by the attending physician on the date of discharge and deemed satable and acceptable for discharge. The patient's chronic medical conditions were treated accordingly per the patient's home medication regimen. The patient's medication reconciliation (with changes made to chronic medications), follow up appointments, discharge orders, instructions, and signifcant lab and diagnostic studies are as noted.     Discharge follow up action items:     1. Follow up with Neuro + EMG in 1-2 weeks  2. Follow up labs ***  3. Medication changes: none  4. On hold medications: none  5. Incidentalomas    Patient's ordered code status: Full Code    Patient disposition: Home       Mr. Singleton is a 58 yo M with PMHx of chronic back and neck pain from a spine injury 2019 from a work incident (fell on the concrete after slipping on snow) with now right sided hand contracture, and no other medical problems who presented to the hospital after he developed numbness/tingling to the left hand and foot, with weakness of the left leg and subsequent dragging of the left foot. He says that this started ~3 days ago and he felt it getting worse and worse so he decided to come to the hospital (was brought in by his son).  He denies any urinary incontinence or pain in legs, falls, injuries, trauma, shortness of breath fevers, chills, n/v/d, recent illnesses. Denies any bladder/bowel incontinence, saddle anesthesia, or pain that awakens him at night. He does not have any history of neurological conditions and does not take any medications for any chronic comorbid conditions. He says that he has had spinal injections and shoulder injections in the past for chronic pain.     In the ED, patient remained afebrile and hemodynamically stable. Did not receive any medications. Admitted to the floor for foot drop and paresthesia w/u. Seen by neuro and w/u for vitamin deficiencies, lyme, syphilis, thyroid disease, diabetes, HLD, all came back normal.  Neuro recommending outpt neuro f/u and EMG. PT/OT both recommended outpatient f/u. Full spine MRI showed abnormalities of the C and lumber spine which could be contributory to the pathology. Ortho consulted for MRI spine findings, and they recommended Anterior Cervical Corpectomy and fusion with a posterior cervical fusion. Plan for transfer to [facility] for surgery on 11/08.     On day of discharge, patient is clinically stable with no new exam findings or acute symptoms compared to prior. The patient was seen by the attending physician on the date of discharge and deemed satable and acceptable for discharge. The patient's chronic medical conditions were treated accordingly per the patient's home medication regimen. The patient's medication reconciliation (with changes made to chronic medications), follow up appointments, discharge orders, instructions, and signifcant lab and diagnostic studies are as noted.     Discharge follow up action items:     1. Follow up with Neuro + EMG in 1-2 weeks  2. Follow up labs ***  3. Medication changes: none  4. On hold medications: none  5. Incidentalomas    Patient's ordered code status: Full Code    Patient disposition: Home       Mr. Singleton is a 58 yo M with PMHx of chronic back and neck pain from a spine injury 2019 from a work incident (fell on the concrete after slipping on snow) with now right sided hand contracture, and no other medical problems who presented to the hospital after he developed numbness/tingling to the left hand and foot, with weakness of the left leg and subsequent dragging of the left foot. He says that this started ~3 days ago and he felt it getting worse and worse so he decided to come to the hospital (was brought in by his son).  He denies any urinary incontinence or pain in legs, falls, injuries, trauma, shortness of breath fevers, chills, n/v/d, recent illnesses. Denies any bladder/bowel incontinence, saddle anesthesia, or pain that awakens him at night. He does not have any history of neurological conditions and does not take any medications for any chronic comorbid conditions. He says that he has had spinal injections and shoulder injections in the past for chronic pain.     In the ED, patient remained afebrile and hemodynamically stable. Did not receive any medications. Admitted to the floor for foot drop and paresthesia w/u. Seen by neuro and w/u for vitamin deficiencies, lyme, syphilis, thyroid disease, diabetes, HLD, all came back normal.  Neuro recommending outpt neuro f/u and EMG. PT/OT both recommended outpatient f/u. Full spine MRI showed abnormalities of the C and lumber spine which could be contributory to the pathology. Ortho consulted for MRI spine findings, and they recommended Anterior Cervical Corpectomy and fusion with a posterior cervical fusion. Plan for transfer to [facility] for surgery on 11/08.     On day of discharge, patient is clinically stable with no new exam findings or acute symptoms compared to prior. The patient was seen by the attending physician on the date of discharge and deemed satable and acceptable for discharge. The patient's chronic medical conditions were treated accordingly per the patient's home medication regimen. The patient's medication reconciliation (with changes made to chronic medications), follow up appointments, discharge orders, instructions, and signifcant lab and diagnostic studies are as noted.     Follow up action items after discharge from [facility]:     1. Follow up with outpatient Neuro + EMG in 1-2 weeks  2. Follow up with outpatient Ortho for post-op per surgical team  3. Follow up with outpatient Physical Therapy and Occupational Therapy    Patient's ordered code status: Full Code    Patient disposition: Transfer t       Mr. Singleton is a 56 yo M with PMHx of chronic back and neck pain from a spine injury 2019 from a work incident (fell on the concrete after slipping on snow) with now right sided hand contracture, and no other medical problems who presented to the hospital after he developed numbness/tingling to the left hand and foot, with weakness of the left leg and subsequent dragging of the left foot. He says that this started ~3 days ago and he felt it getting worse and worse so he decided to come to the hospital (was brought in by his son).  He denies any urinary incontinence or pain in legs, falls, injuries, trauma, shortness of breath fevers, chills, n/v/d, recent illnesses. Denies any bladder/bowel incontinence, saddle anesthesia, or pain that awakens him at night. He does not have any history of neurological conditions and does not take any medications for any chronic comorbid conditions. He says that he has had spinal injections and shoulder injections in the past for chronic pain.     In the ED, patient remained afebrile and hemodynamically stable. Did not receive any medications. Admitted to the floor for foot drop and paresthesia w/u. Seen by neuro and w/u for vitamin deficiencies, lyme, syphilis, thyroid disease, diabetes, HLD, all came back normal.  Neuro recommending outpt neuro f/u and EMG. PT/OT both recommended outpatient f/u. Full spine MRI showed abnormalities of the C and lumber spine which could be contributory to patient's clinical presentation. Ortho consulted for MRI spine findings, and they recommended Anterior Cervical Corpectomy and fusion with a posterior cervical fusion. Plan for transfer to [facility] for surgery on 11/08.     At time of transfer, pt is hemodynamically stable. He will be transferred to [facility] for spinal surgery per Ortho.    Follow up action items after discharge from [facility]:     1. Follow up with outpatient Neuro + EMG in 1-2 weeks  2. Follow up with outpatient Ortho for post-op per surgical team  3. Follow up with outpatient Physical Therapy and Occupational Therapy    Patient's ordered code status: Full Code    Patient disposition: Transfer t       Mr. Singleton is a 56 yo M with PMHx of chronic back and neck pain from a spine injury 2019 from a work incident (fell on the concrete after slipping on snow) with now right sided hand contracture, and no other medical problems who presented to the hospital after he developed numbness/tingling to the left hand and foot, with weakness of the left leg and subsequent dragging of the left foot. He says that this started ~3 days ago and he felt it getting worse and worse so he decided to come to the hospital (was brought in by his son).  He denies any urinary incontinence or pain in legs, falls, injuries, trauma, shortness of breath fevers, chills, n/v/d, recent illnesses. Denies any bladder/bowel incontinence, saddle anesthesia, or pain that awakens him at night. He does not have any history of neurological conditions and does not take any medications for any chronic comorbid conditions. He says that he has had spinal injections and shoulder injections in the past for chronic pain.     In the ED, patient remained afebrile and hemodynamically stable. Did not receive any medications. Admitted to the floor for foot drop and paresthesia w/u. Seen by neuro and w/u for vitamin deficiencies, lyme, syphilis, thyroid disease, diabetes, HLD, all came back normal.  Neuro recommending outpt neuro f/u and EMG. PT/OT both recommended outpatient f/u. Full spine MRI showed abnormalities of the C and lumber spine which could be contributory to patient's clinical presentation. Ortho consulted for MRI spine findings, and they recommended Anterior Cervical Corpectomy and fusion with a posterior cervical fusion. Patient transferred to [facility] for surgery on 11/08.     At time of transfer, pt is hemodynamically stable. He will be transferred to [facility] for spinal surgery per Ortho.    Follow up action items after discharge from [facility]:     1. Follow up with outpatient Neuro + EMG in 1-2 weeks  2. Follow up with outpatient Ortho for post-op per surgical team  3. Follow up with outpatient Physical Therapy and Occupational Therapy    Patient's ordered code status: Full Code

## 2023-02-08 NOTE — H&P ADULT - ASSESSMENT
61 yo F w cad s/p cabg, htn, afib on xarelto, anxiety/depression, asthma/copd presents for sob and chest pain.     #SOB and Chest pain w radiation to back - now resolved, patient asymptomatic, no sepsis poa   - labs and imaging thus far unrevealing   - trend trop and cont supportive care  - PT/OT eval    #cad s/p cabg  #afib on xarelto  #htn/hld  #anxiety/depression  #asthma/copd  - cont home meds    DVT ppx: on xarelto  GI ppx: ni  Diet: DASH  Activity: IAT    DISPO: Likely in AM 61 yo F w cad s/p cabg, htn, afib on xarelto, anxiety/depression, asthma/copd presents for sob and chest pain.     #SOB and Chest pain w radiation to back likely multifactorial (see below) - now resolved, patient asymptomatic, no sepsis poa, no wheezing on exam  - labs and imaging thus far unrevealing   - patient endorses using inhalers more frequently over the last few weeks for copd/asthma > on proventil, incruse ellipta, symbicort, along w monteleukast  - increased home dose of lasix from 20 to 40 as bnp mildly elevated - could be contributing to sob  - otherwise cont supportive care, and trend trops (neg x1)  - PT/OT eval    #cad s/p cabg  #htn/hld  #afib on xarelto  - cont asa, statin, bb, lasix and xarelto    #anxiety/depression - could be contributing to worsening symptoms  - cont home escitalopram, topiramate, bupropion, also takes xanax prn at home    DVT ppx: on xarelto  GI ppx: ni  Diet: DASH  Activity: IAT

## 2023-02-08 NOTE — ED ADULT NURSE REASSESSMENT NOTE - NS ED NURSE REASSESS COMMENT FT1
Assumed care from previous nurse Xenia c/o chest pain , denies chest pain this time , AO x 4 , no labored breathing noted

## 2023-02-08 NOTE — ED ADULT NURSE REASSESSMENT NOTE - NS ED NURSE REASSESS COMMENT FT1
Report given to new primary nurse Michelle, pt AO x 3 , refused cardiac monitor  since pt is allergy to cardiac monitor leads per pt , MD aware , no labored breathing noted , no grimaced face noted , IVL intact , transported to ED 3 bed 3 , belongings sent to unit with pt

## 2023-02-08 NOTE — PHYSICAL THERAPY INITIAL EVALUATION ADULT - PERTINENT HX OF CURRENT PROBLEM, REHAB EVAL
61 yo F w cad s/p cabg, htn, afib on xarelto, anxiety/depression, asthma/copd presents for sob and chest pain. Patient says symptoms have been occurring over the last ~2 weeks and today she started experiencing b/l upper back pain which prompted her to go to , where she was told to come to hospital for evaluation.

## 2023-02-08 NOTE — H&P ADULT - HISTORY OF PRESENT ILLNESS
61 yo F w cad s/p cabg, htn, afib on xarelto, anxiety/depression, asthma/copd presents for sob and chest pain. Patient says symptoms have been occurring over the last ~2 weeks and today she started experiencing b/l upper back pain which prompted her to go to , where she was told to come to hospital for evaluation.     In ED , T 98.4, /65, HR 88, RR 17, SpO2 99% on RA; Labs significant for . ABG pCO2 52, CTA chest neg.    Patient was admitted. 63 yo F w cad s/p cabg, htn, afib on xarelto, anxiety/depression, asthma/copd presents for sob and chest pain. Patient says symptoms have been occurring over the last ~2 weeks and today she started experiencing b/l upper back pain which prompted her to go to , where she was told to come to hospital for evaluation. Of note, came to ed and admitted in sept 2022 for similar complaints.    In ED , T 98.4, /65, HR 88, RR 17, SpO2 99% on RA; Labs significant for . ABG pCO2 52, CTA chest neg.    Patient was admitted after ED could not get in touch private MD. 63 yo F w cad s/p cabg, htn, afib on xarelto, anxiety/depression, asthma/copd presents for sob and chest pain. Patient says symptoms have been occurring over the last ~2 weeks and today she started experiencing b/l upper back pain which prompted her to go to , where she was told to come to hospital for evaluation. Of note, came to ed and admitted in sept 2022 for similar complaints. Also to note, patient states she has been using her home inhalers and nebulizers more frequently as of late.    In ED , T 98.4, /65, HR 88, RR 17, SpO2 99% on RA; Labs significant for . ABG pCO2 52, CTA chest neg.    Patient was admitted after ED could not get in touch private MD.

## 2023-02-08 NOTE — PROGRESS NOTE ADULT - ASSESSMENT
pt with underlying cardiac and pulmonary dis came to the ER for c/o inc SOB assoc with inc back pain and inc use of inhaler and nebulizer.    SOB with back pain. R/O cardiac vs pul etiology  Hx of HTN, ASHD, CAD, sp CABG, paroc afib, Ac/Xarelto  Hx of COPD, Br Asthma  Hx of GERD, Diverticulosis  Hx of OA, DJD, CTS  Hx of Anxiety, Depression    pt ad for further evaluation  trend troponins, EKG, ECHO  Cardio consult  Pul consult  maintain home meds   pt with underlying cardiac and pulmonary dis came to the ER for c/o inc SOB assoc with inc back pain and inc use of inhaler and nebulizer.    SOB with back pain. R/O cardiac vs pul etiology  Hx of HTN, ASHD, CAD, sp CABG, paroc afib, Ac/Xarelto, sp Watchman Procedure  Hx of COPD, Br Asthma  Hx of GERD, Diverticulosis  Hx of OA, DJD, CTS  Hx of Anxiety, Depression    pt ad for further evaluation and tx of her SOB cardiac vs pul etiology  troponin neg x 2  CT angio neg for PE, LN or mass or infiltrate, + emphysema  Cardio consult   Pul consult Dr Donny Wooten  maintain home meds  trend pulse Ox  DUONEB tx q 6  spirometry

## 2023-02-08 NOTE — H&P ADULT - NSHPLABSRESULTS_GEN_ALL_CORE
Labs:                        13.0   6.36  )-----------( 293      ( 07 Feb 2023 18:11 )             40.1                                   02-07    138  |  107  |  15  ----------------------------<  103<H>  4.3   |  23  |  1.2    Ca    10.1      07 Feb 2023 18:11  Mg     2.2     02-07    TPro  6.9  /  Alb  4.1  /  TBili  0.4  /  DBili  x   /  AST  13  /  ALT  9   /  AlkPhos  94  02-07    Troponin T, Serum: <0.01 ng/mL (02-07-23 @ 18:11)    Serum Pro-Brain Natriuretic Peptide: 596 pg/mL (02-07-23 @ 18:11)

## 2023-02-08 NOTE — CONSULT NOTE ADULT - ATTENDING COMMENTS
Ms Kang was seen & examined on her stretcher in the ED with her family member present.    ****INCOMPLETE NOTE Ms Kang was seen & examined on her stretcher in the ED with her family member present.  Patient reports subjective shortness of breath with orthopnea over the past few weeks.  She has been compliant with her home inhalers, and is without hypoxia.  While her VBG in the ED showed some mild acute hypercapnia and associated respiratory acidosis, the lack of renal compensation makes this unlikely to be an ongoing issue.  Recommend repeating the VBG.  Otherwise, obtain a TTE (in or outpatient) and continue patient's home inhalers with albuterol PRN.  Patient should have pulmonary follow-up after discharge.  I agree with the fellow note, with the exceptions listed in my attestation above.  The remainder of impression and plan per fellow note.

## 2023-02-08 NOTE — PROVIDER CONTACT NOTE (OTHER) - SITUATION
Patient was in urgent care, admitted to [facility]. Patient reports she is allergic to adhesive of tele leads, has picture showing inflammation and redness to chest from being on the monitor [facility]

## 2023-02-08 NOTE — H&P ADULT - NSHPPHYSICALEXAM_GEN_ALL_CORE
Vital Signs Last 24 Hrs  T(C): 37.6 (08 Feb 2023 00:19), Max: 37.6 (08 Feb 2023 00:19)  T(F): 99.6 (08 Feb 2023 00:19), Max: 99.6 (08 Feb 2023 00:19)  HR: 68 (08 Feb 2023 00:19) (56 - 88)  BP: 127/60 (08 Feb 2023 00:19) (123/60 - 149/65)  RR: 18 (08 Feb 2023 00:19) (17 - 18)  SpO2: 99% (08 Feb 2023 00:19) (99% - 99%)    Parameters below as of 08 Feb 2023 00:19  Patient On (Oxygen Delivery Method): room air    PHYSICAL EXAM  GENERAL: NAD  HEAD:  NCAT, EOMI, MMM  NECK: Supple, Nontender  NERVOUS SYSTEM: AAOx3  CHEST/LUNG: + bs b/l  HEART: +s1s2 RRR  ABDOMEN: soft, NT/ND (+) bs, no HSM  EXTREMITIES:  PP, no edema  SKIN: No rashes or lesions Vital Signs Last 24 Hrs  T(C): 37.6 (08 Feb 2023 00:19), Max: 37.6 (08 Feb 2023 00:19)  T(F): 99.6 (08 Feb 2023 00:19), Max: 99.6 (08 Feb 2023 00:19)  HR: 68 (08 Feb 2023 00:19) (56 - 88)  BP: 127/60 (08 Feb 2023 00:19) (123/60 - 149/65)  RR: 18 (08 Feb 2023 00:19) (17 - 18)  SpO2: 99% (08 Feb 2023 00:19) (99% - 99%)    Parameters below as of 08 Feb 2023 00:19  Patient On (Oxygen Delivery Method): room air    PHYSICAL EXAM  GENERAL: NAD  HEAD:  NCAT, EOMI, MMM  NECK: Supple, Nontender  NERVOUS SYSTEM: AAOx3  CHEST/LUNG: + bs b/l, no wheeze  HEART: +s1s2 irreg RR  ABDOMEN: soft, NT/ND, back nontender  EXTREMITIES:  PP, no pitting edema

## 2023-02-08 NOTE — PROGRESS NOTE ADULT - SUBJECTIVE AND OBJECTIVE BOX
FLAVIO HUDSON 62y Female went to the  Care for c/o inc SOB over the last 2 wks with recent onset of BL upper back pain and inc use of albuterol pump and nebulizer tx,  The pt was referred from the  Care to the ER for further evaluation.  The pt c/o cont SOB, diff breathing with assoc with upper back pain unrelieved by her pump and nebulizer.  The pt is being ad for further evaluation in context of a cardiac and pul Hx.      The pMHx includes:  HTN, ASHD, CAD, CABG, parox afib, AC/Xarelto, COPD/Br Asthma, hx of tobacco use, OA, DJD, DDD, CTS, GERD, Diverticulosis, anxiety/depression, dec hearing acuity.    INTERVAL HPI/OVERNIGHT EVENTS: pt feels better, unable to be on monitor as pt states she is allergic to the adhesive for the EKG monitor    MEDICATIONS  (STANDING):  albuterol    90 MICROgram(s) HFA Inhaler 2 Puff(s) Inhalation every 6 hours  albuterol/ipratropium for Nebulization 3 milliLiter(s) Nebulizer every 6 hours  aspirin  chewable 81 milliGRAM(s) Oral daily  atorvastatin 20 milliGRAM(s) Oral at bedtime  budesonide 160 MICROgram(s)/formoterol 4.5 MICROgram(s) Inhaler 2 Puff(s) Inhalation two times a day  buPROPion XL (24-Hour) . 300 milliGRAM(s) Oral daily  chlorhexidine 2% Cloths 1 Application(s) Topical <User Schedule>  escitalopram 25 milliGRAM(s) Oral daily  furosemide   Injectable 40 milliGRAM(s) IV Push daily  metoprolol tartrate 25 milliGRAM(s) Oral two times a day  mirtazapine 7.5 milliGRAM(s) Oral at bedtime  montelukast 10 milliGRAM(s) Oral daily  rivaroxaban 20 milliGRAM(s) Oral with dinner  topiramate 100 milliGRAM(s) Oral at bedtime  topiramate 50 milliGRAM(s) Oral daily    MEDICATIONS  (PRN):  acetaminophen     Tablet .. 650 milliGRAM(s) Oral every 6 hours PRN Temp greater or equal to 38C (100.4F), Mild Pain (1 - 3), Moderate Pain (4 - 6)  aluminum hydroxide/magnesium hydroxide/simethicone Suspension 30 milliLiter(s) Oral every 4 hours PRN Dyspepsia  melatonin 5 milliGRAM(s) Oral at bedtime PRN Insomnia      Allergies    amiodarone (Flushing)  iodine (Unknown)  Seafood (Unknown)  shellfish (Unknown)  adhesive for the EKG pads    REVIEW OF SYSTEMS      General: fatigue	  :	    Respiratory and Thorax: inc SOB, assoc with back pain  	  Cardiovascular:	some chest pressure    Gastrointestinal:	denies Sx  	    Vital Signs Last 24 Hrs  T(C): 36.3 (08 Feb 2023 07:45), Max: 37.6 (08 Feb 2023 00:19)  T(F): 97.3 (08 Feb 2023 07:45), Max: 99.6 (08 Feb 2023 00:19)  HR: 60 (08 Feb 2023 07:45) (56 - 88)  BP: 120/57 (08 Feb 2023 07:45) (120/57 - 149/65)  BP(mean): --  RR: 18 (08 Feb 2023 07:45) (17 - 18)  SpO2: 99% (08 Feb 2023 07:45) (99% - 99%)    Parameters below as of 08 Feb 2023 07:45  Patient On (Oxygen Delivery Method): room air        PHYSICAL EXAM:      Constitutional: A&O in NAD    Eyes: nonicteric    ENMT: dry oral mucosa    Neck: supple, no JVD/bruit/stridor    Back: pain on palp of TH and L spine    Respiratory: shallow resp, scattered rhonchi    Cardiovascular: S1s2 reg    Gastrointestinal: globose soft and benign, + BS    Genitourinary: no holley    Extremities: moves all ext, no edema    Vascular: + pedal pulses    Neurological: nonfocal    Skin: no rash    Psychiatric: stable        LABS:                        12.7   5.78  )-----------( 272      ( 08 Feb 2023 06:14 )             40.1     02-08    141  |  108  |  13  ----------------------------<  202<H>  4.7   |  21  |  1.1    Ca    9.9      08 Feb 2023 06:14  Mg     2.2     02-08    TPro  6.7  /  Alb  3.9  /  TBili  0.3  /  DBili  x   /  AST  13  /  ALT  9   /  AlkPhos  88  02-08          RADIOLOGY & ADDITIONAL TESTS:     FLAVIO HUDSON 62y Female went to the  Care for c/o inc SOB over the last 2 wks with recent onset of BL upper back pain and inc use of albuterol pump and nebulizer tx,  The pt was referred from the  Care to the ER for further evaluation.  The pt c/o cont SOB, diff breathing with assoc with upper back pain unrelieved by her pump and nebulizer.  The pt is being ad for further evaluation in context of a cardiac and pul Hx.      The pMHx includes:  HTN, ASHD, CAD, CABG, parox afib, AC/Xarelto, sp Watchman  procedure, COPD/Br Asthma, hx of tobacco use, OA, DJD, DDD, CTS, GERD, Diverticulosis, anxiety/depression, dec hearing acuity.    INTERVAL HPI/OVERNIGHT EVENTS: pt feels better, unable to be on monitor as pt states she is allergic to the adhesive for the EKG monitor, of note CT angio negative for PE or active dis, troponin neg x 2    MEDICATIONS  (STANDING):  albuterol    90 MICROgram(s) HFA Inhaler 2 Puff(s) Inhalation every 6 hours  albuterol/ipratropium for Nebulization 3 milliLiter(s) Nebulizer every 6 hours  aspirin  chewable 81 milliGRAM(s) Oral daily  atorvastatin 20 milliGRAM(s) Oral at bedtime  budesonide 160 MICROgram(s)/formoterol 4.5 MICROgram(s) Inhaler 2 Puff(s) Inhalation two times a day  buPROPion XL (24-Hour) . 300 milliGRAM(s) Oral daily  chlorhexidine 2% Cloths 1 Application(s) Topical <User Schedule>  escitalopram 25 milliGRAM(s) Oral daily  furosemide   Injectable 40 milliGRAM(s) IV Push daily  metoprolol tartrate 25 milliGRAM(s) Oral two times a day  mirtazapine 7.5 milliGRAM(s) Oral at bedtime  montelukast 10 milliGRAM(s) Oral daily  rivaroxaban 20 milliGRAM(s) Oral with dinner  topiramate 100 milliGRAM(s) Oral at bedtime  topiramate 50 milliGRAM(s) Oral daily    MEDICATIONS  (PRN):  acetaminophen     Tablet .. 650 milliGRAM(s) Oral every 6 hours PRN Temp greater or equal to 38C (100.4F), Mild Pain (1 - 3), Moderate Pain (4 - 6)  aluminum hydroxide/magnesium hydroxide/simethicone Suspension 30 milliLiter(s) Oral every 4 hours PRN Dyspepsia  melatonin 5 milliGRAM(s) Oral at bedtime PRN Insomnia      Allergies    amiodarone (Flushing)  iodine (Unknown)  Seafood (Unknown)  shellfish (Unknown)  adhesive for the EKG pads    REVIEW OF SYSTEMS      General: fatigue	  :	    Respiratory and Thorax: inc SOB, assoc with back pain  	  Cardiovascular:	some chest pressure    Gastrointestinal:	denies Sx  	    Vital Signs Last 24 Hrs  T(C): 36.3 (08 Feb 2023 07:45), Max: 37.6 (08 Feb 2023 00:19)  T(F): 97.3 (08 Feb 2023 07:45), Max: 99.6 (08 Feb 2023 00:19)  HR: 60 (08 Feb 2023 07:45) (56 - 88)  BP: 120/57 (08 Feb 2023 07:45) (120/57 - 149/65)  BP(mean): --  RR: 18 (08 Feb 2023 07:45) (17 - 18)  SpO2: 99% (08 Feb 2023 07:45) (99% - 99%)    Parameters below as of 08 Feb 2023 07:45  Patient On (Oxygen Delivery Method): room air        PHYSICAL EXAM:      Constitutional: A&O in NAD    Eyes: nonicteric    ENMT: dry oral mucosa    Neck: supple, no JVD/bruit/stridor    Back: pain on palp of TH and L spine    Respiratory: shallow resp, scattered rhonchi    Cardiovascular: S1s2 reg    Gastrointestinal: globose soft and benign, + BS    Genitourinary: no holley    Extremities: moves all ext, no edema    Vascular: + pedal pulses    Neurological: nonfocal    Skin: no rash    Psychiatric: stable        LABS:                        12.7   5.78  )-----------( 272      ( 08 Feb 2023 06:14 )             40.1     02-08    141  |  108  |  13  ----------------------------<  202<H>  4.7   |  21  |  1.1    Ca    9.9      08 Feb 2023 06:14  Mg     2.2     02-08    TPro  6.7  /  Alb  3.9  /  TBili  0.3  /  DBili  x   /  AST  13  /  ALT  9   /  AlkPhos  88  02-08          RADIOLOGY & ADDITIONAL TESTS:  CXR:  no active infiltrate  CT angio:  no PE, no mediastinal or hilar LN, + emphysema, sp CABG, sp  L atrial appendage device  EKG:  sinus phil 59/min, nonspecific ST-T changes,  no acute changes

## 2023-02-08 NOTE — PHYSICAL THERAPY INITIAL EVALUATION ADULT - NSPTDISCHREC_GEN_A_CORE
Patient is independent with functional mobility. Based on today's evaluation,  PT recommends D/C to home when medically appropriate./No skilled PT needs

## 2023-02-08 NOTE — CHART NOTE - NSCHARTNOTEFT_GEN_A_CORE
Spoke to nurse who reported that patient is allergic to ekg lead adhesives. Hospital does not have hypoallergic adhesives so patient cannot be on cardiac monitor.

## 2023-02-08 NOTE — CONSULT NOTE ADULT - ASSESSMENT
IMPRESSION    Dyspnea  H/o asthma/COPD not in exacerbation  H/o SILAS on CPAP  H/o CHF, CAD and CABG  H/o A fib on xarelto      RECOMMENDATIONS    CT angiogram chest reviewed, shows no PE or any signs of pulmonary disease  Obtain echocardiogram  Repeat VBG  Diurese as tolerated, can continue Lasix 40 IV daily  Consider cardiology consult  Continue home inhalers, albuterol PRN  Continue CPAP home settings  From pulmonary standpoint this workup can be done outpatient IMPRESSION    Dyspnea  H/o asthma/COPD not in exacerbation  H/o SILAS on CPAP  H/o CHF, CAD and CABG  H/o A fib on xarelto      RECOMMENDATIONS    CT angiogram chest reviewed, shows no PE or any signs of pulmonary disease  Obtain echocardiogram to rule-out PH (though unlikely)  Repeat VBG for slight hypercapnia  Diurese as tolerated, per primary team  Consider cardiology consult  Continue home inhalers, albuterol PRN  Continue CPAP QHS at home settings  From pulmonary standpoint this workup can be done outpatient

## 2023-02-08 NOTE — PHYSICAL THERAPY INITIAL EVALUATION ADULT - ADDITIONAL COMMENTS
pt lives with her daughter in a house with 14 steps inside. pt was independent pta. pt lives with her daughter in an upstairs apartment with 14 steps to enter pt was independent pta.

## 2023-02-09 LAB
ALBUMIN SERPL ELPH-MCNC: 3.8 G/DL — SIGNIFICANT CHANGE UP (ref 3.5–5.2)
ALP SERPL-CCNC: 84 U/L — SIGNIFICANT CHANGE UP (ref 30–115)
ALT FLD-CCNC: 11 U/L — SIGNIFICANT CHANGE UP (ref 0–41)
ANION GAP SERPL CALC-SCNC: 8 MMOL/L — SIGNIFICANT CHANGE UP (ref 7–14)
AST SERPL-CCNC: 14 U/L — SIGNIFICANT CHANGE UP (ref 0–41)
BASOPHILS # BLD AUTO: 0.09 K/UL — SIGNIFICANT CHANGE UP (ref 0–0.2)
BASOPHILS NFR BLD AUTO: 0.9 % — SIGNIFICANT CHANGE UP (ref 0–1)
BILIRUB SERPL-MCNC: 0.3 MG/DL — SIGNIFICANT CHANGE UP (ref 0.2–1.2)
BUN SERPL-MCNC: 18 MG/DL — SIGNIFICANT CHANGE UP (ref 10–20)
CALCIUM SERPL-MCNC: 9.9 MG/DL — SIGNIFICANT CHANGE UP (ref 8.4–10.4)
CHLORIDE SERPL-SCNC: 106 MMOL/L — SIGNIFICANT CHANGE UP (ref 98–110)
CO2 SERPL-SCNC: 27 MMOL/L — SIGNIFICANT CHANGE UP (ref 17–32)
CREAT SERPL-MCNC: 1.3 MG/DL — SIGNIFICANT CHANGE UP (ref 0.7–1.5)
EGFR: 46 ML/MIN/1.73M2 — LOW
EOSINOPHIL # BLD AUTO: 0.24 K/UL — SIGNIFICANT CHANGE UP (ref 0–0.7)
EOSINOPHIL NFR BLD AUTO: 2.3 % — SIGNIFICANT CHANGE UP (ref 0–8)
GLUCOSE SERPL-MCNC: 94 MG/DL — SIGNIFICANT CHANGE UP (ref 70–99)
HCT VFR BLD CALC: 39.6 % — SIGNIFICANT CHANGE UP (ref 37–47)
HGB BLD-MCNC: 12.6 G/DL — SIGNIFICANT CHANGE UP (ref 12–16)
IMM GRANULOCYTES NFR BLD AUTO: 0.4 % — HIGH (ref 0.1–0.3)
LYMPHOCYTES # BLD AUTO: 3.35 K/UL — SIGNIFICANT CHANGE UP (ref 1.2–3.4)
LYMPHOCYTES # BLD AUTO: 32.3 % — SIGNIFICANT CHANGE UP (ref 20.5–51.1)
MAGNESIUM SERPL-MCNC: 2.2 MG/DL — SIGNIFICANT CHANGE UP (ref 1.8–2.4)
MCHC RBC-ENTMCNC: 26.5 PG — LOW (ref 27–31)
MCHC RBC-ENTMCNC: 31.8 G/DL — LOW (ref 32–37)
MCV RBC AUTO: 83.2 FL — SIGNIFICANT CHANGE UP (ref 81–99)
MONOCYTES # BLD AUTO: 0.74 K/UL — HIGH (ref 0.1–0.6)
MONOCYTES NFR BLD AUTO: 7.1 % — SIGNIFICANT CHANGE UP (ref 1.7–9.3)
NEUTROPHILS # BLD AUTO: 5.9 K/UL — SIGNIFICANT CHANGE UP (ref 1.4–6.5)
NEUTROPHILS NFR BLD AUTO: 57 % — SIGNIFICANT CHANGE UP (ref 42.2–75.2)
NRBC # BLD: 0 /100 WBCS — SIGNIFICANT CHANGE UP (ref 0–0)
PLATELET # BLD AUTO: 287 K/UL — SIGNIFICANT CHANGE UP (ref 130–400)
POTASSIUM SERPL-MCNC: 4 MMOL/L — SIGNIFICANT CHANGE UP (ref 3.5–5)
POTASSIUM SERPL-SCNC: 4 MMOL/L — SIGNIFICANT CHANGE UP (ref 3.5–5)
PROT SERPL-MCNC: 6.6 G/DL — SIGNIFICANT CHANGE UP (ref 6–8)
RBC # BLD: 4.76 M/UL — SIGNIFICANT CHANGE UP (ref 4.2–5.4)
RBC # FLD: 13.6 % — SIGNIFICANT CHANGE UP (ref 11.5–14.5)
SODIUM SERPL-SCNC: 141 MMOL/L — SIGNIFICANT CHANGE UP (ref 135–146)
WBC # BLD: 10.36 K/UL — SIGNIFICANT CHANGE UP (ref 4.8–10.8)
WBC # FLD AUTO: 10.36 K/UL — SIGNIFICANT CHANGE UP (ref 4.8–10.8)

## 2023-02-09 PROCEDURE — 93306 TTE W/DOPPLER COMPLETE: CPT | Mod: 26

## 2023-02-09 RX ADMIN — Medication 3 MILLILITER(S): at 14:24

## 2023-02-09 RX ADMIN — ATORVASTATIN CALCIUM 20 MILLIGRAM(S): 80 TABLET, FILM COATED ORAL at 21:17

## 2023-02-09 RX ADMIN — ESCITALOPRAM OXALATE 20 MILLIGRAM(S): 10 TABLET, FILM COATED ORAL at 12:23

## 2023-02-09 RX ADMIN — MONTELUKAST 10 MILLIGRAM(S): 4 TABLET, CHEWABLE ORAL at 12:22

## 2023-02-09 RX ADMIN — Medication 40 MILLIGRAM(S): at 05:16

## 2023-02-09 RX ADMIN — Medication 25 MILLIGRAM(S): at 12:22

## 2023-02-09 RX ADMIN — BUPROPION HYDROCHLORIDE 300 MILLIGRAM(S): 150 TABLET, EXTENDED RELEASE ORAL at 12:23

## 2023-02-09 RX ADMIN — RIVAROXABAN 20 MILLIGRAM(S): KIT at 17:51

## 2023-02-09 RX ADMIN — BUDESONIDE AND FORMOTEROL FUMARATE DIHYDRATE 2 PUFF(S): 160; 4.5 AEROSOL RESPIRATORY (INHALATION) at 14:24

## 2023-02-09 RX ADMIN — Medication 25 MILLIGRAM(S): at 05:16

## 2023-02-09 RX ADMIN — Medication 100 MILLIGRAM(S): at 21:17

## 2023-02-09 RX ADMIN — Medication 81 MILLIGRAM(S): at 12:23

## 2023-02-09 RX ADMIN — Medication 3 MILLILITER(S): at 21:26

## 2023-02-09 RX ADMIN — BUDESONIDE AND FORMOTEROL FUMARATE DIHYDRATE 2 PUFF(S): 160; 4.5 AEROSOL RESPIRATORY (INHALATION) at 21:17

## 2023-02-09 RX ADMIN — Medication 25 MILLIGRAM(S): at 17:51

## 2023-02-09 NOTE — CONSULT NOTE ADULT - ASSESSMENT
IMPRESSION  SOB/MURCIA 2 wks duration  CAD s/p CABG LIMA to LAD  Paroxysmal A Fib on Xarelto   recent URI symptoms  Hx of Asthma, COPD  SILAS non compliant with CPAP  Anxiety  --------------------------------------------------  EKG: Sinus with PACs no ischemic changes  troponin: negative X 2  Echo: EF nl, no WMA or valvular disease  NST: negative 10/22    RECOMMENDATIONS  c/w aspirin, Lipitor metoprolol  can switch lasix to PO  c/w anticoagulation for A Fib  inhalers/nebs per primary team and pulm. Check ABGs may need NIV for home  can f/u as outpatient with cardio for possible RHC/LHC if symptoms persist  IMPRESSION  SOB/MURCIA 2 wks duration  CAD s/p CABG LIMA to LAD  Paroxysmal A Fib on Xarelto   recent URI symptoms  Hx of Asthma, COPD  SILAS non compliant with CPAP  Anxiety  --------------------------------------------------  EKG: Sinus with PACs no ischemic changes  troponin: negative X 2  Echo: EF nl, no WMA or valvular disease  NST: negative 10/22    RECOMMENDATIONS  c/w aspirin, Lipitor metoprolol  can switch lasix to PO  c/w anticoagulation for A Fib  inhalers/nebs per primary team and pulm. Check ABGs may need NIV for home  can f/u as outpatient with cardio for possible RHC/LHC if symptoms persist

## 2023-02-09 NOTE — PROGRESS NOTE ADULT - SUBJECTIVE AND OBJECTIVE BOX
FLAVIO HUDSON 62y Female went to the  Care for c/o inc SOB over the last 2 wks with recent onset of BL upper back pain and inc use of albuterol pump and nebulizer tx,  The pt was referred from the  Care to the ER for further evaluation.  The pt c/o cont SOB, diff breathing with assoc with upper back pain unrelieved by her pump and nebulizer.  The pt is being ad for further evaluation in context of a cardiac and pul Hx.      The pMHx includes:  HTN, ASHD, CAD, CABG, parox afib, AC/Xarelto, sp Watchman  procedure, COPD/Br Asthma, hx of tobacco use, OA, DJD, DDD, CTS, GERD, Diverticulosis, anxiety/depression, dec hearing acuity.    INTERVAL HPI/OVERNIGHT EVENTS: pt feels better, unable to be on monitor as pt states she is allergic to the adhesive for the EKG monitor, of note CT angio negative for PE or active dis, troponin neg x 2, ECHO done results still P, evaluated by Pul, no active issues, cont inhalers and CPAP at HS    MEDICATIONS  (STANDING):  albuterol    90 MICROgram(s) HFA Inhaler 2 Puff(s) Inhalation every 6 hours  albuterol/ipratropium for Nebulization 3 milliLiter(s) Nebulizer every 6 hours  aspirin  chewable 81 milliGRAM(s) Oral daily  atorvastatin 20 milliGRAM(s) Oral at bedtime  budesonide 160 MICROgram(s)/formoterol 4.5 MICROgram(s) Inhaler 2 Puff(s) Inhalation two times a day  buPROPion XL (24-Hour) . 300 milliGRAM(s) Oral daily  chlorhexidine 2% Cloths 1 Application(s) Topical <User Schedule>  escitalopram 25 milliGRAM(s) Oral daily  furosemide   Injectable 40 milliGRAM(s) IV Push daily  metoprolol tartrate 25 milliGRAM(s) Oral two times a day  mirtazapine 7.5 milliGRAM(s) Oral at bedtime  montelukast 10 milliGRAM(s) Oral daily  rivaroxaban 20 milliGRAM(s) Oral with dinner  topiramate 100 milliGRAM(s) Oral at bedtime  topiramate 50 milliGRAM(s) Oral daily    MEDICATIONS  (PRN):  acetaminophen     Tablet .. 650 milliGRAM(s) Oral every 6 hours PRN Temp greater or equal to 38C (100.4F), Mild Pain (1 - 3), Moderate Pain (4 - 6)  aluminum hydroxide/magnesium hydroxide/simethicone Suspension 30 milliLiter(s) Oral every 4 hours PRN Dyspepsia  melatonin 5 milliGRAM(s) Oral at bedtime PRN Insomnia      Allergies    amiodarone (Flushing)  iodine (Unknown)  Seafood (Unknown)  shellfish (Unknown)  adhesive for the EKG pads    REVIEW OF SYSTEMS      General: fatigue	  :	    Respiratory and Thorax: inc SOB, assoc with back pain  	  Cardiovascular:	some chest pressure    Gastrointestinal:	denies Sx  	    Vital Signs Last 24 Hrs  T(F): 97.7  HR: 58  BP: 119/58    RR: 18   SpO2: 97% RA    PHYSICAL EXAM:      Constitutional: A&O in NAD    Eyes: nonicteric    ENMT: dry oral mucosa    Neck: supple, no JVD/bruit/stridor    Back: pain on palp of TH and L spine    Respiratory: shallow resp, scattered rhonchi    Cardiovascular: S1s2 reg    Gastrointestinal: globose soft and benign, + BS    Genitourinary: no holley    Extremities: moves all ext, no edema    Vascular: + pedal pulses    Neurological: nonfocal    Skin: no rash    Psychiatric: stable        LABS:                        12.6   10 )-----------( 287             39.6      141  |  106 |  18  ----------------------------<  94  4.0  |  27  |  1.3  GFR  51, 57, 46  Ca    9.9       Mg     2.2     troponin negative x 2/ < 0.01 x2  CKMB 1.1  TPro  6.7  /  Alb  3.9  /  TBili  0.3  /  DBili  x   /  AST  13  /  ALT  9   /  AlkPhos  88  02-08    RADIOLOGY & ADDITIONAL TESTS:  CXR:  no active infiltrate  CT angio:  no PE, no mediastinal or hilar LN, + emphysema, sp CABG, sp  L atrial appendage device  EKG:  sinus phil 59/min, nonspecific ST-T changes,  no acute changes  ECHO   FLAVIO HUDSON 62y Female went to the  Care for c/o inc SOB over the last 2 wks with recent onset of BL upper back pain and inc use of albuterol pump and nebulizer tx,  The pt was referred from the  Care to the ER for further evaluation.  The pt c/o cont SOB, diff breathing with assoc with upper back pain unrelieved by her pump and nebulizer.  The pt is being ad for further evaluation in context of a cardiac and pul Hx.      The pMHx includes:  HTN, ASHD, CAD, CABG, parox afib, AC/Xarelto, sp Watchman  procedure, COPD/Br Asthma, hx of tobacco use, OA, DJD, DDD, CTS, GERD, Diverticulosis, anxiety/depression, dec hearing acuity.    INTERVAL HPI/OVERNIGHT EVENTS: pt feels better, but  concerned over the fact that the SOB is in and feels v similar to  sx she felt prior to her CABG in 2010, ECHO done results still P, evaluated by Pul, no active issues, cont inhalers and CPAP at HS, awaiting cardiac input/ ? cath    MEDICATIONS  (STANDING):  albuterol    90 MICROgram(s) HFA Inhaler 2 Puff(s) Inhalation every 6 hours  albuterol/ipratropium for Nebulization 3 milliLiter(s) Nebulizer every 6 hours  aspirin  chewable 81 milliGRAM(s) Oral daily  atorvastatin 20 milliGRAM(s) Oral at bedtime  budesonide 160 MICROgram(s)/formoterol 4.5 MICROgram(s) Inhaler 2 Puff(s) Inhalation two times a day  buPROPion XL (24-Hour) . 300 milliGRAM(s) Oral daily  chlorhexidine 2% Cloths 1 Application(s) Topical <User Schedule>  escitalopram 25 milliGRAM(s) Oral daily  furosemide   Injectable 40 milliGRAM(s) IV Push daily  metoprolol tartrate 25 milliGRAM(s) Oral two times a day  mirtazapine 7.5 milliGRAM(s) Oral at bedtime  montelukast 10 milliGRAM(s) Oral daily  rivaroxaban 20 milliGRAM(s) Oral with dinner  topiramate 100 milliGRAM(s) Oral at bedtime  topiramate 50 milliGRAM(s) Oral daily    MEDICATIONS  (PRN):  acetaminophen     Tablet .. 650 milliGRAM(s) Oral every 6 hours PRN Temp greater or equal to 38C (100.4F), Mild Pain (1 - 3), Moderate Pain (4 - 6)  aluminum hydroxide/magnesium hydroxide/simethicone Suspension 30 milliLiter(s) Oral every 4 hours PRN Dyspepsia  melatonin 5 milliGRAM(s) Oral at bedtime PRN Insomnia      Allergies    amiodarone (Flushing)  iodine (Unknown)  Seafood (Unknown)  shellfish (Unknown)  adhesive for the EKG pads    REVIEW OF SYSTEMS      General: fatigue	  :	    Respiratory and Thorax: inc SOB, assoc with back pain  	  Cardiovascular:	some chest pressure    Gastrointestinal:	denies Sx  	    Vital Signs Last 24 Hrs  T(F): 97.7  HR: 58  BP: 119/58    RR: 18   SpO2: 97% RA    PHYSICAL EXAM:      Constitutional: A&O in NAD    Eyes: nonicteric    ENMT: dry oral mucosa    Neck: supple, no JVD/bruit/stridor    Back: pain on palp of TH and L spine    Respiratory: shallow resp, scattered rhonchi    Cardiovascular: S1s2 reg    Gastrointestinal: globose soft and benign, + BS    Genitourinary: no holley    Extremities: moves all ext, no edema    Vascular: + pedal pulses    Neurological: nonfocal    Skin: no rash    Psychiatric: stable        LABS:                        12.6   10 )-----------( 287             39.6      141  |  106 |  18  ----------------------------<  94  4.0  |  27  |  1.3  GFR  51, 57, 46  Ca    9.9       Mg     2.2     troponin negative x 2/ < 0.01 x2  CKMB 1.1  TPro  6.7  /  Alb  3.9  /  TBili  0.3  /  DBili  x   /  AST  13  /  ALT  9   /  AlkPhos  88  02-08    RADIOLOGY & ADDITIONAL TESTS:  CXR:  no active infiltrate  CT angio:  no PE, no mediastinal or hilar LN, + emphysema, sp CABG, sp  L atrial appendage device  EKG:  sinus phil 59/min, nonspecific ST-T changes,  no acute changes  ECHO

## 2023-02-09 NOTE — CONSULT NOTE ADULT - CONSULT REASON
Patient with extnesive cardiopulmonary hx p/w wrosening sob. Overloaded on presentation. Assc with cp now resolved. Consult for management recs.
SOB

## 2023-02-09 NOTE — CONSULT NOTE ADULT - SUBJECTIVE AND OBJECTIVE BOX
HPI:  63 yo F w cad s/p cabg, htn, afib on xarelto, anxiety/depression, asthma/copd presents for sob and chest pain. Patient says symptoms have been occurring over the last ~2 weeks and today she started experiencing b/l upper back pain which prompted her to go to , where she was told to come to hospital for evaluation. Of note, came to ed and admitted in 2022 for similar complaints. Also to note, patient states she has been using her home inhalers and nebulizers more frequently as of late.    In ED , T 98.4, /65, HR 88, RR 17, SpO2 99% on RA; Labs significant for . ABG pCO2 52, CTA chest neg.    Patient was admitted after ED could not get in touch private MD. (2023 02:36)        PAST MEDICAL & SURGICAL HISTORY  Bilateral carpal tunnel syndrome    History of  section    Closed fracture of foot, unspecified laterality, sequela    Afib    Essential hypertension    Asthma    Hearing decreased    Hearing aid worn    Anxiety and depression    COPD (chronic obstructive pulmonary disease) case management patient    S/P CABG x 3    H/O prior ablation treatment        FAMILY HISTORY:  FAMILY HISTORY:  No pertinent family history in first degree relatives        SOCIAL HISTORY:  Social History:      ALLERGIES:  amiodarone (Flushing)  iodine (Unknown)  Seafood (Unknown)  shellfish (Unknown)      MEDICATIONS:  albuterol    90 MICROgram(s) HFA Inhaler 2 Puff(s) Inhalation every 6 hours  albuterol/ipratropium for Nebulization 3 milliLiter(s) Nebulizer every 6 hours  aspirin  chewable 81 milliGRAM(s) Oral daily  atorvastatin 20 milliGRAM(s) Oral at bedtime  budesonide 160 MICROgram(s)/formoterol 4.5 MICROgram(s) Inhaler 2 Puff(s) Inhalation two times a day  buPROPion XL (24-Hour) . 300 milliGRAM(s) Oral daily  chlorhexidine 2% Cloths 1 Application(s) Topical <User Schedule>  escitalopram 25 milliGRAM(s) Oral daily  furosemide   Injectable 40 milliGRAM(s) IV Push daily  metoprolol tartrate 25 milliGRAM(s) Oral two times a day  mirtazapine 7.5 milliGRAM(s) Oral at bedtime  montelukast 10 milliGRAM(s) Oral daily  rivaroxaban 20 milliGRAM(s) Oral with dinner  topiramate 100 milliGRAM(s) Oral at bedtime  topiramate 50 milliGRAM(s) Oral daily    PRN:  acetaminophen     Tablet .. 650 milliGRAM(s) Oral every 6 hours PRN  aluminum hydroxide/magnesium hydroxide/simethicone Suspension 30 milliLiter(s) Oral every 4 hours PRN  melatonin 5 milliGRAM(s) Oral at bedtime PRN      HOME MEDICATIONS:  Home Medications:  aspirin 81 mg oral tablet, chewable: 1 tab(s) orally once a day (2023 03:12)  atorvastatin 20 mg oral tablet: 1 tab(s) orally once a day (at bedtime) (2023 02:49)  budesonide-formoterol 160 mcg-4.5 mcg/inh inhalation aerosol: 2 puff(s) inhaled 2 times a day (2023 02:49)  buPROPion 300 mg/24 hours (XL) oral tablet, extended release: 1 tab(s) orally every 24 hours (2023 02:49)  escitalopram: 25 milligram(s) orally once a day (2023 03:13)  Incruse Ellipta 62.5 mcg/inh inhalation powder: 1 puff(s) inhaled every 24 hours (2023 03:15)  metoprolol tartrate 25 mg oral tablet: 1 tab(s) orally 2 times a day (30 Sep 2022 23:38)  Proventil HFA 90 mcg/inh inhalation aerosol: 2 puff(s) inhaled every 6 hours, As Needed (2023 03:15)  topiramate 100 mg oral tablet: orally once a day (at bedtime) (30 Sep 2022 23:42)  topiramate 50 mg oral tablet: 50  orally once a day (30 Sep 2022 23:41)  Xanax 0.5 mg oral tablet: 1 tab(s) orally 3 times a day, As Needed (2023 03:14)  Xarelto 20 mg oral tablet: 1 tab(s) orally once a day (in the evening) (2019 08:00)      VITALS:   T(F): 97.7 ( @ 07:17), Max: 99.6 ( @ 00:19)  HR: 58 ( @ 07:17) (56 - 88)  BP: 119/58 ( @ 07:17) (109/57 - 149/65)  BP(mean): 76 ( @ 05:07) (76 - 81)  RR: 18 ( @ 07:17) (17 - 18)  SpO2: 97% ( @ 07:17) (97% - 99%)    I&O's Summary      REVIEW OF SYSTEMS:  CONSTITUTIONAL: No weakness, fevers or chills  HEENT: rhinorrhea  RESPIRATORY: dry cough  CARDIOVASCULAR: See HPI  GASTROINTESTINAL: No abdominal pain. No nausea, vomiting, diarrhea   GENITOURINARY: No dysuria, frequency or hematuria  NEUROLOGICAL: No new focal deficits  SKIN: No new rashes    PHYSICAL EXAM:  General: Not in distress.  Non-toxic appearing.   HEENT: EOMI  Cardio: regular, S1, S2, no murmur  Pulm: B/L BS.  No wheezing / crackles / rales  Abdomen: Soft, non-tender, non-distended. Normoactive bowel sounds  Extremities: No edema b/l le  Neuro: A&O x3. No focal deficits    LABS:                        12.6   10.36 )-----------( 287      ( 2023 05:30 )             39.6         141  |  106  |  18  ----------------------------<  94  4.0   |  27  |  1.3    Ca    9.9      2023 05:30  Mg     2.2         TPro  6.6  /  Alb  3.8  /  TBili  0.3  /  DBili  x   /  AST  14  /  ALT  11  /  AlkPhos  84  02-        CARDIAC MARKERS ( 2023 06:14 )  x     / <0.01 ng/mL / x     / x     / 1.1 ng/mL  CARDIAC MARKERS ( 2023 18:11 )  x     / <0.01 ng/mL / x     / x     / x            Troponin trend:    Serum Pro-Brain Natriuretic Peptide: 596 pg/mL (23 @ 18:11)    02- Chol 161 LDL -- HDL 59 Trig 62  COVID-19 PCR: NotDetec (30 Sep 2022 20:45)      RADIOLOGY:  < from: CT Angio Chest PE Protocol w/ IV Cont (23 @ 21:37) >    IMPRESSION:  No filling defects in the main pulmonary artery or segmental branches to   suggest pulmonary embolus.    No evidence of acute intrathoracic pathology.    No significant change when compared to a recent outpatient CT chest   2023      < end of copied text >    -TTE: < from: TTE Echo Complete w/ Contrast w/ Doppler (23 @ 11:00) >  Summary:   1. Left ventricular ejection fraction, by visual estimation, is 55 to   60%.   2. Normal global left ventricular systolic function.   3. Mildly enlarged left atrium.   4. Normal right atrial size.   5. No evidence of mitral valve regurgitation.    < end of copied text >      -STRESS TEST: < from: NM Nuclear Stress Pharmacologic Multiple (10.02.22 @ 12:33) >    Impression:  1. NO DEFINITE EVIDENCE FOR ISCHEMIA DURING LEXISCAN INFUSION.  2. NORMAL RESTING LEFT VENTRICULAR WALL MOTION AND WALL THICKENING.  3. LEFT VENTRICULAR EJECTION FRACTION OF  73 % WHICH IS WITHIN RANGE OF   NORMAL.    < end of copied text >    -CATHETERIZATION: < from: Cardiac Cath Lab - Adult (19 @ 09:10) >  CORONARY CIRCULATION: The coronary circulation is left dominant. There was    1-vessel coronary artery disease (LAD). Left main: very short. The artery    bifurcated into two large sized vessels. Ostial LAD: /Prox LAD There was a    95 % stenosis at a site with no prior intervention. There was ELINA grade 3    flow through the vessel (brisk flow). Mid LAD: Angiography showed mild    atherosclerosis with no flow limiting lesions. Distal LAD: Angiography    showed mild atherosclerosis with no flow limiting lesions. The artery was    shown to have competitive flow. 1st diagonal: The vessel was small sized.    Angiography showed mild atherosclerosis with no flow limiting lesions.    Circumflex: The vessel was medium to large sized. Angiography showed no    evidence of disease. 1st obtuse marginal: Normal. RCA: The vessel was    small sized. non dominant Graft to the LAD: The graft was a LIMA. Graft    angiography showed no evidence of disease.         COMPLICATIONS: No complications occurred during the cath lab visit.         IMPRESSIONS: There is significant single vessel coronary artery disease.    Patent LIMA to LAD    significant pulm HTN, elevated PCWP, normal CO         RECOMMENDATIONS:    Patient management should include close monitoring of BUN and creatinine,    weight reduction, and aggressive risk factor modification. The patient    should follow a low fat and low calorie diet.    < end of copied text >    -OTHER:  EC Lead ECG:   Ventricular Rate 59 BPM    Atrial Rate 59 BPM    P-R Interval 128 ms    QRS Duration 78 ms    Q-T Interval 426 ms    QTC Calculation(Bazett) 421 ms    P Axis 72 degrees    R Axis 62 degrees    T Axis 10 degrees    Diagnosis Line Sinus bradycardia  Nonspecific ST and T wave abnormality  Abnormal ECG    Confirmed by LASHONDA LOPEZ MD ([id number]) on 2023 9:33:46 AM ( @ 17:23)      TELEMETRY EVENTS: N/A
Patient is a 62y old  Female who presents with a chief complaint of sob (2023 12:03)      HPI:  61 yo F w cad s/p cabg, htn, afib on xarelto, anxiety/depression, asthma/copd presents for sob and chest pain. Patient says symptoms have been occurring over the last ~2 weeks and today she started experiencing b/l upper back pain which prompted her to go to , where she was told to come to hospital for evaluation. Of note, came to ed and admitted in 2022 for similar complaints. Also to note, patient states she has been using her home inhalers and nebulizers more frequently as of late.    In ED , T 98.4, /65, HR 88, RR 17, SpO2 99% on RA; Labs significant for . ABG pCO2 52, CTA chest neg.    Patient was admitted after ED could not get in touch private MD. (2023 02:36)      PAST MEDICAL & SURGICAL HISTORY:  Bilateral carpal tunnel syndrome      History of  section      Closed fracture of foot, unspecified laterality, sequela      Afib      Essential hypertension      Asthma      Hearing decreased      Hearing aid worn      Anxiety and depression      COPD (chronic obstructive pulmonary disease) case management patient      S/P CABG x 3      H/O prior ablation treatment          SOCIAL HX:   Smoking              quit in     FAMILY HISTORY:  No pertinent family history in first degree relatives    .  No cardiovascular or pulmonary family history     REVIEW OF SYSTEMS:    All ROS are negative except per HPI     Allergies    amiodarone (Flushing)  iodine (Unknown)  Seafood (Unknown)  shellfish (Unknown)    Intolerances          PHYSICAL EXAM  Vital Signs Last 24 Hrs  T(C): 36.3 (2023 07:45), Max: 37.6 (2023 00:19)  T(F): 97.3 (2023 07:45), Max: 99.6 (2023 00:19)  HR: 60 (2023 07:45) (56 - 88)  BP: 120/57 (2023 07:45) (120/57 - 149/65)  BP(mean): --  RR: 18 (2023 07:45) (17 - 18)  SpO2: 99% (2023 07:45) (99% - 99%)    Parameters below as of 2023 07:45  Patient On (Oxygen Delivery Method): room air        CONSTITUTIONAL:  Well nourished.  NAD    ENT:   Airway patent,   No thrush    EYES:   Clear bilaterally,   pupils equal,   round and reactive to light.    CARDIAC:   Normal rate,   regular rhythm.    no edema    RESPIRATORY:   No wheezing  Nnormal chest expansion  Not tachypneic,  No use of accessory muscles    GASTROINTESTINAL:  Abdomen soft  non-tender,   no guarding  + BS    MUSCULOSKELETAL:   range of motion is not limited  no clubbing, cyanosis    NEUROLOGICAL:   Alert and oriented  no motor deficits    SKIN:   Skin normal color for race  No evidence of rash    PSYCHIATRIC:   normal mood and affect  no apparent risk to self or others          LABS:                          12.7   5.78  )-----------( 272      ( 2023 06:14 )             40.1                                               02-08    141  |  108  |  13  ----------------------------<  202<H>  4.7   |  21  |  1.1    Ca    9.9      2023 06:14  Mg     2.2     02-08    TPro  6.7  /  Alb  3.9  /  TBili  0.3  /  DBili  x   /  AST  13  /  ALT  9   /  AlkPhos  88  02-08                                                 CARDIAC MARKERS ( 2023 06:14 )  x     / <0.01 ng/mL / x     / x     / 1.1 ng/mL  CARDIAC MARKERS ( 2023 18:11 )  x     / <0.01 ng/mL / x     / x     / x                                                LIVER FUNCTIONS - ( 2023 06:14 )  Alb: 3.9 g/dL / Pro: 6.7 g/dL / ALK PHOS: 88 U/L / ALT: 9 U/L / AST: 13 U/L / GGT: x                                                                                                MEDICATIONS  (STANDING):  albuterol    90 MICROgram(s) HFA Inhaler 2 Puff(s) Inhalation every 6 hours  albuterol/ipratropium for Nebulization 3 milliLiter(s) Nebulizer every 6 hours  aspirin  chewable 81 milliGRAM(s) Oral daily  atorvastatin 20 milliGRAM(s) Oral at bedtime  budesonide 160 MICROgram(s)/formoterol 4.5 MICROgram(s) Inhaler 2 Puff(s) Inhalation two times a day  buPROPion XL (24-Hour) . 300 milliGRAM(s) Oral daily  chlorhexidine 2% Cloths 1 Application(s) Topical <User Schedule>  escitalopram 25 milliGRAM(s) Oral daily  furosemide   Injectable 40 milliGRAM(s) IV Push daily  metoprolol tartrate 25 milliGRAM(s) Oral two times a day  mirtazapine 7.5 milliGRAM(s) Oral at bedtime  montelukast 10 milliGRAM(s) Oral daily  rivaroxaban 20 milliGRAM(s) Oral with dinner  topiramate 100 milliGRAM(s) Oral at bedtime  topiramate 50 milliGRAM(s) Oral daily    MEDICATIONS  (PRN):  acetaminophen     Tablet .. 650 milliGRAM(s) Oral every 6 hours PRN Temp greater or equal to 38C (100.4F), Mild Pain (1 - 3), Moderate Pain (4 - 6)  aluminum hydroxide/magnesium hydroxide/simethicone Suspension 30 milliLiter(s) Oral every 4 hours PRN Dyspepsia  melatonin 5 milliGRAM(s) Oral at bedtime PRN Insomnia      X-Rays reviewed:    CXR interpreted by me:

## 2023-02-09 NOTE — PROGRESS NOTE ADULT - ASSESSMENT
pt with underlying cardiac and pulmonary dis came to the ER for c/o inc SOB assoc with inc back pain and inc use of inhaler and nebulizer.    SOB with back pain. R/O cardiac vs pul etiology  Hx of HTN, ASHD, CAD, sp CABG, paroc afib, Ac/Xarelto, sp Watchman Procedure  Hx of COPD, Br Asthma  Hx of GERD, Diverticulosis  Hx of OA, DJD, CTS  Hx of Anxiety, Depression    pt ad for further evaluation and tx of her SOB cardiac vs pul etiology  troponin neg x 2  CT angio neg for PE, LN or mass or infiltrate, + emphysema  Cardio consult  ECHO  Pul consult:  no acrive pul issues as CT angio id neg for PE and no active dis, , check ECHO for possible pul HTN  maintain home meds  trend pulse Ox  DUONEB tx q 6  CPAP at HS  spirometry pt with underlying cardiac and pulmonary dis came to the ER for c/o inc SOB assoc with inc back pain and inc use of inhaler and nebulizer.    SOB with back pain. R/O cardiac vs pul etiology  Hx of HTN, ASHD, CAD, sp CABG, paroc afib, Ac/Xarelto, sp Watchman Procedure  Hx of COPD, Br Asthma  Hx of GERD, Diverticulosis  Hx of OA, DJD, CTS  Hx of Anxiety, Depression    pt ad for further evaluation and tx of her SOB cardiac vs pul etiology  troponin neg x 2  CT angio neg for PE, LN or mass or infiltrate, + emphysema  Cardio consult, pt states that inc SOB is v reminiscent of Sx she had prior to CABG in 2019/? need for cath  ECHO done today results still P  Pul consult:  no acrive pul issues as CT angio id neg for PE and no active dis, , check ECHO for possible pul HTN  maintain home meds  trend pulse Ox  DUONEB tx q 6  CPAP at HS  spirometry

## 2023-02-10 ENCOUNTER — TRANSCRIPTION ENCOUNTER (OUTPATIENT)
Age: 63
End: 2023-02-10

## 2023-02-10 VITALS
RESPIRATION RATE: 18 BRPM | SYSTOLIC BLOOD PRESSURE: 121 MMHG | TEMPERATURE: 98 F | DIASTOLIC BLOOD PRESSURE: 59 MMHG | HEART RATE: 52 BPM | OXYGEN SATURATION: 95 %

## 2023-02-10 LAB
BASE EXCESS BLDA CALC-SCNC: 2 MMOL/L — SIGNIFICANT CHANGE UP (ref -2–3)
GLUCOSE BLDC GLUCOMTR-MCNC: 85 MG/DL — SIGNIFICANT CHANGE UP (ref 70–99)
HCO3 BLDA-SCNC: 26 MMOL/L — SIGNIFICANT CHANGE UP (ref 21–28)
PCO2 BLDA: 40 MMHG — SIGNIFICANT CHANGE UP (ref 25–48)
PH BLDA: 7.43 — SIGNIFICANT CHANGE UP (ref 7.35–7.45)
PO2 BLDA: 96 MMHG — SIGNIFICANT CHANGE UP (ref 83–108)
SAO2 % BLDA: 98.6 % — HIGH (ref 94–98)

## 2023-02-10 RX ORDER — UMECLIDINIUM 62.5 UG/1
1 AEROSOL, POWDER ORAL
Qty: 1 | Refills: 0
Start: 2023-02-10

## 2023-02-10 RX ORDER — MIRTAZAPINE 45 MG/1
1 TABLET, ORALLY DISINTEGRATING ORAL
Qty: 0 | Refills: 0 | DISCHARGE
Start: 2023-02-10

## 2023-02-10 RX ORDER — FUROSEMIDE 40 MG
40 TABLET ORAL DAILY
Refills: 0 | Status: DISCONTINUED | OUTPATIENT
Start: 2023-02-10 | End: 2023-02-10

## 2023-02-10 RX ORDER — UMECLIDINIUM 62.5 UG/1
1 AEROSOL, POWDER ORAL
Qty: 0 | Refills: 0 | DISCHARGE

## 2023-02-10 RX ORDER — FUROSEMIDE 40 MG
2 TABLET ORAL
Qty: 60 | Refills: 0
Start: 2023-02-10 | End: 2023-03-11

## 2023-02-10 RX ADMIN — BUPROPION HYDROCHLORIDE 300 MILLIGRAM(S): 150 TABLET, EXTENDED RELEASE ORAL at 11:32

## 2023-02-10 RX ADMIN — Medication 3 MILLILITER(S): at 06:55

## 2023-02-10 RX ADMIN — ESCITALOPRAM OXALATE 25 MILLIGRAM(S): 10 TABLET, FILM COATED ORAL at 11:32

## 2023-02-10 RX ADMIN — Medication 3 MILLILITER(S): at 13:57

## 2023-02-10 RX ADMIN — Medication 50 MILLIGRAM(S): at 11:32

## 2023-02-10 RX ADMIN — Medication 40 MILLIGRAM(S): at 05:37

## 2023-02-10 RX ADMIN — MONTELUKAST 10 MILLIGRAM(S): 4 TABLET, CHEWABLE ORAL at 11:32

## 2023-02-10 RX ADMIN — BUDESONIDE AND FORMOTEROL FUMARATE DIHYDRATE 2 PUFF(S): 160; 4.5 AEROSOL RESPIRATORY (INHALATION) at 08:35

## 2023-02-10 RX ADMIN — Medication 81 MILLIGRAM(S): at 11:32

## 2023-02-10 RX ADMIN — Medication 25 MILLIGRAM(S): at 05:37

## 2023-02-10 NOTE — DISCHARGE NOTE PROVIDER - CARE PROVIDER_API CALL
Doreen Pitts)  Cardiovascular Disease; Interventional Cardiology  [address] [place] [address]  Phone: [phone]  Fax: [phone]  Established Patient  Follow Up Time: 2 weeks    Aury [place] [address]  Phone: [phone]  Fax: [phone]  Established Patient  Follow Up Time: 2 weeks

## 2023-02-10 NOTE — DISCHARGE NOTE PROVIDER - NSDCMRMEDTOKEN_GEN_ALL_CORE_FT
aspirin 81 mg oral tablet, chewable: 1 tab(s) orally once a day  atorvastatin 20 mg oral tablet: 1 tab(s) orally once a day (at bedtime)  budesonide-formoterol 160 mcg-4.5 mcg/inh inhalation aerosol: 2 puff(s) inhaled 2 times a day  buPROPion 300 mg/24 hours (XL) oral tablet, extended release: 1 tab(s) orally every 24 hours  escitalopram: 25 milligram(s) orally once a day  Incruse Ellipta 62.5 mcg/inh inhalation powder: 1 puff(s) inhaled every 24 hours  Lasix 20 mg oral tablet: 2 tab(s) orally once a day   metoprolol tartrate 25 mg oral tablet: 1 tab(s) orally 2 times a day  mirtazapine 7.5 mg oral tablet: 1 tab(s) orally once a day (at bedtime)  montelukast 10 mg oral tablet: 1 tab(s) orally once a day  Proventil HFA 90 mcg/inh inhalation aerosol: 2 puff(s) inhaled every 6 hours, As Needed  topiramate 100 mg oral tablet: orally once a day (at bedtime)  topiramate 50 mg oral tablet: 50  orally once a day  Xanax 0.5 mg oral tablet: 1 tab(s) orally 3 times a day, As Needed  Xarelto 20 mg oral tablet: 1 tab(s) orally once a day (in the evening)

## 2023-02-10 NOTE — DISCHARGE NOTE PROVIDER - HOSPITAL COURSE
62FF. PMH: cad (s/p cabg), HTN, DLD, afib (on xarelto), anxiety/depression, asthma/copd     Presented 2/7 c/o sob and chest pain x2wks. 2/7 she started experiencing b/l upper back pain which prompted her to go to , where she was told to come to hospital for evaluation. Of note, came to ed and admitted in sept 2022 for similar complaints. Also to note, patient states she has been using her home inhalers and nebulizers more frequently as of late.    In ED , T 98.4, /65, HR 88, RR 17, SpO2 99% on RA; Labs significant for . ABG pCO2 52, Trop (-)x2  CTA chest neg.    Patient was admitted after ED could not get in touch private MD.    #SOB and Chest pain w radiation to back likely multifactorial (see below) - now resolved, patient asymptomatic, no sepsis poa, no wheezing on exam  #H/o asthma/COPD not in exacerbation  #H/o SILAS on CPAP  - labs and imaging thus far unrevealing   - patient endorses using inhalers more frequently over the last few weeks for copd/asthma > on proventil, incruse ellipta, symbicort, along w monteleukast  - increased home dose of lasix from 20 to 40 as bnp mildly elevated - could be contributing to sob  - Otherwise cont supportive care, and trend trops (neg x2)  - < from: TTE Echo Complete w/ Contrast w/ Doppler (02.09.23 @ 11:00) >  Summary:   1. Left ventricular ejection fraction, by visual estimation, is 55 to   60%.   2. Normal global left ventricular systolic function.   3. Mildly enlarged left atrium.   4. Normal right atrial size.   5. No evidence of mitral valve regurgitation.  < end of copied text >  - Pulm Eval 2/8: Continue home inhalers, albuterol PRN  - PT/OT: No further skilled acute PT needs identified    #CAD s/p CABG LIMA to LAD  #Paroxysmal A Fib on Xarelto   #htn/hld  - cont asa, statin, bb, lasix and xarelto  - Cardiology eval'd 2/9: can f/u as outpatient with cardio for possible RHC/LHC if symptoms persist (Dr. Pitts)    #anxiety/depression   - cont home escitalopram, topiramate, bupropion, also takes xanax prn at home

## 2023-02-10 NOTE — DISCHARGE NOTE PROVIDER - NSDCFUSCHEDAPPT_GEN_ALL_CORE_FT
[facility] PreAdmits  Scheduled Appointment: 02/13/2023    [facility]  PSYCHIATRY  [address]  Scheduled Appointment: 02/13/2023    [facility] PreAdmits  Scheduled Appointment: 02/17/2023    [facility]  PULMMED  [address]  Scheduled Appointment: 02/17/2023    Titi Burns  [facility] PreAdmits  Scheduled Appointment: 03/29/2023    Titi Burns  [facility]  PSYCHIATRY  [address]  Scheduled Appointment: 03/29/2023

## 2023-02-10 NOTE — DISCHARGE NOTE PROVIDER - NSDCCPCAREPLAN_GEN_ALL_CORE_FT
PRINCIPAL DISCHARGE DIAGNOSIS  Diagnosis: SOB (shortness of breath)  Assessment and Plan of Treatment: You came to the hospital with shortness of breath. The cause of this may be contributed to history of asthma/COPD in combination with fluid in the lungs. We performed an echo which was benign. We increased your lasix to remove fluid and help your symptoms. Please continue to use your inhalers and CPAP. If your symptoms persist please follow up with your outpatient cardiologist.  Please take all medications as prescribed and follow up with your outpatient providers for further management of your care. If you experience worsening of symptoms or new symptoms please return to the ED.

## 2023-02-10 NOTE — DISCHARGE NOTE PROVIDER - PROVIDER TOKENS
PROVIDER:[TOKEN:[[id number]],FOLLOWUP:[2 weeks],ESTABLISHEDPATIENT:[T]],PROVIDER:[TOKEN:[[id number]],FOLLOWUP:[2 weeks],ESTABLISHEDPATIENT:[T]]

## 2023-02-10 NOTE — DISCHARGE NOTE NURSING/CASE MANAGEMENT/SOCIAL WORK - NSDCPEFALRISK_GEN_ALL_CORE
For information on Fall & Injury Prevention, visit: [email or web address] OR  [email or web address] OR  https://www.cdc.gov/steadi/patient.html

## 2023-02-10 NOTE — PROGRESS NOTE ADULT - SUBJECTIVE AND OBJECTIVE BOX
SUBJ: Patient seen and examined. No events overnight.       MEDICATIONS  (STANDING):  albuterol    90 MICROgram(s) HFA Inhaler 2 Puff(s) Inhalation every 6 hours  albuterol/ipratropium for Nebulization 3 milliLiter(s) Nebulizer every 6 hours  aspirin  chewable 81 milliGRAM(s) Oral daily  atorvastatin 20 milliGRAM(s) Oral at bedtime  budesonide 160 MICROgram(s)/formoterol 4.5 MICROgram(s) Inhaler 2 Puff(s) Inhalation two times a day  buPROPion XL (24-Hour) . 300 milliGRAM(s) Oral daily  chlorhexidine 2% Cloths 1 Application(s) Topical <User Schedule>  escitalopram 25 milliGRAM(s) Oral daily  furosemide    Tablet 40 milliGRAM(s) Oral daily  metoprolol tartrate 25 milliGRAM(s) Oral two times a day  mirtazapine 7.5 milliGRAM(s) Oral at bedtime  montelukast 10 milliGRAM(s) Oral daily  rivaroxaban 20 milliGRAM(s) Oral with dinner  topiramate 100 milliGRAM(s) Oral at bedtime  topiramate 50 milliGRAM(s) Oral daily    MEDICATIONS  (PRN):  acetaminophen     Tablet .. 650 milliGRAM(s) Oral every 6 hours PRN Temp greater or equal to 38C (100.4F), Mild Pain (1 - 3), Moderate Pain (4 - 6)  aluminum hydroxide/magnesium hydroxide/simethicone Suspension 30 milliLiter(s) Oral every 4 hours PRN Dyspepsia  melatonin 5 milliGRAM(s) Oral at bedtime PRN Insomnia            Vital Signs Last 24 Hrs  T(C): 36.7 (10 Feb 2023 08:01), Max: 37 (09 Feb 2023 23:56)  T(F): 98 (10 Feb 2023 08:01), Max: 98.6 (09 Feb 2023 23:56)  HR: 52 (10 Feb 2023 08:01) (52 - 63)  BP: 121/59 (10 Feb 2023 08:01) (121/59 - 144/67)  BP(mean): 80 (10 Feb 2023 08:01) (80 - 80)  RR: 18 (10 Feb 2023 08:01) (18 - 18)  SpO2: 95% (10 Feb 2023 08:01) (95% - 99%)    Parameters below as of 10 Feb 2023 08:01  Patient On (Oxygen Delivery Method): room air         REVIEW OF SYSTEMS:  CONSTITUTIONAL: No fever  NECK: No pain or stiffness  CARDIOVASCULAR: patient denies chest pain, shortness of breath improved .  Respiratory: No cough or wheezing.  NEUROLOGICAL: No focal deficits to report.  GI: no BRBPR, no N,V,diarrhea.    PSYCHIATRY: normal mood and affect  HEENT: no nasal discharge, no ecchymosis  SKIN: no ecchymosis, no breakdown  MUSCULOSKELETAL: Full range of motion x4.      PHYSICAL EXAM:  GENERAL: NAD  HEAD:  Atraumatic, Normocephalic  NECK: Supple, No JVD  NERVOUS SYSTEM:  Alert & Oriented X3, Good concentration  CHEST/LUNG: bilateral decreased air entry  HEART: Irregular rate and rhythm  ABDOMEN: Soft, Nontender, Nondistended;   EXTREMITIES:  No  edema  SKIN: No rashes or lesions  Psych: Appropriate mood and affect     	  TELEMETRY:      LABS:                        12.6   10.36 )-----------( 287      ( 09 Feb 2023 05:30 )             39.6     02-09    141  |  106  |  18  ----------------------------<  94  4.0   |  27  |  1.3    Ca    9.9      09 Feb 2023 05:30  Mg     2.2     02-09    TPro  6.6  /  Alb  3.8  /  TBili  0.3  /  DBili  x   /  AST  14  /  ALT  11  /  AlkPhos  84  02-09            I&O's Summary    09 Feb 2023 07:01  -  10 Feb 2023 07:00  --------------------------------------------------------  IN: 0 mL / OUT: 700 mL / NET: -700 mL    10 Feb 2023 07:01  -  10 Feb 2023 21:49  --------------------------------------------------------  IN: 0 mL / OUT: 200 mL / NET: -200 mL      BNP  RADIOLOGY & ADDITIONAL STUDIES:    IMPRESSION AND PLAN:  CAD   Afib   Euvolemic  - Management as per primary team    - Continue Lasix  - Continue Xarelto and Metoprolol   - F/U with Dr. Pitts out pt

## 2023-02-10 NOTE — DISCHARGE NOTE NURSING/CASE MANAGEMENT/SOCIAL WORK - PATIENT PORTAL LINK FT
You can access the FollowMyHealth Patient Portal offered by [facility] by registering at the following website: [email or web address]. By joining [facility]’s FollowMyHealth portal, you will also be able to view your health information using other applications (apps) compatible with our system.

## 2023-02-10 NOTE — DISCHARGE NOTE NURSING/CASE MANAGEMENT/SOCIAL WORK - NSDCVIVACCINE_GEN_ALL_CORE_FT
influenza, injectable, quadrivalent, preservative free; 08-Jan-2019 15:12; Cristine Mondragon (RN); [employer]; t57ea (Exp. Date: 30-Jun-2019); IntraMuscular; Deltoid Left.; 0.5 milliLiter(s); VIS (VIS Published: 07-Aug-2015, VIS Presented: 08-Jan-2019);

## 2023-02-13 ENCOUNTER — APPOINTMENT (OUTPATIENT)
Dept: PSYCHIATRY | Facility: CLINIC | Age: 63
End: 2023-02-13

## 2023-02-13 ENCOUNTER — APPOINTMENT (OUTPATIENT)
Age: 63
End: 2023-02-13

## 2023-02-13 ENCOUNTER — OUTPATIENT (OUTPATIENT)
Dept: OUTPATIENT SERVICES | Facility: HOSPITAL | Age: 63
LOS: 1 days | End: 2023-02-13
Payer: MEDICARE

## 2023-02-13 DIAGNOSIS — Z95.1 PRESENCE OF AORTOCORONARY BYPASS GRAFT: Chronic | ICD-10-CM

## 2023-02-13 DIAGNOSIS — F32.9 MAJOR DEPRESSIVE DISORDER, SINGLE EPISODE, UNSPECIFIED: ICD-10-CM

## 2023-02-13 DIAGNOSIS — Z98.890 OTHER SPECIFIED POSTPROCEDURAL STATES: Chronic | ICD-10-CM

## 2023-02-13 DIAGNOSIS — F41.1 GENERALIZED ANXIETY DISORDER: ICD-10-CM

## 2023-02-13 PROCEDURE — 90832 PSYTX W PT 30 MINUTES: CPT | Mod: 95

## 2023-02-13 NOTE — PLAN
[FreeTextEntry2] : Goal #1- Management of depressive symptoms \par Obj #1- The patient will verbalize and utilize at least three effective coping mechanisms to manage her depression symptoms \par Obj #2- The patient will attend monthly medication management appointments with Dr. Burns and will take her prescribed psychotropic medications as prescribed \par Obj #3- The patient will attend bi weekly sessions with the writer and is no longer in the [facility] track. \par \par  [Cognitive and/or Behavior Therapy] : Cognitive and/or Behavior Therapy  [Skills training (all types)] : Skills training (all types)  [Supportive Therapy] : Supportive Therapy [de-identified] : The patient attended her scheduled telehealth session with the writer via [employer]. The patient reported some feelings of anxiety and nervousness as she explained that she has been having pain that  is similar to when she needed open heart surgery. The patient reported that she went to the ED and  after multiple medical testings, she was found cleared of any cardiac and  or pulmonary issues. The patient has a follow up appointment with her PCP and pulmonologist. The patient and the writer discussed the anniversary of her surgery which is next week. A discussion was had with the patient regarding how this time of the year in particular can be a "trigger" for her. The patient explained that her step niece passed away recently. The writer actively listened to the patient, validated her feelings and support was rendered. The patient has overall mood stability. The patient reports medication adherence. The writer will continue to provide the patient with support and encouragement where needed. The patient is scheduled for a follow up visit on 2/27/2023 at 2pm.  [Recommended Frequency of Visits: ____] : Recommended frequency of visits: [unfilled] [Return in ____ week(s)] : Return in [unfilled] week(s)

## 2023-02-13 NOTE — REASON FOR VISIT
[Continuing, patient not seen in-person within last 12 months (provide details below)] : Telehealth services are continuing, patient not seen in-person within last 12 months.  [Telehealth (audio & video) - Individual/Group] : This visit was provided via telehealth using real-time 2-way audio visual technology. [Other Location: e.g. Home (Enter Location, City,State)___] : The provider was located at [unfilled]. [Home] : The patient, [unfilled], was located at home, [unfilled], at the time of the visit. [Verbal consent obtained from patient/other participant(s)] : Verbal consent for telehealth/telephonic services obtained from patient/other participant(s) [FreeTextEntry4] : 1pm  [FreeTextEntry5] : 1:38pm  [FreeTextEntry3] : patient requested telehealth sessions and is appropriate for telehealth services [Patient] : Patient [FreeTextEntry1] : psychotherapy follow up

## 2023-02-15 DIAGNOSIS — F32.A DEPRESSION, UNSPECIFIED: ICD-10-CM

## 2023-02-15 DIAGNOSIS — Z87.891 PERSONAL HISTORY OF NICOTINE DEPENDENCE: ICD-10-CM

## 2023-02-15 DIAGNOSIS — E87.29 OTHER ACIDOSIS: ICD-10-CM

## 2023-02-15 DIAGNOSIS — Z91.198 PATIENT'S NONCOMPLIANCE WITH OTHER MEDICAL TREATMENT AND REGIMEN FOR OTHER REASON: ICD-10-CM

## 2023-02-15 DIAGNOSIS — I48.0 PAROXYSMAL ATRIAL FIBRILLATION: ICD-10-CM

## 2023-02-15 DIAGNOSIS — Z20.822 CONTACT WITH AND (SUSPECTED) EXPOSURE TO COVID-19: ICD-10-CM

## 2023-02-15 DIAGNOSIS — G47.33 OBSTRUCTIVE SLEEP APNEA (ADULT) (PEDIATRIC): ICD-10-CM

## 2023-02-15 DIAGNOSIS — Z88.8 ALLERGY STATUS TO OTHER DRUGS, MEDICAMENTS AND BIOLOGICAL SUBSTANCES STATUS: ICD-10-CM

## 2023-02-15 DIAGNOSIS — I10 ESSENTIAL (PRIMARY) HYPERTENSION: ICD-10-CM

## 2023-02-15 DIAGNOSIS — J44.9 CHRONIC OBSTRUCTIVE PULMONARY DISEASE, UNSPECIFIED: ICD-10-CM

## 2023-02-15 DIAGNOSIS — Z79.01 LONG TERM (CURRENT) USE OF ANTICOAGULANTS: ICD-10-CM

## 2023-02-15 DIAGNOSIS — K21.9 GASTRO-ESOPHAGEAL REFLUX DISEASE WITHOUT ESOPHAGITIS: ICD-10-CM

## 2023-02-15 DIAGNOSIS — E78.5 HYPERLIPIDEMIA, UNSPECIFIED: ICD-10-CM

## 2023-02-15 DIAGNOSIS — Z97.4 PRESENCE OF EXTERNAL HEARING-AID: ICD-10-CM

## 2023-02-15 DIAGNOSIS — F41.9 ANXIETY DISORDER, UNSPECIFIED: ICD-10-CM

## 2023-02-15 DIAGNOSIS — Z91.013 ALLERGY TO SEAFOOD: ICD-10-CM

## 2023-02-15 DIAGNOSIS — Z95.1 PRESENCE OF AORTOCORONARY BYPASS GRAFT: ICD-10-CM

## 2023-02-17 ENCOUNTER — APPOINTMENT (OUTPATIENT)
Dept: PULMONOLOGY | Facility: CLINIC | Age: 63
End: 2023-02-17

## 2023-02-27 ENCOUNTER — APPOINTMENT (OUTPATIENT)
Dept: PSYCHIATRY | Facility: CLINIC | Age: 63
End: 2023-02-27

## 2023-02-27 ENCOUNTER — OUTPATIENT (OUTPATIENT)
Dept: OUTPATIENT SERVICES | Facility: HOSPITAL | Age: 63
LOS: 1 days | End: 2023-02-27
Payer: MEDICARE

## 2023-02-27 DIAGNOSIS — F32.9 MAJOR DEPRESSIVE DISORDER, SINGLE EPISODE, UNSPECIFIED: ICD-10-CM

## 2023-02-27 DIAGNOSIS — Z98.890 OTHER SPECIFIED POSTPROCEDURAL STATES: Chronic | ICD-10-CM

## 2023-02-27 DIAGNOSIS — F41.1 GENERALIZED ANXIETY DISORDER: ICD-10-CM

## 2023-02-27 DIAGNOSIS — Z95.1 PRESENCE OF AORTOCORONARY BYPASS GRAFT: Chronic | ICD-10-CM

## 2023-02-27 PROCEDURE — 90832 PSYTX W PT 30 MINUTES: CPT | Mod: 95

## 2023-02-27 NOTE — REASON FOR VISIT
[Patient preference] : as per patient preference [Continuing, patient not seen in-person within last 12 months (provide details below)] : Telehealth services are continuing, patient not seen in-person within last 12 months.  [Telehealth (audio & video) - Individual/Group] : This visit was provided via telehealth using real-time 2-way audio visual technology. [Other Location: e.g. Home (Enter Location, City,State)___] : The provider was located at [unfilled]. [Home] : The patient, [unfilled], was located at home, [unfilled], at the time of the visit. [Verbal consent obtained from patient/other participant(s)] : Verbal consent for telehealth/telephonic services obtained from patient/other participant(s) [Patient] : Patient [FreeTextEntry4] : 2pm [FreeTextEntry5] : 2:34pm  [FreeTextEntry3] : patient requested telehealth sessions and is appropriate for telehealth services  [FreeTextEntry1] : psychotherapy follow up

## 2023-02-27 NOTE — PLAN
[FreeTextEntry2] : Goal #1- Management of depressive symptoms \par Obj #1- The patient will verbalize and utilize at least three effective coping mechanisms to manage her depression symptoms \par Obj #2- The patient will attend monthly medication management appointments with Dr. Burns and will take her prescribed psychotropic medications as prescribed \par Obj #3- The patient will attend bi weekly sessions with the writer and is no longer in the [facility] track. \par \par  [Cognitive and/or Behavior Therapy] : Cognitive and/or Behavior Therapy  [Skills training (all types)] : Skills training (all types)  [Supportive Therapy] : Supportive Therapy [de-identified] : The patient attended her scheduled telehealth session with the writer via [employer]. The patient expressed ongoing frustrations with her daughter's BPD and behaviors associated with this. Patient explained that this "is my main cause of anxiety and depression". The writer actively listened to the patient, validated her feelings and concerns. The writer provided the patient with resources that she can share with her daughter. The writer commended the patient for adhering to boundaries. The writer and the patient discussed her daughter's autonomy in this situation as she would be responsible for adhering to her overall stability. The writer encouraged the patient to continue having an open dialogue with her daughter. The patient reports medication adherence. The writer will continue to provide the patient with support and encouragement where needed. The patient is scheduled for a follow up visit on 3/13/2023 at 1:30pm.  [Recommended Frequency of Visits: ____] : Recommended frequency of visits: [unfilled] [Return in ____ week(s)] : Return in [unfilled] week(s)

## 2023-02-28 DIAGNOSIS — F32.9 MAJOR DEPRESSIVE DISORDER, SINGLE EPISODE, UNSPECIFIED: ICD-10-CM

## 2023-02-28 DIAGNOSIS — F41.1 GENERALIZED ANXIETY DISORDER: ICD-10-CM

## 2023-03-03 ENCOUNTER — OUTPATIENT (OUTPATIENT)
Dept: OUTPATIENT SERVICES | Facility: HOSPITAL | Age: 63
LOS: 1 days | End: 2023-03-03
Payer: MEDICARE

## 2023-03-03 ENCOUNTER — APPOINTMENT (OUTPATIENT)
Dept: PULMONOLOGY | Facility: CLINIC | Age: 63
End: 2023-03-03
Payer: MEDICARE

## 2023-03-03 VITALS
SYSTOLIC BLOOD PRESSURE: 148 MMHG | WEIGHT: 244 LBS | HEIGHT: 67 IN | DIASTOLIC BLOOD PRESSURE: 83 MMHG | OXYGEN SATURATION: 98 % | HEART RATE: 66 BPM | BODY MASS INDEX: 38.3 KG/M2 | TEMPERATURE: 97.2 F

## 2023-03-03 DIAGNOSIS — Z95.1 PRESENCE OF AORTOCORONARY BYPASS GRAFT: Chronic | ICD-10-CM

## 2023-03-03 DIAGNOSIS — J44.9 CHRONIC OBSTRUCTIVE PULMONARY DISEASE, UNSPECIFIED: ICD-10-CM

## 2023-03-03 DIAGNOSIS — Z98.890 OTHER SPECIFIED POSTPROCEDURAL STATES: Chronic | ICD-10-CM

## 2023-03-03 DIAGNOSIS — Z87.891 PERSONAL HISTORY OF NICOTINE DEPENDENCE: ICD-10-CM

## 2023-03-03 DIAGNOSIS — Z00.00 ENCOUNTER FOR GENERAL ADULT MEDICAL EXAMINATION WITHOUT ABNORMAL FINDINGS: ICD-10-CM

## 2023-03-03 PROCEDURE — 82103 ALPHA-1-ANTITRYPSIN TOTAL: CPT

## 2023-03-03 PROCEDURE — 99214 OFFICE O/P EST MOD 30 MIN: CPT

## 2023-03-03 PROCEDURE — 99214 OFFICE O/P EST MOD 30 MIN: CPT | Mod: GC

## 2023-03-03 RX ORDER — PREDNISONE 20 MG/1
20 TABLET ORAL
Qty: 10 | Refills: 0 | Status: ACTIVE | COMMUNITY
Start: 2023-03-03 | End: 1900-01-01

## 2023-03-03 RX ORDER — BUDESONIDE AND FORMOTEROL FUMARATE DIHYDRATE 160; 4.5 UG/1; UG/1
160-4.5 AEROSOL RESPIRATORY (INHALATION)
Qty: 1 | Refills: 6 | Status: ACTIVE | COMMUNITY
Start: 2021-09-03 | End: 1900-01-01

## 2023-03-03 RX ORDER — UMECLIDINIUM 62.5 UG/1
62.5 AEROSOL, POWDER ORAL DAILY
Qty: 10 | Refills: 3 | Status: ACTIVE | COMMUNITY
Start: 2021-09-03 | End: 1900-01-01

## 2023-03-03 RX ORDER — ALBUTEROL SULFATE 90 UG/1
108 (90 BASE) INHALANT RESPIRATORY (INHALATION)
Qty: 1 | Refills: 6 | Status: ACTIVE | COMMUNITY
Start: 2021-09-03 | End: 1900-01-01

## 2023-03-03 NOTE — END OF VISIT
[] : Resident [FreeTextEntry3] : Unclear why short of breath \par She is wheezing on exam \par Continue symbicort and incruse; albuterol PRN\par CPAP compliant with therapy \par Cardiology planning for cath; workup negative so far \par Will need repeat PFTs; CTA noted with no PE; airtrapping and some emphysema noted \par Check alpha 1 antitrypsin level; refer to pulm rehab; 10 day prednisone course \par F/U in 3 months after PFTs are done

## 2023-03-03 NOTE — ASSESSMENT
[FreeTextEntry1] : 63 yo F hx asthma, past smoker with COPD, CAD s/p CABG, a fib s/p ablation, here for follow up of SOB.\par \par #COPD, uncontrolled\par #SILAS\par -pt scheduled for PFTs 5/21/2023\par -c/w symbicort/incruse/nebulizer

## 2023-03-03 NOTE — REVIEW OF SYSTEMS
[Dyspnea] : dyspnea [SOB on Exertion] : sob on exertion [Fever] : no fever [Fatigue] : no fatigue [Chills] : no chills [Dry Eyes] : no dry eyes [Eye Irritation] : no eye irritation [Dry Mouth] : no dry mouth [Sinus Problems] : no sinus problems [Cough] : no cough [Sputum] : no sputum [Wheezing] : no wheezing [A.M. Dry Mouth] : no a.m. dry mouth [Chest Discomfort] : no chest discomfort [Orthopnea] : no orthopnea [Syncope] : no syncope

## 2023-03-03 NOTE — HISTORY OF PRESENT ILLNESS
[Former] : former [TextBox_4] : 63 yo F hx asthma, past smoker with COPD, CAD s/p CABG, a fib s/p ablation, here for follow up of SOB. Pt states she has been using her symbicort and incruse ellipta as prescribed but over the past month has required 20+ uses of rescue albuterol per week. Of note, pt with last COPD exacerbation 11/2022 and recent hospitalization 1/2023 for ?CHF vs COPD exacerbation. Pt states she is looking for more relief of her respiratory symptoms. Pt is pending [facility] for phtn evaluation. [TextBox_11] : 1 [TextBox_13] : 20

## 2023-03-08 LAB — A1AT SERPL-MCNC: 165 MG/DL

## 2023-03-13 ENCOUNTER — OUTPATIENT (OUTPATIENT)
Dept: OUTPATIENT SERVICES | Facility: HOSPITAL | Age: 63
LOS: 1 days | End: 2023-03-13
Payer: MEDICARE

## 2023-03-13 ENCOUNTER — APPOINTMENT (OUTPATIENT)
Dept: PSYCHIATRY | Facility: CLINIC | Age: 63
End: 2023-03-13

## 2023-03-13 DIAGNOSIS — Z98.890 OTHER SPECIFIED POSTPROCEDURAL STATES: Chronic | ICD-10-CM

## 2023-03-13 DIAGNOSIS — Z95.1 PRESENCE OF AORTOCORONARY BYPASS GRAFT: Chronic | ICD-10-CM

## 2023-03-13 DIAGNOSIS — F32.9 MAJOR DEPRESSIVE DISORDER, SINGLE EPISODE, UNSPECIFIED: ICD-10-CM

## 2023-03-13 DIAGNOSIS — F41.1 GENERALIZED ANXIETY DISORDER: ICD-10-CM

## 2023-03-13 PROCEDURE — 90832 PSYTX W PT 30 MINUTES: CPT

## 2023-03-13 NOTE — PLAN
[FreeTextEntry2] : Goal #1- Management of depressive symptoms \par Obj #1- The patient will verbalize and utilize at least three effective coping mechanisms to manage her depression symptoms \par Obj #2- The patient will attend monthly medication management appointments with Dr. Burns and will take her prescribed psychotropic medications as prescribed \par Obj #3- The patient will attend bi weekly sessions with the writer and is no longer in the [facility] track. \par \par  [Cognitive and/or Behavior Therapy] : Cognitive and/or Behavior Therapy  [Skills training (all types)] : Skills training (all types)  [Supportive Therapy] : Supportive Therapy [de-identified] : The patient attended her scheduled telehealth session with the writer via [employer]. The patient reported that while there are some periods of anxiety and depression related towards her daughter. The patient verbalized how she has been feeling with coping with some of her daughter's behaviors. The patient noted an instance where she was able to set boundaries with her daughter and she received "a round about apology". The writer commended the patient for establishing and adhering to boundaries. Ongoing psychoeducation was provided to the patient regarding BPD. The writer provided the patient with resources that she can provide to her daughter regarding the [facility] counseling center as patient reported that her daughter is in need of additional support. \par Despite the above mentioned issues, the patient noted that her pulmonary appointment was "one of the best I have ever had" noting that additional medications will be explored. The patient is looking forward to potentially moving and has started the process of looking at/exploring new apartments. The patient reports medication adherence. The writer will continue to provide the patient with support and encouragement where needed. The patient is scheduled for a follow up visit on  [Recommended Frequency of Visits: ____] : Recommended frequency of visits: [unfilled] [Return in ____ week(s)] : Return in [unfilled] week(s)

## 2023-03-13 NOTE — REASON FOR VISIT
[Patient preference] : as per patient preference [Continuing, patient not seen in-person within last 12 months (provide details below)] : Telehealth services are continuing, patient not seen in-person within last 12 months.  [Telehealth (audio & video) - Individual/Group] : This visit was provided via telehealth using real-time 2-way audio visual technology. [Other Location: e.g. Home (Enter Location, City,State)___] : The provider was located at [unfilled]. [Home] : The patient, [unfilled], was located at home, [unfilled], at the time of the visit. [Verbal consent obtained from patient/other participant(s)] : Verbal consent for telehealth/telephonic services obtained from patient/other participant(s) [FreeTextEntry4] : 1:30pm [FreeTextEntry5] : 2:01pm [FreeTextEntry3] : psychotherapy follow up  [Patient] : Patient [FreeTextEntry1] : psychotherapy follow up

## 2023-03-27 ENCOUNTER — OUTPATIENT (OUTPATIENT)
Dept: OUTPATIENT SERVICES | Facility: HOSPITAL | Age: 63
LOS: 1 days | End: 2023-03-27
Payer: MEDICARE

## 2023-03-27 ENCOUNTER — APPOINTMENT (OUTPATIENT)
Dept: PSYCHIATRY | Facility: CLINIC | Age: 63
End: 2023-03-27

## 2023-03-27 DIAGNOSIS — F32.9 MAJOR DEPRESSIVE DISORDER, SINGLE EPISODE, UNSPECIFIED: ICD-10-CM

## 2023-03-27 DIAGNOSIS — Z95.1 PRESENCE OF AORTOCORONARY BYPASS GRAFT: Chronic | ICD-10-CM

## 2023-03-27 DIAGNOSIS — Z98.890 OTHER SPECIFIED POSTPROCEDURAL STATES: Chronic | ICD-10-CM

## 2023-03-27 DIAGNOSIS — F41.1 GENERALIZED ANXIETY DISORDER: ICD-10-CM

## 2023-03-27 PROCEDURE — 90832 PSYTX W PT 30 MINUTES: CPT | Mod: 95

## 2023-03-27 NOTE — PLAN
[FreeTextEntry2] : Goal #1- Management of depressive symptoms \par Obj #1- The patient will verbalize and utilize at least three effective coping mechanisms to manage her depression symptoms \par Obj #2- The patient will attend monthly medication management appointments with Dr. Burns and will take her prescribed psychotropic medications as prescribed \par Obj #3- The patient will attend bi weekly sessions with the writer and is no longer in the CONNE[facility] track. \par \par  [Cognitive and/or Behavior Therapy] : Cognitive and/or Behavior Therapy  [Skills training (all types)] : Skills training (all types)  [Supportive Therapy] : Supportive Therapy [de-identified] : The patient attended her scheduled telehealth session with the writer via [employer]. The patient reported that she has been feeling depressed the last few weeks. The writer explored these feelings with the patient further. The patient noted feelings of being worthless as "I cannot do anything". Patient reported these feelings being exacerbated after her daughter made hurtful comments to her. The patient did not illicit any suicidality. The writer and the patient discussed boundary setting with her daughter regarding mutual respect for one another. The patient and the writer discussed positive qualities that she possesses including being a great listener and nurturer for her children. The patient reports feelings of being discouraged as she has not been successful in locating a new apartment. Patient will continue looking however, she is not in any immanent risk of homelessness. The patient reports medication adherence. The writer will continue to provide the patient with support and encouragement where needed. The patient is scheduled for a follow up visit on 4/3/2023 at 1pm. Patient is being seen next week due to an increase of depressive symptoms.  [Recommended Frequency of Visits: ____] : Recommended frequency of visits: [unfilled] [Return in ____ week(s)] : Return in [unfilled] week(s)

## 2023-03-27 NOTE — REASON FOR VISIT
[Patient preference] : as per patient preference [Continuing, patient not seen in-person within last 12 months (provide details below)] : Telehealth services are continuing, patient not seen in-person within last 12 months.  [Telehealth (audio & video) - Individual/Group] : This visit was provided via telehealth using real-time 2-way audio visual technology. [Other Location: e.g. Home (Enter Location, City,State)___] : The provider was located at [unfilled]. [Home] : The patient, [unfilled], was located at home, [unfilled], at the time of the visit. [Verbal consent obtained from patient/other participant(s)] : Verbal consent for telehealth/telephonic services obtained from patient/other participant(s) [FreeTextEntry4] : 1:30pm [FreeTextEntry5] : 2:05pm  [FreeTextEntry3] : patient requested telehealth sessions and is appropriate for telehealth services  [Patient] : Patient [FreeTextEntry1] : psychotherapy follow up

## 2023-03-29 ENCOUNTER — APPOINTMENT (OUTPATIENT)
Dept: PSYCHIATRY | Facility: CLINIC | Age: 63
End: 2023-03-29
Payer: MEDICARE

## 2023-03-29 ENCOUNTER — OUTPATIENT (OUTPATIENT)
Dept: OUTPATIENT SERVICES | Facility: HOSPITAL | Age: 63
LOS: 1 days | End: 2023-03-29
Payer: MEDICARE

## 2023-03-29 ENCOUNTER — APPOINTMENT (OUTPATIENT)
Dept: PSYCHIATRY | Facility: CLINIC | Age: 63
End: 2023-03-29

## 2023-03-29 DIAGNOSIS — F41.1 GENERALIZED ANXIETY DISORDER: ICD-10-CM

## 2023-03-29 DIAGNOSIS — F32.9 MAJOR DEPRESSIVE DISORDER, SINGLE EPISODE, UNSPECIFIED: ICD-10-CM

## 2023-03-29 DIAGNOSIS — Z98.890 OTHER SPECIFIED POSTPROCEDURAL STATES: Chronic | ICD-10-CM

## 2023-03-29 DIAGNOSIS — Z95.1 PRESENCE OF AORTOCORONARY BYPASS GRAFT: Chronic | ICD-10-CM

## 2023-03-29 PROCEDURE — 99214 OFFICE O/P EST MOD 30 MIN: CPT | Mod: 95

## 2023-03-30 DIAGNOSIS — F32.9 MAJOR DEPRESSIVE DISORDER, SINGLE EPISODE, UNSPECIFIED: ICD-10-CM

## 2023-03-30 DIAGNOSIS — F41.1 GENERALIZED ANXIETY DISORDER: ICD-10-CM

## 2023-04-03 ENCOUNTER — OUTPATIENT (OUTPATIENT)
Dept: OUTPATIENT SERVICES | Facility: HOSPITAL | Age: 63
LOS: 1 days | End: 2023-04-03
Payer: MEDICARE

## 2023-04-03 ENCOUNTER — APPOINTMENT (OUTPATIENT)
Dept: PSYCHIATRY | Facility: CLINIC | Age: 63
End: 2023-04-03

## 2023-04-03 DIAGNOSIS — Z95.1 PRESENCE OF AORTOCORONARY BYPASS GRAFT: Chronic | ICD-10-CM

## 2023-04-03 DIAGNOSIS — F41.1 GENERALIZED ANXIETY DISORDER: ICD-10-CM

## 2023-04-03 DIAGNOSIS — F32.9 MAJOR DEPRESSIVE DISORDER, SINGLE EPISODE, UNSPECIFIED: ICD-10-CM

## 2023-04-03 DIAGNOSIS — Z98.890 OTHER SPECIFIED POSTPROCEDURAL STATES: Chronic | ICD-10-CM

## 2023-04-03 PROCEDURE — 90834 PSYTX W PT 45 MINUTES: CPT | Mod: 95

## 2023-04-03 NOTE — REASON FOR VISIT
[Patient preference] : as per patient preference [Continuing, patient not seen in-person within last 12 months (provide details below)] : Telehealth services are continuing, patient not seen in-person within last 12 months.  [Telehealth (audio & video) - Individual/Group] : This visit was provided via telehealth using real-time 2-way audio visual technology. [Other Location: e.g. Home (Enter Location, City,State)___] : The provider was located at [unfilled]. [Home] : The patient, [unfilled], was located at home, [unfilled], at the time of the visit. [Verbal consent obtained from patient/other participant(s)] : Verbal consent for telehealth/telephonic services obtained from patient/other participant(s) [Patient] : Patient [FreeTextEntry4] : 1pm  [FreeTextEntry5] : 1:43pm [FreeTextEntry3] : patient requested telehealth sessions and is appropriate for telehealth services  [FreeTextEntry1] : psychotherapy follow up

## 2023-04-03 NOTE — PLAN
[Cognitive and/or Behavior Therapy] : Cognitive and/or Behavior Therapy  [Skills training (all types)] : Skills training (all types)  [Supportive Therapy] : Supportive Therapy [FreeTextEntry2] : Goal #1- Management of depressive symptoms \par Obj #1- The patient will verbalize and utilize at least three effective coping mechanisms to manage her depression symptoms \par Obj #2- The patient will attend monthly medication management appointments with Dr. Burns and will take her prescribed psychotropic medications as prescribed \par Obj #3- The patient will attend bi weekly sessions with the writer and is no longer in the CONNECT track. \par \par  [de-identified] : The patient attended her scheduled telehealth session with the writer via [employer]. The patient explained that she has been doing "much better" since her last session with the writer with a decrease in anxiety and depression. The patient explained that she continues to look for apartments however, she explained that  apartments is not within her and her daughter's budget. The patient expressed ongoing frustration as she continues to cope with her daughter's behaviors. The patient described an incident where she was able to set and adhere to boundaries with her daughter. The writer commended the patient for this. The patient explained that she has been enjoying a television series and she recently was able to listen to music while she cleaned up her home which she found to be relaxing. Patient is medication adherent. The writer will continue to provide the patient with support and encouragement where needed. The patient is scheduled for a follow up visit on 4/17/2023 at 1pm.  [Recommended Frequency of Visits: ____] : Recommended frequency of visits: [unfilled] [Return in ____ week(s)] : Return in [unfilled] week(s)

## 2023-04-06 NOTE — CDI QUERY NOTE - NSCDIOTHERTXTBX_GEN_ALL_CORE_HH
CLINICAL INDICATORS    2/8 Pulmonology Consult Note: …Overloaded on presentation… Patient reports subjective shortness of breath with orthopnea over the past few weeks… H/o CHF, CAD and CABG…    2/9 Echo: EF 55-60%    2/10 Discharge Note Provider: …increased home dose of lasix from 20 to 40 as bnp mildly elevated - could be contributing to sob… You came to the hospital with shortness of breath. The cause of this may be contributed to history of asthma/COPD in combination with fluid in the lungs…    Lab Results:  •  (2/7)    Orders:  • Lasix 40 mg PO Daily (2/8)  • Lasix 40 mg IV Daily (2/9 – 2/10)      Based on your professional judgment and the clinical indicators please clarify if CHF can be further specified as:  • Decompensated HFpEF  • Compensated HFpEF  • Other (please specify):  • Clinically unable to determine

## 2023-04-17 ENCOUNTER — OUTPATIENT (OUTPATIENT)
Dept: OUTPATIENT SERVICES | Facility: HOSPITAL | Age: 63
LOS: 1 days | End: 2023-04-17
Payer: MEDICARE

## 2023-04-17 ENCOUNTER — APPOINTMENT (OUTPATIENT)
Dept: PSYCHIATRY | Facility: CLINIC | Age: 63
End: 2023-04-17

## 2023-04-17 DIAGNOSIS — Z95.1 PRESENCE OF AORTOCORONARY BYPASS GRAFT: Chronic | ICD-10-CM

## 2023-04-17 DIAGNOSIS — Z98.890 OTHER SPECIFIED POSTPROCEDURAL STATES: Chronic | ICD-10-CM

## 2023-04-17 DIAGNOSIS — F32.9 MAJOR DEPRESSIVE DISORDER, SINGLE EPISODE, UNSPECIFIED: ICD-10-CM

## 2023-04-17 DIAGNOSIS — F41.1 GENERALIZED ANXIETY DISORDER: ICD-10-CM

## 2023-04-17 PROCEDURE — 90832 PSYTX W PT 30 MINUTES: CPT | Mod: 95

## 2023-04-17 NOTE — PLAN
[Cognitive and/or Behavior Therapy] : Cognitive and/or Behavior Therapy  [Skills training (all types)] : Skills training (all types)  [Supportive Therapy] : Supportive Therapy [FreeTextEntry2] : Goal #1- Management of depressive symptoms \par Obj #1- The patient will verbalize and utilize at least three effective coping mechanisms to manage her depression symptoms \par Obj #2- The patient will attend monthly medication management appointments with Dr. Burns and will take her prescribed psychotropic medications as prescribed \par Obj #3- The patient will attend bi weekly sessions with the writer and is no longer in the CONNECT track. \par \par  [de-identified] : The patient attended her scheduled telehealth session with the writer via [employer]. The patient noted that while she has not been feeling as anxious and or depressed, she noted feelings of being overwhelmed. The writer explored this feeling with the patient and she noted that she and her daughter continue to look for an apartment without much luck. Patient explained that her pet cat has been a barrier for securing an apartment as landlords are not wanting tenants with pets. The writer actively listened to the patient, validated her feelings and support was rendered. Patient expressed frustrations as her daughter has not been flexible with the wish list for their new home. Writer and patient discussed the importance of compromising to secure a new home. \par Patient noted that her memory has been getting progressively worse as she has been forgetting this that she once could remember. The patient explained that this has been scary for her. The writer strongly encouraged the patient to reach out to her PCP to potentially obtain a referral for a neurologist for further investigation. The patient is scheduled for a follow up visit on 5/1/2023 at 1pm.  [Recommended Frequency of Visits: ____] : Recommended frequency of visits: [unfilled] [Return in ____ week(s)] : Return in [unfilled] week(s)

## 2023-04-17 NOTE — REASON FOR VISIT
[Patient preference] : as per patient preference [Continuing, patient not seen in-person within last 12 months (provide details below)] : Telehealth services are continuing, patient not seen in-person within last 12 months.  [Telehealth (audio & video) - Individual/Group] : This visit was provided via telehealth using real-time 2-way audio visual technology. [Other Location: e.g. Home (Enter Location, City,State)___] : The provider was located at [unfilled]. [Home] : The patient, [unfilled], was located at home, [unfilled], at the time of the visit. [Verbal consent obtained from patient/other participant(s)] : Verbal consent for telehealth/telephonic services obtained from patient/other participant(s) [Patient] : Patient [FreeTextEntry4] : 1:30pm  [FreeTextEntry5] : 2:03pm  [FreeTextEntry3] : patient requested telehealth sessions and is appropriate for telehealth services  [FreeTextEntry1] : psychotherapy follow up

## 2023-05-01 ENCOUNTER — OUTPATIENT (OUTPATIENT)
Dept: OUTPATIENT SERVICES | Facility: HOSPITAL | Age: 63
LOS: 1 days | End: 2023-05-01
Payer: MEDICARE

## 2023-05-01 ENCOUNTER — APPOINTMENT (OUTPATIENT)
Dept: PSYCHIATRY | Facility: CLINIC | Age: 63
End: 2023-05-01

## 2023-05-01 DIAGNOSIS — Z98.890 OTHER SPECIFIED POSTPROCEDURAL STATES: Chronic | ICD-10-CM

## 2023-05-01 DIAGNOSIS — F41.1 GENERALIZED ANXIETY DISORDER: ICD-10-CM

## 2023-05-01 DIAGNOSIS — Z95.1 PRESENCE OF AORTOCORONARY BYPASS GRAFT: Chronic | ICD-10-CM

## 2023-05-01 DIAGNOSIS — F32.9 MAJOR DEPRESSIVE DISORDER, SINGLE EPISODE, UNSPECIFIED: ICD-10-CM

## 2023-05-01 PROCEDURE — 90834 PSYTX W PT 45 MINUTES: CPT | Mod: 95

## 2023-05-01 NOTE — PLAN
[Cognitive and/or Behavior Therapy] : Cognitive and/or Behavior Therapy  [Skills training (all types)] : Skills training (all types)  [Supportive Therapy] : Supportive Therapy [FreeTextEntry2] : Goal #1- Management of depressive symptoms \par Obj #1- The patient will verbalize and utilize at least three effective coping mechanisms to manage her depression symptoms \par Obj #2- The patient will attend monthly medication management appointments with Dr. Burns and will take her prescribed psychotropic medications as prescribed \par Obj #3- The patient will attend bi weekly sessions with the writer and is no longer in the CONNECT track. \par \par  [de-identified] : **Termination session with current therapist. The patient attended her scheduled telehealth session with the writer via [employer]. The patient reported that she has been "okay". The patient noted that she continues to look for an apartment that will fit the needs of her and her daughter. Patient noted that while she is not feeling as depressed as in previous weeks, she noted that she continues to have difficulties on occasions managing her daughter's BPD related behaviors. The patient recalled an instance of frustrations. The writer actively listened to the patient, validated her feelings and support was rendered. The writer and the patient discussed the use of logical reasoning to breakdown some of the thought patterns and comments that her daughter has said that has made her angry and or upset. The writer and the patient discussed DBT skill of rational mind, wise mind and emotional mind. Skill was role played with the patient. The patient reports that she saw her PCP regarding concerns regarding her forgetfulness. Patient noted that she has a scheduled appointment with a neurologist next March however, she continues to look for a neurologist that can see her sooner. The patient reports medication adherence. Writer explained to the patient that her new therapist will be in contact with her for continued therapy.

## 2023-05-01 NOTE — REASON FOR VISIT
[Patient preference] : as per patient preference [Continuing, patient not seen in-person within last 12 months (provide details below)] : Telehealth services are continuing, patient not seen in-person within last 12 months.  [Telehealth (audio & video) - Individual/Group] : This visit was provided via telehealth using real-time 2-way audio visual technology. [Other Location: e.g. Home (Enter Location, City,State)___] : The provider was located at [unfilled]. [Home] : The patient, [unfilled], was located at home, [unfilled], at the time of the visit. [Verbal consent obtained from patient/other participant(s)] : Verbal consent for telehealth/telephonic services obtained from patient/other participant(s) [Patient] : Patient [FreeTextEntry4] : 1:01pm  [FreeTextEntry5] : 1:42pm  [FreeTextEntry3] : patient requested telehealth sessions and is appropriate for telehealth services  [FreeTextEntry1] : psychotherapy follow up

## 2023-05-11 NOTE — ASSESSMENT
[FreeTextEntry1] : Patient reports stable mood, at baseline.\par Continue:\par 1. Topamax 50mg in am and 100mg at pm\par 2. Lexapro 25mg po daily\par 3. Wellbutrin XL 300mg po qam\par 4. xanax 0.5mg-1mg at bedtime prn \par 5. remeron 7.5mg at bedtime was d/c\par \par Follow up in 8 weeks, earlier if needed, will come in person if she need another RX for xanax after 5/11/23

## 2023-05-11 NOTE — REASON FOR VISIT
[Home] : at home, [unfilled] , at the time of the visit. [Medical Office: ([facility])___] : at the medical office located in  [Verbal consent obtained from patient] : the patient, [unfilled] [FreeTextEntry4] : 1218pm [FreeTextEntry5] : 2875pm [FreeTextEntry2] : ISTOP checked [FreeTextEntry3] : patient understand than in person appointment is required to receive any more xanax [FreeTextEntry1] : "I am well  I stopped the mirtazapine."

## 2023-05-11 NOTE — HISTORY OF PRESENT ILLNESS
[No] : no [FreeTextEntry1] : This is a 62 year old patient who presents for medication management.  The patient reports  improved mood, good sleep and appetite.  The patient denies any symptoms of depression, seth, or psychosis at this time.  The patient has occasional anxiety that impairs their daily functioning. The patient denies any suicidal ideation, homicidal ideation, no auditory or visual hallucinations, or paranoid ideation.  The patient has chronic medical complaints. The patient denies any drug or alcohol use, and is not smoking at this time. I requested the patient's updated physical and labs from their PCP.  Coping skills were discussed and a safety plan was provided.  The patient was educated on the risks, benefits, and alternatives of their psychiatric medications.  The patient will engage in psychotherapy at this time.  The patient consents to ongoing medication management.  Continues with new therapist.   Patient will continue current medications.  \par

## 2023-05-11 NOTE — CURRENT PSYCHIATRIC SYMPTOMS
[Excessive Worry] : excessive worry [Depressed Mood] : no depressed mood [Anhedonia] : no anhedonia [Decreased Concentration] : no decrease in concentrating ability [Guilt] : not feeling guilty [Hyperphagia] : no hyperphagia [Insomnia] : no insomnia disorder [Hypersomnia] : no ~T hypersomnia [Psychomotor Retardation] : no psychomotor retardation [Anorexia] : no anorexia [Euphoria] : no euphoria [Highly Irritable] : no high irritability [Increased Activity] : no increased in activity [Distractibility] : not distracted [Talkativeness] : no talkativeness [Grandeur] : no feelings of grandeur [Buying Sprees] : no buying sprees [Hypersexuality] : denied hypersexuality [Dec Need For Sleep] : no decreased need for sleep [Delusions] : no ~T delusions [Hallucination Visual] : no visual hallucinations [Hallucination Auditory] : no auditory hallucinations [Hallucination Tactile] : no tactile hallucinations [Thought Disorder] : ~T a thought disorder was not noted [Ruminations] : no rumination disorder [Obsessions] : no obsessions [Re-experiencing] : no re-experiencing [Restlessness] : no restlessness [Panic] : no panic disorder [de-identified] : better [de-identified] : mild

## 2023-05-24 ENCOUNTER — OUTPATIENT (OUTPATIENT)
Dept: OUTPATIENT SERVICES | Facility: HOSPITAL | Age: 63
LOS: 1 days | End: 2023-05-24
Payer: MEDICARE

## 2023-05-24 ENCOUNTER — APPOINTMENT (OUTPATIENT)
Dept: PSYCHIATRY | Facility: CLINIC | Age: 63
End: 2023-05-24
Payer: MEDICARE

## 2023-05-24 DIAGNOSIS — Z95.1 PRESENCE OF AORTOCORONARY BYPASS GRAFT: Chronic | ICD-10-CM

## 2023-05-24 DIAGNOSIS — F41.1 GENERALIZED ANXIETY DISORDER: ICD-10-CM

## 2023-05-24 DIAGNOSIS — F32.9 MAJOR DEPRESSIVE DISORDER, SINGLE EPISODE, UNSPECIFIED: ICD-10-CM

## 2023-05-24 DIAGNOSIS — G47.00 INSOMNIA, UNSPECIFIED: ICD-10-CM

## 2023-05-24 DIAGNOSIS — Z98.890 OTHER SPECIFIED POSTPROCEDURAL STATES: Chronic | ICD-10-CM

## 2023-05-24 PROCEDURE — 99214 OFFICE O/P EST MOD 30 MIN: CPT | Mod: 95

## 2023-05-24 NOTE — DISCUSSION/SUMMARY
[Date of Last Physical Exam: _____] : Date of Last Physical Exam: [unfilled] [Date of Last Annual Labs: _____] : Date of Last Annual Labs: [unfilled] [Annual Review of Systems Completed?] : Annual Review of Systems Completed: Yes [Tobacco Screening Completed?] : Tobacco Screening Completed: Yes [Date of Last HbgA1c: _____] : Date of Last HbgA1c: [unfilled] [Date of Last Lipid Profile: _____] : Date of Last Lipid Profile: [unfilled] [Potential impact of patient’s physical health conditions on psychiatric care?] : Potential impact of patient’s physical health conditions on psychiatric care: No [Does patient require any additional health services or referrals?] : Does patient require any additional health services or referrals: Yes [FreeTextEntry1] : neurology

## 2023-05-24 NOTE — HISTORY OF PRESENT ILLNESS
[FreeTextEntry1] : This is a 62 year old patient who presents for medication management.  The patient reports  stable mood, good sleep and appetite.  The patient denies any symptoms of depression, seth, or psychosis at this time.  The patient has occasional anxiety that impairs their daily functioning. The patient denies any suicidal ideation, homicidal ideation, no auditory or visual hallucinations, or paranoid ideation.  The patient has chronic medical complaints. The patient denies any drug or alcohol use, and is not smoking at this time. I requested the patient's updated physical and labs from their PCP.  Coping skills were discussed and a safety plan was provided.  The patient was educated on the risks, benefits, and alternatives of their psychiatric medications.  The patient will engage in psychotherapy at this time.  The patient consents to ongoing medication management.  Continues with new therapist.   Patient will continue current medications.  Awaits neurology.\par  [No] : no

## 2023-05-24 NOTE — CURRENT PSYCHIATRIC SYMPTOMS
[Depressed Mood] : no depressed mood [Anhedonia] : no anhedonia [Guilt] : not feeling guilty [Decreased Concentration] : no decrease in concentrating ability [Hyperphagia] : no hyperphagia [Insomnia] : no insomnia disorder [Hypersomnia] : no ~T hypersomnia [Psychomotor Retardation] : no psychomotor retardation [Anorexia] : no anorexia [Euphoria] : no euphoria [Highly Irritable] : no high irritability [Increased Activity] : no increased in activity [Distractibility] : not distracted [Talkativeness] : no talkativeness [Grandeur] : no feelings of grandeur [Buying Sprees] : no buying sprees [Hypersexuality] : denied hypersexuality [Dec Need For Sleep] : no decreased need for sleep [Delusions] : no ~T delusions [Hallucination Visual] : no visual hallucinations [Hallucination Auditory] : no auditory hallucinations [Hallucination Tactile] : no tactile hallucinations [Thought Disorder] : ~T a thought disorder was not noted [Excessive Worry] : excessive worry [Ruminations] : no rumination disorder [Obsessions] : no obsessions [Re-experiencing] : no re-experiencing [Restlessness] : no restlessness [Panic] : no panic disorder [de-identified] : better [de-identified] : mild

## 2023-05-24 NOTE — REASON FOR VISIT
[FreeTextEntry4] : 130pm [FreeTextEntry5] : 155pm [FreeTextEntry2] : ISTOP checked [FreeTextEntry3] : patient understand than in person appointment is required to receive any more xanax [FreeTextEntry1] : "I am having problems with naming items, STM, made appointments with neurology, I may just go to ER due to wait time." [Home] : at home, [unfilled] , at the time of the visit. [Medical Office: ([facility])___] : at the medical office located in  [Verbal consent obtained from patient] : the patient, [unfilled]

## 2023-05-24 NOTE — ASSESSMENT
[FreeTextEntry1] : Patient reports stable mood, at baseline.\par Continue:\par 1. Topamax 50mg in am and 100mg at pm (patient willl try to slowly reduce to see if is continuing to anomia\par 2. Lexapro 25mg po daily\par 3. Wellbutrin XL 300mg po qam\par 4. xanax 0.5mg-1mg at bedtime prn \par \par \par Follow up in 8 weeks, earlier if needed, will come in person if she need another RX for xanax after 5/11/23

## 2023-05-25 DIAGNOSIS — F32.9 MAJOR DEPRESSIVE DISORDER, SINGLE EPISODE, UNSPECIFIED: ICD-10-CM

## 2023-05-25 DIAGNOSIS — F41.1 GENERALIZED ANXIETY DISORDER: ICD-10-CM

## 2023-05-25 DIAGNOSIS — G47.00 INSOMNIA, UNSPECIFIED: ICD-10-CM

## 2023-05-26 ENCOUNTER — NON-APPOINTMENT (OUTPATIENT)
Age: 63
End: 2023-05-26

## 2023-05-26 NOTE — DISCUSSION/SUMMARY
[FreeTextEntry1] : Therapist called pt to set up initial appt- pt did not answer, therapist left a [facility] contact info.

## 2023-05-31 ENCOUNTER — APPOINTMENT (OUTPATIENT)
Dept: PULMONOLOGY | Facility: HOSPITAL | Age: 63
End: 2023-05-31

## 2023-06-07 ENCOUNTER — OUTPATIENT (OUTPATIENT)
Dept: OUTPATIENT SERVICES | Facility: HOSPITAL | Age: 63
LOS: 1 days | End: 2023-06-07
Payer: MEDICARE

## 2023-06-07 ENCOUNTER — APPOINTMENT (OUTPATIENT)
Dept: PSYCHIATRY | Facility: CLINIC | Age: 63
End: 2023-06-07

## 2023-06-07 DIAGNOSIS — Z95.1 PRESENCE OF AORTOCORONARY BYPASS GRAFT: Chronic | ICD-10-CM

## 2023-06-07 DIAGNOSIS — F41.1 GENERALIZED ANXIETY DISORDER: ICD-10-CM

## 2023-06-07 DIAGNOSIS — Z98.890 OTHER SPECIFIED POSTPROCEDURAL STATES: Chronic | ICD-10-CM

## 2023-06-07 PROCEDURE — 90832 PSYTX W PT 30 MINUTES: CPT | Mod: 95

## 2023-06-07 NOTE — REASON FOR VISIT
[Patient preference] : as per patient preference [Continuing, patient not seen in-person within last 12 months (provide details below)] : Telehealth services are continuing, patient not seen in-person within last 12 months.  [Telehealth (audio & video) - Individual/Group] : This visit was provided via telehealth using real-time 2-way audio visual technology. [Other Location: e.g. Home (Enter Location, City,State)___] : The provider was located at [unfilled]. [Home] : The patient, [unfilled], was located at home, [unfilled], at the time of the visit. [Verbal consent obtained from patient/other participant(s)] : Verbal consent for telehealth/telephonic services obtained from patient/other participant(s) [Patient] : Patient [FreeTextEntry4] : 11:00am [FreeTextEntry5] : 11:30am [FreeTextEntry1] : anxiety and depression

## 2023-06-07 NOTE — PLAN
[Supportive Therapy] : Supportive Therapy [Other: ____] : [unfilled] [Return in ____ week(s)] : Return in [unfilled] week(s) [FreeTextEntry2] : Goal #1- Management of depressive symptoms \par Obj #1- The patient will verbalize and utilize at least three effective coping mechanisms to manage her depression symptoms \par Obj #2- The patient will attend monthly medication management appointments with Dr. Burns and will take her prescribed psychotropic medications as prescribed  [de-identified] : This was the first session for pt and newly assigned therapist. Pt had previously been seen for the past 2 years by Lina. Therapist used session to establish rapport and build therapeutic alliance. Therapist explored the impact of the change in therapist on pt's mental health and mood. Pt appeared understanding and accepting of the change and was open and forthcoming in today's session. Pt is hard of hearing, wears hearing aids and uses lip reading as well as sound to hear therapist. Pt informed therapist she may ask her to repeat herself if she cannot understand but that in time she will grow more accustomed to her voice. Pt shared her long medical history and how she is now ion disab ility due to health issues. Pt reports being home increases her depressive sx . Pt shared that she struggles with relational conflicts with her older daughter who was dx with BPD but did not have time to expand on this. Pt denied any SI. Pt is stable, taking her medication as directed and will meet with therapist o=every 2 weeks. [FreeTextEntry1] : Pt will contact tx team for worsening of symptoms and/or problems with medication. Next appt: 6/21\par

## 2023-06-08 DIAGNOSIS — F41.1 GENERALIZED ANXIETY DISORDER: ICD-10-CM

## 2023-06-09 ENCOUNTER — APPOINTMENT (OUTPATIENT)
Dept: PULMONOLOGY | Facility: CLINIC | Age: 63
End: 2023-06-09

## 2023-06-21 ENCOUNTER — OUTPATIENT (OUTPATIENT)
Dept: OUTPATIENT SERVICES | Facility: HOSPITAL | Age: 63
LOS: 1 days | End: 2023-06-21
Payer: MEDICARE

## 2023-06-21 ENCOUNTER — APPOINTMENT (OUTPATIENT)
Dept: PSYCHIATRY | Facility: CLINIC | Age: 63
End: 2023-06-21

## 2023-06-21 DIAGNOSIS — Z95.1 PRESENCE OF AORTOCORONARY BYPASS GRAFT: Chronic | ICD-10-CM

## 2023-06-21 DIAGNOSIS — Z98.890 OTHER SPECIFIED POSTPROCEDURAL STATES: Chronic | ICD-10-CM

## 2023-06-21 DIAGNOSIS — F41.1 GENERALIZED ANXIETY DISORDER: ICD-10-CM

## 2023-06-21 PROCEDURE — 90834 PSYTX W PT 45 MINUTES: CPT | Mod: 95

## 2023-06-21 NOTE — PLAN
[Supportive Therapy] : Supportive Therapy [Return in ____ week(s)] : Return in [unfilled] week(s) [FreeTextEntry2] : Goal #1- Management of depressive symptoms \par Obj #1- The patient will verbalize and utilize at least three effective coping mechanisms to manage her depression symptoms \par Obj #2- The patient will attend monthly medication management appointments with Dr. Burns and will take her prescribed psychotropic medications as prescribed  [Other: ____] : [unfilled] [de-identified] : This was the second session for pt and newly assigned therapist. Therapist used this  session as well to establish rapport and build therapeutic alliance. Therapist allowed p to ventilate on her daughter and how she grew up with multiple traits of ASD however she was recently dx with Borderline PD. . Pt believes that her  leaving when her daughter was 5 and not taking and active role in her life was the trauma that triggered pt's BPD. Pt shared how the BPD affects her daughter as well as how it causes conflicts in the home between them.  Pt often feels she is "walking on eggshells " around daughter which she finds unpleasant and anxiety provoking . Therapist listened and validated pt experience with her daughter. Pt did not have time to express how she was doing mentally and share impact BPD has on her today- will meet in 1 week instead of 2 . [FreeTextEntry1] : Pt will contact tx team for worsening of symptoms and/or problems with medication. Next appt: 6/28\par

## 2023-06-21 NOTE — REASON FOR VISIT
[Patient preference] : as per patient preference [Continuing, patient not seen in-person within last 12 months (provide details below)] : Telehealth services are continuing, patient not seen in-person within last 12 months.  [Telehealth (audio & video) - Individual/Group] : This visit was provided via telehealth using real-time 2-way audio visual technology. [Other Location: e.g. Home (Enter Location, City,State)___] : The provider was located at [unfilled]. [Home] : The patient, [unfilled], was located at home, [unfilled], at the time of the visit. [Verbal consent obtained from patient/other participant(s)] : Verbal consent for telehealth/telephonic services obtained from patient/other participant(s) [Patient] : Patient [FreeTextEntry4] : 11:00am [FreeTextEntry5] : 11:45am [FreeTextEntry1] : anxiety and depression

## 2023-06-22 DIAGNOSIS — F41.1 GENERALIZED ANXIETY DISORDER: ICD-10-CM

## 2023-06-28 ENCOUNTER — OUTPATIENT (OUTPATIENT)
Dept: OUTPATIENT SERVICES | Facility: HOSPITAL | Age: 63
LOS: 1 days | End: 2023-06-28
Payer: MEDICARE

## 2023-06-28 ENCOUNTER — APPOINTMENT (OUTPATIENT)
Dept: PSYCHIATRY | Facility: CLINIC | Age: 63
End: 2023-06-28

## 2023-06-28 DIAGNOSIS — Z98.890 OTHER SPECIFIED POSTPROCEDURAL STATES: Chronic | ICD-10-CM

## 2023-06-28 DIAGNOSIS — F41.1 GENERALIZED ANXIETY DISORDER: ICD-10-CM

## 2023-06-28 DIAGNOSIS — Z95.1 PRESENCE OF AORTOCORONARY BYPASS GRAFT: Chronic | ICD-10-CM

## 2023-06-28 PROCEDURE — 90832 PSYTX W PT 30 MINUTES: CPT | Mod: 95

## 2023-06-29 DIAGNOSIS — F41.1 GENERALIZED ANXIETY DISORDER: ICD-10-CM

## 2023-06-29 NOTE — REASON FOR VISIT
[Patient preference] : as per patient preference [Continuing, patient not seen in-person within last 12 months (provide details below)] : Telehealth services are continuing, patient not seen in-person within last 12 months.  [Telehealth (audio & video) - Individual/Group] : This visit was provided via telehealth using real-time 2-way audio visual technology. [Other Location: e.g. Home (Enter Location, City,State)___] : The provider was located at [unfilled]. [Home] : The patient, [unfilled], was located at home, [unfilled], at the time of the visit. [Verbal consent obtained from patient/other participant(s)] : Verbal consent for telehealth/telephonic services obtained from patient/other participant(s) [Patient] : Patient [FreeTextEntry4] : 12:15pm [FreeTextEntry5] : 12:45pm [FreeTextEntry1] : anxiety and depression

## 2023-06-29 NOTE — PLAN
[Supportive Therapy] : Supportive Therapy [Return in ____ week(s)] : Return in [unfilled] week(s) [FreeTextEntry2] : Goal #1- Management of depressive symptoms \par Obj #1- The patient will verbalize and utilize at least three effective coping mechanisms to manage her depression symptoms \par Obj #2- The patient will attend monthly medication management appointments with Dr. Burns and will take her prescribed psychotropic medications as prescribed  [de-identified] : Therapist continues to engage pt and get to know her. Therapist provided active listening as pt shared her family hx, how she is the youngest of 5 siblings and what her life was like growing up in a large family. Pt shared details on the deaths of both of her parents as well as her sister who passe of breast cancer in 2009. Pt shared how she was very close with her mom, who lived to 90. Pt shared her parents had a good marriage, were  a very long time and she was the first to get  in her family., which was difficult for her. Therapist validated pt feelings and explored her family relationships in the present. Pt remains close to remaining sibling. Pt reports mood to be ok but anxiety increased due to wanting to move and being unsuccessful this far in her search for a new apartment. Pt and therapist are beginning to build trust and therapeutic bond. Pt is compliant with medication and therapy appts. [FreeTextEntry1] : Pt will contact tx team for worsening of symptoms and/or problems with medication. Next appt: 7/12\par

## 2023-07-12 ENCOUNTER — OUTPATIENT (OUTPATIENT)
Dept: OUTPATIENT SERVICES | Facility: HOSPITAL | Age: 63
LOS: 1 days | End: 2023-07-12
Payer: MEDICARE

## 2023-07-12 ENCOUNTER — APPOINTMENT (OUTPATIENT)
Dept: PSYCHIATRY | Facility: CLINIC | Age: 63
End: 2023-07-12

## 2023-07-12 DIAGNOSIS — Z98.890 OTHER SPECIFIED POSTPROCEDURAL STATES: Chronic | ICD-10-CM

## 2023-07-12 DIAGNOSIS — Z95.1 PRESENCE OF AORTOCORONARY BYPASS GRAFT: Chronic | ICD-10-CM

## 2023-07-12 DIAGNOSIS — F41.1 GENERALIZED ANXIETY DISORDER: ICD-10-CM

## 2023-07-12 PROCEDURE — 90832 PSYTX W PT 30 MINUTES: CPT | Mod: 95

## 2023-07-13 DIAGNOSIS — F41.1 GENERALIZED ANXIETY DISORDER: ICD-10-CM

## 2023-07-13 NOTE — PLAN
[Supportive Therapy] : Supportive Therapy [Return in ____ week(s)] : Return in [unfilled] week(s) [FreeTextEntry2] : Goal #1- Management of depressive symptoms \par Obj #1- The patient will verbalize and utilize at least three effective coping mechanisms to manage her depression symptoms \par Obj #2- The patient will attend monthly medication management appointments with Dr. Burns and will take her prescribed psychotropic medications as prescribed  [de-identified] : The focus of this session was pt's conflicts with daughter related to finding a new home.  Therapist provided active listening as pt shared how she and daughter got into an argument over the price of the new home, pt feels frustrated and very ,much wants to find something and feels daughter is not as committed to this search. Pt also reported that daughter can be disrespectful and is most likely angry she must continue to live with pt. Pt reports she cannot afford housing on her own and relies on her daughter;s income to support her. Therapist validated pt feelings and how difficult  it must feel for her to have to depend on daughter. Pt and therapist problem solved alternate places for her to live apart from, daughter however they do not seem feasible.  Therapist encouraged ot to have a talk with her daughter about expectation for the new apartment so they can be on the same page and move forward together. Pt is agreeable. Pt was responsive to therapist support and interventions. [FreeTextEntry1] : Pt will contact tx team for worsening of symptoms and/or problems with medication. Next appt: 7/26\par

## 2023-07-13 NOTE — REASON FOR VISIT
[Patient preference] : as per patient preference [Continuing, patient not seen in-person within last 12 months (provide details below)] : Telehealth services are continuing, patient not seen in-person within last 12 months.  [Telehealth (audio & video) - Individual/Group] : This visit was provided via telehealth using real-time 2-way audio visual technology. [Other Location: e.g. Home (Enter Location, City,State)___] : The provider was located at [unfilled]. [Home] : The patient, [unfilled], was located at home, [unfilled], at the time of the visit. [Verbal consent obtained from patient/other participant(s)] : Verbal consent for telehealth/telephonic services obtained from patient/other participant(s) [Patient] : Patient [FreeTextEntry4] : 12:45pm [FreeTextEntry5] : 1:15pm [FreeTextEntry1] : anxiety and depression

## 2023-07-26 ENCOUNTER — APPOINTMENT (OUTPATIENT)
Dept: PSYCHIATRY | Facility: CLINIC | Age: 63
End: 2023-07-26
Payer: MEDICARE

## 2023-07-26 ENCOUNTER — OUTPATIENT (OUTPATIENT)
Dept: OUTPATIENT SERVICES | Facility: HOSPITAL | Age: 63
LOS: 1 days | End: 2023-07-26
Payer: MEDICARE

## 2023-07-26 DIAGNOSIS — F41.1 GENERALIZED ANXIETY DISORDER: ICD-10-CM

## 2023-07-26 DIAGNOSIS — Z95.1 PRESENCE OF AORTOCORONARY BYPASS GRAFT: Chronic | ICD-10-CM

## 2023-07-26 DIAGNOSIS — F32.9 MAJOR DEPRESSIVE DISORDER, SINGLE EPISODE, UNSPECIFIED: ICD-10-CM

## 2023-07-26 PROCEDURE — 99214 OFFICE O/P EST MOD 30 MIN: CPT | Mod: 95

## 2023-07-26 PROCEDURE — 90832 PSYTX W PT 30 MINUTES: CPT | Mod: 95

## 2023-07-26 RX ORDER — TOPIRAMATE 50 MG/1
50 TABLET, FILM COATED ORAL DAILY
Qty: 90 | Refills: 1 | Status: DISCONTINUED | COMMUNITY
Start: 2022-03-10 | End: 2023-07-26

## 2023-07-26 NOTE — ASSESSMENT
[FreeTextEntry1] : Patient reports stable mood, at baseline.\par Continue:\par 1. Topamax 50mg po BID\par 2. Lexapro 25mg po daily\par 3. Wellbutrin XL 300mg po qam\par 4. xanax 0.5mg-1mg at bedtime prn \par \par \par Follow up in 8 weeks, earlier if needed, will come in person if she need another RX for xanax after 5/11/23

## 2023-07-26 NOTE — CURRENT PSYCHIATRIC SYMPTOMS
[Excessive Worry] : excessive worry [Depressed Mood] : no depressed mood [Anhedonia] : no anhedonia [Guilt] : not feeling guilty [Decreased Concentration] : no decrease in concentrating ability [Hyperphagia] : no hyperphagia [Insomnia] : no insomnia disorder [Hypersomnia] : no ~T hypersomnia [Psychomotor Retardation] : no psychomotor retardation [Anorexia] : no anorexia [Euphoria] : no euphoria [Highly Irritable] : no high irritability [Increased Activity] : no increased in activity [Distractibility] : not distracted [Talkativeness] : no talkativeness [Grandeur] : no feelings of grandeur [Buying Sprees] : no buying sprees [Hypersexuality] : denied hypersexuality [Dec Need For Sleep] : no decreased need for sleep [Delusions] : no ~T delusions [Hallucination Visual] : no visual hallucinations [Hallucination Auditory] : no auditory hallucinations [Hallucination Tactile] : no tactile hallucinations [Thought Disorder] : ~T a thought disorder was not noted [Ruminations] : no rumination disorder [Obsessions] : no obsessions [Re-experiencing] : no re-experiencing [Restlessness] : no restlessness [Panic] : no panic disorder [de-identified] : better [de-identified] : mild

## 2023-07-26 NOTE — REASON FOR VISIT
[Home] : at home, [unfilled] , at the time of the visit. [Medical Office: ([facility])___] : at the medical office located in  [Verbal consent obtained from patient] : the patient, [unfilled] [FreeTextEntry4] : 1250pm [FreeTextEntry5] : 105pm [FreeTextEntry2] : ISTOP checked [FreeTextEntry3] : patient understand than in person appointment is required to receive any more xanax [FreeTextEntry1] : "I am good."

## 2023-07-26 NOTE — HISTORY OF PRESENT ILLNESS
[No] : no [FreeTextEntry1] : This is a 62 year old patient who presents for medication management.  The patient reports  stable mood, good sleep and appetite.  The patient denies any symptoms of depression, seth, or psychosis at this time.  The patient has occasional anxiety that impairs their daily functioning. The patient denies any suicidal ideation, homicidal ideation, no auditory or visual hallucinations, or paranoid ideation.  The patient has chronic medical complaints. The patient denies any drug or alcohol use, and is not smoking at this time. I requested the patient's updated physical and labs from their PCP.  Coping skills were discussed and a safety plan was provided.  The patient was educated on the risks, benefits, and alternatives of their psychiatric medications.  The patient will engage in psychotherapy at this time.  The patient consents to ongoing medication management.  Continues with new therapist.   Patient will continue current medications.  Awaits neurology.\par

## 2023-07-26 NOTE — DISCUSSION/SUMMARY
[Date of Last Physical Exam: _____] : Date of Last Physical Exam: [unfilled] [Date of Last Annual Labs: _____] : Date of Last Annual Labs: [unfilled] [Annual Review of Systems Completed?] : Annual Review of Systems Completed: Yes [Tobacco Screening Completed?] : Tobacco Screening Completed: Yes [Date of Last HbgA1c: _____] : Date of Last HbgA1c: [unfilled] [Date of Last Lipid Profile: _____] : Date of Last Lipid Profile: [unfilled] [Does patient require any additional health services or referrals?] : Does patient require any additional health services or referrals: Yes [Potential impact of patient’s physical health conditions on psychiatric care?] : Potential impact of patient’s physical health conditions on psychiatric care: No [FreeTextEntry1] : neurology

## 2023-07-27 DIAGNOSIS — F41.1 GENERALIZED ANXIETY DISORDER: ICD-10-CM

## 2023-07-27 DIAGNOSIS — F32.9 MAJOR DEPRESSIVE DISORDER, SINGLE EPISODE, UNSPECIFIED: ICD-10-CM

## 2023-07-27 NOTE — PLAN
[Supportive Therapy] : Supportive Therapy [Return in ____ week(s)] : Return in [unfilled] week(s) [FreeTextEntry2] : Goal #1- Management of depressive symptoms \par Obj #1- The patient will verbalize and utilize at least three effective coping mechanisms to manage her depression symptoms \par Obj #2- The patient will attend monthly medication management appointments with Dr. Burns and will take her prescribed psychotropic medications as prescribed  [de-identified] : The focus of this session was pt's memory problems. Therapist provided active listening as pt shared how she has been struggling with her memory for several months and that it seems to get worse. Pt reports that she has trouble finding the right words, will mix up the spelling of written words and is overall forgetful. Pt reports feeling frustrated because she was advised to see a neurologist but will have to wait several months, even then she will need proper testing to doctor to know the issue.  Therapist validated pt feelings and how upsetting it must be to to struggle with memory on top of other conditions.  Pt and therapist discussed things she can do to improve memory as well as rescue anxiety this causes. . Pt reports mood has been OK, less conflicts with daughter but still has not found a new home. . Pt was responsive to therapist support and interventions. [FreeTextEntry1] : Pt will contact tx team for worsening of symptoms and/or problems with medication. Next appt: 8/9\par

## 2023-07-27 NOTE — REASON FOR VISIT
[Patient preference] : as per patient preference [Continuing, patient not seen in-person within last 12 months (provide details below)] : Telehealth services are continuing, patient not seen in-person within last 12 months.  [Telehealth (audio & video) - Individual/Group] : This visit was provided via telehealth using real-time 2-way audio visual technology. [Other Location: e.g. Home (Enter Location, City,State)___] : The provider was located at [unfilled]. [Home] : The patient, [unfilled], was located at home, [unfilled], at the time of the visit. [Verbal consent obtained from patient/other participant(s)] : Verbal consent for telehealth/telephonic services obtained from patient/other participant(s) [Patient] : Patient [FreeTextEntry4] : 12:00pm [FreeTextEntry5] : 12:30pm [FreeTextEntry1] : anxiety and depression

## 2023-08-09 ENCOUNTER — OUTPATIENT (OUTPATIENT)
Dept: OUTPATIENT SERVICES | Facility: HOSPITAL | Age: 63
LOS: 1 days | End: 2023-08-09
Payer: MEDICARE

## 2023-08-09 ENCOUNTER — APPOINTMENT (OUTPATIENT)
Dept: PSYCHIATRY | Facility: CLINIC | Age: 63
End: 2023-08-09

## 2023-08-09 DIAGNOSIS — F41.1 GENERALIZED ANXIETY DISORDER: ICD-10-CM

## 2023-08-09 DIAGNOSIS — Z95.1 PRESENCE OF AORTOCORONARY BYPASS GRAFT: Chronic | ICD-10-CM

## 2023-08-09 DIAGNOSIS — Z98.890 OTHER SPECIFIED POSTPROCEDURAL STATES: Chronic | ICD-10-CM

## 2023-08-09 PROCEDURE — 90832 PSYTX W PT 30 MINUTES: CPT | Mod: 95

## 2023-08-10 DIAGNOSIS — F41.1 GENERALIZED ANXIETY DISORDER: ICD-10-CM

## 2023-08-11 NOTE — DISCUSSION/SUMMARY
[Plan Review] : Plan Review [Able to manage surrounding demands and opportunities] : able to manage surrounding demands and opportunities [Able to exercise self-direction] : able to exercise self-direction [Able to set and pursue goals] : able to set and pursue goals [Adherent to treatment recommendations] : adherent to treatment recommendations [Articulate] : articulate [Cognitively intact] : cognitively intact [Insightful] : insightful [Intelligent] : intelligent [Motivated to participate in treatment] : motivated to participate in treatment [Health literacy] : health literacy [Motivated to maintain or improve physical health] : motivated to maintain or improve physical health [Housing stability] : housing stability [English fluency] : English fluency [Connected to healthcare] : connected to healthcare [Access to safe outdoor spaces] : access to safe outdoor spaces [every ___ months] : every [unfilled] months [every ___ weeks] : every [unfilled] weeks [Improvement in symptoms as evidenced by:] : Improvement in symptoms as evidenced by: [Attainment of higher level of functioning as evidenced by:] : Attainment of higher level of functioning as evidenced by: [Treatment is no longer medically necessary as evidenced by:] : Treatment is no longer medically necessary as evidenced by: [A change in level of care is needed as evidenced by:] : A change in level of care is needed as evidenced by: [Other rationale for transition/discharge:] : Other rationale for transition/discharge: [None - Reason others did not participate:] : None - Reason others did not participate:  [Yes] : Yes [Psychiatric Provider/Prescriber] : Psychiatric Provider/Prescriber [Therapist] : Therapist [Tobacco Screening Completed?] : Tobacco Screening Completed: Yes [FreeTextEntry2] : 8/9/24 [FreeTextEntry3] : 12/5/19 [FreeTextEntry8] : medical conditions [Mental Health] : Mental Health [Revised - Rationale] : Revised - Rationale: [Continued - Progress made] : Continued - Progress made: [FreeTextEntry4] : I want to feel useful [de-identified] : Newly assigned therapist [de-identified] : Newly assigned therapist [FreeTextEntry1] : Anxiety [de-identified] : newly assigned therapist  [de-identified] : Pt will reduce score on the GAD7 from 7  Mild anxiety to minimal anxiety 0-4 [de-identified] : 8/9/24 [de-identified] : Pt will develop at least 2 ways to cope with worries and stressors.  [de-identified] : 8/9/24 [FreeTextEntry5] : 1)Clinician will encourage verbalization of feelings, in order to identify two triggers of anxiety.  2)Clinician will utilize CBT techniques to assist client in alleviating thoughts that promote anxious feelings.   3)Clinician and PT will identify two healthy coping skills for client to utilize.  . 4) Clinician and Pt will explore Pts personal strengths and interests.  5)Clinician will encourage pt to attend all monthly med management appointments [de-identified] : The patient expresses improvement in performing activities of daily living and/or says progress with initial complaint symptoms. In addition, the treatment team will conduct diagnose-based screenings as needed. [de-identified] : The patient expresses improvement in performing activities of daily living and/or says progress with initial complaint symptoms. In addition, the treatment team will conduct diagnose-based screenings as needed. [de-identified] : The treatment is no longer medically necessary when the patient no longer requires individual psychotherapy and/or medication to perform at baseline. [de-identified] :  If the patient is unable to perform activities of daily living and/or expresses suicidal ideation, a higher level of care will be considered. [de-identified] : Other rationales for transition/discharge are granted/applied upon the patient DNC, relocate, self-termination, and/or requests for the treatment termination/transfer to another facility. [de-identified] : not clinically indicated

## 2023-08-11 NOTE — PHYSICAL EXAM
[4 - Almost always true (everyday)] : 4 - Almost always true (everyday) [1 - Rarely true (1-2 days)] : 1 - Rarely true (1-2 days) [0 - Not at all true (0 days)] : 0 - Not at all true (0 days) [2 - Sometimes true (3-4 days)] : 2 - Sometimes true (3-4 days) [2 - A moderate amount] : 2 - A moderate amount [3 - Pretty bad, most things are going poorly] : 3 - Pretty bad, most things are going poorly [Cooperative] : cooperative [Euthymic] : euthymic [Full] : full [Clear] : clear [Linear/Goal Directed] : linear/goal directed [None] : none [None Reported] : none reported [Average] : average [WNL] : within normal limits [Individual reports tobacco use during the last 30 days?] : Individual reports tobacco use during the last 30 days? No [FreeTextEntry1] : 34

## 2023-08-11 NOTE — DISCUSSION/SUMMARY
[Plan Review] : Plan Review [Able to manage surrounding demands and opportunities] : able to manage surrounding demands and opportunities [Able to exercise self-direction] : able to exercise self-direction [Able to set and pursue goals] : able to set and pursue goals [Adherent to treatment recommendations] : adherent to treatment recommendations [Articulate] : articulate [Cognitively intact] : cognitively intact [Insightful] : insightful [Intelligent] : intelligent [Motivated to participate in treatment] : motivated to participate in treatment [Health literacy] : health literacy [Motivated to maintain or improve physical health] : motivated to maintain or improve physical health [Housing stability] : housing stability [English fluency] : English fluency [Connected to healthcare] : connected to healthcare [Access to safe outdoor spaces] : access to safe outdoor spaces [every ___ months] : every [unfilled] months [every ___ weeks] : every [unfilled] weeks [Improvement in symptoms as evidenced by:] : Improvement in symptoms as evidenced by: [Attainment of higher level of functioning as evidenced by:] : Attainment of higher level of functioning as evidenced by: [Treatment is no longer medically necessary as evidenced by:] : Treatment is no longer medically necessary as evidenced by: [A change in level of care is needed as evidenced by:] : A change in level of care is needed as evidenced by: [Other rationale for transition/discharge:] : Other rationale for transition/discharge: [None - Reason others did not participate:] : None - Reason others did not participate:  [Yes] : Yes [Psychiatric Provider/Prescriber] : Psychiatric Provider/Prescriber [Therapist] : Therapist [Tobacco Screening Completed?] : Tobacco Screening Completed: Yes [FreeTextEntry2] : 8/9/24 [FreeTextEntry3] : 12/5/19 [FreeTextEntry8] : medical conditions [Mental Health] : Mental Health [Revised - Rationale] : Revised - Rationale: [Continued - Progress made] : Continued - Progress made: [FreeTextEntry4] : I want to feel useful [de-identified] : Newly assigned therapist [de-identified] : Newly assigned therapist [FreeTextEntry1] : Anxiety [de-identified] : newly assigned therapist  [de-identified] : Pt will reduce score on the GAD7 from 7  Mild anxiety to minimal anxiety 0-4 [de-identified] : 8/9/24 [de-identified] : Pt will develop at least 2 ways to cope with worries and stressors.  [de-identified] : 8/9/24 [FreeTextEntry5] : 1)Clinician will encourage verbalization of feelings, in order to identify two triggers of anxiety.  2)Clinician will utilize CBT techniques to assist client in alleviating thoughts that promote anxious feelings.   3)Clinician and PT will identify two healthy coping skills for client to utilize.  . 4) Clinician and Pt will explore Pts personal strengths and interests.  5)Clinician will encourage pt to attend all monthly med management appointments [de-identified] : The patient expresses improvement in performing activities of daily living and/or says progress with initial complaint symptoms. In addition, the treatment team will conduct diagnose-based screenings as needed. [de-identified] : The patient expresses improvement in performing activities of daily living and/or says progress with initial complaint symptoms. In addition, the treatment team will conduct diagnose-based screenings as needed. [de-identified] : The treatment is no longer medically necessary when the patient no longer requires individual psychotherapy and/or medication to perform at baseline. [de-identified] :  If the patient is unable to perform activities of daily living and/or expresses suicidal ideation, a higher level of care will be considered. [de-identified] : Other rationales for transition/discharge are granted/applied upon the patient DNC, relocate, self-termination, and/or requests for the treatment termination/transfer to another facility. [de-identified] : not clinically indicated

## 2023-08-14 NOTE — PLAN
[Supportive Therapy] : Supportive Therapy [Return in ____ week(s)] : Return in [unfilled] week(s) [Other: ____] : [unfilled] [FreeTextEntry2] : Goal1 I want to feel useful.    Obj A Pt will reduce score on the CUDOS from 34 (moderate depression) to 11-20 (minimal depression)   Obj B Pt will engage in 2-3 "useful" task per week   Goal 2 Anxiety- to reduce worry Obj A Pt will reduce score on the GAD7 from 7 Mild anxiety to minimal anxiety 0-4 OBJ B Pt will develop at least 2 ways to cope with worries and stressors. [de-identified] : The focus of this session was treatment planning.  Therapist first used the CUDOS depression inventory and the GAD7 to assess pt's symptoms. Pt scored much higher on the depression scale and demonstrates more depressive sx than anxiety sx. Therapist then asked pt to identify her goals for tx  and what she wanted to work on with newly assigned therapist. Pt expressed the desire to feel better physically so that she could get out so things make her feel more useful. The idea of being more "useful" was a present theme in the discussion as pt feels her medical conditions limit her immensely and make her a burden to her family. Pt also identified that finding a new home would boost her mood as being in limbo causes anxiety and worry. Therapist completed collaboration on pt tx plan reveiw i session. Pt is compnait with [email or web address] and medciation appts.  [FreeTextEntry1] : Pt will contact tx team for worsening of symptoms and/or problems with medication. Next appt: 8/30

## 2023-08-14 NOTE — PLAN
[Supportive Therapy] : Supportive Therapy [Return in ____ week(s)] : Return in [unfilled] week(s) [Other: ____] : [unfilled] [FreeTextEntry2] : Goal1 I want to feel useful.    Obj A Pt will reduce score on the CUDOS from 34 (moderate depression) to 11-20 (minimal depression)   Obj B Pt will engage in 2-3 "useful" task per week   Goal 2 Anxiety- to reduce worry Obj A Pt will reduce score on the GAD7 from 7 Mild anxiety to minimal anxiety 0-4 OBJ B Pt will develop at least 2 ways to cope with worries and stressors. [de-identified] : The focus of this session was treatment planning.  Therapist first used the CUDOS depression inventory and the GAD7 to assess pt's symptoms. Pt scored much higher on the depression scale and demonstrates more depressive sx than anxiety sx. Therapist then asked pt to identify her goals for tx  and what she wanted to work on with newly assigned therapist. Pt expressed the desire to feel better physically so that she could get out so things make her feel more useful. The idea of being more "useful" was a present theme in the discussion as pt feels her medical conditions limit her immensely and make her a burden to her family. Pt also identified that finding a new home would boost her mood as being in limbo causes anxiety and worry. Therapist completed collaboration on pt tx plan reveiw i session. Pt is compnait with [employer] and medciation appts.  [FreeTextEntry1] : Pt will contact tx team for worsening of symptoms and/or problems with medication. Next appt: 8/30

## 2023-08-30 ENCOUNTER — APPOINTMENT (OUTPATIENT)
Dept: PSYCHIATRY | Facility: CLINIC | Age: 63
End: 2023-08-30

## 2023-09-13 ENCOUNTER — APPOINTMENT (OUTPATIENT)
Dept: PSYCHIATRY | Facility: CLINIC | Age: 63
End: 2023-09-13

## 2023-09-13 ENCOUNTER — OUTPATIENT (OUTPATIENT)
Dept: OUTPATIENT SERVICES | Facility: HOSPITAL | Age: 63
LOS: 1 days | End: 2023-09-13
Payer: MEDICARE

## 2023-09-13 DIAGNOSIS — Z95.1 PRESENCE OF AORTOCORONARY BYPASS GRAFT: Chronic | ICD-10-CM

## 2023-09-13 DIAGNOSIS — Z98.890 OTHER SPECIFIED POSTPROCEDURAL STATES: Chronic | ICD-10-CM

## 2023-09-13 DIAGNOSIS — F41.1 GENERALIZED ANXIETY DISORDER: ICD-10-CM

## 2023-09-13 DIAGNOSIS — F41.9 ANXIETY DISORDER, UNSPECIFIED: ICD-10-CM

## 2023-09-13 PROCEDURE — 90832 PSYTX W PT 30 MINUTES: CPT | Mod: 95

## 2023-09-14 DIAGNOSIS — F41.1 GENERALIZED ANXIETY DISORDER: ICD-10-CM

## 2023-09-20 ENCOUNTER — OUTPATIENT (OUTPATIENT)
Dept: OUTPATIENT SERVICES | Facility: HOSPITAL | Age: 63
LOS: 1 days | End: 2023-09-20
Payer: MEDICARE

## 2023-09-20 ENCOUNTER — APPOINTMENT (OUTPATIENT)
Dept: PSYCHIATRY | Facility: CLINIC | Age: 63
End: 2023-09-20
Payer: MEDICARE

## 2023-09-20 DIAGNOSIS — F41.1 GENERALIZED ANXIETY DISORDER: ICD-10-CM

## 2023-09-20 DIAGNOSIS — Z98.890 OTHER SPECIFIED POSTPROCEDURAL STATES: Chronic | ICD-10-CM

## 2023-09-20 DIAGNOSIS — F32.9 MAJOR DEPRESSIVE DISORDER, SINGLE EPISODE, UNSPECIFIED: ICD-10-CM

## 2023-09-20 DIAGNOSIS — Z95.1 PRESENCE OF AORTOCORONARY BYPASS GRAFT: Chronic | ICD-10-CM

## 2023-09-20 DIAGNOSIS — G47.00 INSOMNIA, UNSPECIFIED: ICD-10-CM

## 2023-09-20 PROCEDURE — 99214 OFFICE O/P EST MOD 30 MIN: CPT | Mod: 95

## 2023-09-21 DIAGNOSIS — F41.1 GENERALIZED ANXIETY DISORDER: ICD-10-CM

## 2023-09-21 DIAGNOSIS — F32.9 MAJOR DEPRESSIVE DISORDER, SINGLE EPISODE, UNSPECIFIED: ICD-10-CM

## 2023-09-21 DIAGNOSIS — G47.00 INSOMNIA, UNSPECIFIED: ICD-10-CM

## 2023-09-27 ENCOUNTER — OUTPATIENT (OUTPATIENT)
Dept: OUTPATIENT SERVICES | Facility: HOSPITAL | Age: 63
LOS: 1 days | End: 2023-09-27
Payer: MEDICARE

## 2023-09-27 ENCOUNTER — APPOINTMENT (OUTPATIENT)
Dept: PSYCHIATRY | Facility: CLINIC | Age: 63
End: 2023-09-27

## 2023-09-27 DIAGNOSIS — F41.1 GENERALIZED ANXIETY DISORDER: ICD-10-CM

## 2023-09-27 DIAGNOSIS — Z95.1 PRESENCE OF AORTOCORONARY BYPASS GRAFT: Chronic | ICD-10-CM

## 2023-09-27 PROCEDURE — 90832 PSYTX W PT 30 MINUTES: CPT | Mod: 95

## 2023-09-28 DIAGNOSIS — F41.1 GENERALIZED ANXIETY DISORDER: ICD-10-CM

## 2023-10-06 ENCOUNTER — INPATIENT (INPATIENT)
Facility: HOSPITAL | Age: 63
LOS: 1 days | Discharge: HOME | DRG: 313 | End: 2023-10-08
Attending: INTERNAL MEDICINE | Admitting: INTERNAL MEDICINE
Payer: MEDICARE

## 2023-10-06 VITALS
TEMPERATURE: 98 F | HEIGHT: 60 IN | RESPIRATION RATE: 20 BRPM | OXYGEN SATURATION: 98 % | SYSTOLIC BLOOD PRESSURE: 153 MMHG | WEIGHT: 242.07 LBS | DIASTOLIC BLOOD PRESSURE: 71 MMHG | HEART RATE: 63 BPM

## 2023-10-06 DIAGNOSIS — R07.9 CHEST PAIN, UNSPECIFIED: ICD-10-CM

## 2023-10-06 DIAGNOSIS — Z98.890 OTHER SPECIFIED POSTPROCEDURAL STATES: Chronic | ICD-10-CM

## 2023-10-06 DIAGNOSIS — Z95.1 PRESENCE OF AORTOCORONARY BYPASS GRAFT: Chronic | ICD-10-CM

## 2023-10-06 LAB
ALBUMIN SERPL ELPH-MCNC: 4.1 G/DL — SIGNIFICANT CHANGE UP (ref 3.5–5.2)
ALP SERPL-CCNC: 90 U/L — SIGNIFICANT CHANGE UP (ref 30–115)
ALT FLD-CCNC: 11 U/L — SIGNIFICANT CHANGE UP (ref 0–41)
ANION GAP SERPL CALC-SCNC: 7 MMOL/L — SIGNIFICANT CHANGE UP (ref 7–14)
AST SERPL-CCNC: 14 U/L — SIGNIFICANT CHANGE UP (ref 0–41)
BASOPHILS # BLD AUTO: 0.07 K/UL — SIGNIFICANT CHANGE UP (ref 0–0.2)
BASOPHILS NFR BLD AUTO: 1.1 % — HIGH (ref 0–1)
BILIRUB SERPL-MCNC: 0.6 MG/DL — SIGNIFICANT CHANGE UP (ref 0.2–1.2)
BUN SERPL-MCNC: 13 MG/DL — SIGNIFICANT CHANGE UP (ref 10–20)
CALCIUM SERPL-MCNC: 10.1 MG/DL — SIGNIFICANT CHANGE UP (ref 8.4–10.5)
CHLORIDE SERPL-SCNC: 106 MMOL/L — SIGNIFICANT CHANGE UP (ref 98–110)
CK MB CFR SERPL CALC: 1.3 NG/ML — SIGNIFICANT CHANGE UP (ref 0.6–6.3)
CK SERPL-CCNC: 47 U/L — SIGNIFICANT CHANGE UP (ref 0–225)
CO2 SERPL-SCNC: 27 MMOL/L — SIGNIFICANT CHANGE UP (ref 17–32)
CREAT SERPL-MCNC: 1.2 MG/DL — SIGNIFICANT CHANGE UP (ref 0.7–1.5)
EGFR: 51 ML/MIN/1.73M2 — LOW
EOSINOPHIL # BLD AUTO: 0.3 K/UL — SIGNIFICANT CHANGE UP (ref 0–0.7)
EOSINOPHIL NFR BLD AUTO: 4.6 % — SIGNIFICANT CHANGE UP (ref 0–8)
GLUCOSE SERPL-MCNC: 93 MG/DL — SIGNIFICANT CHANGE UP (ref 70–99)
HCT VFR BLD CALC: 40.3 % — SIGNIFICANT CHANGE UP (ref 37–47)
HGB BLD-MCNC: 13.1 G/DL — SIGNIFICANT CHANGE UP (ref 12–16)
IMM GRANULOCYTES NFR BLD AUTO: 0.3 % — SIGNIFICANT CHANGE UP (ref 0.1–0.3)
LYMPHOCYTES # BLD AUTO: 1.59 K/UL — SIGNIFICANT CHANGE UP (ref 1.2–3.4)
LYMPHOCYTES # BLD AUTO: 24.2 % — SIGNIFICANT CHANGE UP (ref 20.5–51.1)
MCHC RBC-ENTMCNC: 26.9 PG — LOW (ref 27–31)
MCHC RBC-ENTMCNC: 32.5 G/DL — SIGNIFICANT CHANGE UP (ref 32–37)
MCV RBC AUTO: 82.8 FL — SIGNIFICANT CHANGE UP (ref 81–99)
MONOCYTES # BLD AUTO: 0.49 K/UL — SIGNIFICANT CHANGE UP (ref 0.1–0.6)
MONOCYTES NFR BLD AUTO: 7.5 % — SIGNIFICANT CHANGE UP (ref 1.7–9.3)
NEUTROPHILS # BLD AUTO: 4.1 K/UL — SIGNIFICANT CHANGE UP (ref 1.4–6.5)
NEUTROPHILS NFR BLD AUTO: 62.3 % — SIGNIFICANT CHANGE UP (ref 42.2–75.2)
NRBC # BLD: 0 /100 WBCS — SIGNIFICANT CHANGE UP (ref 0–0)
NT-PROBNP SERPL-SCNC: 475 PG/ML — HIGH (ref 0–300)
PLATELET # BLD AUTO: 266 K/UL — SIGNIFICANT CHANGE UP (ref 130–400)
PMV BLD: 9.4 FL — SIGNIFICANT CHANGE UP (ref 7.4–10.4)
POTASSIUM SERPL-MCNC: 4.5 MMOL/L — SIGNIFICANT CHANGE UP (ref 3.5–5)
POTASSIUM SERPL-SCNC: 4.5 MMOL/L — SIGNIFICANT CHANGE UP (ref 3.5–5)
PROT SERPL-MCNC: 6.7 G/DL — SIGNIFICANT CHANGE UP (ref 6–8)
RBC # BLD: 4.87 M/UL — SIGNIFICANT CHANGE UP (ref 4.2–5.4)
RBC # FLD: 13.2 % — SIGNIFICANT CHANGE UP (ref 11.5–14.5)
SODIUM SERPL-SCNC: 140 MMOL/L — SIGNIFICANT CHANGE UP (ref 135–146)
TROPONIN T SERPL-MCNC: <0.01 NG/ML — SIGNIFICANT CHANGE UP
TROPONIN T SERPL-MCNC: <0.01 NG/ML — SIGNIFICANT CHANGE UP
WBC # BLD: 6.57 K/UL — SIGNIFICANT CHANGE UP (ref 4.8–10.8)
WBC # FLD AUTO: 6.57 K/UL — SIGNIFICANT CHANGE UP (ref 4.8–10.8)

## 2023-10-06 PROCEDURE — 78452 HT MUSCLE IMAGE SPECT MULT: CPT

## 2023-10-06 PROCEDURE — 93010 ELECTROCARDIOGRAM REPORT: CPT | Mod: 76

## 2023-10-06 PROCEDURE — 82553 CREATINE MB FRACTION: CPT

## 2023-10-06 PROCEDURE — 36415 COLL VENOUS BLD VENIPUNCTURE: CPT

## 2023-10-06 PROCEDURE — 99285 EMERGENCY DEPT VISIT HI MDM: CPT

## 2023-10-06 PROCEDURE — 82550 ASSAY OF CK (CPK): CPT

## 2023-10-06 PROCEDURE — 84484 ASSAY OF TROPONIN QUANT: CPT

## 2023-10-06 PROCEDURE — 93005 ELECTROCARDIOGRAM TRACING: CPT

## 2023-10-06 PROCEDURE — A9500: CPT

## 2023-10-06 PROCEDURE — 71045 X-RAY EXAM CHEST 1 VIEW: CPT | Mod: 26

## 2023-10-06 PROCEDURE — 93017 CV STRESS TEST TRACING ONLY: CPT

## 2023-10-06 RX ORDER — BUPROPION HYDROCHLORIDE 150 MG/1
300 TABLET, EXTENDED RELEASE ORAL DAILY
Refills: 0 | Status: DISCONTINUED | OUTPATIENT
Start: 2023-10-06 | End: 2023-10-08

## 2023-10-06 RX ORDER — METOPROLOL TARTRATE 50 MG
25 TABLET ORAL
Refills: 0 | Status: DISCONTINUED | OUTPATIENT
Start: 2023-10-06 | End: 2023-10-08

## 2023-10-06 RX ORDER — MONTELUKAST 4 MG/1
10 TABLET, CHEWABLE ORAL DAILY
Refills: 0 | Status: DISCONTINUED | OUTPATIENT
Start: 2023-10-06 | End: 2023-10-08

## 2023-10-06 RX ORDER — MIRTAZAPINE 45 MG/1
7.5 TABLET, ORALLY DISINTEGRATING ORAL AT BEDTIME
Refills: 0 | Status: DISCONTINUED | OUTPATIENT
Start: 2023-10-06 | End: 2023-10-08

## 2023-10-06 RX ORDER — INFLUENZA VIRUS VACCINE 15; 15; 15; 15 UG/.5ML; UG/.5ML; UG/.5ML; UG/.5ML
0.5 SUSPENSION INTRAMUSCULAR ONCE
Refills: 0 | Status: DISCONTINUED | OUTPATIENT
Start: 2023-10-06 | End: 2023-10-08

## 2023-10-06 RX ORDER — FUROSEMIDE 40 MG
40 TABLET ORAL DAILY
Refills: 0 | Status: DISCONTINUED | OUTPATIENT
Start: 2023-10-06 | End: 2023-10-06

## 2023-10-06 RX ORDER — LANOLIN ALCOHOL/MO/W.PET/CERES
5 CREAM (GRAM) TOPICAL AT BEDTIME
Refills: 0 | Status: DISCONTINUED | OUTPATIENT
Start: 2023-10-06 | End: 2023-10-08

## 2023-10-06 RX ORDER — ESCITALOPRAM OXALATE 10 MG/1
25 TABLET, FILM COATED ORAL DAILY
Refills: 0 | Status: DISCONTINUED | OUTPATIENT
Start: 2023-10-06 | End: 2023-10-08

## 2023-10-06 RX ORDER — ACETAMINOPHEN 500 MG
650 TABLET ORAL EVERY 6 HOURS
Refills: 0 | Status: DISCONTINUED | OUTPATIENT
Start: 2023-10-06 | End: 2023-10-08

## 2023-10-06 RX ORDER — BUDESONIDE AND FORMOTEROL FUMARATE DIHYDRATE 160; 4.5 UG/1; UG/1
2 AEROSOL RESPIRATORY (INHALATION)
Refills: 0 | Status: DISCONTINUED | OUTPATIENT
Start: 2023-10-06 | End: 2023-10-08

## 2023-10-06 RX ORDER — ONDANSETRON 8 MG/1
4 TABLET, FILM COATED ORAL EVERY 4 HOURS
Refills: 0 | Status: DISCONTINUED | OUTPATIENT
Start: 2023-10-06 | End: 2023-10-08

## 2023-10-06 RX ORDER — RIVAROXABAN 15 MG-20MG
20 KIT ORAL
Refills: 0 | Status: DISCONTINUED | OUTPATIENT
Start: 2023-10-06 | End: 2023-10-08

## 2023-10-06 RX ORDER — TOPIRAMATE 25 MG
100 TABLET ORAL AT BEDTIME
Refills: 0 | Status: DISCONTINUED | OUTPATIENT
Start: 2023-10-06 | End: 2023-10-06

## 2023-10-06 RX ORDER — TOPIRAMATE 25 MG
50 TABLET ORAL EVERY 12 HOURS
Refills: 0 | Status: DISCONTINUED | OUTPATIENT
Start: 2023-10-06 | End: 2023-10-08

## 2023-10-06 RX ORDER — FUROSEMIDE 40 MG
20 TABLET ORAL DAILY
Refills: 0 | Status: DISCONTINUED | OUTPATIENT
Start: 2023-10-06 | End: 2023-10-08

## 2023-10-06 RX ORDER — ATORVASTATIN CALCIUM 80 MG/1
20 TABLET, FILM COATED ORAL AT BEDTIME
Refills: 0 | Status: DISCONTINUED | OUTPATIENT
Start: 2023-10-06 | End: 2023-10-08

## 2023-10-06 RX ORDER — ALPRAZOLAM 0.25 MG
0.5 TABLET ORAL THREE TIMES A DAY
Refills: 0 | Status: DISCONTINUED | OUTPATIENT
Start: 2023-10-06 | End: 2023-10-08

## 2023-10-06 RX ORDER — ASPIRIN/CALCIUM CARB/MAGNESIUM 324 MG
81 TABLET ORAL DAILY
Refills: 0 | Status: DISCONTINUED | OUTPATIENT
Start: 2023-10-06 | End: 2023-10-08

## 2023-10-06 RX ORDER — CHLORHEXIDINE GLUCONATE 213 G/1000ML
1 SOLUTION TOPICAL
Refills: 0 | Status: DISCONTINUED | OUTPATIENT
Start: 2023-10-06 | End: 2023-10-08

## 2023-10-06 RX ORDER — TOPIRAMATE 25 MG
50 TABLET ORAL DAILY
Refills: 0 | Status: DISCONTINUED | OUTPATIENT
Start: 2023-10-06 | End: 2023-10-06

## 2023-10-06 RX ADMIN — ATORVASTATIN CALCIUM 20 MILLIGRAM(S): 80 TABLET, FILM COATED ORAL at 21:54

## 2023-10-06 RX ADMIN — MIRTAZAPINE 7.5 MILLIGRAM(S): 45 TABLET, ORALLY DISINTEGRATING ORAL at 21:54

## 2023-10-06 RX ADMIN — BUDESONIDE AND FORMOTEROL FUMARATE DIHYDRATE 2 PUFF(S): 160; 4.5 AEROSOL RESPIRATORY (INHALATION) at 21:54

## 2023-10-06 NOTE — H&P ADULT - ASSESSMENT
64 y/o Female c/o chest 7/10 to  5/10 pain on and off  x 2 days. Pt  with pm history CABG 4 years ago COPD, HTN, HLD, CAD  . p[t came  with chest pain x2 days. pt describes the pain as squeezing and never goes away completely . pt states she came to the ED today because of persisting pain and her pmhx. denies radiating pain, SOB, abdominal pain, numbness, weakness and tingling    Chest pain:  Tele  AM EKG  cardiology consult  TTE    continue home medications as per PMD    Afib on xarelto    COPD former smoker:  duoneb  symbicort    depression:  buspar    anxiety:  prn xanax    prophylaxisGi/VTE 64 y/o Female c/o chest 7/10 to  5/10 pain on and off  x 2 days. Pt  with pm history CABG 4 years ago COPD, HTN, HLD, CAD  . p[t came  with chest pain x2 days. pt describes the pain as squeezing and never goes away completely . pt states she came to the ED today because of persisting pain and her pmhx. denies radiating pain, SOB, abdominal pain, numbness, weakness and tingling    Chest pain:  Tele  AM EKG  cardiology consult  TTE    continue home medications as per PMD    Afib on xarelto    pulmonary HTN :   on Lasix 20 mg po q 24    COPD former smoker:  duoneb  symbicort    depression:  buspar    anxiety:  prn xanax    prophylaxisGi/VTE

## 2023-10-06 NOTE — ED PROVIDER NOTE - ATTENDING APP SHARED VISIT CONTRIBUTION OF CARE
64 yo F PMHx noted including h/o CABG, HTN, COPD, on Xarelto presents fr evaluation of chest discomfort on and off since yesterday.  Pt describes it as squeezing sensation, no radiation, no diaphoresis, no SOB, no h/o trauma,. On exam pt in NAD AAO x 3, speaking full clear sentences, Lungs cta b/l no wrr, abd soft nt nd, no edema,

## 2023-10-06 NOTE — PATIENT PROFILE ADULT - FALL HARM RISK - HARM RISK INTERVENTIONS

## 2023-10-06 NOTE — H&P ADULT - NSHPLABSRESULTS_GEN_ALL_CORE
13.1   6.57  )-----------( 266      ( 06 Oct 2023 18:07 )             40.3       10-06    140  |  106  |  13  ----------------------------<  93  4.5   |  27  |  1.2    Ca    10.1      06 Oct 2023 18:07    TPro  6.7  /  Alb  4.1  /  TBili  0.6  /  DBili  x   /  AST  14  /  ALT  11  /  AlkPhos  90  10-06              Urinalysis Basic - ( 06 Oct 2023 18:07 )    Color: x / Appearance: x / SG: x / pH: x  Gluc: 93 mg/dL / Ketone: x  / Bili: x / Urobili: x   Blood: x / Protein: x / Nitrite: x   Leuk Esterase: x / RBC: x / WBC x   Sq Epi: x / Non Sq Epi: x / Bacteria: x            Lactate Trend      CARDIAC MARKERS ( 06 Oct 2023 18:07 )  x     / <0.01 ng/mL / x     / x     / x            CAPILLARY BLOOD GLUCOSE

## 2023-10-06 NOTE — H&P ADULT - NSHPPHYSICALEXAM_GEN_ALL_CORE
Vital Signs Last 24 Hrs  T(C): 36.8 (06 Oct 2023 16:29), Max: 36.8 (06 Oct 2023 16:29)  T(F): 98.3 (06 Oct 2023 16:29), Max: 98.3 (06 Oct 2023 16:29)  HR: 63 (06 Oct 2023 16:29) (63 - 63)  BP: 153/71 (06 Oct 2023 16:29) (153/71 - 153/71)  BP(mean): --  RR: 20 (06 Oct 2023 16:29) (20 - 20)  SpO2: 98% (06 Oct 2023 16:29) (98% - 98%)    GENERAL:  62y/o Female NAD, resting comfortably.  HEAD:  Atraumatic, Normocephalic  EYES: EOMI, PERRLA, conjunctiva and sclera clear  NECK: Supple, No JVD, no cervical lymphadenopathy, non-tender  CHEST/LUNG: Clear to auscultation bilaterally; No wheeze, rhonchi, or rales  HEART: Regular rate and rhythm; S1&S2  ABDOMEN: Soft, Nontender, Nondistended x 4 quadrants; Bowel sounds present  EXTREMITIES:   Peripheral Pulses Present, No clubbing, no cyanosis, or no edema, no calf tenderness  PSYCH: AAOx3, cooperative, appropriate  NEUROLOGY: WNL  SKIN: WNL

## 2023-10-06 NOTE — ED PROVIDER NOTE - OBJECTIVE STATEMENT
64 y/o F with pmhx COPD, HTN, HLD, CAD s/p CABG 4 years ago presents with chest pain x2 days. pt describes the pain as squeezing and constant. pt states she came to the ED today because of persisting pain and her pmhx. denies radiating pain, SOB, abdominal pain, numbness, weakness and tingling.

## 2023-10-06 NOTE — H&P ADULT - HISTORY OF PRESENT ILLNESS
64 y/o Female c/o chest 7/10 to  5/10 pain on and off  x 2 days. Pt  with pm history CABG 4 years ago COPD, HTN, HLD, CAD  . p[t came  with chest pain x2 days. pt describes the pain as squeezing and never goes away completely . pt states she came to the ED today because of persisting pain and her pmhx. denies radiating pain, SOB, abdominal pain, numbness, weakness and tingling 62 y/o Female c/o chest 7/10 to  5/10 pain on and off  x 2 days. Pt  with pm history CABG 4 years ago COPD, HTN, HLD, CAD  . p[t came  with chest pain x2 days. pt describes the pain as squeezing and never goes away completely . pt states she came to the ED today because of persisting pain and her pmhx. denies radiating pain, SOB, abdominal pain, numbness, weakness and tingling. Pt states that today the pain is different as when she had the CABG 4 years ago somewhat a little better.

## 2023-10-06 NOTE — ED PROVIDER NOTE - PHYSICAL EXAMINATION
Vital Signs: I have reviewed the initial vital signs.  Constitutional: well-nourished, no acute distress, normocephalic  Eyes: PERRLA, EOMI, no nystagmus, clear conjunctiva  Cardiovascular: regular rate, regular rhythm, no murmur appreciated  Respiratory: unlabored respiratory effort, clear to auscultation bilaterally  Gastrointestinal: soft, non-tender, non-distended  abdomen, no pulsatile mass  Musculoskeletal: supple neck, no lower extremity edema, no bony tenderness  Integumentary: warm, dry, no rash  Neurologic: awake, alert, extremities’ motor and sensory functions grossly intact, no focal deficits, GCS 15  Psychiatric: appropriate mood, appropriate affect

## 2023-10-06 NOTE — ED ADULT NURSE NOTE - NSFALLUNIVINTERV_ED_ALL_ED
Bed/Stretcher in lowest position, wheels locked, appropriate side rails in place/Call bell, personal items and telephone in reach/Instruct patient to call for assistance before getting out of bed/chair/stretcher/Non-slip footwear applied when patient is off stretcher/[place] to call system/Physically safe environment - no spills, clutter or unnecessary equipment/Purposeful proactive rounding/Room/bathroom lighting operational, light cord in reach

## 2023-10-06 NOTE — ED PROVIDER NOTE - CLINICAL SUMMARY MEDICAL DECISION MAKING FREE TEXT BOX
Pt placed on cardiac monitor. EKG obtained and interpreted by me, no STEMI or overt ischemia, CXR interpreted by me and without acute findings.  Pt with nml troponin however given multiple risk factors and moderate HEART score pt would benefit from further evaluation and admission at this time.

## 2023-10-06 NOTE — PATIENT PROFILE ADULT - MEDICATIONS/VISITS
John Thapa is a 49 year old male presenting with mid back pain and left hip pain.  Pt stated that he did slip on ice.  Pt did ambulate to the exam room without complaint.   Symptoms started Thursday   OTC medications used: None   Denies  known Latex allergy or symptoms of Latex sensitivity.    Social History     Tobacco Use   Smoking Status Not on file   Smokeless Tobacco Not on file     All allergies and medications reviewed.  PCP verified:  None   Pharmacy verified:  [facility]   Patient would like communication of their results via:        Cell Phone: [phone]      Okay to leave a message containing results? Yes     no

## 2023-10-07 LAB
CK MB CFR SERPL CALC: 1 NG/ML — SIGNIFICANT CHANGE UP (ref 0.6–6.3)
CK MB CFR SERPL CALC: 1.1 NG/ML — SIGNIFICANT CHANGE UP (ref 0.6–6.3)
CK SERPL-CCNC: 37 U/L — SIGNIFICANT CHANGE UP (ref 0–225)
CK SERPL-CCNC: 42 U/L — SIGNIFICANT CHANGE UP (ref 0–225)
TROPONIN T SERPL-MCNC: <0.01 NG/ML — SIGNIFICANT CHANGE UP
TROPONIN T SERPL-MCNC: <0.01 NG/ML — SIGNIFICANT CHANGE UP

## 2023-10-07 PROCEDURE — 93016 CV STRESS TEST SUPVJ ONLY: CPT

## 2023-10-07 PROCEDURE — 93018 CV STRESS TEST I&R ONLY: CPT

## 2023-10-07 PROCEDURE — 78452 HT MUSCLE IMAGE SPECT MULT: CPT | Mod: 26

## 2023-10-07 PROCEDURE — 99222 1ST HOSP IP/OBS MODERATE 55: CPT

## 2023-10-07 RX ORDER — REGADENOSON 0.08 MG/ML
0.4 INJECTION, SOLUTION INTRAVENOUS ONCE
Refills: 0 | Status: COMPLETED | OUTPATIENT
Start: 2023-10-07 | End: 2023-10-07

## 2023-10-07 RX ORDER — TOPIRAMATE 25 MG
0 TABLET ORAL
Qty: 0 | Refills: 0 | DISCHARGE

## 2023-10-07 RX ORDER — TOPIRAMATE 25 MG
1 TABLET ORAL
Qty: 0 | Refills: 0 | DISCHARGE
Start: 2023-10-07

## 2023-10-07 RX ORDER — TOPIRAMATE 25 MG
50 TABLET ORAL
Qty: 0 | Refills: 0 | DISCHARGE

## 2023-10-07 RX ADMIN — Medication 50 MILLIGRAM(S): at 17:22

## 2023-10-07 RX ADMIN — Medication 50 MILLIGRAM(S): at 05:20

## 2023-10-07 RX ADMIN — REGADENOSON 0.4 MILLIGRAM(S): 0.08 INJECTION, SOLUTION INTRAVENOUS at 12:38

## 2023-10-07 RX ADMIN — BUDESONIDE AND FORMOTEROL FUMARATE DIHYDRATE 2 PUFF(S): 160; 4.5 AEROSOL RESPIRATORY (INHALATION) at 21:23

## 2023-10-07 RX ADMIN — Medication 20 MILLIGRAM(S): at 05:20

## 2023-10-07 RX ADMIN — Medication 25 MILLIGRAM(S): at 17:22

## 2023-10-07 RX ADMIN — Medication 25 MILLIGRAM(S): at 05:20

## 2023-10-07 RX ADMIN — ESCITALOPRAM OXALATE 25 MILLIGRAM(S): 10 TABLET, FILM COATED ORAL at 05:20

## 2023-10-07 RX ADMIN — RIVAROXABAN 20 MILLIGRAM(S): KIT at 17:22

## 2023-10-07 RX ADMIN — ATORVASTATIN CALCIUM 20 MILLIGRAM(S): 80 TABLET, FILM COATED ORAL at 21:21

## 2023-10-07 NOTE — CONSULT NOTE ADULT - ASSESSMENT
63 y/o female with PMH of AFib(s/p Ablation), on Xarelto, CABG x3(2019), Asthma, COPD, HTN and HLD presented for eval of left sided intermittent chest pain x2 days      Impression:  #Chest pain: ACS ruled out  #Paroxysmal AFib  #HTN/HLD      Currently CP free  Troponin flat x2  ECG: Sinus with no acute ischemic changes  ECHO 2/9/23: EF 55-60%, Normal global LVSF  Lexiscan thallium stress 10/2/22: No ischemia           Plan:  Lipid profile, HgBA1C  Recommend Inpatient Lexiscan thallium stress test, given risk factors and Hx  Appears in sinus rhythm on monitor. C/w Metroprolol and Xarelto  C/w home dose of PO Lasix, Lipitor and ASA  Outpatient TTE  Monitor lyrober      Discussed with Dr Barroso

## 2023-10-07 NOTE — CHART NOTE - NSCHARTNOTEFT_GEN_A_CORE
Troponin negative x 3 sets  - seen by Cardiology team: Dr Barroso: patient scheduled for NMST today at [facility]

## 2023-10-07 NOTE — CONSULT NOTE ADULT - NS ATTEND AMEND GEN_ALL_CORE FT
63 y/o female with PMH of AFib(s/p Ablation), on Xarelto, CABG x3(2019), Asthma, COPD, HTN and HLD presented for eval of left sided intermittent chest pain x2 days. MI ruled . Will do Lexiscan. F/U out patient cardiology

## 2023-10-07 NOTE — PROGRESS NOTE ADULT - SUBJECTIVE AND OBJECTIVE BOX
64 y/o Female c/o chest 7/10 to  5/10 pain on and off  x 2 days. Pt  with pm history CABG 4 years ago COPD, HTN, HLD, CAD  . p[t came  with chest pain x2 days. pt describes the pain as squeezing and never goes away completely . pt states she came to the ED today because of persisting pain and her pmhx. denies radiating pain, SOB, abdominal pain, numbness, weakness and tingling. Pt states that today the pain is different as when she had the CABG 4 years ago somewhat a little better    PAST MEDICAL & SURGICAL HISTORY:    Bilateral carpal tunnel syndrome  History of  section  Closed fracture of foot, unspecified laterality, sequela  Afib  Essential hypertension  Asthma  Hearing decreased  Hearing aid worn  Anxiety and depression  COPD (chronic obstructive pulmonary disease) case management patient  S/P CABG x 3  H/O prior ablation treatment    Social History:    former smoker (06 Oct 2023 19:28)    Vital Signs Last 24 Hrs  T(C): 35.9 (07 Oct 2023 04:52), Max: 36.8 (06 Oct 2023 16:29)  T(F): 96.7 (07 Oct 2023 04:52), Max: 98.3 (06 Oct 2023 16:29)  HR: 57 (07 Oct 2023 04:52) (57 - 63)  BP: 126/66 (07 Oct 2023 04:52) (126/66 - 153/71)  BP(mean): --  RR: 18 (07 Oct 2023 04:52) (18 - 20)  SpO2: 100% (07 Oct 2023 04:52) (98% - 100%)    Parameters below as of 07 Oct 2023 04:52  Patient On (Oxygen Delivery Method): room air        Diet, Regular:   DASH/TLC Sodium & Cholesterol Restricted (10-06-23 @ 23:23) [Active]      PHYSICAL    GENERAL:  64y/o Female NAD, resting comfortably.  HEAD:  Atraumatic, Normocephalic  EYES: EOMI, PERRLA, conjunctiva and sclera clear  NECK: Supple, No JVD, no cervical lymphadenopathy, non-tender  CHEST/LUNG: Clear to auscultation bilaterally; No wheeze, rhonchi, or rales  HEART: Regular rate and rhythm; S1&S2  ABDOMEN: Soft, Nontender, Nondistended x 4 quadrants; Bowel sounds present  EXTREMITIES:   Peripheral Pulses Present, No clubbing, no cyanosis, or no edema, no calf tenderness  PSYCH: AAOx3, cooperative, appropriate  NEUROLOGY: non focal impaired hearing   SKIN: WNL    LABS:                          13.1   6.57  )-----------( 266      ( 06 Oct 2023 18:07 )             40.3     10-06    140  |  106  |  13  ----------------------------<  93  4.5   |  27  |  1.2    Ca    10.1      06 Oct 2023 18:07    TPro  6.7  /  Alb  4.1  /  TBili  0.6  /  DBili  x   /  AST  14  /  ALT  11  /  AlkPhos  90  10-06    CARDIAC MARKERS ( 06 Oct 2023 21:27 )  x     / <0.01 ng/mL / 47 U/L / x     / 1.3 ng/mL  CARDIAC MARKERS ( 06 Oct 2023 18:07 )  x     / <0.01 ng/mL / x     / x     / x        RAD:    ACC: [id number] EXAM:  ECHO TTE WITH CON COMP W DOPP                          PROCEDURE DATE:  2023          INTERPRETATION:   [place] [address]                Phone: [phone].   TRANSTHORACIC ECHOCARDIOGRAM REPORT        Patient Name:   FLAVIO HUDSON Accession #: [id number]  Medical Rec #:  [id number]         Height:      67.0 in 170.2 cm  YOB: 1960        Weight:      246.0 lb 111.59 kg  Patient Age:    62 years         BSA:         2.21 m²  Patient Gender: F                BP:          134/88 mmHg      Date of Exam:        2023 11:00:28 AM  Referring Physician: YADIRA  Sonographer:         Bailee Prajapati  Reading Physician:   Wesly Saleem MD, [facility].    Procedure:     2D Echo/Doppler/Color Doppler Complete and Echocardiogram   with                 Lumason Contrast.  Indications:   R06.00 - Dyspnea, unspecified  Diagnosis:     R06.00 - Dyspnea, unspecified  Study Details: Technically suboptimal study.        Summary:   1. Left ventricular ejection fraction, by visual estimation, is 55 to   60%.   2. Normal global left ventricular systolic function.   3. Mildly enlarged left atrium.   4. Normal right atrial size.   5. No evidence of mitral valve regurgitation.    PHYSICIAN INTERPRETATION:  Left Ventricle: The left ventricular internal cavity size is normal. Left   ventricular wall thickness is normal. Global LV systolic function was   normal. Left ventricular ejection fraction, by visual estimation, is 55   to 60%.  Right Ventricle: Normal right ventricular size and function.  Left Atrium: Mildly enlarged left atrium.  Right Atrium: Normal right atrial size.  Pericardium: There is no evidence of pericardial effusion.  Mitral Valve: Structurally normal mitral valve, with normal leaflet   excursion. No evidence of mitral valve regurgitation is seen.  Tricuspid Valve: Structurally normal tricuspid valve, with normal leaflet   excursion. No tricuspid regurgitation is visualized.  Aortic Valve: Normal trileaflet aortic valve with normal opening. No   evidence of aortic stenosis. No evidence of aortic valve regurgitation is   seen.  Pulmonic Valve: Structurally normal pulmonic valve, with normal leaflet   excursion. No indication of pulmonic valve regurgitation.  Aorta: The aortic root and ascending aorta are structurally normal, with   no evidence of dilitation.  Pulmonary Artery: The main pulmonary artery is normal in size.      2D AND M-MODE MEASUREMENTS (normal ranges within parentheses):  Left Ventricle:                  Normal   Aorta/Left Atrium:            Normal  IVSd (2D):              0.70 cm (0.7-1.1) Aortic Root (2D):  2.50 cm   (2.4-3.7)  LVPWd (2D):             1.10 cm (0.7-1.1)  LVIDd (2D):             5.10 cm (3.4-5.7)  LVIDs (2D):             3.70 cm  LV FS (2D):             27.5 %   (>25%)  Relative Wall Thickness  0.43    (<0.42)    SPECTRAL DOPPLER ANALYSIS:  LV DIASTOLIC FUNCTION:  MV Peak E: 1.30 m/s Decel Time:225 msec  MV Peak A: 0.44 m/s  E/A Ratio: 2.99    Aortic Valve:  AoV VMax:    1.64 m/s  AoV Area, Vmax:    2.41 cm² Vmax Indx:    1.09   cm²/m²  AoV VTI:     0.33 m    AoV Area, VTI:     2.29 cm² VTI Indx:     1.04   cm²/m²  AoV Pk Grad: 10.8 mmHg AoV Area, Mn Grad: 2.16 cm² Mn Grad Indx: 0.98   cm²/m²  AoV Mn Grad: 6.0 mmHg    LVOT Vmax: 1.26 m/s  LVOT VTI:  0.24 m  LVOT Diam: 2.00 cm    Mitral Valve:  MV P1/2 Time: 65.25 msec  MV Area, PHT: 3.37 cm²    Pulmonic Valve:  PV Max Velocity: 0.73 m/sPV Max P.1 mmHg PV Mean P Wesly Saleem MD, FACC, Electronically signed on 2023 at   2:57:43 PM            *** Final ***

## 2023-10-07 NOTE — CONSULT NOTE ADULT - SUBJECTIVE AND OBJECTIVE BOX
HPI:  62 y/o Female c/o chest 7/10 to  5/10 pain on and off  x 2 days. Pt  with pm history CABG 4 years ago COPD, HTN, HLD, CAD  . p[t came  with chest pain x2 days. pt describes the pain as squeezing and never goes away completely . pt states she came to the ED today because of persisting pain and her pmhx. denies radiating pain, SOB, abdominal pain, numbness, weakness and tingling. Pt states that today the pain is different as when she had the CABG 4 years ago somewhat a little better. (06 Oct 2023 19:28)        HPI-Cardiology   Pt with the above Hx, evaluated at bedside. Pt presented for eval of CP x 2 days. Pt states that fo the last 2 days she developed left sided intermittent, non-radiating CP. No aggravating or radiating factors. Pt states that pain is not similar to the pain when she had CABG 4 years ago, which was much worse. Currently denies any CP, SOB, palpitations, dizziness/lightheadedness, diaphoresis or nausea/vomiting. Radiology tests and hospital records, were reviewed, as well as previous notes on this patient.      PAST MEDICAL & SURGICAL HISTORY  Bilateral carpal tunnel syndrome    History of  section    Closed fracture of foot, unspecified laterality, sequela    Afib    Essential hypertension    Asthma    Hearing decreased    Hearing aid worn    Anxiety and depression    COPD (chronic obstructive pulmonary disease) case management patient    S/P CABG x 3    H/O prior ablation treatment        FAMILY HISTORY:  FAMILY HISTORY:  No pertinent family history in first degree relatives        ALLERGIES:  shellfish (Unknown)  iodine (Unknown)  amiodarone ([place])  Seafood (Unknown)      MEDICATIONS:  MEDICATIONS  (STANDING):  aspirin  chewable 81 milliGRAM(s) Oral daily  atorvastatin 20 milliGRAM(s) Oral at bedtime  budesonide 160 MICROgram(s)/formoterol 4.5 MICROgram(s) Inhaler 2 Puff(s) Inhalation two times a day  buPROPion XL (24-Hour) . 300 milliGRAM(s) Oral daily  chlorhexidine 2% Cloths 1 Application(s) Topical <User Schedule>  escitalopram 25 milliGRAM(s) Oral daily  furosemide    Tablet 20 milliGRAM(s) Oral daily  influenza   Vaccine 0.5 milliLiter(s) IntraMuscular once  metoprolol tartrate 25 milliGRAM(s) Oral two times a day  mirtazapine 7.5 milliGRAM(s) Oral at bedtime  montelukast 10 milliGRAM(s) Oral daily  rivaroxaban 20 milliGRAM(s) Oral with dinner  topiramate 50 milliGRAM(s) Oral every 12 hours    MEDICATIONS  (PRN):  acetaminophen     Tablet .. 650 milliGRAM(s) Oral every 6 hours PRN Temp greater or equal to 38C (100.4F), Mild Pain (1 - 3)  ALPRAZolam 0.5 milliGRAM(s) Oral three times a day PRN anxiety  aluminum hydroxide/magnesium hydroxide/simethicone Suspension 30 milliLiter(s) Oral every 4 hours PRN Dyspepsia  melatonin 5 milliGRAM(s) Oral at bedtime PRN Insomnia  ondansetron Injectable 4 milliGRAM(s) IV Push every 4 hours PRN Nausea and/or Vomiting      HOME MEDICATIONS:  Home Medications:  aspirin 81 mg oral tablet, chewable: 1 tab(s) orally once a day (2023 03:12)  atorvastatin 20 mg oral tablet: 1 tab(s) orally once a day (at bedtime) (2023 02:49)  budesonide-formoterol 160 mcg-4.5 mcg/inh inhalation aerosol: 2 puff(s) inhaled 2 times a day (2023 02:49)  buPROPion 300 mg/24 hours (XL) oral tablet, extended release: 1 tab(s) orally every 24 hours (2023 02:49)  escitalopram: 25 milligram(s) orally once a day (2023 03:13)  metoprolol tartrate 25 mg oral tablet: 1 tab(s) orally 2 times a day (30 Sep 2022 23:38)  mirtazapine 7.5 mg oral tablet: 1 tab(s) orally once a day (at bedtime) (10 Feb 2023 13:34)  Proventil HFA 90 mcg/inh inhalation aerosol: 2 puff(s) inhaled every 6 hours, As Needed (2023 03:15)  topiramate 100 mg oral tablet: orally once a day (at bedtime) (30 Sep 2022 23:42)  topiramate 50 mg oral tablet: 50  orally once a day (30 Sep 2022 23:41)  Xanax 0.5 mg oral tablet: 1 tab(s) orally 3 times a day, As Needed (2023 03:14)  Xarelto 20 mg oral tablet: 1 tab(s) orally once a day (in the evening) (2019 08:00)      VITALS:   T(F): 96.7 (10-07 @ 04:52), Max: 98.3 (10-06 @ 16:29)  HR: 57 (10-07 @ 04:52) (57 - 63)  BP: 126/66 (10-07 @ 04:52) (126/66 - 153/71)  BP(mean): --  RR: 18 (10-07 @ 04:52) (18 - 20)  SpO2: 100% (10-07 @ 04:52) (98% - 100%)    I&O's Summary      REVIEW OF SYSTEMS:  See HPI      PHYSICAL EXAM:  NEURO: patient is awake , alert and oriented  GEN: Not in acute distress  NECK: no thyroid enlargement, no JVD  LUNGS: Clear to auscultation bilaterally   CARDIOVASCULAR: S1/S2 present, RRR , no murmurs or rubs, no carotid bruits,  + PP bilaterally  ABD: Soft, non-tender, non-distended, +BS  EXT: No DELMY  SKIN: Intact        LABS:                        13.1   6.57  )-----------( 266      ( 06 Oct 2023 18:07 )             40.3     10    140  |  106  |  13  ----------------------------<  93  4.5   |  27  |  1.2    Ca    10.1      06 Oct 2023 18:07    TPro  6.7  /  Alb  4.1  /  TBili  0.6  /  DBili  x   /  AST  14  /  ALT  11  /  AlkPhos  90  10      Creatine Kinase, Serum: 47 U/L (10-06-23 @ 21:27)  Troponin T, Serum: <0.01 ng/mL (10-06-23 @ 21:27)  Troponin T, Serum: <0.01 ng/mL (10-06-23 @ 18:07)    CARDIAC MARKERS ( 06 Oct 2023 21:27 )  x     / <0.01 ng/mL / 47 U/L / x     / 1.3 ng/mL  CARDIAC MARKERS ( 06 Oct 2023 18:07 )  x     / <0.01 ng/mL / x     / x     / x              RADIOLOGY:    -TTE:  < from: TTE Echo Complete w/ Contrast w/ Doppler (23 @ 11:00) >    Summary:   1. Left ventricular ejection fraction, by visual estimation, is 55 to   60%.   2. Normal global left ventricular systolic function.   3. Mildly enlarged left atrium.   4. Normal right atrial size.   5. No evidence of mitral valve regurgitation.    < end of copied text >          -STRESS TEST:  < from: NM Nuclear Stress Pharmacologic Multiple (10.02.22 @ 12:33) >  Impression:  1. NO DEFINITE EVIDENCE FOR ISCHEMIA DURING LEXISCAN INFUSION.  2. NORMAL RESTING LEFT VENTRICULAR WALL MOTION AND WALL THICKENING.  3. LEFT VENTRICULAR EJECTION FRACTION OF  73 % WHICH IS WITHIN RANGE OF   NORMAL.    --- End of Report ---    < end of copied text >

## 2023-10-08 ENCOUNTER — TRANSCRIPTION ENCOUNTER (OUTPATIENT)
Age: 63
End: 2023-10-08

## 2023-10-08 VITALS — HEART RATE: 58 BPM | SYSTOLIC BLOOD PRESSURE: 106 MMHG | DIASTOLIC BLOOD PRESSURE: 68 MMHG

## 2023-10-08 RX ADMIN — MIRTAZAPINE 7.5 MILLIGRAM(S): 45 TABLET, ORALLY DISINTEGRATING ORAL at 07:03

## 2023-10-08 RX ADMIN — MONTELUKAST 10 MILLIGRAM(S): 4 TABLET, CHEWABLE ORAL at 11:50

## 2023-10-08 RX ADMIN — BUPROPION HYDROCHLORIDE 300 MILLIGRAM(S): 150 TABLET, EXTENDED RELEASE ORAL at 11:49

## 2023-10-08 RX ADMIN — ESCITALOPRAM OXALATE 25 MILLIGRAM(S): 10 TABLET, FILM COATED ORAL at 11:49

## 2023-10-08 RX ADMIN — Medication 20 MILLIGRAM(S): at 07:00

## 2023-10-08 RX ADMIN — BUDESONIDE AND FORMOTEROL FUMARATE DIHYDRATE 2 PUFF(S): 160; 4.5 AEROSOL RESPIRATORY (INHALATION) at 11:51

## 2023-10-08 RX ADMIN — Medication 50 MILLIGRAM(S): at 07:00

## 2023-10-08 RX ADMIN — CHLORHEXIDINE GLUCONATE 1 APPLICATION(S): 213 SOLUTION TOPICAL at 07:04

## 2023-10-08 RX ADMIN — Medication 81 MILLIGRAM(S): at 11:49

## 2023-10-08 NOTE — DISCHARGE NOTE PROVIDER - CARE PROVIDERS DIRECT ADDRESSES
[email or web address]

## 2023-10-08 NOTE — PROGRESS NOTE ADULT - ASSESSMENT
64 y/o Female c/o chest 7/10 to  5/10 pain on and off  x 2 days. Pt  with pm history CABG 4 years ago COPD, HTN, HLD, CAD  . p[t came  with chest pain x2 days. pt describes the pain as squeezing and never goes away completely . pt states she came to the ED today because of persisting pain and her pmhx. denies radiating pain, SOB, abdominal pain, numbness, weakness and tingling    Chest pain:  Tele  AM EKG  cardiology consult  TTE    continue home medications as per PMD    Afib on xarelto    pulmonary HTN :   on Lasix 20 mg po q 24    COPD former smoker:  duoneb  symbicort    depression:  buspar    anxiety:  prn xanax    prophylaxisGi/VTE    Advance Care CPR    Disposition Acute  
64 y/o Female c/o chest 7/10 to  5/10 pain on and off  x 2 days. Pt  with pm history CABG 4 years ago COPD, HTN, HLD, CAD  . p[t came  with chest pain x2 days. pt describes the pain as squeezing and never goes away completely . pt states she came to the ED today because of persisting pain and her pmhx. denies radiating pain, SOB, abdominal pain, numbness, weakness and tingling    Chest pain: negative Maryjo    - will d/c and follow up outpatient   - spoke with Dr. Pitts    Afib on xarelto    pulmonary HTN :   on Lasix 20 mg po q 24    COPD former smoker:  duoneb  symbicort    depression:  buspar    anxiety:  prn xanax    prophylaxisGi/VTE    Advance Care CPR    Disposition Acute      
denies pain/discomfort

## 2023-10-08 NOTE — DISCHARGE NOTE NURSING/CASE MANAGEMENT/SOCIAL WORK - NSDCVIVACCINE_GEN_ALL_CORE_FT
influenza, injectable, quadrivalent, preservative free; 08-Jan-2019 15:12; Cristine Mondragon (RN); [employer]; t57ea (Exp. Date: 30-Jun-2019); IntraMuscular; Deltoid Left.; 0.5 milliLiter(s); VIS (VIS Published: 07-Aug-2015, VIS Presented: 08-Jan-2019);

## 2023-10-08 NOTE — DISCHARGE NOTE PROVIDER - CARE PROVIDER_API CALL
Jerman Hernandez.  Internal Medicine  [address], [address]  [place] [address]  Phone: [phone]  Fax: [phone]  Established Patient  Scheduled Appointment: 10/11/2023    Doreen Pitts  Interventional Cardiology  [address] [place] [address]  Phone: [phone]  Fax: [phone]  Established Patient  Follow Up Time: 1 week

## 2023-10-08 NOTE — DISCHARGE NOTE NURSING/CASE MANAGEMENT/SOCIAL WORK - NSDCPEFALRISK_GEN_ALL_CORE
For information on Fall & Injury Prevention, visit: [email or web address] OR  [email or web address] OR  https://www.cdc.gov/steadi/patient.html

## 2023-10-08 NOTE — DISCHARGE NOTE PROVIDER - NSDCFUSCHEDAPPT_GEN_ALL_CORE_FT
Titi Burns  [facility] PreAdmits  Scheduled Appointment: 10/11/2023    [facility]  PSYCHIATRY  [address]  Scheduled Appointment: 10/11/2023    Pk Crockett  [facility] PreAdmits  Scheduled Appointment: 10/17/2023    [facility]  NEUROLOGY  [address]  Scheduled Appointment: 10/17/2023    Titi Burns  [facility] PreAdmits  Scheduled Appointment: 11/15/2023    Titi Burns  [facility]  PSYCHIATRY  [address]  Scheduled Appointment: 11/15/2023

## 2023-10-08 NOTE — DISCHARGE NOTE PROVIDER - PROVIDER TOKENS
PROVIDER:[TOKEN:[[id number]],SCHEDULEDAPPT:[10/11/2023],ESTABLISHEDPATIENT:[T]],PROVIDER:[TOKEN:[[id number]],FOLLOWUP:[1 week],ESTABLISHEDPATIENT:[T]]

## 2023-10-08 NOTE — DISCHARGE NOTE NURSING/CASE MANAGEMENT/SOCIAL WORK - PATIENT PORTAL LINK FT
You can access the FollowMyHealth Patient Portal offered by [facility] by registering at the following website: [email or web address]. By joining [employer]’s FollowMyHealth portal, you will also be able to view your health information using other applications (apps) compatible with our system.

## 2023-10-08 NOTE — PROGRESS NOTE ADULT - SUBJECTIVE AND OBJECTIVE BOX
62 y/o Female c/o chest 7/10 to  5/10 pain on and off  x 2 days. Pt  with pm history CABG 4 years ago COPD, HTN, HLD, CAD  . p[t came  with chest pain x2 days. pt describes the pain as squeezing and never goes away completely . pt states she came to the ED today because of persisting pain and her pmhx. denies radiating pain, SOB, abdominal pain, numbness, weakness and tingling. Pt states that today the pain is different as when she had the CABG 4 years ago.     Interval: had negative lexiscan; spoke with private cardiologist    PAST MEDICAL & SURGICAL HISTORY:    Bilateral carpal tunnel syndrome  History of  section  Closed fracture of foot, unspecified laterality, sequela  Afib  Essential hypertension  Asthma  Hearing decreased  Hearing aid worn  Anxiety and depression  COPD (chronic obstructive pulmonary disease) case management patient  S/P CABG x 3  H/O prior ablation treatment    Social History:    former smoker (06 Oct 2023 19:28)    Vital Signs Last 24 Hrs  T(C): 35.9 (08 Oct 2023 04:58), Max: 35.9 (07 Oct 2023 21:00)  T(F): 96.6 (08 Oct 2023 04:58), Max: 96.6 (07 Oct 2023 21:00)  HR: 56 (08 Oct 2023 04:58) (55 - 56)  BP: 149/72 (08 Oct 2023 04:58) (115/61 - 149/72)  BP(mean): --  RR: 18 (08 Oct 2023 04:58) (18 - 18)  SpO2: 100% (08 Oct 2023 04:58) (98% - 100%)    Parameters below as of 08 Oct 2023 04:58  Patient On (Oxygen Delivery Method): room air        Diet, Regular:   DASH/TLC Sodium & Cholesterol Restricted (10-06-23 @ 23:23) [Active]      PHYSICAL    GENERAL:  62y/o Female NAD, resting comfortably.  HEAD:  Atraumatic, Normocephalic  EYES: EOMI, PERRLA, conjunctiva and sclera clear  NECK: Supple, No JVD, no cervical lymphadenopathy, non-tender  CHEST/LUNG: Clear to auscultation bilaterally; No wheeze, rhonchi, or rales  HEART: Regular rate and rhythm; S1&S2  ABDOMEN: Soft, Nontender, Nondistended x 4 quadrants; Bowel sounds present  EXTREMITIES:   Peripheral Pulses Present, No clubbing, no cyanosis, or no edema, no calf tenderness  PSYCH: AAOx3, cooperative, appropriate  NEUROLOGY: non focal impaired hearing   SKIN: WNL    LABS:                          13.1   6.57  )-----------( 266      ( 06 Oct 2023 18:07 )             40.3     10-06    140  |  106  |  13  ----------------------------<  93  4.5   |  27  |  1.2    Ca    10.1      06 Oct 2023 18:07    TPro  6.7  /  Alb  4.1  /  TBili  0.6  /  DBili  x   /  AST  14  /  ALT  11  /  AlkPhos  90  10-06    CARDIAC MARKERS ( 06 Oct 2023 21:27 )  x     / <0.01 ng/mL / 47 U/L / x     / 1.3 ng/mL  CARDIAC MARKERS ( 06 Oct 2023 18:07 )  x     / <0.01 ng/mL / x     / x     / x        RAD:    ACC: [id number] EXAM:  ECHO TTE WITH CON COMP W DOPP                          PROCEDURE DATE:  2023          INTERPRETATION:   [place] [address]                Phone: [phone].   TRANSTHORACIC ECHOCARDIOGRAM REPORT        Patient Name:   FLAVIO HUDSON Accession #: [id number]  Medical Rec #:  [id number]         Height:      67.0 in 170.2 cm  YOB: 1960        Weight:      246.0 lb 111.59 kg  Patient Age:    62 years         BSA:         2.21 m²  Patient Gender: F                BP:          134/88 mmHg      Date of Exam:        2023 11:00:28 AM  Referring Physician: YADIRA  Sonographer:         Bailee Prajapati  Reading Physician:   Wesly Saleem MD, [facility].    Procedure:     2D Echo/Doppler/Color Doppler Complete and Echocardiogram   with                 Lumason Contrast.  Indications:   R06.00 - Dyspnea, unspecified  Diagnosis:     R06.00 - Dyspnea, unspecified  Study Details: Technically suboptimal study.        Summary:   1. Left ventricular ejection fraction, by visual estimation, is 55 to   60%.   2. Normal global left ventricular systolic function.   3. Mildly enlarged left atrium.   4. Normal right atrial size.   5. No evidence of mitral valve regurgitation.    PHYSICIAN INTERPRETATION:  Left Ventricle: The left ventricular internal cavity size is normal. Left   ventricular wall thickness is normal. Global LV systolic function was   normal. Left ventricular ejection fraction, by visual estimation, is 55   to 60%.  Right Ventricle: Normal right ventricular size and function.  Left Atrium: Mildly enlarged left atrium.  Right Atrium: Normal right atrial size.  Pericardium: There is no evidence of pericardial effusion.  Mitral Valve: Structurally normal mitral valve, with normal leaflet   excursion. No evidence of mitral valve regurgitation is seen.  Tricuspid Valve: Structurally normal tricuspid valve, with normal leaflet   excursion. No tricuspid regurgitation is visualized.  Aortic Valve: Normal trileaflet aortic valve with normal opening. No   evidence of aortic stenosis. No evidence of aortic valve regurgitation is   seen.  Pulmonic Valve: Structurally normal pulmonic valve, with normal leaflet   excursion. No indication of pulmonic valve regurgitation.  Aorta: The aortic root and ascending aorta are structurally normal, with   no evidence of dilitation.  Pulmonary Artery: The main pulmonary artery is normal in size.      2D AND M-MODE MEASUREMENTS (normal ranges within parentheses):  Left Ventricle:                  Normal   Aorta/Left Atrium:            Normal  IVSd (2D):              0.70 cm (0.7-1.1) Aortic Root (2D):  2.50 cm   (2.4-3.7)  LVPWd (2D):             1.10 cm (0.7-1.1)  LVIDd (2D):             5.10 cm (3.4-5.7)  LVIDs (2D):             3.70 cm  LV FS (2D):             27.5 %   (>25%)  Relative Wall Thickness  0.43    (<0.42)    SPECTRAL DOPPLER ANALYSIS:  LV DIASTOLIC FUNCTION:  MV Peak E: 1.30 m/s Decel Time:225 msec  MV Peak A: 0.44 m/s  E/A Ratio: 2.99    Aortic Valve:  AoV VMax:    1.64 m/s  AoV Area, Vmax:    2.41 cm² Vmax Indx:    1.09   cm²/m²  AoV VTI:     0.33 m    AoV Area, VTI:     2.29 cm² VTI Indx:     1.04   cm²/m²  AoV Pk Grad: 10.8 mmHg AoV Area, Mn Grad: 2.16 cm² Mn Grad Indx: 0.98   cm²/m²  AoV Mn Grad: 6.0 mmHg    LVOT Vmax: 1.26 m/s  LVOT VTI:  0.24 m  LVOT Diam: 2.00 cm    Mitral Valve:  MV P1/2 Time: 65.25 msec  MV Area, PHT: 3.37 cm²    Pulmonic Valve:  PV Max Velocity: 0.73 m/sPV Max P.1 mmHg PV Mean P Wesly Saleem MD, [facility], Electronically signed on 2023 at   2:57:43 PM            *** Final ***    ACC: [id number] EXAM:  NM NUCLEAR STRESS MULTI PHARM   ORDERED BY: JONO GOMES     PROCEDURE DATE:  10/07/2023          INTERPRETATION:  LEXISCAN AND REST MYOCARDIAL PERFUSION TOMOGRAPHY, WALL   MOTION ANALYSIS, LVEF CALCULATION    Reason: Chest pain.    Comparison made to 10/2/2022    Technique:  8 millicuries 99m technetium sestamibi was injected intravenously at   rest,and myocardial perfusion images were acquired over a 180 degree arc.    Then, lexiscan infusion was begun by Cardiology, andat the end of   infusion, 22 millicuries 99m technetium sestamibi was injected   intravenously, and myocardial perfusion images were acquired in the same   manner as the resting study.    Findings:  On viewing a cinematic display of the planar images, there is no   significant patient motion.    On viewing the tomographic images in color and black and white, bullseye   polar map, and three-dimensional surface reconstruction, there is normal   isotope distribution throughout the myocardium on bothsets of   acquisitions, with no evidence for ischemia during lexiscan infusion.    The sestamibi study was acquired as a gated study.  On viewing a   cinematic display of the frames, there is normal resting left ventricular   wall motion and wall thickening.    Analysis of the ventriculogram generates a calculated left ventricular   ejection fraction of 74 % which is within range of normal.    IMPRESSION:  1. No definite scintigraphic evidence of ischemia.    --- End of Report ---            LOVE GRAHAM MD; Attending Radiologist  This document has been electronically signed. Oct  7 2023  4:05PM

## 2023-10-08 NOTE — DISCHARGE NOTE PROVIDER - HOSPITAL COURSE
64 y/o Female c/o chest 7/10 to  5/10 pain on and off  x 2 days. Pt  with pm history CABG 4 years ago COPD, HTN, HLD, CAD  . p[t came  with chest pain x2 days. pt describes the pain as squeezing and never goes away completely . pt states she came to the ED today because of persisting pain and her pmhx. denies radiating pain, SOB, abdominal pain, numbness, weakness and tingling. Pt states that today the pain is different as when she had the CABG 4 years ago somewhat a little better    MEDICATIONS  (STANDING):  aspirin  chewable 81 milliGRAM(s) Oral daily  atorvastatin 20 milliGRAM(s) Oral at bedtime  budesonide 160 MICROgram(s)/formoterol 4.5 MICROgram(s) Inhaler 2 Puff(s) Inhalation two times a day  buPROPion XL (24-Hour) . 300 milliGRAM(s) Oral daily  chlorhexidine 2% Cloths 1 Application(s) Topical <User Schedule>  escitalopram 25 milliGRAM(s) Oral daily  furosemide    Tablet 20 milliGRAM(s) Oral daily  influenza   Vaccine 0.5 milliLiter(s) IntraMuscular once  metoprolol tartrate 25 milliGRAM(s) Oral two times a day  mirtazapine 7.5 milliGRAM(s) Oral at bedtime  montelukast 10 milliGRAM(s) Oral daily  rivaroxaban 20 milliGRAM(s) Oral with dinner  topiramate 50 milliGRAM(s) Oral every 12 hours    MEDICATIONS  (PRN):  acetaminophen     Tablet .. 650 milliGRAM(s) Oral every 6 hours PRN Temp greater or equal to 38C (100.4F), Mild Pain (1 - 3)  ALPRAZolam 0.5 milliGRAM(s) Oral three times a day PRN anxiety  aluminum hydroxide/magnesium hydroxide/simethicone Suspension 30 milliLiter(s) Oral every 4 hours PRN Dyspepsia  melatonin 5 milliGRAM(s) Oral at bedtime PRN Insomnia  ondansetron Injectable 4 milliGRAM(s) IV Push every 4 hours PRN Nausea and/or Vomiting    CARDIAC MARKERS ( 07 Oct 2023 15:49 )  x     / <0.01 ng/mL / 42 U/L / x     / 1.0 ng/mL  CARDIAC MARKERS ( 07 Oct 2023 07:14 )  x     / <0.01 ng/mL / 37 U/L / x     / 1.1 ng/mL  CARDIAC MARKERS ( 06 Oct 2023 21:27 )  x     / <0.01 ng/mL / 47 U/L / x     / 1.3 ng/mL  CARDIAC MARKERS ( 06 Oct 2023 18:07 )  x     / <0.01 ng/mL / x     / x     / x        ACC: [id number] EXAM:  NM NUCLEAR STRESS MULTI PHARM   ORDERED BY: JONO GOMES     PROCEDURE DATE:  10/07/2023          INTERPRETATION:  LEXISCAN AND REST MYOCARDIAL PERFUSION TOMOGRAPHY, WALL   MOTION ANALYSIS, LVEF CALCULATION    Reason: Chest pain.    Comparison made to 10/2/2022    Technique:  8 millicuries 99m technetium sestamibi was injected intravenously at   rest,and myocardial perfusion images were acquired over a 180 degree arc.    Then, lexiscan infusion was begun by Cardiology, andat the end of   infusion, 22 millicuries 99m technetium sestamibi was injected   intravenously, and myocardial perfusion images were acquired in the same   manner as the resting study.    Findings:  On viewing a cinematic display of the planar images, there is no   significant patient motion.    On viewing the tomographic images in color and black and white, bullseye   polar map, and three-dimensional surface reconstruction, there is normal   isotope distribution throughout the myocardium on bothsets of   acquisitions, with no evidence for ischemia during lexiscan infusion.    The sestamibi study was acquired as a gated study.  On viewing a   cinematic display of the frames, there is normal resting left ventricular   wall motion and wall thickening.    Analysis of the ventriculogram generates a calculated left ventricular   ejection fraction of 74 % which is within range of normal.    IMPRESSION:  1. No definite scintigraphic evidence of ischemia.    --- End of Report ---            LOVE GRAHAM MD; Attending Radiologist  This document has been electronically signed. Oct  7 2023  4:05PM

## 2023-10-08 NOTE — DISCHARGE NOTE PROVIDER - NSDCMRMEDTOKEN_GEN_ALL_CORE_FT
aspirin 81 mg oral tablet, chewable: 1 tab(s) orally once a day  atorvastatin 20 mg oral tablet: 1 tab(s) orally once a day (at bedtime)  budesonide-formoterol 160 mcg-4.5 mcg/inh inhalation aerosol: 2 puff(s) inhaled 2 times a day  buPROPion 300 mg/24 hours (XL) oral tablet, extended release: 1 tab(s) orally every 24 hours  escitalopram: 25 milligram(s) orally once a day  Incruse Ellipta 62.5 mcg/inh inhalation powder: 1 puff(s) inhaled every 24 hours  Lasix 20 mg oral tablet: 2 tab(s) orally once a day   metoprolol tartrate 25 mg oral tablet: 1 tab(s) orally 2 times a day  mirtazapine 7.5 mg oral tablet: 1 tab(s) orally once a day (at bedtime)  montelukast 10 mg oral tablet: 1 tab(s) orally once a day  Proventil HFA 90 mcg/inh inhalation aerosol: 2 puff(s) inhaled every 6 hours, As Needed  topiramate 50 mg oral tablet: 1 tab(s) orally every 12 hours  Xanax 0.5 mg oral tablet: 1 tab(s) orally 3 times a day, As Needed  Xarelto 20 mg oral tablet: 1 tab(s) orally once a day (in the evening)

## 2023-10-11 ENCOUNTER — OUTPATIENT (OUTPATIENT)
Dept: OUTPATIENT SERVICES | Facility: HOSPITAL | Age: 63
LOS: 1 days | End: 2023-10-11
Payer: MEDICARE

## 2023-10-11 ENCOUNTER — APPOINTMENT (OUTPATIENT)
Dept: PSYCHIATRY | Facility: CLINIC | Age: 63
End: 2023-10-11

## 2023-10-11 DIAGNOSIS — Z87.891 PERSONAL HISTORY OF NICOTINE DEPENDENCE: ICD-10-CM

## 2023-10-11 DIAGNOSIS — Z95.1 PRESENCE OF AORTOCORONARY BYPASS GRAFT: ICD-10-CM

## 2023-10-11 DIAGNOSIS — I27.20 PULMONARY HYPERTENSION, UNSPECIFIED: ICD-10-CM

## 2023-10-11 DIAGNOSIS — F41.9 ANXIETY DISORDER, UNSPECIFIED: ICD-10-CM

## 2023-10-11 DIAGNOSIS — E78.5 HYPERLIPIDEMIA, UNSPECIFIED: ICD-10-CM

## 2023-10-11 DIAGNOSIS — Z95.1 PRESENCE OF AORTOCORONARY BYPASS GRAFT: Chronic | ICD-10-CM

## 2023-10-11 DIAGNOSIS — F32.A DEPRESSION, UNSPECIFIED: ICD-10-CM

## 2023-10-11 DIAGNOSIS — R07.9 CHEST PAIN, UNSPECIFIED: ICD-10-CM

## 2023-10-11 DIAGNOSIS — I10 ESSENTIAL (PRIMARY) HYPERTENSION: ICD-10-CM

## 2023-10-11 DIAGNOSIS — Z91.013 ALLERGY TO SEAFOOD: ICD-10-CM

## 2023-10-11 DIAGNOSIS — Z79.82 LONG TERM (CURRENT) USE OF ASPIRIN: ICD-10-CM

## 2023-10-11 DIAGNOSIS — I25.10 ATHEROSCLEROTIC HEART DISEASE OF NATIVE CORONARY ARTERY WITHOUT ANGINA PECTORIS: ICD-10-CM

## 2023-10-11 DIAGNOSIS — Z79.01 LONG TERM (CURRENT) USE OF ANTICOAGULANTS: ICD-10-CM

## 2023-10-11 DIAGNOSIS — I48.91 UNSPECIFIED ATRIAL FIBRILLATION: ICD-10-CM

## 2023-10-11 DIAGNOSIS — J44.9 CHRONIC OBSTRUCTIVE PULMONARY DISEASE, UNSPECIFIED: ICD-10-CM

## 2023-10-11 DIAGNOSIS — F41.1 GENERALIZED ANXIETY DISORDER: ICD-10-CM

## 2023-10-11 PROCEDURE — 90832 PSYTX W PT 30 MINUTES: CPT | Mod: 95

## 2023-10-12 DIAGNOSIS — F41.1 GENERALIZED ANXIETY DISORDER: ICD-10-CM

## 2023-10-17 ENCOUNTER — APPOINTMENT (OUTPATIENT)
Dept: NEUROLOGY | Facility: CLINIC | Age: 63
End: 2023-10-17
Payer: MEDICARE

## 2023-10-17 ENCOUNTER — OUTPATIENT (OUTPATIENT)
Dept: OUTPATIENT SERVICES | Facility: HOSPITAL | Age: 63
LOS: 1 days | End: 2023-10-17
Payer: MEDICARE

## 2023-10-17 VITALS
TEMPERATURE: 98 F | RESPIRATION RATE: 14 BRPM | HEART RATE: 66 BPM | BODY MASS INDEX: 38.3 KG/M2 | DIASTOLIC BLOOD PRESSURE: 85 MMHG | SYSTOLIC BLOOD PRESSURE: 148 MMHG | WEIGHT: 244 LBS | OXYGEN SATURATION: 98 % | HEIGHT: 67 IN

## 2023-10-17 DIAGNOSIS — R41.89 OTHER SYMPTOMS AND SIGNS INVOLVING COGNITIVE FUNCTIONS AND AWARENESS: ICD-10-CM

## 2023-10-17 DIAGNOSIS — Z00.00 ENCOUNTER FOR GENERAL ADULT MEDICAL EXAMINATION WITHOUT ABNORMAL FINDINGS: ICD-10-CM

## 2023-10-17 DIAGNOSIS — Z98.890 OTHER SPECIFIED POSTPROCEDURAL STATES: Chronic | ICD-10-CM

## 2023-10-17 DIAGNOSIS — Z95.1 PRESENCE OF AORTOCORONARY BYPASS GRAFT: Chronic | ICD-10-CM

## 2023-10-17 PROCEDURE — 99204 OFFICE O/P NEW MOD 45 MIN: CPT

## 2023-10-17 PROCEDURE — 99243 OFF/OP CNSLTJ NEW/EST LOW 30: CPT

## 2023-10-18 DIAGNOSIS — R41.89 OTHER SYMPTOMS AND SIGNS INVOLVING COGNITIVE FUNCTIONS AND AWARENESS: ICD-10-CM

## 2023-10-24 ENCOUNTER — NON-APPOINTMENT (OUTPATIENT)
Age: 63
End: 2023-10-24

## 2023-10-25 ENCOUNTER — APPOINTMENT (OUTPATIENT)
Dept: PSYCHIATRY | Facility: CLINIC | Age: 63
End: 2023-10-25

## 2023-10-25 ENCOUNTER — OUTPATIENT (OUTPATIENT)
Dept: OUTPATIENT SERVICES | Facility: HOSPITAL | Age: 63
LOS: 1 days | End: 2023-10-25
Payer: MEDICARE

## 2023-10-25 DIAGNOSIS — Z98.890 OTHER SPECIFIED POSTPROCEDURAL STATES: Chronic | ICD-10-CM

## 2023-10-25 DIAGNOSIS — Z95.1 PRESENCE OF AORTOCORONARY BYPASS GRAFT: Chronic | ICD-10-CM

## 2023-10-25 DIAGNOSIS — F33.1 MAJOR DEPRESSIVE DISORDER, RECURRENT, MODERATE: ICD-10-CM

## 2023-10-25 DIAGNOSIS — F41.1 GENERALIZED ANXIETY DISORDER: ICD-10-CM

## 2023-10-25 PROCEDURE — 90832 PSYTX W PT 30 MINUTES: CPT | Mod: 95

## 2023-10-25 RX ORDER — RIVAROXABAN 15 MG-20MG
1 KIT ORAL
Refills: 0 | DISCHARGE

## 2023-10-26 VITALS
OXYGEN SATURATION: 98 % | DIASTOLIC BLOOD PRESSURE: 60 MMHG | SYSTOLIC BLOOD PRESSURE: 126 MMHG | HEIGHT: 67 IN | WEIGHT: 240.08 LBS | HEART RATE: 64 BPM

## 2023-10-26 DIAGNOSIS — F33.1 MAJOR DEPRESSIVE DISORDER, RECURRENT, MODERATE: ICD-10-CM

## 2023-10-26 DIAGNOSIS — F41.1 GENERALIZED ANXIETY DISORDER: ICD-10-CM

## 2023-10-26 NOTE — H&P CARDIOLOGY - CARDIOVASCULAR
APPENDECTOMY DISCHARGE INSTRUCTIONS      Please follow up with local surgeon ask for phone number from nurse.     Please call [phone] to schedule a follow up appointment, we want you to be seen in 1 week.       1. You may resume your regular diet when you feel you are ready to. DO NOT drink alcoholic beverages for 24 hours of while you are taking prescription medication.    2. Limit your activities for the first 48 hours. Gradually, increase them as tolerated. You may use stairs. I encourage you to walk as tolerated. No lifting greater that 20 pounds for 3 weeks.    3. You will have some discomfort at the incision sites. This is expected. This should improve over the next 2-3 days. Ice and pain medication will help with this pain. Use prescribed pain medication as instructed.    4. Bruising and mild swelling is normal after surgery. The area below and around the incision(s) will be hard and elevated. This is normal. I call it the healing ridge. This will resolve slowly over the next several months. If you feel the pain is increasing and cannot explain it by increasing activity please call.    5. The dressing will often have some blood on it. You may shower. Clean gently over incision site. If no bleeding, there is no reason to cover site. The abdominal binder may be removed after 24 hours after surgery. You may continue to wear it however for comfort. I suggest you wear an old t-shirt under the abdominal binder for a more comfortable wear.    6. Avoid Aspirin for the first 72 hours after the procedure. This medication may increase the tendency to bleed.    7. Use the following medications (in addition to your normal meds) as shown:  a. Oxycodone 5 mg every 6 hours as needed for pain.   b. Tylenol (acetaminophen) 500 mg every 6 hours as needed for pain. Do not take more than 1000 mg every 6 hours. (see above)  c. Motrin (ibuprofen) 200-800 mg every 6 hours as needed for pain. Take with food.    8. Notify   [facility]'s clinic if:  Your discomfort is not relieved by your pain medication.  You have signs of infection such as temperature above 100.5 degrees orally, chills, or increasing daily discomfort.  Incision site is becoming more red and/or there is purulent drainage.  You have questions or concerns.    9. When taking narcotics (pain medication more than Tylenol [acetaminophen] and Motrin [ibuprofen]) it is important to keep your stools soft to avoid constipation and pain with straining. This is best done by drinking fluids (non-alcoholic and non-caffeinated) and taking a stool softener (i.e. Metamucil or milk of magnesia). You may be able to use non-narcotics for pain relief especially by the 3rd post- operative day. Tylenol 500 mg  every 6 hours and/or Motrin (ibuprofen) 200-800 mg every 6 hours. Please do not take more than 4 grams of Tylenol (acetaminophen) per day. Remember your Percocet does have Tylenol (acetaminophen) already in it. Please take Motrin (ibuprofen) with food to help protect the stomach. If you have a history of stomach ulcers or stomach problems, do not take Motrin (ibuprofen).    11. Do not drive or operate heavy machinery for 24 hours after surgery or when taking narcotics. You may resume driving when feel that you can safely avoid an accident and are not taking narcotics. This is usually 5 to 7 days after surgery. You should not be alone for 24 hours after surgery.      Signs and symptoms of an abdomen abscess:  Decreased appetite,  Not feeling like you are doing any better this week or last week.   Spiking temperatures.     Regular rate & rhythm, normal S1, S2; no murmurs, gallops or rubs; no S3, S4 details…

## 2023-10-26 NOTE — H&P CARDIOLOGY - NSICDXFAMILYHX_GEN_ALL_CORE_FT
FAMILY HISTORY:  No pertinent family history in first degree relatives     FAMILY HISTORY:  Father  Still living? Unknown  Family history of CABG, Age at diagnosis: Age Unknown

## 2023-10-26 NOTE — H&P CARDIOLOGY - HISTORY OF PRESENT ILLNESS
63 y/old F with PMH significant for HTN, HLP, ex-smoker/COPD, CAD s/p CABG 2019/LIMA-LAD, AF s/p Maze is here for RHC/LHC. The patient was recently in ER at [facility], felt severely SOB and tightness in her chest sitting down watching TV. The patient had a Nuclear stress test that was negative. She is still complaining of exertional SOB mildly worsened since her prior visit.      ALLERGIES:  shellfish (Unknown)  iodine (Unknown)  amiodarone ([place])  Seafood (Unknown)    PAST MEDICAL & SURGICAL HISTORY:     Bilateral carpal tunnel syndrome  History of  section  Closed fracture of foot, unspecified laterality, sequela  Afib  Essential hypertension  Asthma  Hearing decreased  Hearing aid worn  Anxiety and depression  COPD (chronic obstructive pulmonary disease) case management patient  S/P CABG x 3  H/O prior ablation treatment     Social History:  former smoker (06 Oct 2023 19:28)    Pre cath note:    indication:  [ ] STEMI                [ ] NSTEMI                 [ ] Acute coronary syndrome                         [ ]Unstable Angina   [ ] high risk  [ ] intermediate risk  [ ] low risk                         [x ] Stable Angina     non-invasive testing:                          Date:                     result: [ ] high risk  [ x] intermediate risk  [ ] low risk    Anti- Anginal medications:                        [ ] not used                       [ ] used:  [ ]CCB  [ ] BB  [ ] Nitrate [ ] Ranexa                [ ] not used but strong indication not to use    Ejection Fraction                       [ ] <29            [ ] 30-39%   [ ] 40-49%     [ ]>50%    CHF                       [ ] active (within last 14 days on meds   [ ] Chronic (on meds but no exacerbation)    COPD                        [ ] mild (on chronic bronchodilators)  [ ] moderate (on chronic steroid therapy)      [ ] severe (indication for home O2 or PACO2 >50)    Other risk factors:                         [ ] Previous MI                       [ ] CVA/ stroke                      [ ] carotid stent/ CEA                      [ ] PVD/PAD- (arterial aneurysm, non-palpable pulses, tortuous vessel with inability to insert catheter, infra-renal dissection, renal or subclavian artery stenosis)                      [ ] diabetic                      [ x] previous CABG                      [ ] Renal Failure     Adjusted CathPCI Bleeding Event Risk:   %    RIGHT RADIAL ARTERY EVALUATION:  PATRICIA TEST: [ ] Negative          [ ] Positive    IVF: 250cc NS bolus pre-cath hydration [ ]       63 y/old F with PMH significant for HTN, [place],  HLP, ex-smoker/COPD, CAD s/p CABG 2019/LIMA-LAD, AF s/p Maze is here for RHC/LHC. The patient was recently in ER at [facility], felt severely SOB and tightness in her chest sitting down watching TV. The patient had a Nuclear stress test that was negative. She is still complaining of exertional SOB mildly worsened since her prior visit. , R and LHC recommended. Pt premedicated with solucrtef 200 mg IV and benadryl 50 mg IV     ALLERGIES:  shellfish (Unknown)  iodine (Unknown)  amiodarone ([place])  Seafood (Unknown)    PAST MEDICAL & SURGICAL HISTORY:     Bilateral carpal tunnel syndrome  History of  section  Closed fracture of foot, unspecified laterality, sequela  Afib  Essential hypertension  Asthma  Hearing decreased  Hearing aid worn  Anxiety and depression  COPD (chronic obstructive pulmonary disease) case management patient  S/P CABG x 3  H/O prior ablation treatment     Social History:  former smoker (06 Oct 2023 19:28)    Pre cath note:    indication:  [ ] STEMI                [ ] NSTEMI                 [ ] Acute coronary syndrome                         [ ]Unstable Angina   [ ] high risk  [ ] intermediate risk  [ ] low risk                         [x ] Stable Angina     non-invasive testing:       NST                   Date:                     result: [ ] high risk  [ x] intermediate risk  [ ] low risk    Anti- Anginal medications:                        [ ] not used                       [ ] used:  [ ]CCB  [x ] BB  [ ] Nitrate [ ] Ranexa                [ ] not used but strong indication not to use    Ejection Fraction                       [ ] <29            [ ] 30-39%   [ ] 40-49%     [x ]>50%    CHF                       [ ] active (within last 14 days on meds   [ ] Chronic (on meds but no exacerbation)    COPD                        [ ] mild (on chronic bronchodilators)  [ ] moderate (on chronic steroid therapy)      [ ] severe (indication for home O2 or PACO2 >50)    Other risk factors:                         [ ] Previous MI                       [ ] CVA/ stroke                      [ ] carotid stent/ CEA                      [ ] PVD/PAD- (arterial aneurysm, non-palpable pulses, tortuous vessel with inability to insert catheter, infra-renal dissection, renal or subclavian artery stenosis)                      [ ] diabetic                      [ x] previous CABG                      [ ] Renal Failure     Adjusted CathPCI Bleeding Event Risk:  1.6%  RIGHT RADIAL ARTERY EVALUATION:  PATRICIA TEST: [ ] Negative          [ x] Positive    IVF: 250cc NS bolus pre-cath hydration [ x]  EF: 55%   EKG: SR on 10/23

## 2023-10-27 ENCOUNTER — TRANSCRIPTION ENCOUNTER (OUTPATIENT)
Age: 63
End: 2023-10-27

## 2023-10-27 ENCOUNTER — OUTPATIENT (OUTPATIENT)
Dept: OUTPATIENT SERVICES | Facility: HOSPITAL | Age: 63
LOS: 1 days | Discharge: HOME | End: 2023-10-27
Payer: MEDICARE

## 2023-10-27 VITALS
RESPIRATION RATE: 16 BRPM | HEART RATE: 66 BPM | OXYGEN SATURATION: 98 % | DIASTOLIC BLOOD PRESSURE: 90 MMHG | SYSTOLIC BLOOD PRESSURE: 147 MMHG

## 2023-10-27 DIAGNOSIS — I10 ESSENTIAL (PRIMARY) HYPERTENSION: ICD-10-CM

## 2023-10-27 DIAGNOSIS — E78.5 HYPERLIPIDEMIA, UNSPECIFIED: ICD-10-CM

## 2023-10-27 DIAGNOSIS — I25.10 ATHEROSCLEROTIC HEART DISEASE OF NATIVE CORONARY ARTERY WITHOUT ANGINA PECTORIS: ICD-10-CM

## 2023-10-27 DIAGNOSIS — I50.89 OTHER HEART FAILURE: ICD-10-CM

## 2023-10-27 DIAGNOSIS — Z95.1 PRESENCE OF AORTOCORONARY BYPASS GRAFT: ICD-10-CM

## 2023-10-27 DIAGNOSIS — Z98.890 OTHER SPECIFIED POSTPROCEDURAL STATES: Chronic | ICD-10-CM

## 2023-10-27 DIAGNOSIS — R06.02 SHORTNESS OF BREATH: ICD-10-CM

## 2023-10-27 DIAGNOSIS — Z95.1 PRESENCE OF AORTOCORONARY BYPASS GRAFT: Chronic | ICD-10-CM

## 2023-10-27 LAB
ANION GAP SERPL CALC-SCNC: 11 MMOL/L — SIGNIFICANT CHANGE UP (ref 7–14)
ANION GAP SERPL CALC-SCNC: 11 MMOL/L — SIGNIFICANT CHANGE UP (ref 7–14)
BUN SERPL-MCNC: 14 MG/DL — SIGNIFICANT CHANGE UP (ref 10–20)
BUN SERPL-MCNC: 14 MG/DL — SIGNIFICANT CHANGE UP (ref 10–20)
CALCIUM SERPL-MCNC: 10.6 MG/DL — HIGH (ref 8.4–10.4)
CALCIUM SERPL-MCNC: 10.6 MG/DL — HIGH (ref 8.4–10.4)
CHLORIDE SERPL-SCNC: 107 MMOL/L — SIGNIFICANT CHANGE UP (ref 98–110)
CHLORIDE SERPL-SCNC: 107 MMOL/L — SIGNIFICANT CHANGE UP (ref 98–110)
CO2 SERPL-SCNC: 23 MMOL/L — SIGNIFICANT CHANGE UP (ref 17–32)
CO2 SERPL-SCNC: 23 MMOL/L — SIGNIFICANT CHANGE UP (ref 17–32)
CREAT SERPL-MCNC: 1.4 MG/DL — SIGNIFICANT CHANGE UP (ref 0.7–1.5)
CREAT SERPL-MCNC: 1.4 MG/DL — SIGNIFICANT CHANGE UP (ref 0.7–1.5)
EGFR: 42 ML/MIN/1.73M2 — LOW
EGFR: 42 ML/MIN/1.73M2 — LOW
GLUCOSE SERPL-MCNC: 95 MG/DL — SIGNIFICANT CHANGE UP (ref 70–99)
GLUCOSE SERPL-MCNC: 95 MG/DL — SIGNIFICANT CHANGE UP (ref 70–99)
HCT VFR BLD CALC: 41.3 % — SIGNIFICANT CHANGE UP (ref 37–47)
HCT VFR BLD CALC: 41.3 % — SIGNIFICANT CHANGE UP (ref 37–47)
HGB BLD-MCNC: 13.3 G/DL — SIGNIFICANT CHANGE UP (ref 12–16)
HGB BLD-MCNC: 13.3 G/DL — SIGNIFICANT CHANGE UP (ref 12–16)
MCHC RBC-ENTMCNC: 27.2 PG — SIGNIFICANT CHANGE UP (ref 27–31)
MCHC RBC-ENTMCNC: 27.2 PG — SIGNIFICANT CHANGE UP (ref 27–31)
MCHC RBC-ENTMCNC: 32.2 G/DL — SIGNIFICANT CHANGE UP (ref 32–37)
MCHC RBC-ENTMCNC: 32.2 G/DL — SIGNIFICANT CHANGE UP (ref 32–37)
MCV RBC AUTO: 84.5 FL — SIGNIFICANT CHANGE UP (ref 81–99)
MCV RBC AUTO: 84.5 FL — SIGNIFICANT CHANGE UP (ref 81–99)
NRBC # BLD: 0 /100 WBCS — SIGNIFICANT CHANGE UP (ref 0–0)
NRBC # BLD: 0 /100 WBCS — SIGNIFICANT CHANGE UP (ref 0–0)
PLATELET # BLD AUTO: 284 K/UL — SIGNIFICANT CHANGE UP (ref 130–400)
PLATELET # BLD AUTO: 284 K/UL — SIGNIFICANT CHANGE UP (ref 130–400)
PMV BLD: 9.5 FL — SIGNIFICANT CHANGE UP (ref 7.4–10.4)
PMV BLD: 9.5 FL — SIGNIFICANT CHANGE UP (ref 7.4–10.4)
POTASSIUM SERPL-MCNC: 4 MMOL/L — SIGNIFICANT CHANGE UP (ref 3.5–5)
POTASSIUM SERPL-MCNC: 4 MMOL/L — SIGNIFICANT CHANGE UP (ref 3.5–5)
POTASSIUM SERPL-SCNC: 4 MMOL/L — SIGNIFICANT CHANGE UP (ref 3.5–5)
POTASSIUM SERPL-SCNC: 4 MMOL/L — SIGNIFICANT CHANGE UP (ref 3.5–5)
RBC # BLD: 4.89 M/UL — SIGNIFICANT CHANGE UP (ref 4.2–5.4)
RBC # BLD: 4.89 M/UL — SIGNIFICANT CHANGE UP (ref 4.2–5.4)
RBC # FLD: 13.3 % — SIGNIFICANT CHANGE UP (ref 11.5–14.5)
RBC # FLD: 13.3 % — SIGNIFICANT CHANGE UP (ref 11.5–14.5)
SODIUM SERPL-SCNC: 141 MMOL/L — SIGNIFICANT CHANGE UP (ref 135–146)
SODIUM SERPL-SCNC: 141 MMOL/L — SIGNIFICANT CHANGE UP (ref 135–146)
WBC # BLD: 7.46 K/UL — SIGNIFICANT CHANGE UP (ref 4.8–10.8)
WBC # BLD: 7.46 K/UL — SIGNIFICANT CHANGE UP (ref 4.8–10.8)
WBC # FLD AUTO: 7.46 K/UL — SIGNIFICANT CHANGE UP (ref 4.8–10.8)
WBC # FLD AUTO: 7.46 K/UL — SIGNIFICANT CHANGE UP (ref 4.8–10.8)

## 2023-10-27 PROCEDURE — 93461 R&L HRT ART/VENTRICLE ANGIO: CPT

## 2023-10-27 PROCEDURE — C1769: CPT

## 2023-10-27 PROCEDURE — 80048 BASIC METABOLIC PNL TOTAL CA: CPT

## 2023-10-27 PROCEDURE — C1887: CPT

## 2023-10-27 PROCEDURE — C1889: CPT

## 2023-10-27 PROCEDURE — C1894: CPT

## 2023-10-27 PROCEDURE — 36415 COLL VENOUS BLD VENIPUNCTURE: CPT

## 2023-10-27 PROCEDURE — 85027 COMPLETE CBC AUTOMATED: CPT

## 2023-10-27 RX ORDER — HYDROCORTISONE 20 MG
200 TABLET ORAL ONCE
Refills: 0 | Status: COMPLETED | OUTPATIENT
Start: 2023-10-27 | End: 2023-10-27

## 2023-10-27 RX ORDER — SODIUM CHLORIDE 9 MG/ML
250 INJECTION INTRAMUSCULAR; INTRAVENOUS; SUBCUTANEOUS ONCE
Refills: 0 | Status: COMPLETED | OUTPATIENT
Start: 2023-10-27 | End: 2023-10-27

## 2023-10-27 RX ORDER — ASPIRIN/CALCIUM CARB/MAGNESIUM 324 MG
81 TABLET ORAL ONCE
Refills: 0 | Status: COMPLETED | OUTPATIENT
Start: 2023-10-27 | End: 2023-10-27

## 2023-10-27 RX ADMIN — Medication 81 MILLIGRAM(S): at 10:25

## 2023-10-27 RX ADMIN — SODIUM CHLORIDE 250 MILLILITER(S): 9 INJECTION INTRAMUSCULAR; INTRAVENOUS; SUBCUTANEOUS at 10:24

## 2023-10-27 RX ADMIN — Medication 200 MILLIGRAM(S): at 10:25

## 2023-10-27 NOTE — CHART NOTE - NSCHARTNOTEFT_GEN_A_CORE
PROCEDURE: [facility] + Coronary Angiogram    PRIMARY PHYSICIAN:  Dr. Pitts  FELLOW: Dr. Martin      PROCEDURE DESCRIPTION:   Anesthesia: Local + Sedation     Access: 6-Fr Radial artery, 5-Fr brachial vein  Closure: TR band, Manual compression    Intervention: None   Implants: None     IV Contrast:  70 mL      ESTIMATED BLOOD LOSS: <10cc    SPECIMENS REMOVED: None    FINDINGS:   DOMINANCE: Left  LM: Minor luminal irregularities   LAD: 90% ostial disease  D1: Minor luminal irregularities   CX: Mild disease  OM1: Mild disease  LPDA: Mild disease  RCA: Small vessel. Mild disease    LIMA: Patent graft. No disease at anastomoses sites      LVEDP:  30mmHg   EF:  55%     RHC:    RA 15  RV 42/13  PA46/24  PCWP 29    POST-OP DIAGNOSIS:  [] Normal Coronary Angiogram   [] Mild Coronary Artery Disease (< 50% stenosis)   [x] 1-Vessel Coronary Artery Disease : Ostial LAD. Supply via patent LIMA.        PLAN OF CARE:   [x] Post-procedure LVEDP-guided IV fluids: No fluids given elevated LVEDP, CVP, and PCWP  [] 250cc bolus daily x 3 days post procedure  [x] Encourage oral fluids daily  [] Medications: ASA/Xarelto/BB/Statin      DISPOSITION:  [x] D/C Home Today   [] Return to In-patient bed   [] Admit:

## 2023-10-27 NOTE — ASU PATIENT PROFILE, ADULT - FALL HARM RISK - HARM RISK INTERVENTIONS
Assistance with ambulation/Assistance OOB with selected safe patient handling equipment/Communicate Risk of Fall with Harm to all staff/Discuss with provider need for PT consult/Monitor gait and stability/Provide patient with walking aids - walker, cane, crutches/Reinforce activity limits and safety measures with patient and family/Sit up slowly, dangle for a short time, stand at bedside before walking/Tailored Fall Risk Interventions/Use of alarms - bed, chair and/or voice tab/Visual Cue: Yellow wristband and red socks/Bed in lowest position, wheels locked, appropriate side rails in place/Call bell, personal items and telephone in reach/Instruct patient to call for assistance before getting out of bed or chair/Non-slip footwear when patient is out of bed/[place] to call system/Physically safe environment - no spills, clutter or unnecessary equipment/Purposeful Proactive Rounding/Room/bathroom lighting operational, light cord in reach

## 2023-10-27 NOTE — ASU DISCHARGE PLAN (ADULT/PEDIATRIC) - ASU DC SPECIAL INSTRUCTIONSFT
Discharge Instructions as follows:  - Continue medical regimen as prescribed to prevent chest pain.  - If you are diabetic and taking medication containing Metformin, do not take them for 48 hours after the procedure  - No heavy lifting > 10lbs x 1 week.  - No driving x 24 hours.  - No baths, swimming pools x 1 week, may shower  - Low sodium low fat low cholesterol diet  - Follow-up with Cardiologist in 1-2 weeks after discharge  - Soreness or tenderness at the site is possible, it will diminish over time. You may take Tylenol every 4-6 hours as needed. Nothing stronger is needed. NO Motrin/Ibuprofen  - Any questions call cardiac cath lab [phone] Discharge Instructions as follows:  - Resume Xarelto on 10/28  - Continue medical regimen as prescribed to prevent chest pain.  - If you are diabetic and taking medication containing Metformin, do not take them for 48 hours after the procedure  - No heavy lifting > 10lbs x 1 week.  - No driving x 24 hours.  - No baths, swimming pools x 1 week, may shower  - Low sodium low fat low cholesterol diet  - Follow-up with Cardiologist in 1-2 weeks after discharge  - Soreness or tenderness at the site is possible, it will diminish over time. You may take Tylenol every 4-6 hours as needed. Nothing stronger is needed. NO Motrin/Ibuprofen  - Any questions call cardiac cath lab [phone]

## 2023-10-27 NOTE — ASU DISCHARGE PLAN (ADULT/PEDIATRIC) - CARE PROVIDER_API CALL
Doreen Pitts  Interventional Cardiology  [address] [place] [id number]  Phone: [phone]  Fax: [phone]  Established Patient  Follow Up Time: 2 weeks

## 2023-10-29 ENCOUNTER — EMERGENCY (EMERGENCY)
Facility: HOSPITAL | Age: 63
LOS: 0 days | Discharge: HOME | End: 2023-10-29
Attending: EMERGENCY MEDICINE
Payer: MEDICARE

## 2023-10-29 VITALS
HEART RATE: 61 BPM | WEIGHT: 237 LBS | RESPIRATION RATE: 18 BRPM | DIASTOLIC BLOOD PRESSURE: 72 MMHG | OXYGEN SATURATION: 99 % | TEMPERATURE: 99 F | HEIGHT: 66 IN | SYSTOLIC BLOOD PRESSURE: 136 MMHG

## 2023-10-29 DIAGNOSIS — I25.10 ATHEROSCLEROTIC HEART DISEASE OF NATIVE CORONARY ARTERY WITHOUT ANGINA PECTORIS: ICD-10-CM
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
## 2023-10-29 DIAGNOSIS — Z91.041 RADIOGRAPHIC DYE ALLERGY STATUS: ICD-10-CM

## 2023-10-29 DIAGNOSIS — Z88.8 ALLERGY STATUS TO OTHER DRUGS, MEDICAMENTS AND BIOLOGICAL SUBSTANCES: ICD-10-CM

## 2023-10-29 DIAGNOSIS — F32.A DEPRESSION, UNSPECIFIED: ICD-10-CM

## 2023-10-29 DIAGNOSIS — M79.89 OTHER SPECIFIED SOFT TISSUE DISORDERS: ICD-10-CM

## 2023-10-29 DIAGNOSIS — J44.9 CHRONIC OBSTRUCTIVE PULMONARY DISEASE, UNSPECIFIED: ICD-10-CM

## 2023-10-29 DIAGNOSIS — Z91.013 ALLERGY TO SEAFOOD: ICD-10-CM

## 2023-10-29 DIAGNOSIS — R20.2 PARESTHESIA OF SKIN: ICD-10-CM

## 2023-10-29 DIAGNOSIS — I48.91 UNSPECIFIED ATRIAL FIBRILLATION: ICD-10-CM

## 2023-10-29 DIAGNOSIS — F41.9 ANXIETY DISORDER, UNSPECIFIED: ICD-10-CM

## 2023-10-29 DIAGNOSIS — M79.81 NONTRAUMATIC HEMATOMA OF SOFT TISSUE: ICD-10-CM

## 2023-10-29 DIAGNOSIS — S60.212A CONTUSION OF LEFT WRIST, INITIAL ENCOUNTER: ICD-10-CM
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
## 2023-10-29 DIAGNOSIS — I10 ESSENTIAL (PRIMARY) HYPERTENSION: ICD-10-CM

## 2023-10-29 DIAGNOSIS — Z98.890 OTHER SPECIFIED POSTPROCEDURAL STATES: Chronic | ICD-10-CM

## 2023-10-29 DIAGNOSIS — Z95.1 PRESENCE OF AORTOCORONARY BYPASS GRAFT: ICD-10-CM

## 2023-10-29 DIAGNOSIS — Z95.1 PRESENCE OF AORTOCORONARY BYPASS GRAFT: Chronic | ICD-10-CM

## 2023-10-29 DIAGNOSIS — Z79.82 LONG TERM (CURRENT) USE OF ASPIRIN: ICD-10-CM

## 2023-10-29 PROCEDURE — 93010 ELECTROCARDIOGRAM REPORT: CPT

## 2023-10-29 PROCEDURE — 93005 ELECTROCARDIOGRAM TRACING: CPT

## 2023-10-29 PROCEDURE — 99284 EMERGENCY DEPT VISIT MOD MDM: CPT

## 2023-10-29 PROCEDURE — 99283 EMERGENCY DEPT VISIT LOW MDM: CPT | Mod: 25

## 2023-10-29 NOTE — ED PROVIDER NOTE - CLINICAL SUMMARY MEDICAL DECISION MAKING FREE TEXT BOX
63yF p/w mild swelling and healing bruising to L inner wrist from radial access site for catheterization.  LUE NVI w/o concern for SVT or DVT.  Bedside US w/o sign of venous occlusion or pseudoaneurysm.  Recommend supportive care, o/p f/u, return precautions.

## 2023-10-29 NOTE — ED PROVIDER NOTE - PATIENT PORTAL LINK FT
You can access the FollowMyHealth Patient Portal offered by [facility] by registering at the following website: [email or web address]. By joining [employer]’s FollowMyHealth portal, you will also be able to view your health information using other applications (apps) compatible with our system.

## 2023-10-29 NOTE — ED ADULT NURSE NOTE - NSFALLUNIVINTERV_ED_ALL_ED
Bed/Stretcher in lowest position, wheels locked, appropriate side rails in place/Call bell, personal items and telephone in reach/Instruct patient to call for assistance before getting out of bed/chair/stretcher/Non-slip footwear applied when patient is off stretcher/[place] to call system/Physically safe environment - no spills, clutter or unnecessary equipment/Purposeful proactive rounding/Room/bathroom lighting operational, light cord in reach

## 2023-10-29 NOTE — ED PROVIDER NOTE - NSFOLLOWUPINSTRUCTIONS_ED_ALL_ED_FT
PLEASE RETURN TO THE EMERGENCY DEPARTMENT IF YOU HAVE WORSENING SWELLING OR PAIN, REDNESS, OR DIFFICULTY MOVING HAND/ARM.    Follow up with your primary care doctor.

## 2023-10-29 NOTE — ED PROVIDER NOTE - PROGRESS NOTE DETAILS
[place]: Bedside POCUS without left arm DVT or arterial pseudoaneurysm. Strong doppler of radial artery tracking to brachial artery

## 2023-10-29 NOTE — ED PROVIDER NOTE - ATTENDING CONTRIBUTION TO CARE
63yF p/w bruising and mild swelling to L inner wrist - had cardiac cath done w/ entry site at L radial - noticed inc bruising and ?inc swelling today to her wrist and mentioned it to her PCP who sent her to the ED for evaluation of ?clot.

## 2023-10-29 NOTE — ED PROVIDER NOTE - PHYSICAL EXAMINATION
CONSTITUTIONAL: Well-developed; well-nourished; in no acute distress.   SKIN: Warm, dry  ENT: No nasal discharge; airway clear.  CARD:  Regular rate and rhythm. Normal S1, S2  RESP: No increased WOB. CTA b/l without wheezes, crackles, rhonchi  EXT: Normal ROM. Left hand dorsal swelling and left wrist palmar swelling with small puncture filemon c/w arterial puncture. Mild ecchymosis to anterior wrist. No erythema or discharge. 2+ radial pulses b/l. Sensation and strength intact.   NEURO: Alert, oriented, grossly unremarkable  PSYCH: Cooperative, appropriate.

## 2023-10-29 NOTE — ED PROVIDER NOTE - OBJECTIVE STATEMENT
62 y/o F with PM CAD s/p cath on 10/20 in which her b/l radial arteries were access now presenting for swelling and tingling sensation of left hand. Pt states swelling started a few days ago and not immediately after catheterization. Feels otherwise well. Denies loss of strength in hand. No recent fever, chest pain, difficulty breathing, redness. Swelling has not progressed up arm. Pt concerned for clot as instructed by PMD

## 2023-10-29 NOTE — ED ADULT TRIAGE NOTE - CHIEF COMPLAINT QUOTE
Pt c/o Bilateral arm swelling since yesterday s/p Heart catheterization on bilateral wrists Friday. Pt also complaining of painful "pins and needles" in L arm that began this morning. Pulses present in Bilateral wrists. Strength equal bilaterally

## 2023-10-29 NOTE — ED ADULT NURSE NOTE - OBJECTIVE STATEMENT
Patient present to ED c/o bilateral arm swelling and pain s/p PCI . Patient has strong pulses sensation and strength bilaterally

## 2023-11-08 ENCOUNTER — NON-APPOINTMENT (OUTPATIENT)
Age: 63
End: 2023-11-08

## 2023-11-08 ENCOUNTER — APPOINTMENT (OUTPATIENT)
Dept: PSYCHIATRY | Facility: CLINIC | Age: 63
End: 2023-11-08

## 2023-11-14 ENCOUNTER — APPOINTMENT (OUTPATIENT)
Dept: PSYCHIATRY | Facility: CLINIC | Age: 63
End: 2023-11-14

## 2023-11-14 ENCOUNTER — OUTPATIENT (OUTPATIENT)
Dept: OUTPATIENT SERVICES | Facility: HOSPITAL | Age: 63
LOS: 1 days | End: 2023-11-14
Payer: MEDICARE

## 2023-11-14 DIAGNOSIS — Z98.890 OTHER SPECIFIED POSTPROCEDURAL STATES: Chronic | ICD-10-CM

## 2023-11-14 DIAGNOSIS — Z95.1 PRESENCE OF AORTOCORONARY BYPASS GRAFT: Chronic | ICD-10-CM

## 2023-11-14 DIAGNOSIS — F41.1 GENERALIZED ANXIETY DISORDER: ICD-10-CM

## 2023-11-14 DIAGNOSIS — F33.1 MAJOR DEPRESSIVE DISORDER, RECURRENT, MODERATE: ICD-10-CM

## 2023-11-14 PROCEDURE — 90832 PSYTX W PT 30 MINUTES: CPT | Mod: 95

## 2023-11-15 ENCOUNTER — APPOINTMENT (OUTPATIENT)
Dept: PSYCHIATRY | Facility: CLINIC | Age: 63
End: 2023-11-15
Payer: MEDICARE

## 2023-11-15 ENCOUNTER — OUTPATIENT (OUTPATIENT)
Dept: OUTPATIENT SERVICES | Facility: HOSPITAL | Age: 63
LOS: 1 days | End: 2023-11-15
Payer: MEDICARE

## 2023-11-15 DIAGNOSIS — F33.1 MAJOR DEPRESSIVE DISORDER, RECURRENT, MODERATE: ICD-10-CM

## 2023-11-15 DIAGNOSIS — F32.9 MAJOR DEPRESSIVE DISORDER, SINGLE EPISODE, UNSPECIFIED: ICD-10-CM

## 2023-11-15 DIAGNOSIS — Z98.890 OTHER SPECIFIED POSTPROCEDURAL STATES: Chronic | ICD-10-CM

## 2023-11-15 DIAGNOSIS — F41.1 GENERALIZED ANXIETY DISORDER: ICD-10-CM

## 2023-11-15 DIAGNOSIS — G47.00 INSOMNIA, UNSPECIFIED: ICD-10-CM

## 2023-11-15 DIAGNOSIS — Z95.1 PRESENCE OF AORTOCORONARY BYPASS GRAFT: Chronic | ICD-10-CM

## 2023-11-15 PROCEDURE — 99214 OFFICE O/P EST MOD 30 MIN: CPT | Mod: 95

## 2023-11-16 DIAGNOSIS — F32.9 MAJOR DEPRESSIVE DISORDER, SINGLE EPISODE, UNSPECIFIED: ICD-10-CM

## 2023-11-16 DIAGNOSIS — F41.1 GENERALIZED ANXIETY DISORDER: ICD-10-CM

## 2023-11-16 DIAGNOSIS — G47.00 INSOMNIA, UNSPECIFIED: ICD-10-CM

## 2023-11-29 ENCOUNTER — OUTPATIENT (OUTPATIENT)
Dept: OUTPATIENT SERVICES | Facility: HOSPITAL | Age: 63
LOS: 1 days | End: 2023-11-29
Payer: MEDICARE

## 2023-11-29 ENCOUNTER — APPOINTMENT (OUTPATIENT)
Dept: PSYCHIATRY | Facility: CLINIC | Age: 63
End: 2023-11-29

## 2023-11-29 DIAGNOSIS — Z95.1 PRESENCE OF AORTOCORONARY BYPASS GRAFT: Chronic | ICD-10-CM

## 2023-11-29 DIAGNOSIS — F41.1 GENERALIZED ANXIETY DISORDER: ICD-10-CM

## 2023-11-29 DIAGNOSIS — Z98.890 OTHER SPECIFIED POSTPROCEDURAL STATES: Chronic | ICD-10-CM

## 2023-11-29 PROCEDURE — 90832 PSYTX W PT 30 MINUTES: CPT

## 2023-11-30 DIAGNOSIS — F41.1 GENERALIZED ANXIETY DISORDER: ICD-10-CM

## 2023-12-13 ENCOUNTER — OUTPATIENT (OUTPATIENT)
Dept: OUTPATIENT SERVICES | Facility: HOSPITAL | Age: 63
LOS: 1 days | End: 2023-12-13
Payer: MEDICARE

## 2023-12-13 ENCOUNTER — APPOINTMENT (OUTPATIENT)
Dept: PSYCHIATRY | Facility: CLINIC | Age: 63
End: 2023-12-13

## 2023-12-13 DIAGNOSIS — Z98.890 OTHER SPECIFIED POSTPROCEDURAL STATES: Chronic | ICD-10-CM

## 2023-12-13 DIAGNOSIS — Z95.1 PRESENCE OF AORTOCORONARY BYPASS GRAFT: Chronic | ICD-10-CM

## 2023-12-13 DIAGNOSIS — F41.1 GENERALIZED ANXIETY DISORDER: ICD-10-CM

## 2023-12-13 PROCEDURE — 90832 PSYTX W PT 30 MINUTES: CPT | Mod: 95

## 2023-12-13 NOTE — ED ADULT NURSE NOTE - DRUG PRE-SCREENING (DAST -1)
Here for port access prior to scan.  Ret'd post scan for de-access of port.  Tolerated procedure well and was discharged in stable condition.    Statement Selected

## 2023-12-13 NOTE — REASON FOR VISIT
[Patient preference] : as per patient preference [Continuing, patient not seen in-person within last 12 months (provide details below)] : Telehealth services are continuing, patient not seen in-person within last 12 months.  [Telehealth (audio & video) - Individual/Group] : This visit was provided via telehealth using real-time 2-way audio visual technology. [Other Location: e.g. Home (Enter Location, City,State)___] : The provider was located at [unfilled]. [Home] : The patient, [unfilled], was located at home, [unfilled], at the time of the visit. [Verbal consent obtained from patient/other participant(s)] : Verbal consent for telehealth/telephonic services obtained from patient/other participant(s) [Patient] : Patient [FreeTextEntry4] : 1:30 [FreeTextEntry5] : 2:00pm [FreeTextEntry1] : anxiety and depression

## 2023-12-13 NOTE — PLAN
[[facility]] : [facility]  [Supportive Therapy] : Supportive Therapy [Return in ____ week(s)] : Return in [unfilled] week(s) [FreeTextEntry2] : Goal1 I want to feel useful.    Obj A Pt will reduce score on the CUDOS from 34 (moderate depression) to 11-20 (minimal depression)   Obj B Pt will engage in 2-3 "useful" task per week   Goal 2 Anxiety- to reduce worry Obj A Pt will reduce score on the GAD7 from 7 Mild anxiety to minimal anxiety 0-4 OBJ B Pt will develop at least 2 ways to cope with worries and stressors. [de-identified] : The focus of this session was on pt's feelings about her new home.   Therapist provided active listening as pt shared how she and her daughters decided to take the apartment they saw ion [place], an apartment that overlooks her former family home where she grew up with her parents. Pt reports she has mixed feelings about this. . Therapist pointed out how great it is that she found a home after many months of feeling hopeless about this and then exploring her mixed feelings. . Pt identified that it reminds her that this is not where she wants to be in her life at this age and it makes her feel badly. .  Therapist explored ways pt can turn these negative thoughts into positives such as how looking into her parents home can provide her a sense of comfort. .  Pt was responsive to therapist support and interventions.  [FreeTextEntry1] : Pt will contact tx team for worsening of symptoms and/or problems with medication. Next appt: 1/3

## 2023-12-13 NOTE — RISK ASSESSMENT
[Person] : oriented to person [Place] : oriented to place [Time] : oriented to time [Short Term Intact] : short term memory intact [Remote Intact] : remote memory intact [Span Intact] : the attention span was normal [Concentration Intact] : normal concentrating ability [Fluency] : fluency intact [Comprehension] : comprehension intact [Current Events] : adequate knowledge of current events [Past History] : adequate knowledge of personal past history [Vocabulary] : adequate range of vocabulary [Cranial Nerves Optic (II)] : visual acuity intact bilaterally,  pupils equal round and reactive to light [Cranial Nerves Oculomotor (III)] : extraocular motion intact [Cranial Nerves Trigeminal (V)] : facial sensation intact symmetrically [Cranial Nerves Facial (VII)] : face symmetrical [Cranial Nerves Vestibulocochlear (VIII)] : hearing was intact bilaterally [Cranial Nerves Glossopharyngeal (IX)] : tongue and palate midline [Cranial Nerves Accessory (XI - Cranial And Spinal)] : head turning and shoulder shrug symmetric [Cranial Nerves Hypoglossal (XII)] : there was no tongue deviation with protrusion [No, patient denies ideation or behavior] : No, patient denies ideation or behavior [Motor Tone] : muscle tone was normal in all four extremities [Motor Strength] : muscle strength was normal in all four extremities [No Muscle Atrophy] : normal bulk in all four extremities [Sensation Tactile Decrease] : light touch was intact [Abnormal Walk] : normal gait [Balance] : balance was intact [1+] : Ankle jerk left 1+ [No Tenderness to Palpation] : no spine tenderness on palpation [Able to toe walk] : the patient was able to toe walk [Able to heel walk] : the patient was able to heel walk [Past-pointing] : there was no past-pointing [Tremor] : no tremor present [Plantar Reflex Right Only] : normal on the right [Plantar Reflex Left Only] : normal on the left [___] : absent on the right [___] : absent on the left [Mon] : Mon's sign was not demonstrated [Straight-Leg Raise Test - Left] : straight leg raise of the left leg was negative [Straight-Leg Raise Test - Right] : straight leg raise  of the right leg was negative

## 2023-12-14 DIAGNOSIS — F41.1 GENERALIZED ANXIETY DISORDER: ICD-10-CM

## 2024-01-02 NOTE — ED ADULT NURSE NOTE - NS ED NURSE RECORD ANOTHER VITAL SIGN
Pre-Operative Diagnosis: Neoplasm of uncertain behavior of skin [D48.5]     Post-Operative Diagnosis: Neoplasm of uncertain behavior of skin [D48.5]      Procedure Performed:   Wide excision lesion, left forearm    Surgeon(s) and Role:     * Barrett Cabezas MD - Primary    Assistant(s):        Surgical Findings: Lesion     Specimen: Lesion     Estimated Blood Loss: 0 ml    Dictation Number:      Barrett Pavon MD  1/2/2024  10:14 AM           No

## 2024-01-03 ENCOUNTER — OUTPATIENT (OUTPATIENT)
Dept: OUTPATIENT SERVICES | Facility: HOSPITAL | Age: 64
LOS: 1 days | End: 2024-01-03
Payer: MEDICARE

## 2024-01-03 ENCOUNTER — APPOINTMENT (OUTPATIENT)
Dept: PSYCHIATRY | Facility: CLINIC | Age: 64
End: 2024-01-03

## 2024-01-03 DIAGNOSIS — F41.1 GENERALIZED ANXIETY DISORDER: ICD-10-CM

## 2024-01-03 DIAGNOSIS — Z95.1 PRESENCE OF AORTOCORONARY BYPASS GRAFT: Chronic | ICD-10-CM

## 2024-01-03 DIAGNOSIS — Z98.890 OTHER SPECIFIED POSTPROCEDURAL STATES: Chronic | ICD-10-CM

## 2024-01-03 PROCEDURE — 90832 PSYTX W PT 30 MINUTES: CPT

## 2024-01-03 NOTE — PLAN
[[facility]] : [facility]  [Supportive Therapy] : Supportive Therapy [Return in ____ week(s)] : Return in [unfilled] week(s) [FreeTextEntry2] : Goal1 I want to feel useful.    Obj A Pt will reduce score on the CUDOS from 34 (moderate depression) to 11-20 (minimal depression)   Obj B Pt will engage in 2-3 "useful" task per week   Goal 2 Anxiety- to reduce worry Obj A Pt will reduce score on the GAD7 from 7 Mild anxiety to minimal anxiety 0-4 OBJ B Pt will develop at least 2 ways to cope with worries and stressors. [de-identified] : The focus of this session was on pt's feelings about her upcoming move.  Therapist provided active listening as pt shared how she has not slept for the past 2 days because her mind is filled with things she needs to buy and preparing for the move..Pt reports feeling exhausted, Therapist validated pt feeling tired and overwhelmed , normalizing her experience of moving as something that is highly stressful. Therapist explored ways pt can relieve her stress and relax enough to get some sleep. . Pt identified that she now feels tired enough to sleep but will use relaxation skills to manage anxiety and stress.in the next few weeks. .  Pt was responsive to therapist support and interventions.  [FreeTextEntry1] : Pt will contact tx team for worsening of symptoms and/or problems with medication. Next appt: 1/17  GOALS: Pt will perform bed mobility with Michael in 4wks

## 2024-01-03 NOTE — REASON FOR VISIT
[Patient preference] : as per patient preference [Continuing, patient not seen in-person within last 12 months (provide details below)] : Telehealth services are continuing, patient not seen in-person within last 12 months.  [Telehealth (audio & video) - Individual/Group] : This visit was provided via telehealth using real-time 2-way audio visual technology. [Other Location: e.g. Home (Enter Location, City,State)___] : The provider was located at [unfilled]. [Home] : The patient, [unfilled], was located at home, [unfilled], at the time of the visit. [Verbal consent obtained from patient/other participant(s)] : Verbal consent for telehealth/telephonic services obtained from patient/other participant(s) [Patient] : Patient [FreeTextEntry4] : 1:00 [FreeTextEntry5] : 1:30 [FreeTextEntry1] : anxiety and depression

## 2024-01-04 DIAGNOSIS — F41.1 GENERALIZED ANXIETY DISORDER: ICD-10-CM

## 2024-01-10 ENCOUNTER — APPOINTMENT (OUTPATIENT)
Dept: PSYCHIATRY | Facility: CLINIC | Age: 64
End: 2024-01-10
Payer: MEDICARE

## 2024-01-10 ENCOUNTER — OUTPATIENT (OUTPATIENT)
Dept: OUTPATIENT SERVICES | Facility: HOSPITAL | Age: 64
LOS: 1 days | End: 2024-01-10
Payer: MEDICARE

## 2024-01-10 DIAGNOSIS — F32.9 MAJOR DEPRESSIVE DISORDER, SINGLE EPISODE, UNSPECIFIED: ICD-10-CM

## 2024-01-10 DIAGNOSIS — F41.1 GENERALIZED ANXIETY DISORDER: ICD-10-CM

## 2024-01-10 PROCEDURE — 99214 OFFICE O/P EST MOD 30 MIN: CPT | Mod: 95

## 2024-01-10 NOTE — ASSESSMENT
[FreeTextEntry1] : Patient reports stable mood, at baseline. Continue: 1. Topamax 50mg po BID 2. Lexapro 25mg po daily 3. Wellbutrin XL 300mg po qam 4. xanax 0.5mg-1mg at bedtime prn, rx provided as law was extnded 5. add hydroxyzine 25mg-75mg at bedtime prn (or may try OTC CDB and or THC)   Follow up in 4 weeks, earlier if needed

## 2024-01-10 NOTE — HISTORY OF PRESENT ILLNESS
[No] : no [FreeTextEntry1] : This is a 62 year old patient who presents for medication management.  The patient reports  stable mood, but poor sleep and ok appetite.  The patient denies any symptoms of depression, seth, or psychosis at this time.  The patient has occasional anxiety that impairs their daily functioning. The patient denies any suicidal ideation, homicidal ideation, no auditory or visual hallucinations, or paranoid ideation.  The patient has chronic medical complaints. The patient denies any drug or alcohol use, and is not smoking at this time. I requested the patient's updated physical and labs from their PCP.  Coping skills were discussed and a safety plan was provided.  The patient was educated on the risks, benefits, and alternatives of their psychiatric medications.  The patient will engage in psychotherapy at this time.  The patient consents to ongoing medication management.  Continues with new therapist.   Patient will adjust current medications.  Awaits neurology., had poor experience recently with cardio.

## 2024-01-10 NOTE — DISCUSSION/SUMMARY
[Date of Last Physical Exam: _____] : Date of Last Physical Exam: [unfilled] [Date of Last Annual Labs: _____] : Date of Last Annual Labs: [unfilled] [Annual Review of Systems Completed?] : Annual Review of Systems Completed: Yes [Tobacco Screening Completed?] : Tobacco Screening Completed: Yes [Date of Last HbgA1c: _____] : Date of Last HbgA1c: [unfilled] [Date of Last Lipid Profile: _____] : Date of Last Lipid Profile: [unfilled] [Does patient require any additional health services or referrals?] : Does patient require any additional health services or referrals: Yes [Potential impact of patientâ??s physical health conditions on psychiatric care?] : Potential impact of patient's physical health conditions on psychiatric care: No [FreeTextEntry1] : neurology

## 2024-01-10 NOTE — REASON FOR VISIT
[Patient preference] : as per patient preference [Telehealth (audio & video) - Individual/Group] : This visit was provided via telehealth using real-time 2-way audio visual technology. [Verbal consent obtained from patient/other participant(s)] : Verbal consent for telehealth/telephonic services obtained from patient/other participant(s) [Home] : at home, [unfilled] , at the time of the visit. [Medical Office: ([facility])___] : at the medical office located in  [Verbal consent obtained from patient] : the patient, [unfilled] [FreeTextEntry4] : [id number] [FreeTextEntry5] : 110pm [FreeTextEntry2] : ISTOP checked [FreeTextEntry3] : law for in person visit extended for 1 year [FreeTextEntry1] : "I need something for sleep."

## 2024-01-10 NOTE — CURRENT PSYCHIATRIC SYMPTOMS
[Excessive Worry] : excessive worry [Depressed Mood] : no depressed mood [Anhedonia] : no anhedonia [Guilt] : not feeling guilty [Decreased Concentration] : no decrease in concentrating ability [Hyperphagia] : no hyperphagia [Insomnia] : no insomnia disorder [Hypersomnia] : no ~T hypersomnia [Psychomotor Retardation] : no psychomotor retardation [Anorexia] : no anorexia [Euphoria] : no euphoria [Highly Irritable] : no high irritability [Increased Activity] : no increased in activity [Distractibility] : not distracted [Talkativeness] : no talkativeness [Grandeur] : no feelings of grandeur [Buying Sprees] : no buying sprees [Hypersexuality] : denied hypersexuality [Dec Need For Sleep] : no decreased need for sleep [Delusions] : no ~T delusions [Hallucination Visual] : no visual hallucinations [Hallucination Auditory] : no auditory hallucinations [Hallucination Tactile] : no tactile hallucinations [Thought Disorder] : ~T a thought disorder was not noted [Ruminations] : no rumination disorder [Obsessions] : no obsessions [Re-experiencing] : no re-experiencing [Restlessness] : no restlessness [Panic] : no panic disorder [de-identified] : better [de-identified] : mild

## 2024-01-11 DIAGNOSIS — F41.1 GENERALIZED ANXIETY DISORDER: ICD-10-CM

## 2024-01-11 DIAGNOSIS — F32.9 MAJOR DEPRESSIVE DISORDER, SINGLE EPISODE, UNSPECIFIED: ICD-10-CM

## 2024-01-17 ENCOUNTER — APPOINTMENT (OUTPATIENT)
Dept: PSYCHIATRY | Facility: CLINIC | Age: 64
End: 2024-01-17

## 2024-01-17 ENCOUNTER — OUTPATIENT (OUTPATIENT)
Dept: OUTPATIENT SERVICES | Facility: HOSPITAL | Age: 64
LOS: 1 days | End: 2024-01-17
Payer: MEDICARE

## 2024-01-17 DIAGNOSIS — Z95.1 PRESENCE OF AORTOCORONARY BYPASS GRAFT: Chronic | ICD-10-CM

## 2024-01-17 DIAGNOSIS — F41.1 GENERALIZED ANXIETY DISORDER: ICD-10-CM

## 2024-01-17 DIAGNOSIS — Z98.890 OTHER SPECIFIED POSTPROCEDURAL STATES: Chronic | ICD-10-CM

## 2024-01-17 PROCEDURE — 90832 PSYTX W PT 30 MINUTES: CPT

## 2024-01-17 NOTE — PLAN
[facility]  [Supportive Therapy] : Supportive Therapy [Return in ____ week(s)] : Return in [unfilled] week(s) [FreeTextEntry2] : Goal1 I want to feel useful.    Obj A Pt will reduce score on the CUDOS from 34 (moderate depression) to 11-20 (minimal depression)   Obj B Pt will engage in 2-3 "useful" task per week   Goal 2 Anxiety- to reduce worry Obj A Pt will reduce score on the GAD7 from 7 Mild anxiety to minimal anxiety 0-4 OBJ B Pt will develop at least 2 ways to cope with worries and stressors. [de-identified] : The focus of this session was again on pt's feelings about her upcoming move.  Therapist provided active listening as pt shared how she continues to find it hard to sleep in anticipation of her move on 1/31 and is considering medical marijuana as a solution. Pt reports that she spoke to Dr. Burns about this and that he is not a provider. Therapist validated pt feelings of anxiety related to the move and living in a cramped space with tons of boxes around,  Therapist discussed ways pt can relieve her stress and relax enough to get some sleep. . Pt was also0 encouraged to make her f/u appts her various medical tests she needs that she ahs been putting off due to the move such as a pulmonary function test.  .  Pt was responsive to therapist support and interventions.  [FreeTextEntry1] : Pt will contact tx team for worsening of symptoms and/or problems with medication. Next appt: 2/7

## 2024-01-18 DIAGNOSIS — F41.1 GENERALIZED ANXIETY DISORDER: ICD-10-CM

## 2024-02-05 ENCOUNTER — NON-APPOINTMENT (OUTPATIENT)
Age: 64
End: 2024-02-05

## 2024-02-05 ENCOUNTER — APPOINTMENT (OUTPATIENT)
Dept: PSYCHIATRY | Facility: CLINIC | Age: 64
End: 2024-02-05

## 2024-02-07 ENCOUNTER — APPOINTMENT (OUTPATIENT)
Dept: PSYCHIATRY | Facility: CLINIC | Age: 64
End: 2024-02-07

## 2024-02-07 ENCOUNTER — NON-APPOINTMENT (OUTPATIENT)
Age: 64
End: 2024-02-07

## 2024-02-14 ENCOUNTER — APPOINTMENT (OUTPATIENT)
Dept: PSYCHIATRY | Facility: CLINIC | Age: 64
End: 2024-02-14

## 2024-02-14 ENCOUNTER — OUTPATIENT (OUTPATIENT)
Dept: OUTPATIENT SERVICES | Facility: HOSPITAL | Age: 64
LOS: 1 days | End: 2024-02-14
Payer: MEDICARE

## 2024-02-14 DIAGNOSIS — Z95.1 PRESENCE OF AORTOCORONARY BYPASS GRAFT: Chronic | ICD-10-CM

## 2024-02-14 DIAGNOSIS — Z98.890 OTHER SPECIFIED POSTPROCEDURAL STATES: Chronic | ICD-10-CM

## 2024-02-14 DIAGNOSIS — F41.1 GENERALIZED ANXIETY DISORDER: ICD-10-CM

## 2024-02-14 PROCEDURE — 90832 PSYTX W PT 30 MINUTES: CPT

## 2024-02-14 NOTE — REASON FOR VISIT
[Patient preference] : as per patient preference [Continuing, patient not seen in-person within last 12 months (provide details below)] : Telehealth services are continuing, patient not seen in-person within last 12 months.  [Telehealth (audio & video) - Individual/Group] : This visit was provided via telehealth using real-time 2-way audio visual technology. [Other Location: e.g. Home (Enter Location, City,State)___] : The provider was located at [unfilled]. [Home] : The patient, [unfilled], was located at home, [unfilled], at the time of the visit. [Verbal consent obtained from patient/other participant(s)] : Verbal consent for telehealth/telephonic services obtained from patient/other participant(s) [Patient] : Patient [FreeTextEntry4] : 12:30 [FreeTextEntry5] : 1:00pm [FreeTextEntry1] : anxiety and depression

## 2024-02-14 NOTE — PLAN
[[facility]] : [facility]  [[facility]ive Therapy] : Supportive Therapy [Return in ____ week(s)] : Return in [unfilled] week(s) [FreeTextEntry2] : Goal1 I want to feel useful.    Obj A Pt will reduce score on the CUDOS from 34 (moderate depression) to 11-20 (minimal depression)   Obj B Pt will engage in 2-3 "useful" task per week   Goal 2 Anxiety- to reduce worry Obj A Pt will reduce score on the GAD7 from 7 Mild anxiety to minimal anxiety 0-4 OBJ B Pt will develop at least 2 ways to cope with worries and stressors. [de-identified] : The focus of this session was again on pt's feelings about her new home.   Therapist provided active listening as pt shared how she feels very happy in her home and that being able to sort through and get rid of old things was cleansing for her. Pt reports  she feels like  new version of herself, as though a weight has been lifted. Therapist validated pt feelings  and  experience and verbalized happiness for this new state of being for her after being unhappy in her previous [id number] for so long,   Pt identified that she has been sleeping very well in the new home but is not sure if this is because she had the flu the past week or she is content.  . Pt was again encouraged to make her f/u appts her various medical tests she needs that she has  been putting off due to the move such as cardiologist and neurologist.   .  Pt was responsive to therapist support and interventions.  [FreeTextEntry1] : Pt will contact tx team for worsening of symptoms and/or problems with medication. Next appt: 3/6

## 2024-02-15 DIAGNOSIS — F41.1 GENERALIZED ANXIETY DISORDER: ICD-10-CM

## 2024-03-06 ENCOUNTER — APPOINTMENT (OUTPATIENT)
Dept: PSYCHIATRY | Facility: CLINIC | Age: 64
End: 2024-03-06

## 2024-03-06 ENCOUNTER — OUTPATIENT (OUTPATIENT)
Dept: OUTPATIENT SERVICES | Facility: HOSPITAL | Age: 64
LOS: 1 days | End: 2024-03-06
Payer: MEDICARE

## 2024-03-06 DIAGNOSIS — Z98.890 OTHER SPECIFIED POSTPROCEDURAL STATES: Chronic | ICD-10-CM

## 2024-03-06 DIAGNOSIS — F41.1 GENERALIZED ANXIETY DISORDER: ICD-10-CM

## 2024-03-06 DIAGNOSIS — Z95.1 PRESENCE OF AORTOCORONARY BYPASS GRAFT: Chronic | ICD-10-CM

## 2024-03-06 PROCEDURE — 90832 PSYTX W PT 30 MINUTES: CPT

## 2024-03-06 NOTE — REASON FOR VISIT
[Patient preference] : as per patient preference [Telehealth (audio & video) - Individual/Group] : This visit was provided via telehealth using real-time 2-way audio visual technology. [Continuing, patient not seen in-person within last 12 months (provide details below)] : Telehealth services are continuing, patient not seen in-person within last 12 months.  [Other Location: e.g. Home (Enter Location, City,State)___] : The provider was located at [unfilled]. [Home] : The patient, [unfilled], was located at home, [unfilled], at the time of the visit. [Verbal consent obtained from patient/other participant(s)] : Verbal consent for telehealth/telephonic services obtained from patient/other participant(s) [Patient] : Patient [FreeTextEntry4] : 1:0pm [FreeTextEntry5] : 1:30pm [FreeTextEntry1] : anxiety and depression

## 2024-03-06 NOTE — PLAN
[[facility]] : [facility]  [Supportive Therapy] : Supportive Therapy [Return in ____ week(s)] : Return in [unfilled] week(s) [FreeTextEntry2] : Goal1 I want to feel useful.    Obj A Pt will reduce score on the CUDOS from 34 (moderate depression) to 11-20 (minimal depression)   Obj B Pt will engage in 2-3 "useful" task per week   Goal 2 Anxiety- to reduce worry Obj A Pt will reduce score on the GAD7 from 7 Mild anxiety to minimal anxiety 0-4 OBJ B Pt will develop at least 2 ways to cope with worries and stressors. [de-identified] : The focus of this session was pt's recent depressive sx.   Therapist provided active listening as pt shared how she feels that her life lacks purpose and that there is n o reason for her to be here. Pt denies any active SI and reports she would never do anything to hurt herself .  Pt reports she feels guilty for living off of her daughter and being unable to work a full time job due to medical issues,  Therapist validated pt feelings and pointed out this is not her "fault" as she referred to it because it is not her choice to have these issues. Pt reports she sits all day doing nothing which leads to rumination.  Pt given a homework assignment ,  to make a list of p/t jobs she thinks she could manage and also a list of n on-job activities that she could engage in to occupy her time and mind, Pt was agreeable and  was responsive to therapist support and interventions.  [FreeTextEntry1] : Pt will contact tx team for worsening of symptoms and/or problems with medication. Next appt: 3/20

## 2024-03-07 DIAGNOSIS — F41.1 GENERALIZED ANXIETY DISORDER: ICD-10-CM

## 2024-03-11 ENCOUNTER — NON-APPOINTMENT (OUTPATIENT)
Age: 64
End: 2024-03-11

## 2024-03-13 ENCOUNTER — EMERGENCY (EMERGENCY)
Facility: HOSPITAL | Age: 64
LOS: 0 days | Discharge: HOME | End: 2024-03-13
Attending: EMERGENCY MEDICINE
Payer: MEDICARE

## 2024-03-13 VITALS
WEIGHT: 229.94 LBS | DIASTOLIC BLOOD PRESSURE: 74 MMHG | HEART RATE: 63 BPM | OXYGEN SATURATION: 98 % | SYSTOLIC BLOOD PRESSURE: 141 MMHG | RESPIRATION RATE: 20 BRPM | TEMPERATURE: 96 F

## 2024-03-13 VITALS
OXYGEN SATURATION: 99 % | SYSTOLIC BLOOD PRESSURE: 145 MMHG | DIASTOLIC BLOOD PRESSURE: 61 MMHG | RESPIRATION RATE: 18 BRPM | HEART RATE: 66 BPM

## 2024-03-13 DIAGNOSIS — Z79.01 LONG TERM (CURRENT) USE OF ANTICOAGULANTS: ICD-10-CM

## 2024-03-13 DIAGNOSIS — Z91.013 ALLERGY TO SEAFOOD: ICD-10-CM

## 2024-03-13 DIAGNOSIS — M79.645 PAIN IN LEFT FINGER(S): ICD-10-CM

## 2024-03-13 DIAGNOSIS — Z91.041 RADIOGRAPHIC DYE ALLERGY STATUS: ICD-10-CM

## 2024-03-13 DIAGNOSIS — R19.7 DIARRHEA, UNSPECIFIED: ICD-10-CM

## 2024-03-13 DIAGNOSIS — Z79.82 LONG TERM (CURRENT) USE OF ASPIRIN: ICD-10-CM

## 2024-03-13 DIAGNOSIS — I10 ESSENTIAL (PRIMARY) HYPERTENSION: ICD-10-CM
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
## 2024-03-13 DIAGNOSIS — M25.532 PAIN IN LEFT WRIST: ICD-10-CM

## 2024-03-13 DIAGNOSIS — J44.9 CHRONIC OBSTRUCTIVE PULMONARY DISEASE, UNSPECIFIED: ICD-10-CM

## 2024-03-13 DIAGNOSIS — S70.02XA CONTUSION OF LEFT HIP, INITIAL ENCOUNTER: ICD-10-CM

## 2024-03-13 DIAGNOSIS — Z87.891 PERSONAL HISTORY OF NICOTINE DEPENDENCE: ICD-10-CM

## 2024-03-13 DIAGNOSIS — R10.30 LOWER ABDOMINAL PAIN, UNSPECIFIED: ICD-10-CM
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
## 2024-03-13 DIAGNOSIS — S30.0XXA CONTUSION OF LOWER BACK AND PELVIS, INITIAL ENCOUNTER: ICD-10-CM

## 2024-03-13 DIAGNOSIS — I48.91 UNSPECIFIED ATRIAL FIBRILLATION: ICD-10-CM

## 2024-03-13 DIAGNOSIS — Z95.1 PRESENCE OF AORTOCORONARY BYPASS GRAFT: Chronic | ICD-10-CM

## 2024-03-13 DIAGNOSIS — Z98.890 OTHER SPECIFIED POSTPROCEDURAL STATES: Chronic | ICD-10-CM

## 2024-03-13 DIAGNOSIS — Z88.8 ALLERGY STATUS TO OTHER DRUGS, MEDICAMENTS AND BIOLOGICAL SUBSTANCES: ICD-10-CM

## 2024-03-13 LAB
ALBUMIN SERPL ELPH-MCNC: 4.2 G/DL — SIGNIFICANT CHANGE UP (ref 3.5–5.2)
ALP SERPL-CCNC: 94 U/L — SIGNIFICANT CHANGE UP (ref 30–115)
ALT FLD-CCNC: 11 U/L — SIGNIFICANT CHANGE UP (ref 0–41)
ANION GAP SERPL CALC-SCNC: 8 MMOL/L — SIGNIFICANT CHANGE UP (ref 7–14)
APPEARANCE UR: CLEAR — SIGNIFICANT CHANGE UP
AST SERPL-CCNC: 22 U/L — SIGNIFICANT CHANGE UP (ref 0–41)
BASOPHILS # BLD AUTO: 0.09 K/UL — SIGNIFICANT CHANGE UP (ref 0–0.2)
BASOPHILS NFR BLD AUTO: 1.3 % — HIGH (ref 0–1)
BILIRUB SERPL-MCNC: 0.6 MG/DL — SIGNIFICANT CHANGE UP (ref 0.2–1.2)
BILIRUB UR-MCNC: NEGATIVE — SIGNIFICANT CHANGE UP
BUN SERPL-MCNC: 13 MG/DL — SIGNIFICANT CHANGE UP (ref 10–20)
CALCIUM SERPL-MCNC: 9.9 MG/DL — SIGNIFICANT CHANGE UP (ref 8.4–10.5)
CHLORIDE SERPL-SCNC: 106 MMOL/L — SIGNIFICANT CHANGE UP (ref 98–110)
CO2 SERPL-SCNC: 23 MMOL/L — SIGNIFICANT CHANGE UP (ref 17–32)
COLOR SPEC: YELLOW — SIGNIFICANT CHANGE UP
CREAT SERPL-MCNC: 1.2 MG/DL — SIGNIFICANT CHANGE UP (ref 0.7–1.5)
DIFF PNL FLD: NEGATIVE — SIGNIFICANT CHANGE UP
EGFR: 51 ML/MIN/1.73M2 — LOW
EOSINOPHIL # BLD AUTO: 0.26 K/UL — SIGNIFICANT CHANGE UP (ref 0–0.7)
EOSINOPHIL NFR BLD AUTO: 3.8 % — SIGNIFICANT CHANGE UP (ref 0–8)
GLUCOSE SERPL-MCNC: 109 MG/DL — HIGH (ref 70–99)
GLUCOSE UR QL: NEGATIVE MG/DL — SIGNIFICANT CHANGE UP
HCT VFR BLD CALC: 41.1 % — SIGNIFICANT CHANGE UP (ref 37–47)
HGB BLD-MCNC: 13.2 G/DL — SIGNIFICANT CHANGE UP (ref 12–16)
IMM GRANULOCYTES NFR BLD AUTO: 0.1 % — SIGNIFICANT CHANGE UP (ref 0.1–0.3)
KETONES UR-MCNC: NEGATIVE MG/DL — SIGNIFICANT CHANGE UP
LEUKOCYTE ESTERASE UR-ACNC: NEGATIVE — SIGNIFICANT CHANGE UP
LIDOCAIN IGE QN: 22 U/L — SIGNIFICANT CHANGE UP (ref 7–60)
LYMPHOCYTES # BLD AUTO: 1.86 K/UL — SIGNIFICANT CHANGE UP (ref 1.2–3.4)
LYMPHOCYTES # BLD AUTO: 27.1 % — SIGNIFICANT CHANGE UP (ref 20.5–51.1)
MCHC RBC-ENTMCNC: 27.2 PG — SIGNIFICANT CHANGE UP (ref 27–31)
MCHC RBC-ENTMCNC: 32.1 G/DL — SIGNIFICANT CHANGE UP (ref 32–37)
MCV RBC AUTO: 84.6 FL — SIGNIFICANT CHANGE UP (ref 81–99)
MONOCYTES # BLD AUTO: 0.58 K/UL — SIGNIFICANT CHANGE UP (ref 0.1–0.6)
MONOCYTES NFR BLD AUTO: 8.4 % — SIGNIFICANT CHANGE UP (ref 1.7–9.3)
NEUTROPHILS # BLD AUTO: 4.07 K/UL — SIGNIFICANT CHANGE UP (ref 1.4–6.5)
NEUTROPHILS NFR BLD AUTO: 59.3 % — SIGNIFICANT CHANGE UP (ref 42.2–75.2)
NITRITE UR-MCNC: NEGATIVE — SIGNIFICANT CHANGE UP
NRBC # BLD: 0 /100 WBCS — SIGNIFICANT CHANGE UP (ref 0–0)
PH UR: 6 — SIGNIFICANT CHANGE UP (ref 5–8)
PLATELET # BLD AUTO: 258 K/UL — SIGNIFICANT CHANGE UP (ref 130–400)
PMV BLD: 10.3 FL — SIGNIFICANT CHANGE UP (ref 7.4–10.4)
POTASSIUM SERPL-MCNC: 4.8 MMOL/L — SIGNIFICANT CHANGE UP (ref 3.5–5)
POTASSIUM SERPL-SCNC: 4.8 MMOL/L — SIGNIFICANT CHANGE UP (ref 3.5–5)
PROT SERPL-MCNC: 7.2 G/DL — SIGNIFICANT CHANGE UP (ref 6–8)
PROT UR-MCNC: NEGATIVE MG/DL — SIGNIFICANT CHANGE UP
RBC # BLD: 4.86 M/UL — SIGNIFICANT CHANGE UP (ref 4.2–5.4)
RBC # FLD: 13.2 % — SIGNIFICANT CHANGE UP (ref 11.5–14.5)
SODIUM SERPL-SCNC: 137 MMOL/L — SIGNIFICANT CHANGE UP (ref 135–146)
SP GR SPEC: 1.02 — SIGNIFICANT CHANGE UP (ref 1–1.03)
UROBILINOGEN FLD QL: 1 MG/DL — SIGNIFICANT CHANGE UP (ref 0.2–1)
WBC # BLD: 6.87 K/UL — SIGNIFICANT CHANGE UP (ref 4.8–10.8)
WBC # FLD AUTO: 6.87 K/UL — SIGNIFICANT CHANGE UP (ref 4.8–10.8)

## 2024-03-13 PROCEDURE — 83690 ASSAY OF LIPASE: CPT

## 2024-03-13 PROCEDURE — 99285 EMERGENCY DEPT VISIT HI MDM: CPT

## 2024-03-13 PROCEDURE — 74177 CT ABD & PELVIS W/CONTRAST: CPT | Mod: MC

## 2024-03-13 PROCEDURE — 85025 COMPLETE CBC W/AUTO DIFF WBC: CPT

## 2024-03-13 PROCEDURE — 96375 TX/PRO/DX INJ NEW DRUG ADDON: CPT

## 2024-03-13 PROCEDURE — 96374 THER/PROPH/DIAG INJ IV PUSH: CPT | Mod: XU

## 2024-03-13 PROCEDURE — 74177 CT ABD & PELVIS W/CONTRAST: CPT | Mod: 26,MC

## 2024-03-13 PROCEDURE — 36415 COLL VENOUS BLD VENIPUNCTURE: CPT

## 2024-03-13 PROCEDURE — 80053 COMPREHEN METABOLIC PANEL: CPT

## 2024-03-13 PROCEDURE — 99284 EMERGENCY DEPT VISIT MOD MDM: CPT | Mod: 25

## 2024-03-13 PROCEDURE — 81003 URINALYSIS AUTO W/O SCOPE: CPT

## 2024-03-13 PROCEDURE — 87086 URINE CULTURE/COLONY COUNT: CPT

## 2024-03-13 RX ORDER — ESCITALOPRAM OXALATE 10 MG/1
25 TABLET, FILM COATED ORAL
Qty: 0 | Refills: 0 | DISCHARGE

## 2024-03-13 RX ORDER — METOPROLOL TARTRATE 50 MG
1 TABLET ORAL
Qty: 0 | Refills: 0 | DISCHARGE

## 2024-03-13 RX ORDER — ALPRAZOLAM 0.25 MG
1 TABLET ORAL
Qty: 0 | Refills: 0 | DISCHARGE

## 2024-03-13 RX ORDER — ATORVASTATIN CALCIUM 80 MG/1
1 TABLET, FILM COATED ORAL
Qty: 0 | Refills: 0 | DISCHARGE

## 2024-03-13 RX ORDER — BUDESONIDE AND FORMOTEROL FUMARATE DIHYDRATE 160; 4.5 UG/1; UG/1
2 AEROSOL RESPIRATORY (INHALATION)
Qty: 0 | Refills: 0 | DISCHARGE

## 2024-03-13 RX ORDER — DIPHENHYDRAMINE HCL 50 MG
50 CAPSULE ORAL ONCE
Refills: 0 | Status: COMPLETED | OUTPATIENT
Start: 2024-03-13 | End: 2024-03-13

## 2024-03-13 RX ORDER — ALBUTEROL 90 UG/1
2 AEROSOL, METERED ORAL
Qty: 0 | Refills: 0 | DISCHARGE

## 2024-03-13 RX ORDER — BUPROPION HYDROCHLORIDE 150 MG/1
1 TABLET, EXTENDED RELEASE ORAL
Qty: 0 | Refills: 0 | DISCHARGE

## 2024-03-13 RX ORDER — RIVAROXABAN 15 MG-20MG
1 KIT ORAL
Refills: 0 | DISCHARGE

## 2024-03-13 RX ORDER — ASPIRIN/CALCIUM CARB/MAGNESIUM 324 MG
1 TABLET ORAL
Qty: 0 | Refills: 0 | DISCHARGE

## 2024-03-13 RX ADMIN — Medication 50 MILLIGRAM(S): at 15:24

## 2024-03-13 RX ADMIN — Medication 125 MILLIGRAM(S): at 15:23

## 2024-03-13 NOTE — ED ADULT NURSE NOTE - CAS TRG GEN SKIN CONDITION
[facility] Note  Patient Name: Gage Pickens  Patient Age: 80 y.o.  YOB: 1937  MRN: [id number]  ?  Date of Visit: 1/29/Sunny Metz MD  [facility]    Warm/Dry

## 2024-03-13 NOTE — ED PROVIDER NOTE - CLINICAL SUMMARY MEDICAL DECISION MAKING FREE TEXT BOX
Labs and imaging obtained. Results reviewed and discussed with patient and daughter. Pt to cont supportive care at home. Pt instructed to return if any worsening symptoms or concerns.  They verbalize understanding.

## 2024-03-13 NOTE — ED PROVIDER NOTE - ATTENDING APP SHARED VISIT CONTRIBUTION OF CARE
64 yo F PMHx noted including a fib on Xarelto, COPD presents for evaluation of diarrhea x 2 episodes.  Pt fell yesterday and sustained injury to left wrist.  Pt  called [facility] and was told to come to ED for further evaluation of pain.  no n/v. On exam pt in NAD AAO x 3 , left wrist in splint, abd soft nd, mild tenderness lower abdomen, no rebound or guarding.

## 2024-03-13 NOTE — ED PROVIDER NOTE - CARE PROVIDER_API CALL
Jerman Hernandez.  Internal Medicine  [address] [place] [address], [facility] [address]  [place] [address]  Phone: [phone]  Fax: [phone]  Follow Up Time: 1-3 Days

## 2024-03-13 NOTE — ED ADULT TRIAGE NOTE - CHIEF COMPLAINT QUOTE
Patient fell yesterday sent in from [facility] for possible wrist fracture. Patient [facility]

## 2024-03-13 NOTE — ED PROVIDER NOTE - OBJECTIVE STATEMENT
63-year-old female past medical history A-fib on Xarelto, COPD, HTN presents with 2 episodes of nonbloody diarrhea today.  Of note patient sustained mechanical fall yesterday injuring her left wrist/thumb, was seen and evaluated by urgent care with negative x-ray, developed diarrhea today and called urgent care who referred her to come to the ED.  Denies fever, abdominal pain, urinary symptoms, hip pain, leg pain, weakness.  Patient reports left wrist/thumb pain from the fall yesterday, but is already in a thumb spica splint and has Ortho follow-up arranged.  Patient states she has been ambulating at baseline

## 2024-03-13 NOTE — ED ADULT NURSE NOTE - CHIEF COMPLAINT QUOTE
Patient fell yesterday sent in from [facility] for possible wrist fracture. Patient [facility]

## 2024-03-13 NOTE — ED PROVIDER NOTE - PATIENT PORTAL LINK FT
You can access the FollowMyHealth Patient Portal offered by [facility] by registering at the following website: [email or web address]. By joining [employer]’s FollowMyHealth portal, you will also be able to view your health information using other applications (apps) compatible with our system.

## 2024-03-13 NOTE — ED ADULT NURSE NOTE - NSFALLHARMRISKINTERV_ED_ALL_ED

## 2024-03-13 NOTE — ED PROVIDER NOTE - PHYSICAL EXAMINATION
Vital Signs: I have reviewed the initial vital signs.  CONSTITUTIONAL: Pt in no acute distress laying on stretcher.  SKIN: Skin exam is warm and dry, no acute rash.  HEAD: Normocephalic; atraumatic.  EYES: PERRL, EOM intact; conjunctiva and sclera clear.  NECK: Supple; non tender. FROM  CARD: S1, S2 normal; no murmurs, gallops, or rubs. Regular rate and rhythm.  RESP: CTAB. No wheezes, rales or rhonchi.  ABD: +RLQ tenderness. soft; non-distended; no hepatosplenomegaly.  MSK: +thumb spica splint on left wrist with tenderness of area. No overlying skin changes. +large ecchymosis to left hip/ buttock. FROM x4 extremities. Pelvis stable and nontender. Pt ambulating in ED at baseline. Never smoker

## 2024-03-13 NOTE — ED PROVIDER NOTE - NSFOLLOWUPINSTRUCTIONS_ED_ALL_ED_FT
Follow-up with your primary care doctor in 1 to 2 days.  As discussed in the emergency department follow-up with orthopedic appointment that you have scheduled regarding fall which you were evaluated for yesterday.    Diarrhea    Diarrhea is frequent loose or watery bowel movements that has many causes. Diarrhea can make you feel weak and cause you to become dehydrated. Diarrhea typically lasts 2–3 days, but can last longer if it is a sign of something more serious. Drink clear fluids to prevent dehydration. Eat bland, easy-to-digest foods as tolerated.     SEEK IMMEDIATE MEDICAL CARE IF YOU HAVE ANY OF THE FOLLOWING SYMPTOMS: high fevers, lightheadedness/dizziness, chest pain, black or bloody stools, shortness of breath, severe abdominal or back pain, or any signs of dehydration.

## 2024-03-14 LAB
CULTURE RESULTS: SIGNIFICANT CHANGE UP
SPECIMEN SOURCE: SIGNIFICANT CHANGE UP

## 2024-03-15 ENCOUNTER — APPOINTMENT (OUTPATIENT)
Dept: ORTHOPEDIC SURGERY | Facility: CLINIC | Age: 64
End: 2024-03-15
Payer: MEDICARE

## 2024-03-15 VITALS — WEIGHT: 230 LBS | HEIGHT: 67 IN | BODY MASS INDEX: 36.1 KG/M2

## 2024-03-15 VITALS — HEIGHT: 67 IN | BODY MASS INDEX: 36.1 KG/M2 | WEIGHT: 230 LBS

## 2024-03-15 DIAGNOSIS — Z63.5 DISRUPTION OF FAMILY BY SEPARATION AND DIVORCE: ICD-10-CM

## 2024-03-15 DIAGNOSIS — Z86.79 PERSONAL HISTORY OF OTHER DISEASES OF THE CIRCULATORY SYSTEM: ICD-10-CM

## 2024-03-15 DIAGNOSIS — Z87.09 PERSONAL HISTORY OF OTHER DISEASES OF THE RESPIRATORY SYSTEM: ICD-10-CM

## 2024-03-15 DIAGNOSIS — S69.92XA UNSPECIFIED INJURY OF LEFT WRIST, HAND AND FINGER(S), INITIAL ENCOUNTER: ICD-10-CM

## 2024-03-15 PROCEDURE — 99203 OFFICE O/P NEW LOW 30 MIN: CPT

## 2024-03-15 SDOH — SOCIAL STABILITY - SOCIAL INSECURITY: DISRUPTION OF FAMILY BY SEPARATION AND DIVORCE: Z63.5

## 2024-03-15 NOTE — DISCUSSION/SUMMARY
[de-identified] : At this point send stat authorization for an MRI of the left wrist to evaluate for a scaphoid fracture.  She will remain in the brace at all times except for hygiene and doing warm water soaks for the arthritis in her hand.  She is on Xarelto so she will stay away from oral anti-inflammatories and use Tylenol for pain.  She will call me 2 to 3 days after the MRI is completed so we can discuss the results.  At that point in time we will make a follow-up and treatment plan.

## 2024-03-15 NOTE — IMAGING
[de-identified] : Physical exam of the left wrist: Mild edema.  No erythema or ecchymosis.  Slightly decreased range of motion with extension, flexion, radial and ulnar deviation.  Painful range of motion.  No tenderness to palpation over the distal radius.  She has tenderness to palpation over the anatomic snuffbox.  No tenderness to palpation over the distal ulna or the TFCC.  She has some tenderness to palpation over the CMC joint.  Intact to light touch.

## 2024-03-15 NOTE — HISTORY OF PRESENT ILLNESS
[de-identified] : The patient is a 63-year-old female right-hand-dominant here for evaluation of her left wrist.  On 3/12/2020 formal walking downstairs at home she fell landing on outstretched hand.  She had x-rays at [facility] urgent care on 3/12/2024 which were read as negative for fracture.  She presents today in a thumb spica brace.  She is on Xarelto so she cannot take anti-inflammatory medication.

## 2024-03-20 ENCOUNTER — OUTPATIENT (OUTPATIENT)
Dept: OUTPATIENT SERVICES | Facility: HOSPITAL | Age: 64
LOS: 1 days | End: 2024-03-20
Payer: MEDICARE

## 2024-03-20 ENCOUNTER — APPOINTMENT (OUTPATIENT)
Dept: PSYCHIATRY | Facility: CLINIC | Age: 64
End: 2024-03-20

## 2024-03-20 DIAGNOSIS — Z95.1 PRESENCE OF AORTOCORONARY BYPASS GRAFT: Chronic | ICD-10-CM

## 2024-03-20 DIAGNOSIS — Z98.890 OTHER SPECIFIED POSTPROCEDURAL STATES: Chronic | ICD-10-CM

## 2024-03-20 DIAGNOSIS — F41.1 GENERALIZED ANXIETY DISORDER: ICD-10-CM

## 2024-03-20 PROCEDURE — 90832 PSYTX W PT 30 MINUTES: CPT

## 2024-03-20 NOTE — PLAN
[Supportive Therapy] : [facility] Therapy [[facility]] : [facility]  [Return in ____ week(s)] : Return in [unfilled] week(s) [FreeTextEntry2] : Goal1 I want to feel useful.    Obj A Pt will reduce score on the CUDOS from 34 (moderate depression) to 11-20 (minimal depression)   Obj B Pt will engage in 2-3 "useful" task per week   Goal 2 Anxiety- to reduce worry Obj A Pt will reduce score on the GAD7 from 7 Mild anxiety to minimal anxiety 0-4 OBJ B Pt will develop at least 2 ways to cope with worries and stressors. [de-identified] : The focus of this session was pt's mood since last session.   Therapist provided active listening as pt shared how her mood is much improved since last session and she equates last time to relational conflicts with her children and the stressful process of moving. Pt reports she has been happier and feeling more positive. Therapist validated pt experience and explored what if any ideas she came up with regard to activities or jobs for herself. 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 Pt also reports she fell last week and fractured her wrist, she had an MRI yesterday and may require surgery. Pt was responsive to therapist support and interventions.  [FreeTextEntry1] : Pt will contact tx team for worsening of symptoms and/or problems with medication. Next appt: 4/3

## 2024-03-20 NOTE — PHYSICAL EXAM
[Euthymic] : euthymic [Cooperative] : cooperative [Full] : full [Clear] : clear [Linear/Goal Directed] : linear/goal directed [None Reported] : none reported [None] : none [Average] : average [WNL] : within normal limits

## 2024-03-20 NOTE — REASON FOR VISIT
[Patient preference] : as per patient preference [Telehealth (audio & video) - Individual/Group] : This visit was provided via telehealth using real-time 2-way audio visual technology. [Continuing, patient not seen in-person within last 12 months (provide details below)] : Telehealth services are continuing, patient not seen in-person within last 12 months.  [Other Location: e.g. Home (Enter Location, City,State)___] : The provider was located at [unfilled]. [Home] : The patient, [unfilled], was located at home, [unfilled], at the time of the visit. [Verbal consent obtained from patient/other participant(s)] : Verbal consent for telehealth/telephonic services obtained from patient/other participant(s) [Patient] : Patient [FreeTextEntry4] : 1:00pm [FreeTextEntry5] : 1:30pm [FreeTextEntry1] : anxiety and depression

## 2024-03-21 DIAGNOSIS — F41.1 GENERALIZED ANXIETY DISORDER: ICD-10-CM

## 2024-03-25 ENCOUNTER — APPOINTMENT (OUTPATIENT)
Dept: NEUROLOGY | Facility: CLINIC | Age: 64
End: 2024-03-25

## 2024-03-25 NOTE — PATIENT PROFILE ADULT - LIVING ENVIRONMENT
Problem: Chronic Conditions and Co-morbidities  Goal: Patient's chronic conditions and co-morbidity symptoms are monitored and maintained or improved  Flowsheets (Taken 3/25/2024 1255)  Care Plan - Patient's Chronic Conditions and Co-Morbidity Symptoms are Monitored and Maintained or Improved: Monitor and assess patient's chronic conditions and comorbid symptoms for stability, deterioration, or improvement      no

## 2024-04-03 ENCOUNTER — APPOINTMENT (OUTPATIENT)
Dept: PSYCHIATRY | Facility: CLINIC | Age: 64
End: 2024-04-03

## 2024-04-03 ENCOUNTER — OUTPATIENT (OUTPATIENT)
Dept: OUTPATIENT SERVICES | Facility: HOSPITAL | Age: 64
LOS: 1 days | End: 2024-04-03
Payer: MEDICARE

## 2024-04-03 DIAGNOSIS — Z95.1 PRESENCE OF AORTOCORONARY BYPASS GRAFT: Chronic | ICD-10-CM

## 2024-04-03 DIAGNOSIS — F41.1 GENERALIZED ANXIETY DISORDER: ICD-10-CM

## 2024-04-03 DIAGNOSIS — Z98.890 OTHER SPECIFIED POSTPROCEDURAL STATES: Chronic | ICD-10-CM

## 2024-04-03 PROCEDURE — 90832 PSYTX W PT 30 MINUTES: CPT

## 2024-04-03 NOTE — REASON FOR VISIT
[Patient preference] : as per patient preference [Continuing, patient not seen in-person within last 12 months (provide details below)] : Telehealth services are continuing, patient not seen in-person within last 12 months.  [Telehealth (audio & video) - Individual/Group] : This visit was provided via telehealth using real-time 2-way audio visual technology. [Other Location: e.g. Home (Enter Location, City,State)___] : The provider was located at [unfilled]. [Home] : The patient, [unfilled], was located at home, [unfilled], at the time of the visit. [Verbal consent obtained from patient/other participant(s)] : Verbal consent for telehealth/telephonic services obtained from patient/other participant(s) [FreeTextEntry4] : 1:00pm [FreeTextEntry5] : 1:30pm [Patient] : Patient [FreeTextEntry1] : anxiety and depression

## 2024-04-03 NOTE — PLAN
[FreeTextEntry2] : Goal1 I want to feel useful.    Obj A Pt will reduce score on the CUDOS from 34 (moderate depression) to 11-20 (minimal depression)   Obj B Pt will engage in 2-3 "useful" task per week   Goal 2 Anxiety- to reduce worry Obj A Pt will reduce score on the GAD7 from 7 Mild anxiety to minimal anxiety 0-4 OBJ B Pt will develop at least 2 ways to cope with worries and stressors. [[facility]] : [facility]  [Supportive Therapy] : Supportive Therapy [de-identified] : The focus of this session was pt's medical issues.   Therapist provided active listening as pt shared how she has been coping wio9th severe pain in her wrist and hand after a fall 2 weeks prior.  Pt reports her mood has been good despite this injury but she is eager to have surgery to correct it.  Therapist validated pt feelings and empathized with how hard to must be for her to do everyday tasks with this pain, especially since she is unable to take certain pain relievers.  Pt identified still managing to make dessert for her Easter holiday and enjoying the day with family. . Pt will see the orthopedic surgeon next week. Pt was encouraged to make f/u appts with her pulmonologist and cardiologists, something she has been avoiding, . Pt was responsive to therapist support and interventions.  [Return in ____ week(s)] : Return in [unfilled] week(s) [FreeTextEntry1] : Pt will contact tx team for worsening of symptoms and/or problems with medication. Next appt: 4/19

## 2024-04-04 DIAGNOSIS — F41.1 GENERALIZED ANXIETY DISORDER: ICD-10-CM

## 2024-04-19 ENCOUNTER — OUTPATIENT (OUTPATIENT)
Dept: OUTPATIENT SERVICES | Facility: HOSPITAL | Age: 64
LOS: 1 days | End: 2024-04-19
Payer: MEDICARE

## 2024-04-19 ENCOUNTER — APPOINTMENT (OUTPATIENT)
Dept: PSYCHIATRY | Facility: CLINIC | Age: 64
End: 2024-04-19

## 2024-04-19 DIAGNOSIS — Z95.1 PRESENCE OF AORTOCORONARY BYPASS GRAFT: Chronic | ICD-10-CM

## 2024-04-19 DIAGNOSIS — Z98.890 OTHER SPECIFIED POSTPROCEDURAL STATES: Chronic | ICD-10-CM

## 2024-04-19 DIAGNOSIS — F41.1 GENERALIZED ANXIETY DISORDER: ICD-10-CM

## 2024-04-19 PROCEDURE — 90832 PSYTX W PT 30 MINUTES: CPT

## 2024-04-20 DIAGNOSIS — F41.1 GENERALIZED ANXIETY DISORDER: ICD-10-CM

## 2024-04-23 ENCOUNTER — APPOINTMENT (OUTPATIENT)
Dept: ORTHOPEDIC SURGERY | Facility: CLINIC | Age: 64
End: 2024-04-23
Payer: MEDICARE

## 2024-04-23 DIAGNOSIS — M65.4 RADIAL STYLOID TENOSYNOVITIS [DE QUERVAIN]: ICD-10-CM

## 2024-04-23 PROCEDURE — 99213 OFFICE O/P EST LOW 20 MIN: CPT

## 2024-04-23 NOTE — IMAGING
[de-identified] : Physical exam of the left wrist: No effusion, no erythema or ecchymosis.  Slightly decreased range of motion with extension 1, flexion of the left wrist.  She has pain with ulnar and radial deviation.  Slight tenderness to palpation over the distal radius.  No tenderness to palpation over the distal ulna or the TFCC.  She has tenderness to palpation over the CMC joint of the left thumb.  Positive Finkelstein's test.  Intact to light touch.

## 2024-04-23 NOTE — HISTORY OF PRESENT ILLNESS
[de-identified] : The patient is a 63-year-old female right-hand-dominant here for a subsequent reevaluationof her left wrist. On 3/12/2020 formal walking downstairs at home she fell landing on outstretched hand.  MRI of the left wrist did not show a fracture.MRI of the left wrist showed tenosynovitis of the second dorsal compartment, it showed a partial TFCC tear.  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  She points over the CMC joint of the left thumb as to where the pain is.  The pain radiates to the radial aspect of her left wrist.  She cannot take oral anti-inflammatory medication.

## 2024-04-23 NOTE — DISCUSSION/SUMMARY
[de-identified] : I reviewed the MRI results with the patient.  Today she was placed into a thumb guard.  She will do warm soaks with Epsom salt.  She can use Tylenol arthritis for pain.  I did explain to her that she may have a mixed picture of the CMC joint osteoarthritis and de Quervain's tenosynovitis.  She inquired about surgery for her CMC joint.  I did state to her that surgery is not usually the first recommendation for CMC joint osteoarthritis.  She will follow-up with Dr. Olivares for this.  She would like to be seen soon as possible.

## 2024-04-30 ENCOUNTER — APPOINTMENT (OUTPATIENT)
Dept: ORTHOPEDIC SURGERY | Facility: CLINIC | Age: 64
End: 2024-04-30
Payer: MEDICARE

## 2024-04-30 DIAGNOSIS — M18.12 UNILATERAL PRIMARY OSTEOARTHRITIS OF FIRST CARPOMETACARPAL JOINT, LEFT HAND: ICD-10-CM

## 2024-04-30 PROCEDURE — 99204 OFFICE O/P NEW MOD 45 MIN: CPT | Mod: 25

## 2024-04-30 PROCEDURE — 20604 DRAIN/INJ JOINT/BURSA W/US: CPT | Mod: LT

## 2024-04-30 NOTE — ASSESSMENT
[FreeTextEntry1] : Patient comes in with complaints of left wrist pain.  She says has been going on for some time.  She had this after a fall.  She says things have not improved significantly.  She wears a thumb spica brace.  She says she was given an AOA thumb brace which does not help her.  She says immobilizing helps her significantly.  L hand:  +thumb CMC swelling  +thumb CMC tenderness  Decreased thumb ROM  +Basal grind test  X-rays done at the hospital show basal joint arthritis MRI of the right wrist show volar extrinsic ligament sprain, arthrosis of the first CMC joint with radial subluxation reactive marrow edema partial tear of the ulnar capsule with effusion  The patient was advised of the diagnosis. The natural history of the pathology was explained in full to the patient in layman's terms. We reviewed that the forces applied to the thumb tip are magnified 12-14 times at the thumb cmc joint.  We also reviewed the progression of arthritis with early arthritis showing minimal to no xray changes.  We discussed treatment options, including activity modification(demonstrated), medicine(topical and oral), bracing, therapy, injection and eventually surgery.  We reviewed the r/b of each.  All questions were answered and the patient verbalized understanding  I believe the patient's main pain is secondary to her basal joint arthritis.  I believe this was all exacerbated after her fall.  The MRI findings are mostly on the ulnar aspect of the wrist which does not cause her significant pain.  As for the partial injury to the ulnar capsule I believe that while it may have occurred from this fall the majority of the issue secondary to arthritis.  The patient will continue using the brace and opted for an injection of the left basal joint today.  She can have another injection in 3 months if she would like.  If it does not help she will let me know.  I am hoping this will help her.  After a discussion of the risks, benefits and alternatives along with the expectations, the patient was amenable to injection.  The indication for injection is pain, inflammation and radiographically confirmed arthritis. Anesthesia: ethyl chloride sprayed topically Dexamethasone: An injection of 0.9cc (4mg/mL) Lidocaine: An injection of Lidocaine 1% 0.1 cc   Patient has tried OTC's including aspirin, Ibuprofen, Aleve etc or prescription NSAIDS, and/or exercises at home and/ or physical therapy without satisfactory response. After verbal consent using sterile preparation and technique. The risks, benefits, and alternatives to cortisone injection were explained in full to the patient. Risks outlined include but are not limited to infection, sepsis, bleeding, scarring, skin discoloration, temporary increase in pain, syncopal episode, failure to resolve symptoms, allergic reaction, symptom recurrence, and elevation of blood sugar in diabetics. Patient understood the risks. All questions were answered. After discussion of options, patient requested an injection. Oral informed consent was obtained and sterile prep was done of the injection site. Sterile technique was utilized for the procedure including the preparation of the solutions used for the injection. Prep with betadine  and alcohol locally to site.  Using ultrasound guidance, the joint was visualized.  The area was then sprayed with ethyl chloride and an injection was given.  Patient tolerated the procedure well. Advised to ice the injection site this evening.

## 2024-05-08 ENCOUNTER — APPOINTMENT (OUTPATIENT)
Dept: PSYCHIATRY | Facility: CLINIC | Age: 64
End: 2024-05-08

## 2024-05-08 ENCOUNTER — OUTPATIENT (OUTPATIENT)
Dept: OUTPATIENT SERVICES | Facility: HOSPITAL | Age: 64
LOS: 1 days | End: 2024-05-08
Payer: MEDICARE

## 2024-05-08 DIAGNOSIS — F41.1 GENERALIZED ANXIETY DISORDER: ICD-10-CM

## 2024-05-08 PROCEDURE — 90832 PSYTX W PT 30 MINUTES: CPT

## 2024-05-09 DIAGNOSIS — F41.1 GENERALIZED ANXIETY DISORDER: ICD-10-CM

## 2024-05-09 NOTE — PLAN
[[facility]] : [facility]  [Supportive Therapy] : Supportive Therapy [Return in ____ week(s)] : Return in [unfilled] week(s) [FreeTextEntry2] : Goal1 I want to feel useful.    Obj A Pt will reduce score on the CUDOS from 34 (moderate depression) to 11-20 (minimal depression)   Obj B Pt will engage in 2-3 "useful" task per week   Goal 2 Anxiety- to reduce worry Obj A Pt will reduce score on the GAD7 from 7 Mild anxiety to minimal anxiety 0-4 OBJ B Pt will develop at least 2 ways to cope with worries and stressors. [de-identified] : The focus of this session was pt's relationship with her daughter.   Therapist provided active listening as pt shared how  she feels uncared for and as a burden to her daughter. Therapist validated pt feelings and explored pt's reason for these thoughts as well as what she has done to cope with them. Pt identified that she keeps quiet feeling as though this is how she is and it is not something that can change. Pt was encouraged to communicate her feelings and needs to daughter to avoid built up anger and resentment.. Pt has been continuing to  focus on the positives in her life and  was happy to see her brother who is visiting after many years from out of state . . Pt saw orthopedist and had a cortisone injection in her hand which she reports has helped her pain immensely. Pt continues to struggle with insomnia most nights and considering CBD . Pt would like to consult with  about this.  Pt was responsive to therapist support and interventions.  [FreeTextEntry1] : Pt will contact tx team for worsening of symptoms and/or problems with medication. Next appt: 5/22

## 2024-05-22 ENCOUNTER — APPOINTMENT (OUTPATIENT)
Dept: PSYCHIATRY | Facility: CLINIC | Age: 64
End: 2024-05-22

## 2024-05-22 ENCOUNTER — OUTPATIENT (OUTPATIENT)
Dept: OUTPATIENT SERVICES | Facility: HOSPITAL | Age: 64
LOS: 1 days | End: 2024-05-22
Payer: MEDICARE

## 2024-05-22 DIAGNOSIS — F41.1 GENERALIZED ANXIETY DISORDER: ICD-10-CM

## 2024-05-22 DIAGNOSIS — Z98.890 OTHER SPECIFIED POSTPROCEDURAL STATES: Chronic | ICD-10-CM

## 2024-05-22 DIAGNOSIS — Z95.1 PRESENCE OF AORTOCORONARY BYPASS GRAFT: Chronic | ICD-10-CM

## 2024-05-22 PROCEDURE — 90832 PSYTX W PT 30 MINUTES: CPT

## 2024-05-23 DIAGNOSIS — F41.1 GENERALIZED ANXIETY DISORDER: ICD-10-CM

## 2024-05-23 NOTE — PLAN
[[facility]] : [facility]  [Supportive Therapy] : Supportive Therapy [Return in ____ week(s)] : Return in [unfilled] week(s) [FreeTextEntry2] : Goal1 I want to feel useful.    Obj A Pt will reduce score on the CUDOS from 34 (moderate depression) to 11-20 (minimal depression)   Obj B Pt will engage in 2-3 "useful" task per week   Goal 2 Anxiety- to reduce worry Obj A Pt will reduce score on the GAD7 from 7 Mild anxiety to minimal anxiety 0-4 OBJ B Pt will develop at least 2 ways to cope with worries and stressors. [de-identified] : The focus of this session was pt's mood and functioning.  Therapist provided active listening as pt shared how she is feeling more optimistic and is enjoying her new home.  Therapist validated pt feelings and explored how pt spends her days and what activities she has or wants to add to her days.  Pt identified that right now she continues to attend doctor appts and that her wrist continues to hurt, making it difficult  to pursue the crafting she had identified. Pt also used session to ventilate about memories about her mother, her experience giving birth and some traumatic events she often does not discuss. Pt reported relief after discussing these topics today in session. Pt continues to struggle with insomnia most nights and will discuss options with  aside from using proper sleep hygiene .  Pt was responsive to therapist support and interventions.  [FreeTextEntry1] : Pt will contact tx team for worsening of symptoms and/or problems with medication. Next appt: 6/5

## 2024-06-05 ENCOUNTER — APPOINTMENT (OUTPATIENT)
Dept: PSYCHIATRY | Facility: CLINIC | Age: 64
End: 2024-06-05

## 2024-06-05 ENCOUNTER — OUTPATIENT (OUTPATIENT)
Dept: OUTPATIENT SERVICES | Facility: HOSPITAL | Age: 64
LOS: 1 days | End: 2024-06-05
Payer: MEDICARE

## 2024-06-05 DIAGNOSIS — Z98.890 OTHER SPECIFIED POSTPROCEDURAL STATES: Chronic | ICD-10-CM

## 2024-06-05 DIAGNOSIS — Z95.1 PRESENCE OF AORTOCORONARY BYPASS GRAFT: Chronic | ICD-10-CM

## 2024-06-05 DIAGNOSIS — F41.1 GENERALIZED ANXIETY DISORDER: ICD-10-CM

## 2024-06-05 PROCEDURE — 90832 PSYTX W PT 30 MINUTES: CPT | Mod: 95

## 2024-06-05 NOTE — PLAN
[[facility]] : [facility]  [Supportive Therapy] : Supportive Therapy [Return in ____ week(s)] : Return in [unfilled] week(s) [FreeTextEntry2] : Goal1 I want to feel useful.    Obj A Pt will reduce score on the CUDOS from 34 (moderate depression) to 11-20 (minimal depression)   Obj B Pt will engage in 2-3 "useful" task per week   Goal 2 Anxiety- to reduce worry Obj A Pt will reduce score on the GAD7 from 7 Mild anxiety to minimal anxiety 0-4 OBJ B Pt will develop at least 2 ways to cope with worries and stressors. [de-identified] : The focus of this session was pt's mood and functioning.  Therapist provided active listening as pt shared how she struggles to breath when walking out of the house and often relies on a walker. Pt reports that this is due to her pulmonary hypertension and it became a problem after her heart surgery. Therapist validated pt experience and explored what if any treatments were available to improve her condition. Pt identified an option but that not having a car makes everything very difficult for her and she does not want to rely on the car services covered by her insurance. .  Therapist encouraged pt to try the transportation as this will enable  her to live more independently  and that the treatment may improve her pulmonary functioning. P:t was agreeable to look into this as well as f/u on other necessary doctor appts. Pt was responsive to therapist support and interventions.  [FreeTextEntry1] : Pt will contact tx team for worsening of symptoms and/or problems with medication. Next appt: 6/26

## 2024-06-06 DIAGNOSIS — F41.1 GENERALIZED ANXIETY DISORDER: ICD-10-CM

## 2024-06-26 ENCOUNTER — OUTPATIENT (OUTPATIENT)
Dept: OUTPATIENT SERVICES | Facility: HOSPITAL | Age: 64
LOS: 1 days | End: 2024-06-26
Payer: MEDICARE

## 2024-06-26 ENCOUNTER — APPOINTMENT (OUTPATIENT)
Dept: PSYCHIATRY | Facility: CLINIC | Age: 64
End: 2024-06-26

## 2024-06-26 DIAGNOSIS — F41.1 GENERALIZED ANXIETY DISORDER: ICD-10-CM

## 2024-06-26 DIAGNOSIS — Z95.1 PRESENCE OF AORTOCORONARY BYPASS GRAFT: Chronic | ICD-10-CM

## 2024-06-26 PROCEDURE — 90832 PSYTX W PT 30 MINUTES: CPT | Mod: 95

## 2024-06-27 DIAGNOSIS — F41.1 GENERALIZED ANXIETY DISORDER: ICD-10-CM

## 2024-07-01 ENCOUNTER — OUTPATIENT (OUTPATIENT)
Dept: OUTPATIENT SERVICES | Facility: HOSPITAL | Age: 64
LOS: 1 days | End: 2024-07-01
Payer: MEDICARE

## 2024-07-01 ENCOUNTER — APPOINTMENT (OUTPATIENT)
Dept: PSYCHIATRY | Facility: CLINIC | Age: 64
End: 2024-07-01
Payer: MEDICARE

## 2024-07-01 DIAGNOSIS — Z95.1 PRESENCE OF AORTOCORONARY BYPASS GRAFT: Chronic | ICD-10-CM

## 2024-07-01 DIAGNOSIS — F41.1 GENERALIZED ANXIETY DISORDER: ICD-10-CM

## 2024-07-01 DIAGNOSIS — G47.00 INSOMNIA, UNSPECIFIED: ICD-10-CM

## 2024-07-01 DIAGNOSIS — F32.9 MAJOR DEPRESSIVE DISORDER, SINGLE EPISODE, UNSPECIFIED: ICD-10-CM

## 2024-07-01 PROCEDURE — 99214 OFFICE O/P EST MOD 30 MIN: CPT

## 2024-07-02 DIAGNOSIS — G47.00 INSOMNIA, UNSPECIFIED: ICD-10-CM

## 2024-07-02 DIAGNOSIS — F32.9 MAJOR DEPRESSIVE DISORDER, SINGLE EPISODE, UNSPECIFIED: ICD-10-CM

## 2024-07-02 DIAGNOSIS — F41.1 GENERALIZED ANXIETY DISORDER: ICD-10-CM

## 2024-07-17 ENCOUNTER — OUTPATIENT (OUTPATIENT)
Dept: OUTPATIENT SERVICES | Facility: HOSPITAL | Age: 64
LOS: 1 days | End: 2024-07-17
Payer: MEDICARE

## 2024-07-17 ENCOUNTER — APPOINTMENT (OUTPATIENT)
Dept: PSYCHIATRY | Facility: CLINIC | Age: 64
End: 2024-07-17

## 2024-07-17 DIAGNOSIS — F41.1 GENERALIZED ANXIETY DISORDER: ICD-10-CM

## 2024-07-17 DIAGNOSIS — Z98.890 OTHER SPECIFIED POSTPROCEDURAL STATES: Chronic | ICD-10-CM

## 2024-07-17 DIAGNOSIS — Z95.1 PRESENCE OF AORTOCORONARY BYPASS GRAFT: Chronic | ICD-10-CM

## 2024-07-17 DIAGNOSIS — F32.9 MAJOR DEPRESSIVE DISORDER, SINGLE EPISODE, UNSPECIFIED: ICD-10-CM

## 2024-07-17 PROCEDURE — 90832 PSYTX W PT 30 MINUTES: CPT

## 2024-07-18 DIAGNOSIS — F41.1 GENERALIZED ANXIETY DISORDER: ICD-10-CM

## 2024-07-31 ENCOUNTER — OUTPATIENT (OUTPATIENT)
Dept: OUTPATIENT SERVICES | Facility: HOSPITAL | Age: 64
LOS: 1 days | End: 2024-07-31
Payer: MEDICARE

## 2024-07-31 ENCOUNTER — APPOINTMENT (OUTPATIENT)
Dept: CARDIOLOGY | Facility: CLINIC | Age: 64
End: 2024-07-31
Payer: MEDICARE

## 2024-07-31 ENCOUNTER — APPOINTMENT (OUTPATIENT)
Dept: PSYCHIATRY | Facility: CLINIC | Age: 64
End: 2024-07-31

## 2024-07-31 VITALS — DIASTOLIC BLOOD PRESSURE: 70 MMHG | HEART RATE: 60 BPM | SYSTOLIC BLOOD PRESSURE: 108 MMHG

## 2024-07-31 VITALS — DIASTOLIC BLOOD PRESSURE: 80 MMHG | SYSTOLIC BLOOD PRESSURE: 130 MMHG

## 2024-07-31 VITALS — BODY MASS INDEX: 36.26 KG/M2 | WEIGHT: 231 LBS | HEIGHT: 67 IN

## 2024-07-31 DIAGNOSIS — E66.9 OBESITY, UNSPECIFIED: ICD-10-CM

## 2024-07-31 DIAGNOSIS — Z87.891 PERSONAL HISTORY OF NICOTINE DEPENDENCE: ICD-10-CM

## 2024-07-31 DIAGNOSIS — J98.4 OTHER DISORDERS OF LUNG: ICD-10-CM

## 2024-07-31 DIAGNOSIS — Z98.890 OTHER SPECIFIED POSTPROCEDURAL STATES: ICD-10-CM

## 2024-07-31 DIAGNOSIS — Z95.1 PRESENCE OF AORTOCORONARY BYPASS GRAFT: ICD-10-CM

## 2024-07-31 DIAGNOSIS — Z98.890 OTHER SPECIFIED POSTPROCEDURAL STATES: Chronic | ICD-10-CM

## 2024-07-31 DIAGNOSIS — Z95.1 PRESENCE OF AORTOCORONARY BYPASS GRAFT: Chronic | ICD-10-CM

## 2024-07-31 DIAGNOSIS — I10 ESSENTIAL (PRIMARY) HYPERTENSION: ICD-10-CM

## 2024-07-31 DIAGNOSIS — I48.91 UNSPECIFIED ATRIAL FIBRILLATION: ICD-10-CM

## 2024-07-31 DIAGNOSIS — Z86.79 OTHER SPECIFIED POSTPROCEDURAL STATES: ICD-10-CM

## 2024-07-31 DIAGNOSIS — F41.1 GENERALIZED ANXIETY DISORDER: ICD-10-CM

## 2024-07-31 PROCEDURE — 90832 PSYTX W PT 30 MINUTES: CPT

## 2024-07-31 PROCEDURE — 93000 ELECTROCARDIOGRAM COMPLETE: CPT

## 2024-07-31 PROCEDURE — 99214 OFFICE O/P EST MOD 30 MIN: CPT

## 2024-07-31 RX ORDER — RIVAROXABAN 20 MG/1
20 TABLET, FILM COATED ORAL
Qty: 90 | Refills: 3 | Status: ACTIVE | COMMUNITY
Start: 2024-07-31 | End: 1900-01-01

## 2024-07-31 RX ORDER — FUROSEMIDE 20 MG/1
20 TABLET ORAL TWICE DAILY
Qty: 180 | Refills: 0 | Status: ACTIVE | COMMUNITY
Start: 2024-07-31 | End: 1900-01-01

## 2024-07-31 NOTE — REASON FOR VISIT
[Patient preference] : as per patient preference [Continuing, patient not seen in-person within last 12 months (provide details below)] : Telehealth services are continuing, patient not seen in-person within last 12 months.  [Telehealth (audio & video) - Individual/Group] : This visit was provided via telehealth using real-time 2-way audio visual technology. [Other Location: e.g. Home (Enter Location, City,State)___] : The provider was located at [unfilled]. [Home] : The patient, [unfilled], was located at home, [unfilled], at the time of the visit. [Verbal consent obtained from patient/other participant(s)] : Verbal consent for telehealth/telephonic services obtained from patient/other participant(s) [FreeTextEntry4] : 1:00 [FreeTextEntry5] : 1:30 [Patient] : Patient [FreeTextEntry1] : anxiety and depression

## 2024-07-31 NOTE — PLAN
[FreeTextEntry2] : Goal1 I want to feel useful.    Obj A Pt will reduce score on the CUDOS from 34 (moderate depression) to 11-20 (minimal depression)   Obj B Pt will engage in 2-3 "useful" task per week   Goal 2 Anxiety- to reduce worry Obj A Pt will reduce score on the GAD7 from 7 Mild anxiety to minimal anxiety 0-4 OBJ B Pt will develop at least 2 ways to cope with worries and stressors. [[facility]] : [facility]  [[facility] Therapy] : Support[facility] Therapy [de-identified] : The focus of this session was pt's health.  Therapist provided active listening as pt shared about how she went to see her  new cardiologist  . Pt reported that she was very happy with her appt because the doctor listened to her concerns and felt she had not gotten the right care until now. Therapist validated pt feelings  and pointed out that she has not felt well with regard to her breathing in several months. Pt informed she will go for a new echocardiogram, bloodwork and meet with the doctor who specializes in pulmonary hypertension. Pt was provided support and reassurance.  Pt was responsive to therapist support and interventions.  [Return in ____ week(s)] : Return in [unfilled] week(s) [FreeTextEntry1] : Pt will contact tx team for worsening of symptoms and/or problems with medication. Next appt: 7/31

## 2024-07-31 NOTE — REVIEW OF SYSTEMS
[Feeling Fatigued] : feeling fatigued [SOB] : shortness of breath [Dyspnea on exertion] : dyspnea during exertion [Diarrhea] : diarrhea [Joint Pain] : joint pain [Joint Swelling] : joint swelling [Negative] : Neurological [Chest Discomfort] : no chest discomfort

## 2024-07-31 NOTE — REASON FOR VISIT
[Arrhythmia/ECG Abnorrmalities] : arrhythmia/ECG abnormalities [Hyperlipidemia] : hyperlipidemia [Coronary Artery Disease] : coronary artery disease [FreeTextEntry3] : Mary Cerna

## 2024-07-31 NOTE — HISTORY OF PRESENT ILLNESS
[FreeTextEntry1] : The patient has history of having a MI in 2019 S/P CABG maze procedure and ? ELENITA clip . The patient has had issues with pulmonary HTN . Etiology of this is uncertain . She had seen Dr. Ocampo from [facility] . She was not put on medication for this . Review of last cath showed an increased PCW and PA pressures . She has MURCIA on waliing minimal distnce .

## 2024-07-31 NOTE — ASSESSMENT
[FreeTextEntry1] : The patient has had MI and CABG 90% ostial LAD and LIMA to LAD . She was noted to have pulmonary HTN . She said that at one time her pulmonary pressures as high as 68 but PCW was 29 indicating that she has Class II pulmonary HTN . She had seen Dr. Garcia in 2019 but was not offered medication .She does have COPD as well as SILAS which is not being treated

## 2024-08-01 DIAGNOSIS — F41.1 GENERALIZED ANXIETY DISORDER: ICD-10-CM

## 2024-08-10 ENCOUNTER — APPOINTMENT (OUTPATIENT)
Dept: CARDIOLOGY | Facility: CLINIC | Age: 64
End: 2024-08-10

## 2024-08-10 LAB
ALBUMIN SERPL ELPH-MCNC: 4.3 G/DL
ALP BLD-CCNC: 104 U/L
ALT SERPL-CCNC: 13 U/L
ANION GAP SERPL CALC-SCNC: 11 MMOL/L
AST SERPL-CCNC: 19 U/L
BILIRUB SERPL-MCNC: 0.5 MG/DL
BUN SERPL-MCNC: 13 MG/DL
CALCIUM SERPL-MCNC: 10.3 MG/DL
CHLORIDE SERPL-SCNC: 107 MMOL/L
CHOLEST SERPL-MCNC: 143 MG/DL
CO2 SERPL-SCNC: 23 MMOL/L
CREAT SERPL-MCNC: 1.3 MG/DL
EGFR: 46 ML/MIN/1.73M2
GLUCOSE SERPL-MCNC: 100 MG/DL
HDLC SERPL-MCNC: 47 MG/DL
LDLC SERPL CALC-MCNC: 77 MG/DL
MAGNESIUM SERPL-MCNC: 2.2 MG/DL
NONHDLC SERPL-MCNC: 96 MG/DL
NT-PROBNP SERPL-MCNC: 558 PG/ML
POTASSIUM SERPL-SCNC: 4.1 MMOL/L
PROT SERPL-MCNC: 6.8 G/DL
SODIUM SERPL-SCNC: 141 MMOL/L
T4 FREE SERPL-MCNC: 1.2 NG/DL
TRIGL SERPL-MCNC: 97 MG/DL
TSH SERPL-ACNC: 2.1 UIU/ML

## 2024-08-10 PROCEDURE — 93306 TTE W/DOPPLER COMPLETE: CPT

## 2024-08-14 ENCOUNTER — APPOINTMENT (OUTPATIENT)
Dept: PSYCHIATRY | Facility: CLINIC | Age: 64
End: 2024-08-14

## 2024-08-14 DIAGNOSIS — F41.1 GENERALIZED ANXIETY DISORDER: ICD-10-CM

## 2024-08-14 DIAGNOSIS — F32.9 MAJOR DEPRESSIVE DISORDER, SINGLE EPISODE, UNSPECIFIED: ICD-10-CM

## 2024-08-14 NOTE — DISCUSSION/SUMMARY
[Plan Review] : Plan Review [Able to manage surrounding demands and opportunities] : able to manage surrounding demands and opportunities [Able to exercise self-direction] : able to exercise self-direction [Able to set and pursue goals] : able to set and pursue goals [Adherent to treatment recommendations] : adherent to treatment recommendations [Articulate] : articulate [Attempting to realize their potential] : Attempting to realize their potential [Awareness of substance use issues] : awareness of substance use issues [Cognitively intact] : cognitively intact [Good impulse control] : good impulse control [Insightful] : insightful [Creative] : creative [Intelligent] : intelligent [Motivated to participate in treatment] : motivated to participate in treatment [Motivated and ready for change] : motivated and ready for change [Motivated to maintain or improve physical health] : motivated to maintain or improve physical health [Has vocational interests or hobbies] : has vocational interests or hobbies [Housing stability] : housing stability [English fluency] : English fluency [Connected to healthcare] : connected to healthcare [Social supports] : social supports [Mental Health] : Mental Health [Physical Health] : Physical Health [Continued - Progress made] : Continued - Progress made: [every ___ months] : every [unfilled] months [every ___ weeks] : every [unfilled] weeks [Treatment is no longer medically necessary as evidenced by:] : Treatment is no longer medically necessary as evidenced by: [A change in level of care is needed as evidenced by:] : A change in level of care is needed as evidenced by: [Other rationale for transition/discharge:] : Other rationale for transition/discharge: [None - Reason others did not participate:] : None - Reason others did not participate:  [Yes] : Yes [Psychiatric Provider/Prescriber] : Psychiatric Provider/Prescriber [Therapist] : Therapist [FreeTextEntry2] : 8/14/25 [FreeTextEntry3] : 12/5/19 [FreeTextEntry8] : none [FreeTextEntry9] : none [de-identified] : will collaborate with psychiatrist as needed. [Initial] : Initial [de-identified] : pt scored the same on this scale but feels less anxious overall  [de-identified] : Pt will continue to develop coping tools she finds effective and easy to use.  [FreeTextEntry1] : Heart and Pulmonary Issues [FreeTextEntry4] : "I was to feel useful"  [de-identified] : Pt will attend all scheduled medical appointments and follow/ups 90% of the time [de-identified] : 8/14/25 [de-identified] : Pt will adhere to a healthy diet and exercise plan most days of the week.  [de-identified] : 8/14/25 [FreeTextEntry5] : 1. Clinician will help pt keep track of scheduled medical appts 2. Clinician will encourage pt to follow-up with necessary medical tests and appts.  3. Clinician will encourage pt to follow a heart healthy diet and use exercises approved by her doctors.  [de-identified] : , psychiatrist (in person)  [de-identified] : Clari Schumacher, therapist (virtual)  [de-identified] : The treatment is no longer medically necessary when the patient no longer requires individual psychotherapy and/or medication to perform at baseline. [de-identified] :  If the patient is unable to perform activities of daily living and/or expresses suicidal ideation, a higher level of care will be considered. [de-identified] : Other rationales for transition/discharge are granted/applied upon the patient DNC, relocate, self-termination, and/or requests for the treatment termination/transfer to another facility. [de-identified] : not clinically indicated

## 2024-08-14 NOTE — DISCUSSION/SUMMARY
[Plan Review] : Plan Review pls call Trevon and ask how he is feeling? BP today? Will need to start BP medication and can he schedule an appt soon?    [Able to manage surrounding demands and opportunities] : able to manage surrounding demands and opportunities [Able to exercise self-direction] : able to exercise self-direction [Able to set and pursue goals] : able to set and pursue goals [Adherent to treatment recommendations] : adherent to treatment recommendations [Articulate] : articulate [Attempting to realize their potential] : Attempting to realize their potential [Awareness of substance use issues] : awareness of substance use issues [Cognitively intact] : cognitively intact [Good impulse control] : good impulse control [Insightful] : insightful [Creative] : creative [Intelligent] : intelligent [Motivated to participate in treatment] : motivated to participate in treatment [Motivated and ready for change] : motivated and ready for change [Motivated to maintain or improve physical health] : motivated to maintain or improve physical health [Has vocational interests or hobbies] : has vocational interests or hobbies [Housing stability] : housing stability [English fluency] : English fluency [Connected to healthcare] : connected to healthcare [Social supports] : social supports [Mental Health] : Mental Health [Physical Health] : Physical Health [Continued - Progress made] : Continued - Progress made: [every ___ months] : every [unfilled] months [every ___ weeks] : every [unfilled] weeks [Treatment is no longer medically necessary as evidenced by:] : Treatment is no longer medically necessary as evidenced by: [A change in level of care is needed as evidenced by:] : A change in level of care is needed as evidenced by: [Other rationale for transition/discharge:] : Other rationale for transition/discharge: [None - Reason others did not participate:] : None - Reason others did not participate:  [Yes] : Yes [Psychiatric Provider/Prescriber] : Psychiatric Provider/Prescriber [Therapist] : Therapist [FreeTextEntry2] : 8/14/25 [FreeTextEntry3] : 12/5/19 [FreeTextEntry8] : none [FreeTextEntry9] : none [de-identified] : will collaborate with psychiatrist as needed. [Initial] : Initial [de-identified] : pt scored the same on this scale but feels less anxious overall  [de-identified] : Pt will continue to develop coping tools she finds effective and easy to use.  [FreeTextEntry1] : Heart and Pulmonary Issues [FreeTextEntry4] : "I was to feel useful"  [de-identified] : Pt will attend all scheduled medical appointments and follow/ups 90% of the time [de-identified] : 8/14/25 [de-identified] : Pt will adhere to a healthy diet and exercise plan most days of the week.  [de-identified] : 8/14/25 [FreeTextEntry5] : 1. Clinician will help pt keep track of scheduled medical appts 2. Clinician will encourage pt to follow-up with necessary medical tests and appts.  3. Clinician will encourage pt to follow a heart healthy diet and use exercises approved by her doctors.  [de-identified] : , psychiatrist (in person)  [de-identified] : Clari Schumacher, therapist (virtual)  [de-identified] : The treatment is no longer medically necessary when the patient no longer requires individual psychotherapy and/or medication to perform at baseline. [de-identified] :  If the patient is unable to perform activities of daily living and/or expresses suicidal ideation, a higher level of care will be considered. [de-identified] : Other rationales for transition/discharge are granted/applied upon the patient DNC, relocate, self-termination, and/or requests for the treatment termination/transfer to another facility. [de-identified] : not clinically indicated

## 2024-08-14 NOTE — DISCUSSION/SUMMARY
[Plan Review] : Plan Review [Able to manage surrounding demands and opportunities] : able to manage surrounding demands and opportunities [Able to exercise self-direction] : able to exercise self-direction [Able to set and pursue goals] : able to set and pursue goals [Adherent to treatment recommendations] : adherent to treatment recommendations [Articulate] : articulate [Attempting to realize their potential] : Attempting to realize their potential [Awareness of substance use issues] : awareness of substance use issues [Cognitively intact] : cognitively intact [Good impulse control] : good impulse control [Insightful] : insightful [Creative] : creative [Intelligent] : intelligent [Motivated to participate in treatment] : motivated to participate in treatment [Motivated and ready for change] : motivated and ready for change [Motivated to maintain or improve physical health] : motivated to maintain or improve physical health [Has vocational interests or hobbies] : has vocational interests or hobbies [Housing stability] : housing stability [English fluency] : English fluency [Connected to healthcare] : connected to healthcare [Social supports] : social supports [Mental Health] : Mental Health [Physical Health] : Physical Health [Continued - Progress made] : Continued - Progress made: [every ___ months] : every [unfilled] months [every ___ weeks] : every [unfilled] weeks [Treatment is no longer medically necessary as evidenced by:] : Treatment is no longer medically necessary as evidenced by: [A change in level of care is needed as evidenced by:] : A change in level of care is needed as evidenced by: [Other rationale for transition/discharge:] : Other rationale for transition/discharge: [None - Reason others did not participate:] : None - Reason others did not participate:  [Yes] : Yes [Psychiatric Provider/Prescriber] : Psychiatric Provider/Prescriber [Therapist] : Therapist [FreeTextEntry2] : 8/14/25 [FreeTextEntry3] : 12/5/19 [FreeTextEntry8] : none [FreeTextEntry9] : none [de-identified] : will collaborate with psychiatrist as needed. [Initial] : Initial [de-identified] : pt scored the same on this scale but feels less anxious overall  [de-identified] : Pt will continue to develop coping tools she finds effective and easy to use.  [FreeTextEntry1] : Heart and Pulmonary Issues [FreeTextEntry4] : "I was to feel useful"  [de-identified] : Pt will attend all scheduled medical appointments and follow/ups 90% of the time [de-identified] : 8/14/25 [de-identified] : Pt will adhere to a healthy diet and exercise plan most days of the week.  [de-identified] : 8/14/25 [FreeTextEntry5] : 1. Clinician will help pt keep track of scheduled medical appts 2. Clinician will encourage pt to follow-up with necessary medical tests and appts.  3. Clinician will encourage pt to follow a heart healthy diet and use exercises approved by her doctors.  [de-identified] : , psychiatrist (in person)  [de-identified] : Clari Schumacher, therapist (virtual)  [de-identified] : The treatment is no longer medically necessary when the patient no longer requires individual psychotherapy and/or medication to perform at baseline. [de-identified] :  If the patient is unable to perform activities of daily living and/or expresses suicidal ideation, a higher level of care will be considered. [de-identified] : Other rationales for transition/discharge are granted/applied upon the patient DNC, relocate, self-termination, and/or requests for the treatment termination/transfer to another facility. [de-identified] : not clinically indicated

## 2024-08-14 NOTE — DISCUSSION/SUMMARY
[Plan Review] : Plan Review [Able to manage surrounding demands and opportunities] : able to manage surrounding demands and opportunities [Able to exercise self-direction] : able to exercise self-direction [Able to set and pursue goals] : able to set and pursue goals [Adherent to treatment recommendations] : adherent to treatment recommendations [Articulate] : articulate [Attempting to realize their potential] : Attempting to realize their potential [Awareness of substance use issues] : awareness of substance use issues [Cognitively intact] : cognitively intact [Good impulse control] : good impulse control [Insightful] : insightful [Creative] : creative [Intelligent] : intelligent [Motivated to participate in treatment] : motivated to participate in treatment [Motivated and ready for change] : motivated and ready for change [Motivated to maintain or improve physical health] : motivated to maintain or improve physical health [Has vocational interests or hobbies] : has vocational interests or hobbies [Housing stability] : housing stability [English fluency] : English fluency [Connected to healthcare] : connected to healthcare [Social supports] : social supports [Mental Health] : Mental Health [Physical Health] : Physical Health [Continued - Progress made] : Continued - Progress made: [every ___ months] : every [unfilled] months [every ___ weeks] : every [unfilled] weeks [Treatment is no longer medically necessary as evidenced by:] : Treatment is no longer medically necessary as evidenced by: [A change in level of care is needed as evidenced by:] : A change in level of care is needed as evidenced by: [Other rationale for transition/discharge:] : Other rationale for transition/discharge: [None - Reason others did not participate:] : None - Reason others did not participate:  [Yes] : Yes [Psychiatric Provider/Prescriber] : Psychiatric Provider/Prescriber [Therapist] : Therapist [FreeTextEntry2] : 8/14/25 [FreeTextEntry3] : 12/5/19 [FreeTextEntry8] : none [FreeTextEntry9] : none [de-identified] : will collaborate with psychiatrist as needed. [Initial] : Initial [de-identified] : pt scored the same on this scale but feels less anxious overall  [de-identified] : Pt will continue to develop coping tools she finds effective and easy to use.  [FreeTextEntry1] : Heart and Pulmonary Issues [FreeTextEntry4] : "I was to feel useful"  [de-identified] : Pt will attend all scheduled medical appointments and follow/ups 90% of the time [de-identified] : 8/14/25 [de-identified] : Pt will adhere to a healthy diet and exercise plan most days of the week.  [de-identified] : 8/14/25 [FreeTextEntry5] : 1. Clinician will help pt keep track of scheduled medical appts 2. Clinician will encourage pt to follow-up with necessary medical tests and appts.  3. Clinician will encourage pt to follow a heart healthy diet and use exercises approved by her doctors.  [de-identified] : , psychiatrist (in person)  [de-identified] : Clari Schumacher, therapist (virtual)  [de-identified] : The treatment is no longer medically necessary when the patient no longer requires individual psychotherapy and/or medication to perform at baseline. [de-identified] :  If the patient is unable to perform activities of daily living and/or expresses suicidal ideation, a higher level of care will be considered. [de-identified] : Other rationales for transition/discharge are granted/applied upon the patient DNC, relocate, self-termination, and/or requests for the treatment termination/transfer to another facility. [de-identified] : not clinically indicated

## 2024-08-14 NOTE — PHYSICAL EXAM
[2 - Sometimes true (3-4 days)] : 2 - Sometimes true (3-4 days) [3 - Often true (5-6 days)] : 3 - Often true (5-6 days) [4 - Almost always true (everyday)] : 4 - Almost always true (everyday) [1 - Rarely true (1-2 days)] : 1 - Rarely true (1-2 days) [0 - Not at all true (0 days)] : 0 - Not at all true (0 days) [3 - Quite a bit] : 3 - Quite a bit [2 - The good and bad parts are about equal] : 2 - The good and bad parts are about equal [Cooperative] : cooperative [Euthymic] : euthymic [Full] : full [Clear] : clear [Linear/Goal Directed] : linear/goal directed [None] : none [None Reported] : none reported [Average] : average [WNL] : within normal limits [FreeTextEntry1] : 30

## 2024-08-15 NOTE — PLAN
[[facility]] : [facility]  [Supportive Therapy] : Supportive Therapy [Other: ____] : [unfilled] [Return in ____ week(s)] : Return in [unfilled] week(s) [FreeTextEntry2] : Goal overall "  I want to feel useful"   Problem I: Depression Objective A: Pt will reduce score on the CUDOs depression inventory to 11-20 indicating minimal depression     Objective B: Pt will engage in 2-3 "useful" tasks per week   Problem II : Anxiety. Objective A: Pt will reduce score on the GAD7 from 7 Mild anxiety to minimal anxiety 0-4 Objective B: Pt will develop at least 2 ways to cope with worries and stressors. Problem III: Heart and Pulmonary Issues. Objective A: Pt will attend all scheduled medical appointments and follow/ups 90% of the time Objective B: Pt will adhere to a healthy diet and exercise plan most days of the week.   [de-identified] : The focus of this session was to collaborate on pt's tx plan review. .  Therapist used assessment tools to evaluate pt sx of depression and anxiety . Pt scored the same this year on the JOSEFINA-7 and scored 4 pts lower on the CUDOs inventory. Pt would like to continue to work on developing coping still to reduce anxiety and depressive sx. Pt identified her overall goal to be the same as the year prior "I was to feel useful" because she continues to rely on her daughters for financial support . Pt reports she would like to find a p/t job that would not require any strenuous activity so that she can make some extra money for herself. Pt feels guilty daily for her reliance in her children and as though she is failure for not providing for them. Pt continues to cope with significant health issues. She struggles with breathing daily, is often out of breath and recently found out about new heart issues after testing was done by her  new cardiologist. Pt will continue to be seen every 2 weeks for therapy and medication management every 1-3 months .  [FreeTextEntry1] : Pt will contact tx team for worsening of symptoms and/or problems with medication. Next appt:  8/28

## 2024-08-15 NOTE — PLAN
[[facility]] : [facility]  [Supportive Therapy] : Supportive Therapy [Other: ____] : [unfilled] [Return in ____ week(s)] : Return in [unfilled] week(s) [FreeTextEntry2] : Goal overall "  I want to feel useful"   Problem I: Depression Objective A: Pt will reduce score on the CUDOs depression inventory to 11-20 indicating minimal depression     Objective B: Pt will engage in 2-3 "useful" tasks per week   Problem II : Anxiety. Objective A: Pt will reduce score on the GAD7 from 7 Mild anxiety to minimal anxiety 0-4 Objective B: Pt will develop at least 2 ways to cope with worries and stressors. Problem III: Heart and Pulmonary Issues. Objective A: Pt will attend all scheduled medical appointments and follow/ups 90% of the time Objective B: Pt will adhere to a healthy diet and exercise plan most days of the week.   [de-identified] : The focus of this session was to collaborate on pt's tx plan review. .  Therapist used assessment tools to evaluate pt sx of depression and anxiety . Pt scored the same this year on the JOSEFINA-7 and scored 4 pts lower on the CUDOs inventory. Pt would like to continue to work on developing coping still to reduce anxiety and depressive sx. Pt identified her overall goal to be the same as the year prior "I was to feel useful" because she continues to rely on her daughters for financial support . Pt reports she would like to find a p/t job that would not require any strenuous activity so that she can make some extra money for herself. Pt feels guilty daily for her reliance in her children and as though she is failure for not providing for them. Pt continues to cope with significant health issues. She struggles with breathing daily, is often out of breath and recently found out about new heart issues after testing was done by her  new cardiologist. Pt will continue to be seen every 2 weeks for therapy and medication management every 1-3 months .  [FreeTextEntry1] : Pt will contact tx team for worsening of symptoms and/or problems with medication. Next appt:  8/28

## 2024-08-15 NOTE — PLAN
[[facility]] : [facility]  [Supportive Therapy] : Supportive Therapy [Other: ____] : [unfilled] [Return in ____ week(s)] : Return in [unfilled] week(s) [FreeTextEntry2] : Goal overall "  I want to feel useful"   Problem I: Depression Objective A: Pt will reduce score on the CUDOs depression inventory to 11-20 indicating minimal depression     Objective B: Pt will engage in 2-3 "useful" tasks per week   Problem II : Anxiety. Objective A: Pt will reduce score on the GAD7 from 7 Mild anxiety to minimal anxiety 0-4 Objective B: Pt will develop at least 2 ways to cope with worries and stressors. Problem III: Heart and Pulmonary Issues. Objective A: Pt will attend all scheduled medical appointments and follow/ups 90% of the time Objective B: Pt will adhere to a healthy diet and exercise plan most days of the week.   [de-identified] : The focus of this session was to collaborate on pt's tx plan review. .  Therapist used assessment tools to evaluate pt sx of depression and anxiety . Pt scored the same this year on the JOSEFINA-7 and scored 4 pts lower on the CUDOs inventory. Pt would like to continue to work on developing coping still to reduce anxiety and depressive sx. Pt identified her overall goal to be the same as the year prior "I was to feel useful" because she continues to rely on her daughters for financial support . Pt reports she would like to find a p/t job that would not require any strenuous activity so that she can make some extra money for herself. Pt feels guilty daily for her reliance in her children and as though she is failure for not providing for them. Pt continues to cope with significant health issues. She struggles with breathing daily, is often out of breath and recently found out about new heart issues after testing was done by her  new cardiologist. Pt will continue to be seen every 2 weeks for therapy and medication management every 1-3 months .  [FreeTextEntry1] : Pt will contact tx team for worsening of symptoms and/or problems with medication. Next appt:  8/28

## 2024-08-15 NOTE — PLAN
[[facility]] : [facility]  [Supportive Therapy] : Supportive Therapy [Other: ____] : [unfilled] [Return in ____ week(s)] : Return in [unfilled] week(s) [FreeTextEntry2] : Goal overall "  I want to feel useful"   Problem I: Depression Objective A: Pt will reduce score on the CUDOs depression inventory to 11-20 indicating minimal depression     Objective B: Pt will engage in 2-3 "useful" tasks per week   Problem II : Anxiety. Objective A: Pt will reduce score on the GAD7 from 7 Mild anxiety to minimal anxiety 0-4 Objective B: Pt will develop at least 2 ways to cope with worries and stressors. Problem III: Heart and Pulmonary Issues. Objective A: Pt will attend all scheduled medical appointments and follow/ups 90% of the time Objective B: Pt will adhere to a healthy diet and exercise plan most days of the week.   [de-identified] : The focus of this session was to collaborate on pt's tx plan review. .  Therapist used assessment tools to evaluate pt sx of depression and anxiety . Pt scored the same this year on the JOSEFINA-7 and scored 4 pts lower on the CUDOs inventory. Pt would like to continue to work on developing coping still to reduce anxiety and depressive sx. Pt identified her overall goal to be the same as the year prior "I was to feel useful" because she continues to rely on her daughters for financial support . Pt reports she would like to find a p/t job that would not require any strenuous activity so that she can make some extra money for herself. Pt feels guilty daily for her reliance in her children and as though she is failure for not providing for them. Pt continues to cope with significant health issues. She struggles with breathing daily, is often out of breath and recently found out about new heart issues after testing was done by her  new cardiologist. Pt will continue to be seen every 2 weeks for therapy and medication management every 1-3 months .  [FreeTextEntry1] : Pt will contact tx team for worsening of symptoms and/or problems with medication. Next appt:  8/28

## 2024-08-28 ENCOUNTER — APPOINTMENT (OUTPATIENT)
Dept: HEART FAILURE | Facility: CLINIC | Age: 64
End: 2024-08-28

## 2024-08-30 ENCOUNTER — APPOINTMENT (OUTPATIENT)
Dept: PSYCHIATRY | Facility: CLINIC | Age: 64
End: 2024-08-30

## 2024-08-30 ENCOUNTER — OUTPATIENT (OUTPATIENT)
Dept: OUTPATIENT SERVICES | Facility: HOSPITAL | Age: 64
LOS: 1 days | End: 2024-08-30
Payer: MEDICARE

## 2024-08-30 DIAGNOSIS — Z95.1 PRESENCE OF AORTOCORONARY BYPASS GRAFT: Chronic | ICD-10-CM

## 2024-08-30 DIAGNOSIS — F41.1 GENERALIZED ANXIETY DISORDER: ICD-10-CM

## 2024-08-30 DIAGNOSIS — F32.9 MAJOR DEPRESSIVE DISORDER, SINGLE EPISODE, UNSPECIFIED: ICD-10-CM

## 2024-08-30 DIAGNOSIS — Z98.890 OTHER SPECIFIED POSTPROCEDURAL STATES: Chronic | ICD-10-CM

## 2024-08-30 PROCEDURE — 90832 PSYTX W PT 30 MINUTES: CPT | Mod: 95

## 2024-08-31 DIAGNOSIS — F41.1 GENERALIZED ANXIETY DISORDER: ICD-10-CM

## 2024-09-03 NOTE — PLAN
[[facility]] : [facility]  [Supportive Therapy] : [facility] Therapy [Other: ____] : [unfilled] [Return in ____ week(s)] : Return in [unfilled] week(s) [FreeTextEntry2] : Goal overall "  I want to feel useful"   Problem I: Depression Objective A: Pt will reduce score on the CUDOs depression inventory to 11-20 indicating minimal depression     Objective B: Pt will engage in 2-3 "useful" tasks per week   Problem II : Anxiety. Objective A: Pt will reduce score on the GAD7 from 7 Mild anxiety to minimal anxiety 0-4 Objective B: Pt will develop at least 2 ways to cope with worries and stressors. Problem III: Heart and Pulmonary Issues. Objective A: Pt will attend all scheduled medical appointments and follow/ups 90% of the time Objective B: Pt will adhere to a healthy diet and exercise plan most days of the week.   [de-identified] : The focus of this session was on pt's current illness.  Therapist provided active listening as pt shared she has been ill with a stomach flu and low grade fever for the past 3 days. Pt reports she has not eaten much in 3 days. Therapist validated  pt experience and advised that she seek medical tx as soon as possible. Therapist suggested Teledoc so that she lees snot need to leave the home due to weakness. Pt agreed to seek tx, agreeing she may need medication. Pt reports feeling badly she had to cancel ehr appt with the pulmonary hypertension doctor but will reschedule. Pt reports mood and anxiety level stable. Pt is compliant with therapy and medication.  [FreeTextEntry1] : Pt will contact tx team for worsening of symptoms and/or problems with medication. Next appt:  9/11

## 2024-09-03 NOTE — PLAN
[[facility]] : [facility]  [Supportive Therapy] : [facility] Therapy [Other: ____] : [unfilled] [Return in ____ week(s)] : Return in [unfilled] week(s) [FreeTextEntry2] : Goal overall "  I want to feel useful"   Problem I: Depression Objective A: Pt will reduce score on the CUDOs depression inventory to 11-20 indicating minimal depression     Objective B: Pt will engage in 2-3 "useful" tasks per week   Problem II : Anxiety. Objective A: Pt will reduce score on the GAD7 from 7 Mild anxiety to minimal anxiety 0-4 Objective B: Pt will develop at least 2 ways to cope with worries and stressors. Problem III: Heart and Pulmonary Issues. Objective A: Pt will attend all scheduled medical appointments and follow/ups 90% of the time Objective B: Pt will adhere to a healthy diet and exercise plan most days of the week.   [de-identified] : The focus of this session was on pt's current illness.  Therapist provided active listening as pt shared she has been ill with a stomach flu and low grade fever for the past 3 days. Pt reports she has not eaten much in 3 days. Therapist validated  pt experience and advised that she seek medical tx as soon as possible. Therapist suggested Teledoc so that she lees snot need to leave the home due to weakness. Pt agreed to seek tx, agreeing she may need medication. Pt reports feeling badly she had to cancel ehr appt with the pulmonary hypertension doctor but will reschedule. Pt reports mood and anxiety level stable. Pt is compliant with therapy and medication.  [FreeTextEntry1] : Pt will contact tx team for worsening of symptoms and/or problems with medication. Next appt:  9/11

## 2024-09-11 ENCOUNTER — APPOINTMENT (OUTPATIENT)
Dept: PSYCHIATRY | Facility: CLINIC | Age: 64
End: 2024-09-11

## 2024-09-11 ENCOUNTER — OUTPATIENT (OUTPATIENT)
Dept: OUTPATIENT SERVICES | Facility: HOSPITAL | Age: 64
LOS: 1 days | End: 2024-09-11
Payer: MEDICARE

## 2024-09-11 DIAGNOSIS — F41.1 GENERALIZED ANXIETY DISORDER: ICD-10-CM

## 2024-09-11 DIAGNOSIS — Z95.1 PRESENCE OF AORTOCORONARY BYPASS GRAFT: Chronic | ICD-10-CM

## 2024-09-11 DIAGNOSIS — Z98.890 OTHER SPECIFIED POSTPROCEDURAL STATES: Chronic | ICD-10-CM

## 2024-09-11 PROCEDURE — 90832 PSYTX W PT 30 MINUTES: CPT

## 2024-09-12 DIAGNOSIS — F41.1 GENERALIZED ANXIETY DISORDER: ICD-10-CM

## 2024-09-12 NOTE — PLAN
[[facility]] : [facility]  [Supportive Therapy] : [facility] Therapy [Other: ____] : [unfilled] [Return in ____ week(s)] : Return in [unfilled] week(s) [FreeTextEntry2] : Goal overall "  I want to feel useful"   Problem I: Depression Objective A: Pt will reduce score on the CUDOs depression inventory to 11-20 indicating minimal depression     Objective B: Pt will engage in 2-3 "useful" tasks per week   Problem II : Anxiety. Objective A: Pt will reduce score on the GAD7 from 7 Mild anxiety to minimal anxiety 0-4 Objective B: Pt will develop at least 2 ways to cope with worries and stressors. Problem III: Heart and Pulmonary Issues. Objective A: Pt will attend all scheduled medical appointments and follow/ups 90% of the time Objective B: Pt will adhere to a healthy diet and exercise plan most days of the week.   [de-identified] : The focus of this session was on pt's anniversary reaction to .   Therapist provided active listening as pt shared how she lost someone every special to her in . P:t reported how this dear friend was a hero his whole life and was not mean t to be working that day . Pt was tearful as she spoke of her friend and how he  doling what he loved, saving people.  Therapist validated  pt experience and normalized her anniversary reaction, also pointing how how difficult this day is for many people, Therapist explored how pt would spend the rest of her day, encouraging her to maintain proper self care despite her grief. Pt reports mood and anxiety level stable. Pt is compliant with therapy and medication.  [FreeTextEntry1] : Pt will contact tx team for worsening of symptoms and/or problems with medication. Next appt:  9/25

## 2024-09-12 NOTE — PLAN
[[facility]] : [facility]  [Supportive Therapy] : [facility] Therapy [Other: ____] : [unfilled] [Return in ____ week(s)] : Return in [unfilled] week(s) [FreeTextEntry2] : Goal overall "  I want to feel useful"   Problem I: Depression Objective A: Pt will reduce score on the CUDOs depression inventory to 11-20 indicating minimal depression     Objective B: Pt will engage in 2-3 "useful" tasks per week   Problem II : Anxiety. Objective A: Pt will reduce score on the GAD7 from 7 Mild anxiety to minimal anxiety 0-4 Objective B: Pt will develop at least 2 ways to cope with worries and stressors. Problem III: Heart and Pulmonary Issues. Objective A: Pt will attend all scheduled medical appointments and follow/ups 90% of the time Objective B: Pt will adhere to a healthy diet and exercise plan most days of the week.   [de-identified] : The focus of this session was on pt's anniversary reaction to .   Therapist provided active listening as pt shared how she lost someone every special to her in . P:t reported how this dear friend was a hero his whole life and was not mean t to be working that day . Pt was tearful as she spoke of her friend and how he  doling what he loved, saving people.  Therapist validated  pt experience and normalized her anniversary reaction, also pointing how how difficult this day is for many people, Therapist explored how pt would spend the rest of her day, encouraging her to maintain proper self care despite her grief. Pt reports mood and anxiety level stable. Pt is compliant with therapy and medication.  [FreeTextEntry1] : Pt will contact tx team for worsening of symptoms and/or problems with medication. Next appt:  9/25

## 2024-09-24 ENCOUNTER — APPOINTMENT (OUTPATIENT)
Dept: PSYCHIATRY | Facility: CLINIC | Age: 64
End: 2024-09-24

## 2024-09-25 ENCOUNTER — APPOINTMENT (OUTPATIENT)
Dept: PSYCHIATRY | Facility: CLINIC | Age: 64
End: 2024-09-25

## 2024-09-25 ENCOUNTER — OUTPATIENT (OUTPATIENT)
Dept: OUTPATIENT SERVICES | Facility: HOSPITAL | Age: 64
LOS: 1 days | End: 2024-09-25
Payer: MEDICARE

## 2024-09-25 DIAGNOSIS — F41.1 GENERALIZED ANXIETY DISORDER: ICD-10-CM

## 2024-09-25 DIAGNOSIS — F32.9 MAJOR DEPRESSIVE DISORDER, SINGLE EPISODE, UNSPECIFIED: ICD-10-CM

## 2024-09-25 PROCEDURE — 90832 PSYTX W PT 30 MINUTES: CPT | Mod: 95

## 2024-09-25 NOTE — PLAN
[[facility]] : [facility]  [[facility] Therapy] : [facility] Therapy [Return in ____ week(s)] : Return in [unfilled] week(s) [FreeTextEntry2] : Goal overall "  I want to feel useful"   Problem I: Depression Objective A: Pt will reduce score on the CUDOs depression inventory to 11-20 indicating minimal depression     Objective B: Pt will engage in 2-3 "useful" tasks per week   Problem II : Anxiety. Objective A: Pt will reduce score on the GAD7 from 7 Mild anxiety to minimal anxiety 0-4 Objective B: Pt will develop at least 2 ways to cope with worries and stressors. Problem III: Heart and Pulmonary Issues. Objective A: Pt will attend all scheduled medical appointments and follow/ups 90% of the time Objective B: Pt will adhere to a healthy diet and exercise plan most days of the week.   [de-identified] : The focus of this session was on pt's new pet. .   Therapist provided active listening as pt shared how her daughter brought home a new kitten and did not ask her. Pt reports that the burden of caring for another animal will fall on her as her daughter is not home often.  Therapist validated  pt feelings about this and explored whether she was able to communicate her thoughts to daughter as well as ask for more help. Pt identified feeling as though this would not help and has shared how she felt. PT did enjoy her recent birthday with her family . Pt reports mood and anxiety level stable. Pt has yet to schedule f/u with the pulmonary hypertension specialist at the hospital and was urged to do so as soon as possible. Pt is compliant with therapy and medication.  [FreeTextEntry1] : Pt will contact tx team for worsening of symptoms and/or problems with medication. Next appt:  10/9

## 2024-09-25 NOTE — PLAN
[[facility]] : [facility]  [[facility]ive Therapy] : [facility] Therapy [Return in ____ week(s)] : Return in [unfilled] week(s) [FreeTextEntry2] : Goal overall "  I want to feel useful"   Problem I: Depression Objective A: Pt will reduce score on the CUDOs depression inventory to 11-20 indicating minimal depression     Objective B: Pt will engage in 2-3 "useful" tasks per week   Problem II : Anxiety. Objective A: Pt will reduce score on the GAD7 from 7 Mild anxiety to minimal anxiety 0-4 Objective B: Pt will develop at least 2 ways to cope with worries and stressors. Problem III: Heart and Pulmonary Issues. Objective A: Pt will attend all scheduled medical appointments and follow/ups 90% of the time Objective B: Pt will adhere to a healthy diet and exercise plan most days of the week.   [de-identified] : The focus of this session was on pt's new pet. .   Therapist provided active listening as pt shared how her daughter brought home a new kitten and did not ask her. Pt reports that the burden of caring for another animal will fall on her as her daughter is not home often.  Therapist validated  pt feelings about this and explored whether she was able to communicate her thoughts to daughter as well as ask for more help. Pt identified feeling as though this would not help and has shared how she felt. PT did enjoy her recent birthday with her family . Pt reports mood and anxiety level stable. Pt has yet to schedule f/u with the pulmonary hypertension specialist at the hospital and was urged to do so as soon as possible. Pt is compliant with therapy and medication.  [FreeTextEntry1] : Pt will contact tx team for worsening of symptoms and/or problems with medication. Next appt:  10/9

## 2024-09-26 DIAGNOSIS — F41.1 GENERALIZED ANXIETY DISORDER: ICD-10-CM

## 2024-10-09 ENCOUNTER — OUTPATIENT (OUTPATIENT)
Dept: OUTPATIENT SERVICES | Facility: HOSPITAL | Age: 64
LOS: 1 days | End: 2024-10-09
Payer: MEDICARE

## 2024-10-09 ENCOUNTER — APPOINTMENT (OUTPATIENT)
Dept: PSYCHIATRY | Facility: CLINIC | Age: 64
End: 2024-10-09

## 2024-10-09 DIAGNOSIS — Z98.890 OTHER SPECIFIED POSTPROCEDURAL STATES: Chronic | ICD-10-CM

## 2024-10-09 DIAGNOSIS — F41.1 GENERALIZED ANXIETY DISORDER: ICD-10-CM

## 2024-10-09 DIAGNOSIS — F32.9 MAJOR DEPRESSIVE DISORDER, SINGLE EPISODE, UNSPECIFIED: ICD-10-CM

## 2024-10-09 PROCEDURE — 90832 PSYTX W PT 30 MINUTES: CPT

## 2024-10-10 DIAGNOSIS — F41.1 GENERALIZED ANXIETY DISORDER: ICD-10-CM

## 2024-10-23 ENCOUNTER — APPOINTMENT (OUTPATIENT)
Dept: PSYCHIATRY | Facility: CLINIC | Age: 64
End: 2024-10-23

## 2024-10-23 ENCOUNTER — OUTPATIENT (OUTPATIENT)
Dept: OUTPATIENT SERVICES | Facility: HOSPITAL | Age: 64
LOS: 1 days | End: 2024-10-23
Payer: MEDICARE

## 2024-10-23 DIAGNOSIS — Z98.890 OTHER SPECIFIED POSTPROCEDURAL STATES: Chronic | ICD-10-CM

## 2024-10-23 DIAGNOSIS — F41.1 GENERALIZED ANXIETY DISORDER: ICD-10-CM

## 2024-10-23 DIAGNOSIS — Z95.1 PRESENCE OF AORTOCORONARY BYPASS GRAFT: Chronic | ICD-10-CM

## 2024-10-23 PROCEDURE — 90832 PSYTX W PT 30 MINUTES: CPT | Mod: 95

## 2024-10-24 DIAGNOSIS — F41.1 GENERALIZED ANXIETY DISORDER: ICD-10-CM

## 2024-10-30 ENCOUNTER — OUTPATIENT (OUTPATIENT)
Dept: OUTPATIENT SERVICES | Facility: HOSPITAL | Age: 64
LOS: 1 days | End: 2024-10-30
Payer: MEDICARE

## 2024-10-30 ENCOUNTER — APPOINTMENT (OUTPATIENT)
Dept: PSYCHIATRY | Facility: CLINIC | Age: 64
End: 2024-10-30
Payer: MEDICARE

## 2024-10-30 ENCOUNTER — APPOINTMENT (OUTPATIENT)
Dept: HEART FAILURE | Facility: CLINIC | Age: 64
End: 2024-10-30

## 2024-10-30 VITALS
BODY MASS INDEX: 35.79 KG/M2 | SYSTOLIC BLOOD PRESSURE: 130 MMHG | WEIGHT: 228 LBS | OXYGEN SATURATION: 97 % | HEIGHT: 67 IN | HEART RATE: 59 BPM | DIASTOLIC BLOOD PRESSURE: 72 MMHG

## 2024-10-30 DIAGNOSIS — J98.4 OTHER DISORDERS OF LUNG: ICD-10-CM

## 2024-10-30 DIAGNOSIS — G47.00 INSOMNIA, UNSPECIFIED: ICD-10-CM

## 2024-10-30 DIAGNOSIS — F32.9 MAJOR DEPRESSIVE DISORDER, SINGLE EPISODE, UNSPECIFIED: ICD-10-CM

## 2024-10-30 DIAGNOSIS — F41.1 GENERALIZED ANXIETY DISORDER: ICD-10-CM

## 2024-10-30 DIAGNOSIS — Z98.890 OTHER SPECIFIED POSTPROCEDURAL STATES: Chronic | ICD-10-CM

## 2024-10-30 DIAGNOSIS — Z95.1 PRESENCE OF AORTOCORONARY BYPASS GRAFT: Chronic | ICD-10-CM

## 2024-10-30 DIAGNOSIS — I50.30 UNSPECIFIED DIASTOLIC (CONGESTIVE) HEART FAILURE: ICD-10-CM

## 2024-10-30 PROCEDURE — 99214 OFFICE O/P EST MOD 30 MIN: CPT

## 2024-10-30 PROCEDURE — 94618 PULMONARY STRESS TESTING: CPT

## 2024-10-30 PROCEDURE — 99205 OFFICE O/P NEW HI 60 MIN: CPT | Mod: 25

## 2024-10-30 PROCEDURE — 93000 ELECTROCARDIOGRAM COMPLETE: CPT | Mod: 59

## 2024-10-30 PROCEDURE — 93308 TTE F-UP OR LMTD: CPT

## 2024-10-31 DIAGNOSIS — G47.00 INSOMNIA, UNSPECIFIED: ICD-10-CM

## 2024-10-31 DIAGNOSIS — F41.1 GENERALIZED ANXIETY DISORDER: ICD-10-CM

## 2024-10-31 DIAGNOSIS — F32.9 MAJOR DEPRESSIVE DISORDER, SINGLE EPISODE, UNSPECIFIED: ICD-10-CM

## 2024-11-06 ENCOUNTER — OUTPATIENT (OUTPATIENT)
Dept: OUTPATIENT SERVICES | Facility: HOSPITAL | Age: 64
LOS: 1 days | End: 2024-11-06
Payer: MEDICARE

## 2024-11-06 ENCOUNTER — APPOINTMENT (OUTPATIENT)
Dept: CARDIOLOGY | Facility: CLINIC | Age: 64
End: 2024-11-06
Payer: MEDICARE

## 2024-11-06 ENCOUNTER — APPOINTMENT (OUTPATIENT)
Dept: PSYCHIATRY | Facility: CLINIC | Age: 64
End: 2024-11-06

## 2024-11-06 VITALS
HEIGHT: 67 IN | DIASTOLIC BLOOD PRESSURE: 82 MMHG | BODY MASS INDEX: 35.79 KG/M2 | HEART RATE: 79 BPM | WEIGHT: 228 LBS | SYSTOLIC BLOOD PRESSURE: 140 MMHG

## 2024-11-06 VITALS — DIASTOLIC BLOOD PRESSURE: 70 MMHG | SYSTOLIC BLOOD PRESSURE: 110 MMHG

## 2024-11-06 DIAGNOSIS — Z98.890 OTHER SPECIFIED POSTPROCEDURAL STATES: Chronic | ICD-10-CM

## 2024-11-06 DIAGNOSIS — Z95.1 PRESENCE OF AORTOCORONARY BYPASS GRAFT: Chronic | ICD-10-CM

## 2024-11-06 DIAGNOSIS — F41.1 GENERALIZED ANXIETY DISORDER: ICD-10-CM

## 2024-11-06 DIAGNOSIS — E66.9 OBESITY, UNSPECIFIED: ICD-10-CM

## 2024-11-06 DIAGNOSIS — I10 ESSENTIAL (PRIMARY) HYPERTENSION: ICD-10-CM

## 2024-11-06 DIAGNOSIS — F32.9 MAJOR DEPRESSIVE DISORDER, SINGLE EPISODE, UNSPECIFIED: ICD-10-CM

## 2024-11-06 DIAGNOSIS — I48.91 UNSPECIFIED ATRIAL FIBRILLATION: ICD-10-CM

## 2024-11-06 DIAGNOSIS — R06.00 DYSPNEA, UNSPECIFIED: ICD-10-CM

## 2024-11-06 PROCEDURE — 90832 PSYTX W PT 30 MINUTES: CPT

## 2024-11-06 PROCEDURE — 93000 ELECTROCARDIOGRAM COMPLETE: CPT

## 2024-11-06 PROCEDURE — 99214 OFFICE O/P EST MOD 30 MIN: CPT

## 2024-11-07 DIAGNOSIS — F41.1 GENERALIZED ANXIETY DISORDER: ICD-10-CM

## 2024-11-12 ENCOUNTER — OUTPATIENT (OUTPATIENT)
Dept: OUTPATIENT SERVICES | Facility: HOSPITAL | Age: 64
LOS: 1 days | End: 2024-11-12
Payer: MEDICARE

## 2024-11-12 ENCOUNTER — APPOINTMENT (OUTPATIENT)
Dept: PULMONOLOGY | Facility: HOSPITAL | Age: 64
End: 2024-11-12

## 2024-11-12 DIAGNOSIS — Z98.890 OTHER SPECIFIED POSTPROCEDURAL STATES: Chronic | ICD-10-CM

## 2024-11-12 DIAGNOSIS — R06.02 SHORTNESS OF BREATH: ICD-10-CM

## 2024-11-12 DIAGNOSIS — Z95.1 PRESENCE OF AORTOCORONARY BYPASS GRAFT: Chronic | ICD-10-CM

## 2024-11-12 PROCEDURE — 94727 GAS DIL/WSHOT DETER LNG VOL: CPT

## 2024-11-12 PROCEDURE — 94729 DIFFUSING CAPACITY: CPT

## 2024-11-12 PROCEDURE — 94070 EVALUATION OF WHEEZING: CPT

## 2024-11-12 PROCEDURE — 94664 DEMO&/EVAL PT USE INHALER: CPT

## 2024-11-13 DIAGNOSIS — R06.02 SHORTNESS OF BREATH: ICD-10-CM

## 2024-11-20 ENCOUNTER — APPOINTMENT (OUTPATIENT)
Dept: PSYCHIATRY | Facility: CLINIC | Age: 64
End: 2024-11-20

## 2024-11-20 ENCOUNTER — OUTPATIENT (OUTPATIENT)
Dept: OUTPATIENT SERVICES | Facility: HOSPITAL | Age: 64
LOS: 1 days | End: 2024-11-20
Payer: MEDICARE

## 2024-11-20 ENCOUNTER — APPOINTMENT (OUTPATIENT)
Dept: CARDIOLOGY | Facility: CLINIC | Age: 64
End: 2024-11-20

## 2024-11-20 DIAGNOSIS — Z95.1 PRESENCE OF AORTOCORONARY BYPASS GRAFT: Chronic | ICD-10-CM

## 2024-11-20 DIAGNOSIS — F41.1 GENERALIZED ANXIETY DISORDER: ICD-10-CM

## 2024-11-20 DIAGNOSIS — Z98.890 OTHER SPECIFIED POSTPROCEDURAL STATES: Chronic | ICD-10-CM

## 2024-11-20 DIAGNOSIS — F32.9 MAJOR DEPRESSIVE DISORDER, SINGLE EPISODE, UNSPECIFIED: ICD-10-CM

## 2024-11-20 PROCEDURE — 90832 PSYTX W PT 30 MINUTES: CPT

## 2024-11-21 DIAGNOSIS — F41.1 GENERALIZED ANXIETY DISORDER: ICD-10-CM

## 2024-11-22 ENCOUNTER — LABORATORY RESULT (OUTPATIENT)
Age: 64
End: 2024-11-22

## 2024-11-22 VITALS
RESPIRATION RATE: 16 BRPM | WEIGHT: 227.96 LBS | OXYGEN SATURATION: 98 % | DIASTOLIC BLOOD PRESSURE: 78 MMHG | HEART RATE: 55 BPM | HEIGHT: 67 IN | SYSTOLIC BLOOD PRESSURE: 181 MMHG

## 2024-11-22 NOTE — H&P CARDIOLOGY - HISTORY OF PRESENT ILLNESS
64-year-old female with PMH of HFpEF, Afib (on Xarelto - last dose on _______), COPD, SILAS, HTN, HLD, CAD, MI, s/p 1V CABG 2019 (LIMA-LAD) and s/p MAZE with closure of left atrial appendage, pulmonary HTN with uncertain etiology, last cath 10/27/23 showed an increased PCW and PA pressures, who presented to her cardiolodist with c/o MURCIA, on minimal exertion.............  The patient had a repeat echo which showed normal LV systolic function, there was a RVSP of only 22 mhg and RA pressures were normal.   Pt is now referred for RHC to evaluate pulmonary pressures.     Adjusted bleeding risk:  No IVF, for RHC only   64-year-old F with PMH of HFpEF, Afib (on Xarelto - last dose on 11/22), COPD, SILAS (not on CPAP currently), HTN, HLD, CAD, MI, s/p 1V CABG 2019 (LIMA-LAD) and s/p MAZE with closure of left atrial appendage, pulmonary HTN with uncertain etiology, last cath 10/27/23 showed an increased PCW and PA pressures, who presented to her cardiologist with c/o MURCIA, on minimal exertion. Denies any chest pain, dizziness, n/v, diaphoresis, orthopnea, PND, LE edema, palpitations, syncope.   The patient had a repeat echo which showed normal LV systolic function, there was a RVSP of only 22 mhg and RA pressures were normal.   Pt is now referred for RHC to evaluate pulmonary pressures.     Adjusted bleeding risk: 1.7%  No IVF, for RHC only

## 2024-11-22 NOTE — H&P CARDIOLOGY - MS EXT PE MLT D E PC
How Severe Is It?: mild
Is This A New Presentation, Or A Follow-Up?: Follow Up Accutane
no clubbing/no cyanosis/no pedal edema

## 2024-11-22 NOTE — H&P CARDIOLOGY - COMMENTS
64-year-old F with PMH of HFpEF, Afib (on Xarelto - last dose on 11/22), COPD, SILAS (not on CPAP currently), HTN, HLD, CAD, MI, s/p 1V CABG 2019 (LIMA-LAD) and s/p MAZE with closure of left atrial appendage, pulmonary HTN with uncertain etiology, last cath 10/27/23 showed an increased PCW and PA pressures, who presented to her cardiologist with c/o MURCIA, on minimal exertion. The patient had a repeat echo which showed normal LV systolic function, there was a RVSP of only 22 mhg and RA pressures were normal. Pt is now referred for RHC to evaluate pulmonary pressures.

## 2024-11-22 NOTE — H&P CARDIOLOGY - NSICDXPASTMEDICALHX_GEN_ALL_CORE_FT
Chart reviewed by Ambulatory Quality and Data team    Please abstract the following data from this visit with this patient into the appropriate field in Epic:    Tests that can be patient reported without a hard copy:        Other Tests found in the patient's chart through Chart Review/Care Everywhere:    Eye exam with ophthalmology on this date: 8/7/2018 with documentation of no diabetic retinopathy.     Note to [employer]: If this section is blank, no results were found via Chart Review/Care Everywhere.  
PAST MEDICAL HISTORY:  Afib     Anxiety and depression     Asthma     Bilateral carpal tunnel syndrome     Closed fracture of foot, unspecified laterality, sequela     COPD (chronic obstructive pulmonary disease) case management patient     Essential hypertension     Hearing aid worn     Hearing decreased     History of  section

## 2024-11-25 ENCOUNTER — TRANSCRIPTION ENCOUNTER (OUTPATIENT)
Age: 64
End: 2024-11-25

## 2024-11-25 ENCOUNTER — OUTPATIENT (OUTPATIENT)
Dept: OUTPATIENT SERVICES | Facility: HOSPITAL | Age: 64
LOS: 1 days | Discharge: HOME | End: 2024-11-25
Payer: MEDICARE

## 2024-11-25 VITALS
SYSTOLIC BLOOD PRESSURE: 117 MMHG | RESPIRATION RATE: 14 BRPM | OXYGEN SATURATION: 98 % | DIASTOLIC BLOOD PRESSURE: 56 MMHG | HEART RATE: 55 BPM

## 2024-11-25 DIAGNOSIS — I27.20 PULMONARY HYPERTENSION, UNSPECIFIED: ICD-10-CM

## 2024-11-25 DIAGNOSIS — Z98.890 OTHER SPECIFIED POSTPROCEDURAL STATES: Chronic | ICD-10-CM

## 2024-11-25 DIAGNOSIS — Z95.1 PRESENCE OF AORTOCORONARY BYPASS GRAFT: ICD-10-CM

## 2024-11-25 DIAGNOSIS — Z95.1 PRESENCE OF AORTOCORONARY BYPASS GRAFT: Chronic | ICD-10-CM

## 2024-11-25 DIAGNOSIS — I50.30 UNSPECIFIED DIASTOLIC (CONGESTIVE) HEART FAILURE: ICD-10-CM

## 2024-11-25 LAB
ANION GAP SERPL CALC-SCNC: 9 MMOL/L — SIGNIFICANT CHANGE UP (ref 7–14)
BUN SERPL-MCNC: 22 MG/DL — HIGH (ref 10–20)
CALCIUM SERPL-MCNC: 9.9 MG/DL — SIGNIFICANT CHANGE UP (ref 8.4–10.5)
CHLORIDE SERPL-SCNC: 104 MMOL/L — SIGNIFICANT CHANGE UP (ref 98–110)
CO2 SERPL-SCNC: 23 MMOL/L — SIGNIFICANT CHANGE UP (ref 17–32)
CREAT SERPL-MCNC: 1.4 MG/DL — SIGNIFICANT CHANGE UP (ref 0.7–1.5)
EGFR: 42 ML/MIN/1.73M2 — LOW
GLUCOSE SERPL-MCNC: 89 MG/DL — SIGNIFICANT CHANGE UP (ref 70–99)
HCT VFR BLD CALC: 39.3 % — SIGNIFICANT CHANGE UP (ref 37–47)
HGB BLD-MCNC: 12.8 G/DL — SIGNIFICANT CHANGE UP (ref 12–16)
MCHC RBC-ENTMCNC: 27.6 PG — SIGNIFICANT CHANGE UP (ref 27–31)
MCHC RBC-ENTMCNC: 32.6 G/DL — SIGNIFICANT CHANGE UP (ref 32–37)
MCV RBC AUTO: 84.9 FL — SIGNIFICANT CHANGE UP (ref 81–99)
NRBC # BLD: 0 /100 WBCS — SIGNIFICANT CHANGE UP (ref 0–0)
PLATELET # BLD AUTO: 290 K/UL — SIGNIFICANT CHANGE UP (ref 130–400)
PMV BLD: 9.7 FL — SIGNIFICANT CHANGE UP (ref 7.4–10.4)
POTASSIUM SERPL-MCNC: 3.9 MMOL/L — SIGNIFICANT CHANGE UP (ref 3.5–5)
POTASSIUM SERPL-SCNC: 3.9 MMOL/L — SIGNIFICANT CHANGE UP (ref 3.5–5)
RBC # BLD: 4.63 M/UL — SIGNIFICANT CHANGE UP (ref 4.2–5.4)
RBC # FLD: 13.2 % — SIGNIFICANT CHANGE UP (ref 11.5–14.5)
SODIUM SERPL-SCNC: 136 MMOL/L — SIGNIFICANT CHANGE UP (ref 135–146)
WBC # BLD: 9.1 K/UL — SIGNIFICANT CHANGE UP (ref 4.8–10.8)
WBC # FLD AUTO: 9.1 K/UL — SIGNIFICANT CHANGE UP (ref 4.8–10.8)

## 2024-11-25 PROCEDURE — 93451 RIGHT HEART CATH: CPT

## 2024-11-25 PROCEDURE — C1894: CPT

## 2024-11-25 PROCEDURE — 93451 RIGHT HEART CATH: CPT | Mod: 26

## 2024-11-25 PROCEDURE — 93463 DRUG ADMIN & HEMODYNMIC MEAS: CPT

## 2024-11-25 PROCEDURE — 85027 COMPLETE CBC AUTOMATED: CPT

## 2024-11-25 PROCEDURE — 36415 COLL VENOUS BLD VENIPUNCTURE: CPT

## 2024-11-25 PROCEDURE — 93463 DRUG ADMIN & HEMODYNMIC MEAS: CPT | Mod: 59

## 2024-11-25 PROCEDURE — 80048 BASIC METABOLIC PNL TOTAL CA: CPT

## 2024-11-25 PROCEDURE — C1889: CPT

## 2024-11-25 RX ORDER — CHLORHEXIDINE GLUCONATE 1.2 MG/ML
1 RINSE ORAL ONCE
Refills: 0 | Status: DISCONTINUED | OUTPATIENT
Start: 2024-11-25 | End: 2024-11-25

## 2024-11-25 RX ORDER — FUROSEMIDE 40 MG/1
1 TABLET ORAL
Refills: 0 | DISCHARGE

## 2024-11-25 RX ORDER — SACUBITRIL AND VALSARTAN 24; 26 MG/1; MG/1
24-26 TABLET, FILM COATED ORAL TWICE DAILY
Qty: 28 | Refills: 1 | Status: ACTIVE | COMMUNITY
Start: 2024-11-25 | End: 1900-01-01

## 2024-11-25 NOTE — ASU PREOP CHECKLIST - ALLERGIES REVIEWED
[FreeTextEntry1] : 54M Enlarged left lateral, right posterior and right anterior hemorrhoids. \par Rubber band ligation performed on each hemorrhoid column. A total of 3 bands placed\par \par Plan of care for Hemorrhoids\par Fiber fact sheet provided for information on dietary fiber\par Encouraged to add daily fiber supplementation to diet, Metamucil, Benefiber, or fiber supplement of preference\par Continue daily over the counter stool softener (eg.Colace) to prevent straining\par Continue increased fluid intake, preferably water\par Refrain from straining or lingering (eg. reading) on the toilet\par Post band ligation instruction given, if worsening pain, fevers, or trouble urinating, patient to call office to arrange for urgent re-evaluation.\par Follow up in 4 weeks for evaluation.\par \par \par \par \par \par  done

## 2024-11-25 NOTE — CHART NOTE - NSCHARTNOTEFT_GEN_A_CORE
***INCOMPLETE REPORT****    The risks and alternatives of the procedures and conscious sedation were explained to the patient and informed consent was obtained. The patient was brought to the cath lab and placed on the exam table.    Access: Right IJ manual compression      BP: 176/80 (115)  RA pressure: 9/8/3  RV pressure: 49/8/8  PAWP: 18/35/11  PA pressure: 50/20/28  CO/CI: 5.17/2.42    AO 99%  PA 67.9%      Challenge: Nipride challenge:   BP: 146/65 (100)  PCW 6/8/9  PA 25/10/15  TD: 5.35/2.5  PCW 13/30/16    PA 78.3%        Condition: good       Recommendation:     Return to inpatient   [x] Discharge home after one hour  Follow up as outpatient with Dr Valente thao 1-2 weeks The risks and alternatives of the procedures and conscious sedation were explained to the patient and informed consent was obtained. The patient was brought to the cath lab and placed on the exam table.    Access: Right IJ manual compression    Baseline hemodynamics    BP: 176/80 (115) mmHg  RA pressure: 9/8/3 mmHg  RV pressure: 49/8/8 mmHg  PAWP: 18/v35 mmHg  PA pressure: 50/20/28 mmHg   CO/CI: 5.17/2.42 (TD)  PVR: 1.9 FULLER  SVR: 1723 dynes*sec*cm-5    AO 99%  PA 67.9%      Challenge: Nipride challenge at max dose of 1.5 mcg/kg/min    BP: 149/67/100 mmHg  PAWP: 6/8/9 mmHg  PA: 25/10/15 mmHg  TD: 5.35/2.5 mmHg  PAWP: 13/30/16 mmHg  PA 78.3%  CO/CI: 5.3/2.5 (TD)  PVR: 1.1 FULLER  SVR: 1418 dynes*sec*cm-5      Condition: good       Recommendation:     Return to inpatient   [x] Discharge home after one hour  Follow up as outpatient with Dr Valente thao 1-2 weeks

## 2024-11-25 NOTE — ASU DISCHARGE PLAN (ADULT/PEDIATRIC) - ASU DC SPECIAL INSTRUCTIONSFT
Discharge Instructions as follows:  - Continue medical regimen as prescribed.  - Resume Eliquis tomorrow 11/26 in AM  - Instructed to call 911 if chest pain, shortness of breath or bleeding from access site.  - No heavy lifting > 10lbs x 1 week.  - No driving x 24 hours.  - No baths, swimming pools x 1 week, may shower  - Low sodium low fat low cholesterol diet  - Follow-up with Cardiologist in 1-2 weeks after discharge  - Soreness or tenderness at the site is possible, it will diminish over time. You may take Tylenol every 4-6 hours as needed. Nothing stronger is needed. NO Motrin/Ibuprofen  - Any questions call cardiac cath lab [phone]

## 2024-11-25 NOTE — ASU DISCHARGE PLAN (ADULT/PEDIATRIC) - CARE PROVIDER_API CALL
Yony Castellanos  [facility]  [address] [place] [id number]  Phone: [phone]  Fax: [phone]  Follow Up Time: 2 weeks

## 2024-11-26 DIAGNOSIS — I27.20 PULMONARY HYPERTENSION, UNSPECIFIED: ICD-10-CM

## 2024-12-04 ENCOUNTER — NON-APPOINTMENT (OUTPATIENT)
Age: 64
End: 2024-12-04

## 2024-12-04 ENCOUNTER — APPOINTMENT (OUTPATIENT)
Dept: PSYCHIATRY | Facility: CLINIC | Age: 64
End: 2024-12-04

## 2024-12-09 ENCOUNTER — OUTPATIENT (OUTPATIENT)
Dept: OUTPATIENT SERVICES | Facility: HOSPITAL | Age: 64
LOS: 1 days | End: 2024-12-09
Payer: MEDICARE

## 2024-12-09 ENCOUNTER — APPOINTMENT (OUTPATIENT)
Dept: PSYCHIATRY | Facility: CLINIC | Age: 64
End: 2024-12-09

## 2024-12-09 ENCOUNTER — RX RENEWAL (OUTPATIENT)
Age: 64
End: 2024-12-09

## 2024-12-09 DIAGNOSIS — F32.9 MAJOR DEPRESSIVE DISORDER, SINGLE EPISODE, UNSPECIFIED: ICD-10-CM

## 2024-12-09 DIAGNOSIS — G47.00 INSOMNIA, UNSPECIFIED: ICD-10-CM

## 2024-12-09 DIAGNOSIS — F41.1 GENERALIZED ANXIETY DISORDER: ICD-10-CM

## 2024-12-09 DIAGNOSIS — Z98.890 OTHER SPECIFIED POSTPROCEDURAL STATES: Chronic | ICD-10-CM

## 2024-12-09 PROCEDURE — 90832 PSYTX W PT 30 MINUTES: CPT | Mod: 95

## 2024-12-10 DIAGNOSIS — F41.1 GENERALIZED ANXIETY DISORDER: ICD-10-CM

## 2024-12-23 ENCOUNTER — APPOINTMENT (OUTPATIENT)
Dept: PULMONOLOGY | Facility: CLINIC | Age: 64
End: 2024-12-23

## 2024-12-27 ENCOUNTER — OUTPATIENT (OUTPATIENT)
Dept: OUTPATIENT SERVICES | Facility: HOSPITAL | Age: 64
LOS: 1 days | End: 2024-12-27
Payer: MEDICARE

## 2024-12-27 ENCOUNTER — APPOINTMENT (OUTPATIENT)
Dept: PSYCHIATRY | Facility: CLINIC | Age: 64
End: 2024-12-27

## 2024-12-27 DIAGNOSIS — F41.1 GENERALIZED ANXIETY DISORDER: ICD-10-CM

## 2024-12-27 DIAGNOSIS — Z98.890 OTHER SPECIFIED POSTPROCEDURAL STATES: Chronic | ICD-10-CM

## 2024-12-27 DIAGNOSIS — F32.9 MAJOR DEPRESSIVE DISORDER, SINGLE EPISODE, UNSPECIFIED: ICD-10-CM

## 2024-12-27 DIAGNOSIS — Z95.1 PRESENCE OF AORTOCORONARY BYPASS GRAFT: Chronic | ICD-10-CM

## 2024-12-27 PROCEDURE — 90832 PSYTX W PT 30 MINUTES: CPT

## 2024-12-28 DIAGNOSIS — F41.1 GENERALIZED ANXIETY DISORDER: ICD-10-CM

## 2025-01-15 ENCOUNTER — APPOINTMENT (OUTPATIENT)
Dept: PSYCHIATRY | Facility: CLINIC | Age: 65
End: 2025-01-15

## 2025-01-22 ENCOUNTER — APPOINTMENT (OUTPATIENT)
Dept: PSYCHIATRY | Facility: CLINIC | Age: 65
End: 2025-01-22

## 2025-01-24 ENCOUNTER — APPOINTMENT (OUTPATIENT)
Dept: PSYCHIATRY | Facility: CLINIC | Age: 65
End: 2025-01-24

## 2025-01-24 ENCOUNTER — OUTPATIENT (OUTPATIENT)
Dept: OUTPATIENT SERVICES | Facility: HOSPITAL | Age: 65
LOS: 1 days | End: 2025-01-24
Payer: MEDICARE

## 2025-01-24 DIAGNOSIS — F41.1 GENERALIZED ANXIETY DISORDER: ICD-10-CM

## 2025-01-24 DIAGNOSIS — F32.9 MAJOR DEPRESSIVE DISORDER, SINGLE EPISODE, UNSPECIFIED: ICD-10-CM

## 2025-01-24 DIAGNOSIS — Z98.890 OTHER SPECIFIED POSTPROCEDURAL STATES: Chronic | ICD-10-CM

## 2025-01-24 DIAGNOSIS — Z95.1 PRESENCE OF AORTOCORONARY BYPASS GRAFT: Chronic | ICD-10-CM

## 2025-01-24 PROCEDURE — 90832 PSYTX W PT 30 MINUTES: CPT

## 2025-01-25 DIAGNOSIS — F41.1 GENERALIZED ANXIETY DISORDER: ICD-10-CM

## 2025-02-12 ENCOUNTER — APPOINTMENT (OUTPATIENT)
Dept: PSYCHIATRY | Facility: CLINIC | Age: 65
End: 2025-02-12

## 2025-02-12 ENCOUNTER — OUTPATIENT (OUTPATIENT)
Dept: OUTPATIENT SERVICES | Facility: HOSPITAL | Age: 65
LOS: 1 days | End: 2025-02-12
Payer: MEDICARE

## 2025-02-12 DIAGNOSIS — F41.1 GENERALIZED ANXIETY DISORDER: ICD-10-CM

## 2025-02-12 DIAGNOSIS — Z98.890 OTHER SPECIFIED POSTPROCEDURAL STATES: Chronic | ICD-10-CM

## 2025-02-12 DIAGNOSIS — F32.9 MAJOR DEPRESSIVE DISORDER, SINGLE EPISODE, UNSPECIFIED: ICD-10-CM

## 2025-02-12 PROCEDURE — 90832 PSYTX W PT 30 MINUTES: CPT | Mod: 95

## 2025-02-13 ENCOUNTER — EMERGENCY (EMERGENCY)
Facility: HOSPITAL | Age: 65
LOS: 0 days | Discharge: HOME | End: 2025-02-14
Attending: EMERGENCY MEDICINE
Payer: MEDICARE

## 2025-02-13 VITALS
HEIGHT: 67 IN | WEIGHT: 218.04 LBS | OXYGEN SATURATION: 98 % | TEMPERATURE: 98 F | HEART RATE: 72 BPM | SYSTOLIC BLOOD PRESSURE: 122 MMHG | RESPIRATION RATE: 18 BRPM | DIASTOLIC BLOOD PRESSURE: 85 MMHG

## 2025-02-13 DIAGNOSIS — R04.2 HEMOPTYSIS: ICD-10-CM

## 2025-02-13 DIAGNOSIS — Z98.890 OTHER SPECIFIED POSTPROCEDURAL STATES: Chronic | ICD-10-CM

## 2025-02-13 DIAGNOSIS — Z88.8 ALLERGY STATUS TO OTHER DRUGS, MEDICAMENTS AND BIOLOGICAL SUBSTANCES: ICD-10-CM

## 2025-02-13 DIAGNOSIS — F41.1 GENERALIZED ANXIETY DISORDER: ICD-10-CM

## 2025-02-13 DIAGNOSIS — Z91.041 RADIOGRAPHIC DYE ALLERGY STATUS: ICD-10-CM

## 2025-02-13 DIAGNOSIS — Z79.01 LONG TERM (CURRENT) USE OF ANTICOAGULANTS: ICD-10-CM

## 2025-02-13 DIAGNOSIS — Z91.013 ALLERGY TO SEAFOOD: ICD-10-CM

## 2025-02-13 DIAGNOSIS — Z95.1 PRESENCE OF AORTOCORONARY BYPASS GRAFT: Chronic | ICD-10-CM

## 2025-02-13 DIAGNOSIS — I10 ESSENTIAL (PRIMARY) HYPERTENSION: ICD-10-CM

## 2025-02-13 DIAGNOSIS — I25.10 ATHEROSCLEROTIC HEART DISEASE OF NATIVE CORONARY ARTERY WITHOUT ANGINA PECTORIS: ICD-10-CM

## 2025-02-13 DIAGNOSIS — Z95.1 PRESENCE OF AORTOCORONARY BYPASS GRAFT: ICD-10-CM

## 2025-02-13 DIAGNOSIS — I48.91 UNSPECIFIED ATRIAL FIBRILLATION: ICD-10-CM

## 2025-02-13 LAB
ALBUMIN SERPL ELPH-MCNC: 4.1 G/DL — SIGNIFICANT CHANGE UP (ref 3.5–5.2)
ALP SERPL-CCNC: 104 U/L — SIGNIFICANT CHANGE UP (ref 30–115)
ALT FLD-CCNC: 11 U/L — SIGNIFICANT CHANGE UP (ref 0–41)
ANION GAP SERPL CALC-SCNC: 8 MMOL/L — SIGNIFICANT CHANGE UP (ref 7–14)
APTT BLD: 38.6 SEC — SIGNIFICANT CHANGE UP (ref 27–39.2)
AST SERPL-CCNC: 17 U/L — SIGNIFICANT CHANGE UP (ref 0–41)
BASOPHILS # BLD AUTO: 0.05 K/UL — SIGNIFICANT CHANGE UP (ref 0–0.2)
BASOPHILS NFR BLD AUTO: 0.9 % — SIGNIFICANT CHANGE UP (ref 0–1)
BILIRUB SERPL-MCNC: 0.5 MG/DL — SIGNIFICANT CHANGE UP (ref 0.2–1.2)
BUN SERPL-MCNC: 19 MG/DL — SIGNIFICANT CHANGE UP (ref 10–20)
CALCIUM SERPL-MCNC: 9.7 MG/DL — SIGNIFICANT CHANGE UP (ref 8.4–10.5)
CHLORIDE SERPL-SCNC: 107 MMOL/L — SIGNIFICANT CHANGE UP (ref 98–110)
CO2 SERPL-SCNC: 25 MMOL/L — SIGNIFICANT CHANGE UP (ref 17–32)
CREAT SERPL-MCNC: 1.3 MG/DL — SIGNIFICANT CHANGE UP (ref 0.7–1.5)
EGFR: 46 ML/MIN/1.73M2 — LOW
EOSINOPHIL # BLD AUTO: 0.3 K/UL — SIGNIFICANT CHANGE UP (ref 0–0.7)
EOSINOPHIL NFR BLD AUTO: 5.4 % — SIGNIFICANT CHANGE UP (ref 0–8)
GLUCOSE SERPL-MCNC: 101 MG/DL — HIGH (ref 70–99)
HCT VFR BLD CALC: 38.1 % — SIGNIFICANT CHANGE UP (ref 37–47)
HGB BLD-MCNC: 12.2 G/DL — SIGNIFICANT CHANGE UP (ref 12–16)
IMM GRANULOCYTES NFR BLD AUTO: 0.2 % — SIGNIFICANT CHANGE UP (ref 0.1–0.3)
INR BLD: 1.16 RATIO — SIGNIFICANT CHANGE UP (ref 0.65–1.3)
LYMPHOCYTES # BLD AUTO: 1.67 K/UL — SIGNIFICANT CHANGE UP (ref 1.2–3.4)
LYMPHOCYTES # BLD AUTO: 29.9 % — SIGNIFICANT CHANGE UP (ref 20.5–51.1)
MCHC RBC-ENTMCNC: 26.9 PG — LOW (ref 27–31)
MCHC RBC-ENTMCNC: 32 G/DL — SIGNIFICANT CHANGE UP (ref 32–37)
MCV RBC AUTO: 84.1 FL — SIGNIFICANT CHANGE UP (ref 81–99)
MONOCYTES # BLD AUTO: 0.41 K/UL — SIGNIFICANT CHANGE UP (ref 0.1–0.6)
MONOCYTES NFR BLD AUTO: 7.3 % — SIGNIFICANT CHANGE UP (ref 1.7–9.3)
NEUTROPHILS # BLD AUTO: 3.14 K/UL — SIGNIFICANT CHANGE UP (ref 1.4–6.5)
NEUTROPHILS NFR BLD AUTO: 56.3 % — SIGNIFICANT CHANGE UP (ref 42.2–75.2)
NRBC BLD AUTO-RTO: 0 /100 WBCS — SIGNIFICANT CHANGE UP (ref 0–0)
PLATELET # BLD AUTO: 237 K/UL — SIGNIFICANT CHANGE UP (ref 130–400)
PMV BLD: 9.5 FL — SIGNIFICANT CHANGE UP (ref 7.4–10.4)
POTASSIUM SERPL-MCNC: 3.8 MMOL/L — SIGNIFICANT CHANGE UP (ref 3.5–5)
POTASSIUM SERPL-SCNC: 3.8 MMOL/L — SIGNIFICANT CHANGE UP (ref 3.5–5)
PROT SERPL-MCNC: 6.5 G/DL — SIGNIFICANT CHANGE UP (ref 6–8)
PROTHROM AB SERPL-ACNC: 13.7 SEC — HIGH (ref 9.95–12.87)
RBC # BLD: 4.53 M/UL — SIGNIFICANT CHANGE UP (ref 4.2–5.4)
RBC # FLD: 13.2 % — SIGNIFICANT CHANGE UP (ref 11.5–14.5)
SODIUM SERPL-SCNC: 140 MMOL/L — SIGNIFICANT CHANGE UP (ref 135–146)
WBC # BLD: 5.58 K/UL — SIGNIFICANT CHANGE UP (ref 4.8–10.8)
WBC # FLD AUTO: 5.58 K/UL — SIGNIFICANT CHANGE UP (ref 4.8–10.8)

## 2025-02-13 PROCEDURE — 85610 PROTHROMBIN TIME: CPT

## 2025-02-13 PROCEDURE — 99285 EMERGENCY DEPT VISIT HI MDM: CPT

## 2025-02-13 PROCEDURE — 71275 CT ANGIOGRAPHY CHEST: CPT | Mod: 26

## 2025-02-13 PROCEDURE — 99284 EMERGENCY DEPT VISIT MOD MDM: CPT | Mod: 25

## 2025-02-13 PROCEDURE — 36415 COLL VENOUS BLD VENIPUNCTURE: CPT

## 2025-02-13 PROCEDURE — 71275 CT ANGIOGRAPHY CHEST: CPT | Mod: MC

## 2025-02-13 PROCEDURE — 85730 THROMBOPLASTIN TIME PARTIAL: CPT

## 2025-02-13 PROCEDURE — 71046 X-RAY EXAM CHEST 2 VIEWS: CPT | Mod: 26

## 2025-02-13 PROCEDURE — 96375 TX/PRO/DX INJ NEW DRUG ADDON: CPT | Mod: XU

## 2025-02-13 PROCEDURE — 80053 COMPREHEN METABOLIC PANEL: CPT

## 2025-02-13 PROCEDURE — 71046 X-RAY EXAM CHEST 2 VIEWS: CPT

## 2025-02-13 PROCEDURE — 96374 THER/PROPH/DIAG INJ IV PUSH: CPT | Mod: XU

## 2025-02-13 PROCEDURE — 85025 COMPLETE CBC W/AUTO DIFF WBC: CPT

## 2025-02-13 RX ORDER — METHYLPREDNISOLONE ACETATE 40 MG/ML
125 VIAL (ML) INJECTION ONCE
Refills: 0 | Status: COMPLETED | OUTPATIENT
Start: 2025-02-13 | End: 2025-02-13

## 2025-02-13 RX ORDER — DIPHENHYDRAMINE HCL 25 MG
50 CAPSULE ORAL ONCE
Refills: 0 | Status: COMPLETED | OUTPATIENT
Start: 2025-02-13 | End: 2025-02-13

## 2025-02-13 RX ADMIN — Medication 50 MILLIGRAM(S): at 22:25

## 2025-02-13 RX ADMIN — Medication 125 MILLIGRAM(S): at 22:25

## 2025-02-13 NOTE — ED PROVIDER NOTE - NSFOLLOWUPINSTRUCTIONS_ED_ALL_ED_FT
Hemoptysis    Outline of the upper body, showing parts of the respiratory system, highlighting the nose, throat, and lungs.  Hemoptysis is coughing up blood. With mild hemoptysis, you may cough up small amounts of blood-streaked saliva and mucus from your lungs (sputum). With severe hemoptysis, you may cough up a lot of blood. Coughing up 1–2 cups (240–480 mL) of blood within 24 hours is a medical emergency.  Common causes of mild (nonmassive) hemoptysis include:  An infection in your nose, throat, or lungs, such as bronchitis, pneumonia, bronchiectasis, asthma, or chronic obstructive pulmonary disease (COPD).  Nosebleeds.  Breathing in a foreign object.  Common causes of severe (massive) hemoptysis include:  Tuberculosis (TB).  A tumor in the lungs or upper airway.  A blood clot in the lungs (pulmonary embolism).  Stomach bleeding or ulcer.  Taking blood thinner (anticoagulant) medicine.  Having a medical condition that keeps your blood from normal clotting.  Sometimes, the cause is not known.    Follow these instructions at home:  Medicines    If you were prescribed an antibiotic medicine, take it as told by your health care provider. Do not stop using the antibiotic even if you start to feel better.  Take over-the-counter and prescription medicines only as told by your health care provider.  General instructions    A sign showing that a person should not smoke.  Do not use any products that contain nicotine or tobacco. These products include cigarettes, chewing tobacco, and vaping devices, such as e-cigarettes. If you need help quitting, ask your health care provider.  Return to your normal activities as told by your health care provider. Ask your health care provider what activities are safe for you. This includes any exercise or air travel.  Keep all follow-up visits. This is important.  Contact a health care provider if:  You have a fever over 100.4°F (38°C).  The amount of bleeding increases.  The blood looks brighter or darker red.  Get help right away if you:  Cough up fresh blood or blood clots.  Cough up 1–2 cups (240–480 mL) in 24 hours.  Have trouble breathing.  Feel like you are choking.  Have chest pain, light-headedness, or dizziness.  These symptoms may be an emergency. Get help right away. Call 911.  Do not wait to see if the symptoms will go away.  Do not drive yourself to the hospital.  Summary  Hemoptysis is coughing up blood.  Coughing up 1–2 cups (240–480 mL) of blood within 24 hours is a medical emergency.  Do not use any products that contain nicotine or tobacco. These products include cigarettes, chewing tobacco, and vaping devices, such as e-cigarettes. If you need help quitting, ask your health care provider.  This information is not intended to replace advice given to you by your health care provider. Make sure you discuss any questions you have with your health care provider.

## 2025-02-13 NOTE — ED PROVIDER NOTE - HIV OFFER
Previously Declined (within the last year) Opioid Pregnancy And Lactation Text: These medications can lead to premature delivery and should be avoided during pregnancy. These medications are also present in breast milk in small amounts.

## 2025-02-13 NOTE — ED PROVIDER NOTE - PATIENT PORTAL LINK FT
You can access the FollowMyHealth Patient Portal offered by [facility] by registering at the following website: [email or web address]. By joining [facility]’s FollowMyHealth portal, you will also be able to view your health information using other applications (apps) compatible with our system.

## 2025-02-13 NOTE — ED PROVIDER NOTE - CLINICAL SUMMARY MEDICAL DECISION MAKING FREE TEXT BOX
64-year-old female with history of hypertension, A-fib on Xarelto, CAD status post PCI here for assessment of episode of hemoptysis.  Patient states she coughed a few times, second time had pink blood and third time had bright red blood.  No chest pain, dyspnea.  No other bleeding, in stool, urine, easy bruising or excessive bleeding from gums.    Patient is already anticoagulated so there is low suspicion for PE however her anticoagulation does increase her risk of bleeding from something such as pulmonary hemorrhage so CT was done to rule out both hemorrhage and PE.  No acute intrathoracic pathology noted.    Labs also reviewed, has normal H&H, platelets, coags.  Patient has had no further episodes.  Suspect bleeding was likely from forceful coughing.  No indication for continued monitoring additional testing or imaging.

## 2025-02-13 NOTE — ED PROVIDER NOTE - PHYSICAL EXAMINATION
As Follows:  CONST: Well appearing in NAD  EYES: PERRL, EOMI, Sclera and conjunctiva clear.   CARD: No murmurs, rubs, or gallops; Normal rate and rhythm  RESP: BS Equal B/L, No wheezes, rhonchi or rales. No distress or accessory breathing  SKIN: Warm, dry, no acute rashes. MMM  NEURO: Alert and Oriented, No focal deficits.

## 2025-02-13 NOTE — ED PROVIDER NOTE - OBJECTIVE STATEMENT
Patient is a  64 year old with pmhx of htn, afib on xarelto, cad s/p cath x years ago presents for dual episode of hemoptysis that occurred shortly after dinner today. She denies any fever, cough, sore throat, N/V, chest pain, shortness of breath, abdominal pain, or urinary complaints leading up to today. She denies any current complaints except for previously stated episode. She had buffalo chicken and iced tea prior. Picture of blood clot shown via patient phone.

## 2025-02-21 ENCOUNTER — APPOINTMENT (OUTPATIENT)
Dept: HEART FAILURE | Facility: CLINIC | Age: 65
End: 2025-02-21

## 2025-02-25 ENCOUNTER — NON-APPOINTMENT (OUTPATIENT)
Age: 65
End: 2025-02-25

## 2025-03-03 NOTE — OCCUPATIONAL THERAPY INITIAL EVALUATION ADULT - MUSCLE TONE ASSESSMENT, REHAB EVAL
All discharge information provided via Upper sor[place] interpretor Sae [id number]. All questions answered and medications delivered to room.    normal

## 2025-03-05 ENCOUNTER — APPOINTMENT (OUTPATIENT)
Dept: CARDIOLOGY | Facility: CLINIC | Age: 65
End: 2025-03-05

## 2025-03-05 ENCOUNTER — OUTPATIENT (OUTPATIENT)
Dept: OUTPATIENT SERVICES | Facility: HOSPITAL | Age: 65
LOS: 1 days | End: 2025-03-05
Payer: MEDICARE

## 2025-03-05 ENCOUNTER — APPOINTMENT (OUTPATIENT)
Dept: PSYCHIATRY | Facility: CLINIC | Age: 65
End: 2025-03-05

## 2025-03-05 DIAGNOSIS — F32.9 MAJOR DEPRESSIVE DISORDER, SINGLE EPISODE, UNSPECIFIED: ICD-10-CM

## 2025-03-05 DIAGNOSIS — Z98.890 OTHER SPECIFIED POSTPROCEDURAL STATES: Chronic | ICD-10-CM

## 2025-03-05 DIAGNOSIS — G47.00 INSOMNIA, UNSPECIFIED: ICD-10-CM

## 2025-03-05 DIAGNOSIS — F41.1 GENERALIZED ANXIETY DISORDER: ICD-10-CM

## 2025-03-05 DIAGNOSIS — Z95.1 PRESENCE OF AORTOCORONARY BYPASS GRAFT: Chronic | ICD-10-CM

## 2025-03-05 PROCEDURE — 90832 PSYTX W PT 30 MINUTES: CPT

## 2025-03-06 DIAGNOSIS — F41.1 GENERALIZED ANXIETY DISORDER: ICD-10-CM

## 2025-03-06 NOTE — ED PROVIDER NOTE - DATE/TIME 1
-Manjit is doing great, he is consistently using CPAP, treatment is beneficial and effective  -He has some nasal congestion which was present prior to use of CPAP.  Discussed he can use fluticasone over-the-counter before bed.  He has tried this and found some modest benefit  -Discussed that if he loses weight successfully with Zepbound, can plan for a repeat diagnostic sleep study once he achieves optimal weight loss or has CPAP pressure intolerance.  We discussed symptoms to look out for regarding CPAP pressure intolerance such as dry mouth, aerophagia, or general discomfort.  -I will see him back in 6 months     
06-Oct-2023 19:28

## 2025-03-19 ENCOUNTER — OUTPATIENT (OUTPATIENT)
Dept: OUTPATIENT SERVICES | Facility: HOSPITAL | Age: 65
LOS: 1 days | End: 2025-03-19
Payer: MEDICARE

## 2025-03-19 ENCOUNTER — APPOINTMENT (OUTPATIENT)
Dept: PSYCHIATRY | Facility: CLINIC | Age: 65
End: 2025-03-19

## 2025-03-19 DIAGNOSIS — F32.9 MAJOR DEPRESSIVE DISORDER, SINGLE EPISODE, UNSPECIFIED: ICD-10-CM

## 2025-03-19 DIAGNOSIS — F41.1 GENERALIZED ANXIETY DISORDER: ICD-10-CM

## 2025-03-19 DIAGNOSIS — Z95.1 PRESENCE OF AORTOCORONARY BYPASS GRAFT: Chronic | ICD-10-CM

## 2025-03-19 PROCEDURE — 90832 PSYTX W PT 30 MINUTES: CPT

## 2025-03-20 DIAGNOSIS — F41.1 GENERALIZED ANXIETY DISORDER: ICD-10-CM

## 2025-03-31 ENCOUNTER — APPOINTMENT (OUTPATIENT)
Dept: PSYCHIATRY | Facility: CLINIC | Age: 65
End: 2025-03-31

## 2025-04-02 ENCOUNTER — OUTPATIENT (OUTPATIENT)
Dept: OUTPATIENT SERVICES | Facility: HOSPITAL | Age: 65
LOS: 1 days | End: 2025-04-02
Payer: MEDICARE

## 2025-04-02 ENCOUNTER — APPOINTMENT (OUTPATIENT)
Dept: PSYCHIATRY | Facility: CLINIC | Age: 65
End: 2025-04-02

## 2025-04-02 DIAGNOSIS — Z95.1 PRESENCE OF AORTOCORONARY BYPASS GRAFT: Chronic | ICD-10-CM

## 2025-04-02 DIAGNOSIS — F41.1 GENERALIZED ANXIETY DISORDER: ICD-10-CM

## 2025-04-02 DIAGNOSIS — F32.9 MAJOR DEPRESSIVE DISORDER, SINGLE EPISODE, UNSPECIFIED: ICD-10-CM

## 2025-04-02 DIAGNOSIS — Z98.890 OTHER SPECIFIED POSTPROCEDURAL STATES: Chronic | ICD-10-CM

## 2025-04-02 PROCEDURE — 90832 PSYTX W PT 30 MINUTES: CPT

## 2025-04-03 DIAGNOSIS — F41.1 GENERALIZED ANXIETY DISORDER: ICD-10-CM

## 2025-04-21 ENCOUNTER — APPOINTMENT (OUTPATIENT)
Dept: PSYCHIATRY | Facility: CLINIC | Age: 65
End: 2025-04-21

## 2025-04-30 ENCOUNTER — APPOINTMENT (OUTPATIENT)
Dept: PSYCHIATRY | Facility: CLINIC | Age: 65
End: 2025-04-30

## 2025-04-30 ENCOUNTER — OUTPATIENT (OUTPATIENT)
Dept: OUTPATIENT SERVICES | Facility: HOSPITAL | Age: 65
LOS: 1 days | End: 2025-04-30
Payer: MEDICARE

## 2025-04-30 DIAGNOSIS — F32.9 MAJOR DEPRESSIVE DISORDER, SINGLE EPISODE, UNSPECIFIED: ICD-10-CM

## 2025-04-30 DIAGNOSIS — F41.1 GENERALIZED ANXIETY DISORDER: ICD-10-CM

## 2025-04-30 DIAGNOSIS — Z95.1 PRESENCE OF AORTOCORONARY BYPASS GRAFT: Chronic | ICD-10-CM

## 2025-04-30 DIAGNOSIS — Z98.890 OTHER SPECIFIED POSTPROCEDURAL STATES: Chronic | ICD-10-CM

## 2025-04-30 PROCEDURE — 90832 PSYTX W PT 30 MINUTES: CPT

## 2025-04-30 NOTE — ED ADULT NURSE NOTE - BREATHING, MLM
s/p I&D and fistula irrigation with vancomycin in ED by [facility]  Wound culture growing few streptococcal anginosus and rare staph epidermidis     - continue Unasyn IV per [facility] recs, Augmentin 500mg BID for 7 days on discharge   - chlorhexidine rinses BID sent to pharmacy  - discontinue denture, full liquid diet for now (can eat anything non chew, but full liquid diet is closest while inpatient)  - [facility] following, has outpt appt 5/9/25   - wound care consult placed- gave recs and home care set up (see provider to RN) Spontaneous, unlabored and symmetrical

## 2025-05-01 DIAGNOSIS — F41.1 GENERALIZED ANXIETY DISORDER: ICD-10-CM

## 2025-05-06 ENCOUNTER — NON-APPOINTMENT (OUTPATIENT)
Age: 65
End: 2025-05-06

## 2025-05-12 ENCOUNTER — NON-APPOINTMENT (OUTPATIENT)
Age: 65
End: 2025-05-12

## 2025-05-14 ENCOUNTER — APPOINTMENT (OUTPATIENT)
Dept: CARDIOLOGY | Facility: CLINIC | Age: 65
End: 2025-05-14
Payer: MEDICARE

## 2025-05-14 VITALS — HEART RATE: 54 BPM | SYSTOLIC BLOOD PRESSURE: 124 MMHG | DIASTOLIC BLOOD PRESSURE: 60 MMHG

## 2025-05-14 VITALS — WEIGHT: 220 LBS | TEMPERATURE: 97.6 F | BODY MASS INDEX: 34.53 KG/M2 | HEIGHT: 67 IN

## 2025-05-14 VITALS — DIASTOLIC BLOOD PRESSURE: 70 MMHG | SYSTOLIC BLOOD PRESSURE: 100 MMHG

## 2025-05-14 DIAGNOSIS — E66.9 OBESITY, UNSPECIFIED: ICD-10-CM

## 2025-05-14 DIAGNOSIS — I50.30 UNSPECIFIED DIASTOLIC (CONGESTIVE) HEART FAILURE: ICD-10-CM

## 2025-05-14 DIAGNOSIS — J44.89 OTHER SPECIFIED CHRONIC OBSTRUCTIVE PULMONARY DISEASE: ICD-10-CM

## 2025-05-14 DIAGNOSIS — I10 ESSENTIAL (PRIMARY) HYPERTENSION: ICD-10-CM

## 2025-05-14 DIAGNOSIS — I48.91 UNSPECIFIED ATRIAL FIBRILLATION: ICD-10-CM

## 2025-05-14 PROCEDURE — 93000 ELECTROCARDIOGRAM COMPLETE: CPT

## 2025-05-14 PROCEDURE — 99214 OFFICE O/P EST MOD 30 MIN: CPT

## 2025-05-16 ENCOUNTER — NON-APPOINTMENT (OUTPATIENT)
Age: 65
End: 2025-05-16

## 2025-05-18 PROBLEM — J02.9 ACUTE PHARYNGITIS, UNSPECIFIED ETIOLOGY: Status: ACTIVE | Noted: 2025-05-18 | Resolved: 2025-06-17

## 2025-05-19 ENCOUNTER — APPOINTMENT (OUTPATIENT)
Dept: PSYCHIATRY | Facility: CLINIC | Age: 65
End: 2025-05-19

## 2025-05-21 ENCOUNTER — APPOINTMENT (OUTPATIENT)
Dept: CARDIOLOGY | Facility: CLINIC | Age: 65
End: 2025-05-21

## 2025-05-24 LAB
ALBUMIN SERPL ELPH-MCNC: 4.1 G/DL
ALP BLD-CCNC: 92 U/L
ALT SERPL-CCNC: 11 U/L
ANION GAP SERPL CALC-SCNC: 7 MMOL/L
AST SERPL-CCNC: 16 U/L
BASOPHILS # BLD AUTO: 0.1 K/UL
BASOPHILS NFR BLD AUTO: 1.8 %
BILIRUB SERPL-MCNC: 0.4 MG/DL
BUN SERPL-MCNC: 10 MG/DL
CALCIUM SERPL-MCNC: 10.1 MG/DL
CHLORIDE SERPL-SCNC: 105 MMOL/L
CHOLEST SERPL-MCNC: 140 MG/DL
CO2 SERPL-SCNC: 25 MMOL/L
CREAT SERPL-MCNC: 1.2 MG/DL
EGFRCR SERPLBLD CKD-EPI 2021: 51 ML/MIN/1.73M2
EOSINOPHIL # BLD AUTO: 0.32 K/UL
EOSINOPHIL NFR BLD AUTO: 5.8 %
GLUCOSE SERPL-MCNC: 86 MG/DL
HCT VFR BLD CALC: 40.8 %
HDLC SERPL-MCNC: 47 MG/DL
HGB BLD-MCNC: 13 G/DL
IMM GRANULOCYTES NFR BLD AUTO: 0.2 %
LDLC SERPL-MCNC: 71 MG/DL
LYMPHOCYTES # BLD AUTO: 1.97 K/UL
LYMPHOCYTES NFR BLD AUTO: 35.8 %
MAN DIFF?: NORMAL
MCHC RBC-ENTMCNC: 27.6 PG
MCHC RBC-ENTMCNC: 31.9 G/DL
MCV RBC AUTO: 86.6 FL
MONOCYTES # BLD AUTO: 0.5 K/UL
MONOCYTES NFR BLD AUTO: 9.1 %
NEUTROPHILS # BLD AUTO: 2.6 K/UL
NEUTROPHILS NFR BLD AUTO: 47.3 %
NONHDLC SERPL-MCNC: 93 MG/DL
NT-PROBNP SERPL-MCNC: 426 PG/ML
PLATELET # BLD AUTO: 326 K/UL
PMV BLD AUTO: 0 /100 WBCS
PMV BLD: 9.9 FL
POTASSIUM SERPL-SCNC: 4.5 MMOL/L
PROT SERPL-MCNC: 7 G/DL
RBC # BLD: 4.71 M/UL
RBC # FLD: 12.9 %
SODIUM SERPL-SCNC: 137 MMOL/L
TRIGL SERPL-MCNC: 125 MG/DL
WBC # FLD AUTO: 5.5 K/UL

## 2025-06-02 ENCOUNTER — APPOINTMENT (OUTPATIENT)
Dept: PULMONOLOGY | Facility: CLINIC | Age: 65
End: 2025-06-02

## 2025-06-03 ENCOUNTER — APPOINTMENT (OUTPATIENT)
Dept: PSYCHIATRY | Facility: CLINIC | Age: 65
End: 2025-06-03
Payer: MEDICARE

## 2025-06-03 ENCOUNTER — OUTPATIENT (OUTPATIENT)
Dept: OUTPATIENT SERVICES | Facility: HOSPITAL | Age: 65
LOS: 1 days | End: 2025-06-03
Payer: MEDICARE

## 2025-06-03 ENCOUNTER — APPOINTMENT (OUTPATIENT)
Dept: CARDIOLOGY | Facility: CLINIC | Age: 65
End: 2025-06-03
Payer: MEDICARE

## 2025-06-03 ENCOUNTER — APPOINTMENT (OUTPATIENT)
Dept: HEART FAILURE | Facility: CLINIC | Age: 65
End: 2025-06-03
Payer: MEDICARE

## 2025-06-03 VITALS
DIASTOLIC BLOOD PRESSURE: 70 MMHG | BODY MASS INDEX: 34.21 KG/M2 | HEART RATE: 56 BPM | SYSTOLIC BLOOD PRESSURE: 98 MMHG | OXYGEN SATURATION: 98 % | WEIGHT: 218 LBS | HEIGHT: 67 IN

## 2025-06-03 DIAGNOSIS — F32.9 MAJOR DEPRESSIVE DISORDER, SINGLE EPISODE, UNSPECIFIED: ICD-10-CM

## 2025-06-03 DIAGNOSIS — F41.1 GENERALIZED ANXIETY DISORDER: ICD-10-CM

## 2025-06-03 DIAGNOSIS — G47.00 INSOMNIA, UNSPECIFIED: ICD-10-CM

## 2025-06-03 DIAGNOSIS — Z95.1 PRESENCE OF AORTOCORONARY BYPASS GRAFT: Chronic | ICD-10-CM

## 2025-06-03 DIAGNOSIS — I50.30 UNSPECIFIED DIASTOLIC (CONGESTIVE) HEART FAILURE: ICD-10-CM

## 2025-06-03 DIAGNOSIS — I27.20 PULMONARY HYPERTENSION, UNSPECIFIED: ICD-10-CM

## 2025-06-03 DIAGNOSIS — Z98.890 OTHER SPECIFIED POSTPROCEDURAL STATES: Chronic | ICD-10-CM

## 2025-06-03 PROCEDURE — 99214 OFFICE O/P EST MOD 30 MIN: CPT

## 2025-06-03 PROCEDURE — 93000 ELECTROCARDIOGRAM COMPLETE: CPT

## 2025-06-03 PROCEDURE — 99215 OFFICE O/P EST HI 40 MIN: CPT

## 2025-06-03 PROCEDURE — G2211 COMPLEX E/M VISIT ADD ON: CPT

## 2025-06-03 PROCEDURE — 93306 TTE W/DOPPLER COMPLETE: CPT

## 2025-06-03 RX ORDER — METOPROLOL SUCCINATE 25 MG/1
25 TABLET, EXTENDED RELEASE ORAL DAILY
Qty: 30 | Refills: 3 | Status: ACTIVE | COMMUNITY
Start: 2025-06-03 | End: 1900-01-01

## 2025-06-03 RX ORDER — ALPRAZOLAM 0.5 MG/1
0.5 TABLET ORAL TWICE DAILY
Qty: 60 | Refills: 0 | Status: ACTIVE | COMMUNITY
Start: 2025-06-03 | End: 1900-01-01

## 2025-06-04 ENCOUNTER — OUTPATIENT (OUTPATIENT)
Dept: OUTPATIENT SERVICES | Facility: HOSPITAL | Age: 65
LOS: 1 days | End: 2025-06-04
Payer: MEDICARE

## 2025-06-04 ENCOUNTER — APPOINTMENT (OUTPATIENT)
Dept: PSYCHIATRY | Facility: CLINIC | Age: 65
End: 2025-06-04

## 2025-06-04 DIAGNOSIS — F32.9 MAJOR DEPRESSIVE DISORDER, SINGLE EPISODE, UNSPECIFIED: ICD-10-CM

## 2025-06-04 DIAGNOSIS — F41.1 GENERALIZED ANXIETY DISORDER: ICD-10-CM

## 2025-06-04 DIAGNOSIS — Z98.890 OTHER SPECIFIED POSTPROCEDURAL STATES: Chronic | ICD-10-CM

## 2025-06-04 DIAGNOSIS — G47.00 INSOMNIA, UNSPECIFIED: ICD-10-CM

## 2025-06-04 DIAGNOSIS — Z95.1 PRESENCE OF AORTOCORONARY BYPASS GRAFT: Chronic | ICD-10-CM

## 2025-06-04 PROCEDURE — 90832 PSYTX W PT 30 MINUTES: CPT

## 2025-06-05 DIAGNOSIS — F41.1 GENERALIZED ANXIETY DISORDER: ICD-10-CM

## 2025-06-09 ENCOUNTER — APPOINTMENT (OUTPATIENT)
Dept: PULMONOLOGY | Facility: CLINIC | Age: 65
End: 2025-06-09

## 2025-06-13 ENCOUNTER — APPOINTMENT (OUTPATIENT)
Dept: PULMONOLOGY | Facility: CLINIC | Age: 65
End: 2025-06-13

## 2025-06-17 ENCOUNTER — RX RENEWAL (OUTPATIENT)
Age: 65
End: 2025-06-17

## 2025-06-18 ENCOUNTER — OUTPATIENT (OUTPATIENT)
Dept: OUTPATIENT SERVICES | Facility: HOSPITAL | Age: 65
LOS: 1 days | End: 2025-06-18
Payer: MEDICARE

## 2025-06-18 ENCOUNTER — APPOINTMENT (OUTPATIENT)
Dept: PSYCHIATRY | Facility: CLINIC | Age: 65
End: 2025-06-18

## 2025-06-18 DIAGNOSIS — F33.1 MAJOR DEPRESSIVE DISORDER, RECURRENT, MODERATE: ICD-10-CM

## 2025-06-18 DIAGNOSIS — F41.1 GENERALIZED ANXIETY DISORDER: ICD-10-CM

## 2025-06-18 DIAGNOSIS — Z98.890 OTHER SPECIFIED POSTPROCEDURAL STATES: Chronic | ICD-10-CM

## 2025-06-18 PROCEDURE — 90832 PSYTX W PT 30 MINUTES: CPT

## 2025-06-19 DIAGNOSIS — F33.1 MAJOR DEPRESSIVE DISORDER, RECURRENT, MODERATE: ICD-10-CM

## 2025-06-30 ENCOUNTER — APPOINTMENT (OUTPATIENT)
Dept: PULMONOLOGY | Facility: CLINIC | Age: 65
End: 2025-06-30

## 2025-07-09 ENCOUNTER — OUTPATIENT (OUTPATIENT)
Dept: OUTPATIENT SERVICES | Facility: HOSPITAL | Age: 65
LOS: 1 days | End: 2025-07-09
Payer: MEDICARE

## 2025-07-09 ENCOUNTER — APPOINTMENT (OUTPATIENT)
Dept: PSYCHIATRY | Facility: CLINIC | Age: 65
End: 2025-07-09

## 2025-07-09 DIAGNOSIS — Z98.890 OTHER SPECIFIED POSTPROCEDURAL STATES: Chronic | ICD-10-CM

## 2025-07-09 DIAGNOSIS — F33.1 MAJOR DEPRESSIVE DISORDER, RECURRENT, MODERATE: ICD-10-CM

## 2025-07-09 DIAGNOSIS — F41.1 GENERALIZED ANXIETY DISORDER: ICD-10-CM

## 2025-07-09 DIAGNOSIS — Z95.1 PRESENCE OF AORTOCORONARY BYPASS GRAFT: Chronic | ICD-10-CM

## 2025-07-09 PROCEDURE — 90832 PSYTX W PT 30 MINUTES: CPT

## 2025-07-10 DIAGNOSIS — F41.1 GENERALIZED ANXIETY DISORDER: ICD-10-CM

## 2025-07-15 ENCOUNTER — APPOINTMENT (OUTPATIENT)
Dept: PSYCHIATRY | Facility: CLINIC | Age: 65
End: 2025-07-15

## 2025-07-15 ENCOUNTER — NON-APPOINTMENT (OUTPATIENT)
Age: 65
End: 2025-07-15

## 2025-07-17 ENCOUNTER — EMERGENCY (EMERGENCY)
Facility: HOSPITAL | Age: 65
LOS: 0 days | Discharge: HOME | End: 2025-07-17
Attending: EMERGENCY MEDICINE
Payer: MEDICARE

## 2025-07-17 VITALS
HEART RATE: 96 BPM | HEIGHT: 67 IN | DIASTOLIC BLOOD PRESSURE: 83 MMHG | SYSTOLIC BLOOD PRESSURE: 125 MMHG | RESPIRATION RATE: 20 BRPM | TEMPERATURE: 98 F | OXYGEN SATURATION: 96 %

## 2025-07-17 VITALS
DIASTOLIC BLOOD PRESSURE: 71 MMHG | HEART RATE: 60 BPM | SYSTOLIC BLOOD PRESSURE: 107 MMHG | RESPIRATION RATE: 19 BRPM | OXYGEN SATURATION: 100 %

## 2025-07-17 DIAGNOSIS — R68.83 CHILLS (WITHOUT FEVER): ICD-10-CM

## 2025-07-17 DIAGNOSIS — Z95.1 PRESENCE OF AORTOCORONARY BYPASS GRAFT: Chronic | ICD-10-CM

## 2025-07-17 DIAGNOSIS — Z79.01 LONG TERM (CURRENT) USE OF ANTICOAGULANTS: ICD-10-CM

## 2025-07-17 DIAGNOSIS — J44.9 CHRONIC OBSTRUCTIVE PULMONARY DISEASE, UNSPECIFIED: ICD-10-CM

## 2025-07-17 DIAGNOSIS — J02.9 ACUTE PHARYNGITIS, UNSPECIFIED: ICD-10-CM

## 2025-07-17 DIAGNOSIS — Z88.8 ALLERGY STATUS TO OTHER DRUGS, MEDICAMENTS AND BIOLOGICAL SUBSTANCES: ICD-10-CM

## 2025-07-17 DIAGNOSIS — R13.10 DYSPHAGIA, UNSPECIFIED: ICD-10-CM

## 2025-07-17 DIAGNOSIS — Z98.890 OTHER SPECIFIED POSTPROCEDURAL STATES: Chronic | ICD-10-CM

## 2025-07-17 LAB
ALBUMIN SERPL ELPH-MCNC: 4 G/DL — SIGNIFICANT CHANGE UP (ref 3.5–5.2)
ALP SERPL-CCNC: 87 U/L — SIGNIFICANT CHANGE UP (ref 30–115)
ALT FLD-CCNC: 7 U/L — SIGNIFICANT CHANGE UP (ref 0–41)
ANION GAP SERPL CALC-SCNC: 13 MMOL/L — SIGNIFICANT CHANGE UP (ref 7–14)
AST SERPL-CCNC: 11 U/L — SIGNIFICANT CHANGE UP (ref 0–41)
BASOPHILS # BLD AUTO: 0.07 K/UL — SIGNIFICANT CHANGE UP (ref 0–0.2)
BASOPHILS NFR BLD AUTO: 1.3 % — SIGNIFICANT CHANGE UP (ref 0–2)
BILIRUB SERPL-MCNC: 0.9 MG/DL — SIGNIFICANT CHANGE UP (ref 0.2–1.2)
BUN SERPL-MCNC: 10 MG/DL — SIGNIFICANT CHANGE UP (ref 10–20)
CALCIUM SERPL-MCNC: 10.2 MG/DL — SIGNIFICANT CHANGE UP (ref 8.4–10.5)
CHLORIDE SERPL-SCNC: 106 MMOL/L — SIGNIFICANT CHANGE UP (ref 98–110)
CO2 SERPL-SCNC: 21 MMOL/L — SIGNIFICANT CHANGE UP (ref 17–32)
CREAT SERPL-MCNC: 1.2 MG/DL — SIGNIFICANT CHANGE UP (ref 0.7–1.5)
EGFR: 51 ML/MIN/1.73M2 — LOW
EGFR: 51 ML/MIN/1.73M2 — LOW
EOSINOPHIL # BLD AUTO: 0.23 K/UL — SIGNIFICANT CHANGE UP (ref 0–0.5)
EOSINOPHIL NFR BLD AUTO: 4.3 % — SIGNIFICANT CHANGE UP (ref 0–6)
GLUCOSE SERPL-MCNC: 91 MG/DL — SIGNIFICANT CHANGE UP (ref 70–99)
HCT VFR BLD CALC: 38.7 % — SIGNIFICANT CHANGE UP (ref 34.5–45)
HGB BLD-MCNC: 12.3 G/DL — SIGNIFICANT CHANGE UP (ref 11.5–15.5)
IMM GRANULOCYTES # BLD AUTO: 0.01 K/UL — SIGNIFICANT CHANGE UP (ref 0–0.07)
IMM GRANULOCYTES NFR BLD AUTO: 0.2 % — SIGNIFICANT CHANGE UP (ref 0–0.9)
LYMPHOCYTES # BLD AUTO: 1.37 K/UL — SIGNIFICANT CHANGE UP (ref 1–3.3)
LYMPHOCYTES NFR BLD AUTO: 25.5 % — SIGNIFICANT CHANGE UP (ref 13–44)
MCHC RBC-ENTMCNC: 26.9 PG — LOW (ref 27–34)
MCHC RBC-ENTMCNC: 31.8 G/DL — LOW (ref 32–36)
MCV RBC AUTO: 84.7 FL — SIGNIFICANT CHANGE UP (ref 80–100)
MONOCYTES # BLD AUTO: 0.53 K/UL — SIGNIFICANT CHANGE UP (ref 0–0.9)
MONOCYTES NFR BLD AUTO: 9.9 % — SIGNIFICANT CHANGE UP (ref 2–14)
NEUTROPHILS # BLD AUTO: 3.17 K/UL — SIGNIFICANT CHANGE UP (ref 1.8–7.4)
NEUTROPHILS NFR BLD AUTO: 58.8 % — SIGNIFICANT CHANGE UP (ref 43–77)
NRBC # BLD AUTO: 0 K/UL — SIGNIFICANT CHANGE UP (ref 0–0)
NRBC # FLD: 0 K/UL — SIGNIFICANT CHANGE UP (ref 0–0)
NRBC BLD AUTO-RTO: 0 /100 WBCS — SIGNIFICANT CHANGE UP (ref 0–0)
PLATELET # BLD AUTO: 261 K/UL — SIGNIFICANT CHANGE UP (ref 150–400)
PMV BLD: 9.7 FL — SIGNIFICANT CHANGE UP (ref 7–13)
POTASSIUM SERPL-MCNC: 4.3 MMOL/L — SIGNIFICANT CHANGE UP (ref 3.5–5)
POTASSIUM SERPL-SCNC: 4.3 MMOL/L — SIGNIFICANT CHANGE UP (ref 3.5–5)
PROT SERPL-MCNC: 6.9 G/DL — SIGNIFICANT CHANGE UP (ref 6–8)
RBC # BLD: 4.57 M/UL — SIGNIFICANT CHANGE UP (ref 3.8–5.2)
RBC # FLD: 13.2 % — SIGNIFICANT CHANGE UP (ref 10.3–14.5)
SODIUM SERPL-SCNC: 140 MMOL/L — SIGNIFICANT CHANGE UP (ref 135–146)
WBC # BLD: 5.38 K/UL — SIGNIFICANT CHANGE UP (ref 3.8–10.5)
WBC # FLD AUTO: 5.38 K/UL — SIGNIFICANT CHANGE UP (ref 3.8–10.5)

## 2025-07-17 PROCEDURE — 80053 COMPREHEN METABOLIC PANEL: CPT

## 2025-07-17 PROCEDURE — 85025 COMPLETE CBC W/AUTO DIFF WBC: CPT

## 2025-07-17 PROCEDURE — 70491 CT SOFT TISSUE NECK W/DYE: CPT | Mod: 26

## 2025-07-17 PROCEDURE — 99285 EMERGENCY DEPT VISIT HI MDM: CPT

## 2025-07-17 PROCEDURE — 70491 CT SOFT TISSUE NECK W/DYE: CPT

## 2025-07-17 PROCEDURE — 96375 TX/PRO/DX INJ NEW DRUG ADDON: CPT | Mod: XU

## 2025-07-17 PROCEDURE — 99284 EMERGENCY DEPT VISIT MOD MDM: CPT | Mod: 25

## 2025-07-17 PROCEDURE — 96374 THER/PROPH/DIAG INJ IV PUSH: CPT | Mod: XU

## 2025-07-17 RX ORDER — PREDNISONE 20 MG/1
1 TABLET ORAL
Qty: 5 | Refills: 0
Start: 2025-07-17 | End: 2025-07-21

## 2025-07-17 RX ORDER — METHYLPREDNISOLONE ACETATE 80 MG/ML
125 INJECTION, SUSPENSION INTRA-ARTICULAR; INTRALESIONAL; INTRAMUSCULAR; SOFT TISSUE ONCE
Refills: 0 | Status: COMPLETED | OUTPATIENT
Start: 2025-07-17 | End: 2025-07-17

## 2025-07-17 RX ORDER — KETOROLAC TROMETHAMINE 30 MG/ML
15 INJECTION, SOLUTION INTRAMUSCULAR; INTRAVENOUS ONCE
Refills: 0 | Status: DISCONTINUED | OUTPATIENT
Start: 2025-07-17 | End: 2025-07-17

## 2025-07-17 RX ORDER — DIPHENHYDRAMINE HCL 12.5MG/5ML
50 ELIXIR ORAL ONCE
Refills: 0 | Status: COMPLETED | OUTPATIENT
Start: 2025-07-17 | End: 2025-07-17

## 2025-07-17 RX ADMIN — METHYLPREDNISOLONE ACETATE 125 MILLIGRAM(S): 80 INJECTION, SUSPENSION INTRA-ARTICULAR; INTRALESIONAL; INTRAMUSCULAR; SOFT TISSUE at 16:24

## 2025-07-17 RX ADMIN — Medication 50 MILLIGRAM(S): at 17:53

## 2025-07-17 RX ADMIN — KETOROLAC TROMETHAMINE 15 MILLIGRAM(S): 30 INJECTION, SOLUTION INTRAMUSCULAR; INTRAVENOUS at 16:24

## 2025-07-17 RX ADMIN — Medication 1000 MILLILITER(S): at 16:25

## 2025-07-17 NOTE — ED ADULT TRIAGE NOTE - CHIEF COMPLAINT QUOTE
Presented to ED c/o throat pain, went to  on Tuesday, was given antibiotics, unable to swallow, was told to come back for possible abscess.

## 2025-07-17 NOTE — ED ADULT NURSE NOTE - OBJECTIVE STATEMENT
Pt has had a sore throat since Sunday.  She went to urgent care on Monday and was started on antibiotics.  She was instructed to come to ED if throat worsened to r/o abscess

## 2025-07-17 NOTE — ED PROVIDER NOTE - CLINICAL SUMMARY MEDICAL DECISION MAKING FREE TEXT BOX
Patient symptoms are treated with improvement.  We did obtain imaging.  No evidence of abscess seen.  Recommend patient continue antibiotics.  Will do short course of steroids.  Recommend patient follow with PMD return if any worsening symptoms or concerns.

## 2025-07-17 NOTE — ED PROVIDER NOTE - PHYSICAL EXAMINATION
Vital Signs: I have reviewed the initial vital signs.  Constitutional: well-nourished, appears stated age, no acute distress  Head: atraumatic and normocephalic  Eyes:PERRLA, EOMI, no nystagmus, clear conjunctiva  ENT: TM b/l clear, oropharynx clear with tonsilar enlargement, +uvula deviation, no anterior neck swelling,  no tongue elevation or swelling, no trismus,  MMM  Cardiovascular: regular rate, regular rhythm, well-perfused extremities  Respiratory: unlabored respiratory effort, clear to auscultation bilaterally  Neuro: awake, alert, follows commands, oriented, no focal deficits, GCS 15  ;

## 2025-07-17 NOTE — ED PROVIDER NOTE - PATIENT PORTAL LINK FT
You can access the FollowMyHealth Patient Portal offered by [facility] by registering at the following website: [email or web address]. By joining [facility]’s FollowMyHealth portal, you will also be able to view your health information using other applications (apps) compatible with our system.

## 2025-07-17 NOTE — ED PROVIDER NOTE - ATTENDING APP SHARED VISIT CONTRIBUTION OF CARE
64-year-old female presents for evaluation of sore throat for the last few days.  Patient was seen at urgent care earlier in the week and started on antibiotics.  Patient's daughter feels that throat looks better however patient is not feeling much better and still has pain with swallowing.  No fever or chills.  On exam patient is in NAD, AAO x 3, OP with erythema, no exudates, positive tonsillar hypertrophy right greater than left, no LAD, no rash

## 2025-07-17 NOTE — ED PROVIDER NOTE - OBJECTIVE STATEMENT
65 y/o female with hx of COPD on xarelto presents to the Ed with sore throat and chills unchanged with amoxicillin. patient c/o painful swallowing. no sob, chest pain or cough. no anterior neck swelling. no rash. patient neg strep, covid at [facility]

## 2025-07-17 NOTE — ED PROVIDER NOTE - DISPOSITION TYPE
· Patient previously on Lipitor 40 mg daily  Patient reports not taking statin since July  · Obtain lipid panel  DISCHARGE

## 2025-07-22 ENCOUNTER — APPOINTMENT (OUTPATIENT)
Dept: PSYCHIATRY | Facility: CLINIC | Age: 65
End: 2025-07-22
Payer: MEDICARE

## 2025-07-22 ENCOUNTER — APPOINTMENT (OUTPATIENT)
Dept: HEART FAILURE | Facility: CLINIC | Age: 65
End: 2025-07-22
Payer: MEDICARE

## 2025-07-22 ENCOUNTER — OUTPATIENT (OUTPATIENT)
Dept: OUTPATIENT SERVICES | Facility: HOSPITAL | Age: 65
LOS: 1 days | End: 2025-07-22
Payer: MEDICARE

## 2025-07-22 VITALS
HEART RATE: 57 BPM | HEIGHT: 67 IN | WEIGHT: 219 LBS | SYSTOLIC BLOOD PRESSURE: 102 MMHG | DIASTOLIC BLOOD PRESSURE: 60 MMHG | BODY MASS INDEX: 34.37 KG/M2 | OXYGEN SATURATION: 98 %

## 2025-07-22 DIAGNOSIS — F32.9 MAJOR DEPRESSIVE DISORDER, SINGLE EPISODE, UNSPECIFIED: ICD-10-CM

## 2025-07-22 DIAGNOSIS — F41.1 GENERALIZED ANXIETY DISORDER: ICD-10-CM

## 2025-07-22 DIAGNOSIS — Z98.890 OTHER SPECIFIED POSTPROCEDURAL STATES: Chronic | ICD-10-CM

## 2025-07-22 DIAGNOSIS — Z95.1 PRESENCE OF AORTOCORONARY BYPASS GRAFT: Chronic | ICD-10-CM

## 2025-07-22 DIAGNOSIS — R06.00 DYSPNEA, UNSPECIFIED: ICD-10-CM

## 2025-07-22 PROCEDURE — 99214 OFFICE O/P EST MOD 30 MIN: CPT

## 2025-07-22 PROCEDURE — 99212 OFFICE O/P EST SF 10 MIN: CPT

## 2025-07-22 PROCEDURE — 93000 ELECTROCARDIOGRAM COMPLETE: CPT

## 2025-07-23 ENCOUNTER — APPOINTMENT (OUTPATIENT)
Dept: PSYCHIATRY | Facility: CLINIC | Age: 65
End: 2025-07-23

## 2025-07-23 ENCOUNTER — NON-APPOINTMENT (OUTPATIENT)
Age: 65
End: 2025-07-23

## 2025-07-23 DIAGNOSIS — F41.1 GENERALIZED ANXIETY DISORDER: ICD-10-CM

## 2025-07-23 DIAGNOSIS — F32.9 MAJOR DEPRESSIVE DISORDER, SINGLE EPISODE, UNSPECIFIED: ICD-10-CM

## 2025-07-30 ENCOUNTER — OUTPATIENT (OUTPATIENT)
Dept: OUTPATIENT SERVICES | Facility: HOSPITAL | Age: 65
LOS: 1 days | End: 2025-07-30
Payer: MEDICARE

## 2025-07-30 ENCOUNTER — APPOINTMENT (OUTPATIENT)
Dept: PSYCHIATRY | Facility: CLINIC | Age: 65
End: 2025-07-30

## 2025-07-30 DIAGNOSIS — Z95.1 PRESENCE OF AORTOCORONARY BYPASS GRAFT: Chronic | ICD-10-CM

## 2025-07-30 DIAGNOSIS — F41.1 GENERALIZED ANXIETY DISORDER: ICD-10-CM

## 2025-07-30 DIAGNOSIS — Z98.890 OTHER SPECIFIED POSTPROCEDURAL STATES: Chronic | ICD-10-CM

## 2025-07-30 PROCEDURE — 90832 PSYTX W PT 30 MINUTES: CPT | Mod: 95

## 2025-07-31 DIAGNOSIS — F41.1 GENERALIZED ANXIETY DISORDER: ICD-10-CM

## 2025-08-13 ENCOUNTER — OUTPATIENT (OUTPATIENT)
Dept: OUTPATIENT SERVICES | Facility: HOSPITAL | Age: 65
LOS: 1 days | End: 2025-08-13
Payer: MEDICARE

## 2025-08-13 ENCOUNTER — APPOINTMENT (OUTPATIENT)
Dept: PSYCHIATRY | Facility: CLINIC | Age: 65
End: 2025-08-13

## 2025-08-13 DIAGNOSIS — Z95.1 PRESENCE OF AORTOCORONARY BYPASS GRAFT: Chronic | ICD-10-CM

## 2025-08-13 DIAGNOSIS — Z98.890 OTHER SPECIFIED POSTPROCEDURAL STATES: Chronic | ICD-10-CM

## 2025-08-13 DIAGNOSIS — F41.1 GENERALIZED ANXIETY DISORDER: ICD-10-CM

## 2025-08-13 DIAGNOSIS — F32.9 MAJOR DEPRESSIVE DISORDER, SINGLE EPISODE, UNSPECIFIED: ICD-10-CM

## 2025-08-13 PROCEDURE — 90832 PSYTX W PT 30 MINUTES: CPT

## 2025-08-14 DIAGNOSIS — F41.1 GENERALIZED ANXIETY DISORDER: ICD-10-CM

## 2025-08-15 ENCOUNTER — APPOINTMENT (OUTPATIENT)
Dept: CARDIOLOGY | Facility: CLINIC | Age: 65
End: 2025-08-15
Payer: MEDICARE

## 2025-08-15 VITALS — HEART RATE: 53 BPM | SYSTOLIC BLOOD PRESSURE: 124 MMHG | DIASTOLIC BLOOD PRESSURE: 80 MMHG

## 2025-08-15 VITALS — HEIGHT: 67 IN | WEIGHT: 214 LBS | BODY MASS INDEX: 33.59 KG/M2

## 2025-08-15 DIAGNOSIS — I48.91 UNSPECIFIED ATRIAL FIBRILLATION: ICD-10-CM

## 2025-08-15 DIAGNOSIS — I50.30 UNSPECIFIED DIASTOLIC (CONGESTIVE) HEART FAILURE: ICD-10-CM

## 2025-08-15 DIAGNOSIS — E66.9 OBESITY, UNSPECIFIED: ICD-10-CM

## 2025-08-15 DIAGNOSIS — J44.89 OTHER SPECIFIED CHRONIC OBSTRUCTIVE PULMONARY DISEASE: ICD-10-CM

## 2025-08-15 DIAGNOSIS — I10 ESSENTIAL (PRIMARY) HYPERTENSION: ICD-10-CM

## 2025-08-15 PROCEDURE — 93000 ELECTROCARDIOGRAM COMPLETE: CPT

## 2025-08-15 PROCEDURE — 99214 OFFICE O/P EST MOD 30 MIN: CPT

## 2025-08-30 ENCOUNTER — OUTPATIENT (OUTPATIENT)
Dept: OUTPATIENT SERVICES | Facility: HOSPITAL | Age: 65
LOS: 1 days | Discharge: HOME | End: 2025-08-30
Payer: MEDICARE

## 2025-08-30 DIAGNOSIS — Z95.1 PRESENCE OF AORTOCORONARY BYPASS GRAFT: Chronic | ICD-10-CM

## 2025-08-30 DIAGNOSIS — Z98.890 OTHER SPECIFIED POSTPROCEDURAL STATES: Chronic | ICD-10-CM

## 2025-08-30 PROCEDURE — 95800 SLP STDY UNATTENDED: CPT | Mod: 26

## 2025-08-30 PROCEDURE — 95800 SLP STDY UNATTENDED: CPT

## 2025-09-03 ENCOUNTER — OUTPATIENT (OUTPATIENT)
Dept: OUTPATIENT SERVICES | Facility: HOSPITAL | Age: 65
LOS: 1 days | End: 2025-09-03
Payer: MEDICARE

## 2025-09-03 ENCOUNTER — APPOINTMENT (OUTPATIENT)
Dept: PSYCHIATRY | Facility: CLINIC | Age: 65
End: 2025-09-03

## 2025-09-03 DIAGNOSIS — Z98.890 OTHER SPECIFIED POSTPROCEDURAL STATES: Chronic | ICD-10-CM

## 2025-09-03 DIAGNOSIS — F32.9 MAJOR DEPRESSIVE DISORDER, SINGLE EPISODE, UNSPECIFIED: ICD-10-CM

## 2025-09-03 DIAGNOSIS — F41.1 GENERALIZED ANXIETY DISORDER: ICD-10-CM

## 2025-09-03 DIAGNOSIS — Z95.1 PRESENCE OF AORTOCORONARY BYPASS GRAFT: Chronic | ICD-10-CM

## 2025-09-03 PROCEDURE — 90832 PSYTX W PT 30 MINUTES: CPT

## 2025-09-04 DIAGNOSIS — F41.1 GENERALIZED ANXIETY DISORDER: ICD-10-CM

## 2025-09-08 DIAGNOSIS — G47.33 OBSTRUCTIVE SLEEP APNEA (ADULT) (PEDIATRIC): ICD-10-CM

## 2025-09-09 DIAGNOSIS — G47.33 OBSTRUCTIVE SLEEP APNEA (ADULT) (PEDIATRIC): ICD-10-CM

## 2025-09-17 ENCOUNTER — APPOINTMENT (OUTPATIENT)
Dept: PSYCHIATRY | Facility: CLINIC | Age: 65
End: 2025-09-17

## 2025-09-25 PROBLEM — G47.33 OSA (OBSTRUCTIVE SLEEP APNEA): Status: ACTIVE | Noted: 2025-09-25
